# Patient Record
Sex: MALE | Race: WHITE | Employment: OTHER
[De-identification: names, ages, dates, MRNs, and addresses within clinical notes are randomized per-mention and may not be internally consistent; named-entity substitution may affect disease eponyms.]

---

## 2017-01-02 ENCOUNTER — HOME CARE VISIT (OUTPATIENT)
Dept: SCHEDULING | Facility: HOME HEALTH | Age: 67
End: 2017-01-02
Payer: MEDICAID

## 2017-01-02 PROCEDURE — 400013 HH SOC

## 2017-01-02 PROCEDURE — G0299 HHS/HOSPICE OF RN EA 15 MIN: HCPCS

## 2017-01-03 ENCOUNTER — HOME CARE VISIT (OUTPATIENT)
Dept: SCHEDULING | Facility: HOME HEALTH | Age: 67
End: 2017-01-03
Payer: MEDICAID

## 2017-01-03 VITALS
TEMPERATURE: 97.6 F | RESPIRATION RATE: 20 BRPM | SYSTOLIC BLOOD PRESSURE: 130 MMHG | OXYGEN SATURATION: 96 % | DIASTOLIC BLOOD PRESSURE: 70 MMHG | HEART RATE: 72 BPM

## 2017-01-03 VITALS
TEMPERATURE: 96.9 F | DIASTOLIC BLOOD PRESSURE: 90 MMHG | OXYGEN SATURATION: 96 % | SYSTOLIC BLOOD PRESSURE: 140 MMHG | HEART RATE: 68 BPM | RESPIRATION RATE: 18 BRPM

## 2017-01-03 PROCEDURE — G0155 HHCP-SVS OF CSW,EA 15 MIN: HCPCS

## 2017-01-03 PROCEDURE — G0299 HHS/HOSPICE OF RN EA 15 MIN: HCPCS

## 2017-01-04 ENCOUNTER — PATIENT OUTREACH (OUTPATIENT)
Dept: CASE MANAGEMENT | Age: 67
End: 2017-01-04

## 2017-01-04 ENCOUNTER — HOME CARE VISIT (OUTPATIENT)
Dept: SCHEDULING | Facility: HOME HEALTH | Age: 67
End: 2017-01-04
Payer: MEDICAID

## 2017-01-04 VITALS
RESPIRATION RATE: 18 BRPM | HEART RATE: 74 BPM | OXYGEN SATURATION: 98 % | TEMPERATURE: 97.2 F | DIASTOLIC BLOOD PRESSURE: 74 MMHG | SYSTOLIC BLOOD PRESSURE: 132 MMHG

## 2017-01-04 PROCEDURE — G0299 HHS/HOSPICE OF RN EA 15 MIN: HCPCS

## 2017-01-04 NOTE — PROGRESS NOTES
CCM outreach after discharge from SNF. Spoke with patient's son due to patient being at diaylsis . Contact information provided to son for patient to return call. Outreach to [place] , Delmy Bo. Unable to reach. Left message requesting a return call. CCM will folllow up. This note will not be viewable in [address]

## 2017-01-05 ENCOUNTER — HOME CARE VISIT (OUTPATIENT)
Dept: SCHEDULING | Facility: HOME HEALTH | Age: 67
End: 2017-01-05
Payer: MEDICAID

## 2017-01-05 ENCOUNTER — HOME CARE VISIT (OUTPATIENT)
Dept: HOME HEALTH SERVICES | Facility: HOME HEALTH | Age: 67
End: 2017-01-05
Payer: MEDICAID

## 2017-01-05 VITALS
DIASTOLIC BLOOD PRESSURE: 62 MMHG | SYSTOLIC BLOOD PRESSURE: 128 MMHG | RESPIRATION RATE: 18 BRPM | TEMPERATURE: 98.2 F | HEART RATE: 80 BPM

## 2017-01-05 PROCEDURE — G0299 HHS/HOSPICE OF RN EA 15 MIN: HCPCS

## 2017-01-06 ENCOUNTER — HOME CARE VISIT (OUTPATIENT)
Dept: SCHEDULING | Facility: HOME HEALTH | Age: 67
End: 2017-01-06
Payer: MEDICAID

## 2017-01-06 VITALS
RESPIRATION RATE: 16 BRPM | HEART RATE: 82 BPM | OXYGEN SATURATION: 95 % | DIASTOLIC BLOOD PRESSURE: 80 MMHG | SYSTOLIC BLOOD PRESSURE: 152 MMHG

## 2017-01-06 VITALS
DIASTOLIC BLOOD PRESSURE: 86 MMHG | SYSTOLIC BLOOD PRESSURE: 150 MMHG | HEART RATE: 101 BPM | OXYGEN SATURATION: 98 % | TEMPERATURE: 97 F | RESPIRATION RATE: 20 BRPM

## 2017-01-06 PROCEDURE — G0299 HHS/HOSPICE OF RN EA 15 MIN: HCPCS

## 2017-01-06 PROCEDURE — G0152 HHCP-SERV OF OT,EA 15 MIN: HCPCS

## 2017-01-09 ENCOUNTER — HOME CARE VISIT (OUTPATIENT)
Dept: SCHEDULING | Facility: HOME HEALTH | Age: 67
End: 2017-01-09
Payer: MEDICAID

## 2017-01-09 VITALS
OXYGEN SATURATION: 97 % | HEART RATE: 89 BPM | SYSTOLIC BLOOD PRESSURE: 150 MMHG | TEMPERATURE: 96.9 F | RESPIRATION RATE: 14 BRPM | DIASTOLIC BLOOD PRESSURE: 90 MMHG

## 2017-01-09 PROCEDURE — G0299 HHS/HOSPICE OF RN EA 15 MIN: HCPCS

## 2017-01-10 ENCOUNTER — HOME CARE VISIT (OUTPATIENT)
Dept: SCHEDULING | Facility: HOME HEALTH | Age: 67
End: 2017-01-10
Payer: MEDICAID

## 2017-01-10 VITALS
HEART RATE: 90 BPM | SYSTOLIC BLOOD PRESSURE: 150 MMHG | DIASTOLIC BLOOD PRESSURE: 82 MMHG | RESPIRATION RATE: 17 BRPM | OXYGEN SATURATION: 97 %

## 2017-01-10 PROCEDURE — G0151 HHCP-SERV OF PT,EA 15 MIN: HCPCS

## 2017-01-10 PROCEDURE — G0158 HHC OT ASSISTANT EA 15: HCPCS

## 2017-01-11 ENCOUNTER — HOME CARE VISIT (OUTPATIENT)
Dept: SCHEDULING | Facility: HOME HEALTH | Age: 67
End: 2017-01-11
Payer: MEDICAID

## 2017-01-11 VITALS
SYSTOLIC BLOOD PRESSURE: 158 MMHG | DIASTOLIC BLOOD PRESSURE: 82 MMHG | HEART RATE: 80 BPM | RESPIRATION RATE: 18 BRPM | TEMPERATURE: 98.8 F | OXYGEN SATURATION: 98 %

## 2017-01-11 VITALS
RESPIRATION RATE: 16 BRPM | SYSTOLIC BLOOD PRESSURE: 130 MMHG | OXYGEN SATURATION: 96 % | HEART RATE: 69 BPM | DIASTOLIC BLOOD PRESSURE: 62 MMHG | TEMPERATURE: 95.6 F

## 2017-01-11 PROCEDURE — G0299 HHS/HOSPICE OF RN EA 15 MIN: HCPCS

## 2017-01-12 ENCOUNTER — HOME CARE VISIT (OUTPATIENT)
Dept: SCHEDULING | Facility: HOME HEALTH | Age: 67
End: 2017-01-12
Payer: MEDICAID

## 2017-01-12 PROCEDURE — G0158 HHC OT ASSISTANT EA 15: HCPCS

## 2017-01-13 VITALS
DIASTOLIC BLOOD PRESSURE: 81 MMHG | SYSTOLIC BLOOD PRESSURE: 118 MMHG | RESPIRATION RATE: 18 BRPM | OXYGEN SATURATION: 98 % | HEART RATE: 78 BPM

## 2017-01-14 ENCOUNTER — HOME CARE VISIT (OUTPATIENT)
Dept: SCHEDULING | Facility: HOME HEALTH | Age: 67
End: 2017-01-14
Payer: MEDICAID

## 2017-01-14 VITALS
RESPIRATION RATE: 18 BRPM | HEART RATE: 72 BPM | SYSTOLIC BLOOD PRESSURE: 122 MMHG | DIASTOLIC BLOOD PRESSURE: 64 MMHG | OXYGEN SATURATION: 97 % | TEMPERATURE: 98 F

## 2017-01-14 PROCEDURE — G0299 HHS/HOSPICE OF RN EA 15 MIN: HCPCS

## 2017-01-17 ENCOUNTER — HOME CARE VISIT (OUTPATIENT)
Dept: HOME HEALTH SERVICES | Facility: HOME HEALTH | Age: 67
End: 2017-01-17
Payer: MEDICAID

## 2017-01-17 ENCOUNTER — HOME CARE VISIT (OUTPATIENT)
Dept: SCHEDULING | Facility: HOME HEALTH | Age: 67
End: 2017-01-17
Payer: MEDICAID

## 2017-01-17 VITALS
HEART RATE: 62 BPM | SYSTOLIC BLOOD PRESSURE: 112 MMHG | DIASTOLIC BLOOD PRESSURE: 60 MMHG | OXYGEN SATURATION: 98 % | TEMPERATURE: 97.1 F | RESPIRATION RATE: 18 BRPM

## 2017-01-17 VITALS
DIASTOLIC BLOOD PRESSURE: 70 MMHG | HEART RATE: 66 BPM | OXYGEN SATURATION: 96 % | RESPIRATION RATE: 17 BRPM | SYSTOLIC BLOOD PRESSURE: 112 MMHG

## 2017-01-17 PROCEDURE — G0157 HHC PT ASSISTANT EA 15: HCPCS

## 2017-01-17 PROCEDURE — G0152 HHCP-SERV OF OT,EA 15 MIN: HCPCS

## 2017-01-17 PROCEDURE — G0299 HHS/HOSPICE OF RN EA 15 MIN: HCPCS

## 2017-01-18 ENCOUNTER — PATIENT OUTREACH (OUTPATIENT)
Dept: CASE MANAGEMENT | Age: 67
End: 2017-01-18

## 2017-01-18 VITALS
RESPIRATION RATE: 20 BRPM | TEMPERATURE: 97.3 F | DIASTOLIC BLOOD PRESSURE: 68 MMHG | SYSTOLIC BLOOD PRESSURE: 122 MMHG | HEART RATE: 64 BPM

## 2017-01-19 ENCOUNTER — HOME CARE VISIT (OUTPATIENT)
Dept: SCHEDULING | Facility: HOME HEALTH | Age: 67
End: 2017-01-19
Payer: MEDICAID

## 2017-01-19 VITALS
DIASTOLIC BLOOD PRESSURE: 70 MMHG | TEMPERATURE: 97.8 F | HEART RATE: 70 BPM | SYSTOLIC BLOOD PRESSURE: 130 MMHG | OXYGEN SATURATION: 95 % | RESPIRATION RATE: 20 BRPM

## 2017-01-19 PROCEDURE — G0299 HHS/HOSPICE OF RN EA 15 MIN: HCPCS

## 2017-01-19 PROCEDURE — G0157 HHC PT ASSISTANT EA 15: HCPCS

## 2017-01-20 VITALS
HEART RATE: 61 BPM | SYSTOLIC BLOOD PRESSURE: 119 MMHG | TEMPERATURE: 97.8 F | DIASTOLIC BLOOD PRESSURE: 70 MMHG | RESPIRATION RATE: 18 BRPM

## 2017-01-24 ENCOUNTER — HOME CARE VISIT (OUTPATIENT)
Dept: SCHEDULING | Facility: HOME HEALTH | Age: 67
End: 2017-01-24
Payer: MEDICAID

## 2017-01-24 VITALS
HEART RATE: 55 BPM | RESPIRATION RATE: 18 BRPM | SYSTOLIC BLOOD PRESSURE: 122 MMHG | TEMPERATURE: 96.2 F | DIASTOLIC BLOOD PRESSURE: 80 MMHG | OXYGEN SATURATION: 98 %

## 2017-01-24 PROCEDURE — G0157 HHC PT ASSISTANT EA 15: HCPCS

## 2017-01-26 ENCOUNTER — HOME CARE VISIT (OUTPATIENT)
Dept: SCHEDULING | Facility: HOME HEALTH | Age: 67
End: 2017-01-26
Payer: MEDICAID

## 2017-01-26 VITALS
DIASTOLIC BLOOD PRESSURE: 78 MMHG | OXYGEN SATURATION: 98 % | TEMPERATURE: 97.2 F | HEART RATE: 72 BPM | SYSTOLIC BLOOD PRESSURE: 130 MMHG | RESPIRATION RATE: 20 BRPM

## 2017-01-26 PROCEDURE — G0157 HHC PT ASSISTANT EA 15: HCPCS

## 2017-01-31 ENCOUNTER — HOME CARE VISIT (OUTPATIENT)
Dept: SCHEDULING | Facility: HOME HEALTH | Age: 67
End: 2017-01-31
Payer: MEDICAID

## 2017-01-31 PROCEDURE — G0151 HHCP-SERV OF PT,EA 15 MIN: HCPCS

## 2017-02-01 VITALS
SYSTOLIC BLOOD PRESSURE: 126 MMHG | DIASTOLIC BLOOD PRESSURE: 66 MMHG | TEMPERATURE: 97.7 F | RESPIRATION RATE: 18 BRPM | HEART RATE: 58 BPM | OXYGEN SATURATION: 94 %

## 2017-03-29 ENCOUNTER — HOSPITAL ENCOUNTER (INPATIENT)
Age: 67
LOS: 2 days | Discharge: HOME HEALTH | DRG: 660 | End: 2017-04-02
Attending: EMERGENCY MEDICINE | Admitting: INTERNAL MEDICINE
Payer: MEDICAID

## 2017-03-29 DIAGNOSIS — R53.81 DEBILITY: Primary | ICD-10-CM

## 2017-03-29 DIAGNOSIS — D64.9 ANEMIA, UNSPECIFIED TYPE: ICD-10-CM

## 2017-03-29 DIAGNOSIS — D72.819 LEUKOPENIA, UNSPECIFIED TYPE: ICD-10-CM

## 2017-03-29 PROBLEM — N18.6 ESRD (END STAGE RENAL DISEASE) (HCC): Status: ACTIVE | Noted: 2017-03-29

## 2017-03-29 PROBLEM — E80.6 HYPERBILIRUBINEMIA: Status: ACTIVE | Noted: 2017-03-29

## 2017-03-29 PROBLEM — R42 DIZZINESS: Status: ACTIVE | Noted: 2017-03-29

## 2017-03-29 PROBLEM — R77.8 ELEVATED TOTAL PROTEIN: Status: ACTIVE | Noted: 2017-03-29

## 2017-03-29 LAB
ALBUMIN SERPL BCP-MCNC: 2.9 G/DL (ref 3.2–4.6)
ALBUMIN/GLOB SERPL: 0.5 {RATIO} (ref 1.2–3.5)
ALP SERPL-CCNC: 71 U/L (ref 50–136)
ALT SERPL-CCNC: 8 U/L (ref 12–65)
ANION GAP BLD CALC-SCNC: 12 MMOL/L (ref 7–16)
AST SERPL W P-5'-P-CCNC: 15 U/L (ref 15–37)
ATRIAL RATE: 90 BPM
BASOPHILS # BLD AUTO: 0 K/UL (ref 0–0.2)
BASOPHILS # BLD: 0 % (ref 0–2)
BILIRUB DIRECT SERPL-MCNC: 0.4 MG/DL
BILIRUB SERPL-MCNC: 1.8 MG/DL (ref 0.2–1.1)
BUN SERPL-MCNC: 6 MG/DL (ref 8–23)
CALCIUM SERPL-MCNC: 8.2 MG/DL (ref 8.3–10.4)
CALCULATED R AXIS, ECG10: -76 DEGREES
CALCULATED T AXIS, ECG11: 84 DEGREES
CHLORIDE SERPL-SCNC: 97 MMOL/L (ref 98–107)
CO2 SERPL-SCNC: 28 MMOL/L (ref 21–32)
CREAT SERPL-MCNC: 2.15 MG/DL (ref 0.8–1.5)
DIAGNOSIS, 93000: NORMAL
DIFFERENTIAL METHOD BLD: ABNORMAL
EOSINOPHIL # BLD: 0 K/UL (ref 0–0.8)
EOSINOPHIL NFR BLD: 1 % (ref 0.5–7.8)
ERYTHROCYTE [DISTWIDTH] IN BLOOD BY AUTOMATED COUNT: 19.5 % (ref 11.9–14.6)
FERRITIN SERPL-MCNC: 742 NG/ML (ref 8–388)
FLUAV AG NPH QL IA: NEGATIVE
FLUBV AG NPH QL IA: NEGATIVE
FOLATE SERPL-MCNC: 5.6 NG/ML (ref 3.1–17.5)
GLOBULIN SER CALC-MCNC: 5.8 G/DL (ref 2.3–3.5)
GLUCOSE SERPL-MCNC: 86 MG/DL (ref 65–100)
HCT VFR BLD AUTO: 20.3 % (ref 41.1–50.3)
HEMOCCULT STL QL: NEGATIVE
HGB BLD-MCNC: 6.9 G/DL (ref 13.6–17.2)
HGB RETIC QN AUTO: 34 PG (ref 29–35)
IMM GRANULOCYTES # BLD: 0 K/UL (ref 0–0.5)
IMM GRANULOCYTES NFR BLD AUTO: 1.1 % (ref 0–5)
IMM RETICS NFR: 9.4 % (ref 2.3–13.4)
INR PPP: 1 (ref 0.9–1.2)
LACTATE BLD-SCNC: 1.9 MMOL/L (ref 0.5–1.9)
LYMPHOCYTES # BLD AUTO: 81 % (ref 13–44)
LYMPHOCYTES # BLD: 0.7 K/UL (ref 0.5–4.6)
MAGNESIUM SERPL-MCNC: 1.5 MG/DL (ref 1.8–2.4)
MCH RBC QN AUTO: 30.9 PG (ref 26.1–32.9)
MCHC RBC AUTO-ENTMCNC: 34 G/DL (ref 31.4–35)
MCV RBC AUTO: 91 FL (ref 79.6–97.8)
MONOCYTES # BLD: 0.1 K/UL (ref 0.1–1.3)
MONOCYTES NFR BLD AUTO: 10 % (ref 4–12)
NEUTS SEG # BLD: 0.1 K/UL (ref 1.7–8.2)
NEUTS SEG NFR BLD AUTO: 7 % (ref 43–78)
PLATELET # BLD AUTO: 136 K/UL (ref 150–450)
PMV BLD AUTO: 9.2 FL (ref 10.8–14.1)
POTASSIUM SERPL-SCNC: 3.1 MMOL/L (ref 3.5–5.1)
PROT SERPL-MCNC: 8.7 G/DL (ref 6.3–8.2)
PROTHROMBIN TIME: 11.4 SEC (ref 9.6–12)
Q-T INTERVAL, ECG07: 412 MS
QRS DURATION, ECG06: 136 MS
QTC CALCULATION (BEZET), ECG08: 504 MS
RBC # BLD AUTO: 2.23 M/UL (ref 4.23–5.67)
RETICS # AUTO: 0.07 M/UL (ref 0.03–0.1)
RETICS/RBC NFR AUTO: 3 % (ref 0.3–2)
SODIUM SERPL-SCNC: 137 MMOL/L (ref 136–145)
VENTRICULAR RATE, ECG03: 90 BPM
VIT B12 SERPL-MCNC: 1135 PG/ML (ref 193–986)
WBC # BLD AUTO: 0.9 K/UL (ref 4.3–11.1)

## 2017-03-29 PROCEDURE — 80053 COMPREHEN METABOLIC PANEL: CPT | Performed by: EMERGENCY MEDICINE

## 2017-03-29 PROCEDURE — 82270 OCCULT BLOOD FECES: CPT

## 2017-03-29 PROCEDURE — 86334 IMMUNOFIX E-PHORESIS SERUM: CPT

## 2017-03-29 PROCEDURE — P9016 RBC LEUKOCYTES REDUCED: HCPCS | Performed by: EMERGENCY MEDICINE

## 2017-03-29 PROCEDURE — 74011250636 HC RX REV CODE- 250/636: Performed by: INTERNAL MEDICINE

## 2017-03-29 PROCEDURE — 77030032490 HC SLV COMPR SCD KNE COVD -B

## 2017-03-29 PROCEDURE — 82728 ASSAY OF FERRITIN: CPT | Performed by: INTERNAL MEDICINE

## 2017-03-29 PROCEDURE — 81003 URINALYSIS AUTO W/O SCOPE: CPT | Performed by: EMERGENCY MEDICINE

## 2017-03-29 PROCEDURE — 99218 HC RM OBSERVATION: CPT

## 2017-03-29 PROCEDURE — 86901 BLOOD TYPING SEROLOGIC RH(D): CPT | Performed by: EMERGENCY MEDICINE

## 2017-03-29 PROCEDURE — 87804 INFLUENZA ASSAY W/OPTIC: CPT | Performed by: EMERGENCY MEDICINE

## 2017-03-29 PROCEDURE — 93005 ELECTROCARDIOGRAM TRACING: CPT | Performed by: EMERGENCY MEDICINE

## 2017-03-29 PROCEDURE — 99285 EMERGENCY DEPT VISIT HI MDM: CPT | Performed by: EMERGENCY MEDICINE

## 2017-03-29 PROCEDURE — 77030013131 HC IV BLD ST ICUM -A

## 2017-03-29 PROCEDURE — 77030010541

## 2017-03-29 PROCEDURE — 82248 BILIRUBIN DIRECT: CPT | Performed by: INTERNAL MEDICINE

## 2017-03-29 PROCEDURE — 83605 ASSAY OF LACTIC ACID: CPT

## 2017-03-29 PROCEDURE — 86923 COMPATIBILITY TEST ELECTRIC: CPT | Performed by: EMERGENCY MEDICINE

## 2017-03-29 PROCEDURE — 85025 COMPLETE CBC W/AUTO DIFF WBC: CPT | Performed by: EMERGENCY MEDICINE

## 2017-03-29 PROCEDURE — 83735 ASSAY OF MAGNESIUM: CPT | Performed by: INTERNAL MEDICINE

## 2017-03-29 PROCEDURE — 82746 ASSAY OF FOLIC ACID SERUM: CPT | Performed by: EMERGENCY MEDICINE

## 2017-03-29 PROCEDURE — 86580 TB INTRADERMAL TEST: CPT

## 2017-03-29 PROCEDURE — 36430 TRANSFUSION BLD/BLD COMPNT: CPT

## 2017-03-29 PROCEDURE — 97161 PT EVAL LOW COMPLEX 20 MIN: CPT

## 2017-03-29 PROCEDURE — 74011000302 HC RX REV CODE- 302

## 2017-03-29 PROCEDURE — 77030010522

## 2017-03-29 PROCEDURE — 74011250637 HC RX REV CODE- 250/637

## 2017-03-29 PROCEDURE — 85610 PROTHROMBIN TIME: CPT | Performed by: EMERGENCY MEDICINE

## 2017-03-29 PROCEDURE — 84165 PROTEIN E-PHORESIS SERUM: CPT

## 2017-03-29 PROCEDURE — 85046 RETICYTE/HGB CONCENTRATE: CPT | Performed by: INTERNAL MEDICINE

## 2017-03-29 PROCEDURE — 82607 VITAMIN B-12: CPT | Performed by: EMERGENCY MEDICINE

## 2017-03-29 RX ORDER — POTASSIUM CHLORIDE 20MEQ/15ML
40 LIQUID (ML) ORAL
Status: COMPLETED | OUTPATIENT
Start: 2017-03-29 | End: 2017-03-29

## 2017-03-29 RX ORDER — FOLIC ACID 1 MG/1
1 TABLET ORAL DAILY
Status: DISCONTINUED | OUTPATIENT
Start: 2017-03-29 | End: 2017-04-02 | Stop reason: HOSPADM

## 2017-03-29 RX ORDER — SODIUM CHLORIDE 9 MG/ML
250 INJECTION, SOLUTION INTRAVENOUS AS NEEDED
Status: DISCONTINUED | OUTPATIENT
Start: 2017-03-29 | End: 2017-03-30 | Stop reason: SDUPTHER

## 2017-03-29 RX ORDER — SODIUM CHLORIDE 0.9 % (FLUSH) 0.9 %
5-10 SYRINGE (ML) INJECTION EVERY 8 HOURS
Status: DISCONTINUED | OUTPATIENT
Start: 2017-03-29 | End: 2017-03-30 | Stop reason: SDUPTHER

## 2017-03-29 RX ORDER — SODIUM CHLORIDE 0.9 % (FLUSH) 0.9 %
5-10 SYRINGE (ML) INJECTION EVERY 8 HOURS
Status: DISCONTINUED | OUTPATIENT
Start: 2017-03-29 | End: 2017-04-02 | Stop reason: HOSPADM

## 2017-03-29 RX ORDER — ALBUTEROL SULFATE 0.83 MG/ML
2.5 SOLUTION RESPIRATORY (INHALATION)
Status: DISCONTINUED | OUTPATIENT
Start: 2017-03-29 | End: 2017-04-02 | Stop reason: HOSPADM

## 2017-03-29 RX ORDER — MAGNESIUM SULFATE HEPTAHYDRATE 40 MG/ML
2 INJECTION, SOLUTION INTRAVENOUS ONCE
Status: COMPLETED | OUTPATIENT
Start: 2017-03-29 | End: 2017-03-29

## 2017-03-29 RX ORDER — SODIUM CHLORIDE 0.9 % (FLUSH) 0.9 %
5-10 SYRINGE (ML) INJECTION AS NEEDED
Status: DISCONTINUED | OUTPATIENT
Start: 2017-03-29 | End: 2017-04-02 | Stop reason: HOSPADM

## 2017-03-29 RX ORDER — METOPROLOL TARTRATE 25 MG/1
25 TABLET, FILM COATED ORAL EVERY 12 HOURS
Status: DISCONTINUED | OUTPATIENT
Start: 2017-03-29 | End: 2017-04-02 | Stop reason: HOSPADM

## 2017-03-29 RX ORDER — LANOLIN ALCOHOL/MO/W.PET/CERES
325 CREAM (GRAM) TOPICAL 2 TIMES DAILY WITH MEALS
Status: DISCONTINUED | OUTPATIENT
Start: 2017-03-29 | End: 2017-04-02 | Stop reason: HOSPADM

## 2017-03-29 RX ORDER — LANOLIN ALCOHOL/MO/W.PET/CERES
100 CREAM (GRAM) TOPICAL DAILY
Status: DISCONTINUED | OUTPATIENT
Start: 2017-03-29 | End: 2017-04-02 | Stop reason: HOSPADM

## 2017-03-29 RX ORDER — LORAZEPAM 2 MG/ML
1 INJECTION INTRAMUSCULAR
Status: DISCONTINUED | OUTPATIENT
Start: 2017-03-29 | End: 2017-04-02 | Stop reason: HOSPADM

## 2017-03-29 RX ORDER — SODIUM CHLORIDE 0.9 % (FLUSH) 0.9 %
5-10 SYRINGE (ML) INJECTION AS NEEDED
Status: DISCONTINUED | OUTPATIENT
Start: 2017-03-29 | End: 2017-03-30 | Stop reason: SDUPTHER

## 2017-03-29 RX ORDER — PANTOPRAZOLE SODIUM 40 MG/1
40 TABLET, DELAYED RELEASE ORAL
Status: DISCONTINUED | OUTPATIENT
Start: 2017-03-29 | End: 2017-04-02 | Stop reason: HOSPADM

## 2017-03-29 RX ADMIN — Medication 100 MG: at 15:51

## 2017-03-29 RX ADMIN — FOLIC ACID 1 MG: 1 TABLET ORAL at 15:51

## 2017-03-29 RX ADMIN — Medication 10 ML: at 15:52

## 2017-03-29 RX ADMIN — Medication 10 ML: at 21:24

## 2017-03-29 RX ADMIN — FERROUS SULFATE TAB 325 MG (65 MG ELEMENTAL FE) 325 MG: 325 (65 FE) TAB at 18:09

## 2017-03-29 RX ADMIN — TUBERCULIN PURIFIED PROTEIN DERIVATIVE 5 UNITS: 5 INJECTION INTRADERMAL at 18:10

## 2017-03-29 RX ADMIN — METOPROLOL TARTRATE 25 MG: 25 TABLET ORAL at 21:00

## 2017-03-29 RX ADMIN — PANTOPRAZOLE SODIUM 40 MG: 40 TABLET, DELAYED RELEASE ORAL at 18:09

## 2017-03-29 RX ADMIN — MAGNESIUM SULFATE HEPTAHYDRATE 2 G: 40 INJECTION, SOLUTION INTRAVENOUS at 19:35

## 2017-03-29 RX ADMIN — POTASSIUM CHLORIDE 40 MEQ: 20 SOLUTION ORAL at 15:51

## 2017-03-29 NOTE — IP AVS SNAPSHOT
[address] 
[phone] Patient: Hi Bean MRN: [id number] OOY:[birth year] You are allergic to the following Allergen Reactions Heparin Analogues Other (comments) H. I.T positive Immunizations Administered for This Admission Name Date  
 TB Skin Test (PPD) Intradermal 3/29/2017 Recent Documentation Height Weight BMI Smoking Status 1.727 m 75.4 kg 25.27 kg/m2 Former Smoker Unresulted Labs Order Current Status PLEASE READ & DOCUMENT PPD TEST IN 24 HRS Preliminary result PLEASE READ & DOCUMENT PPD TEST IN 48 HRS Preliminary result PROTEIN ELEC WITH LYLE, SERUM Preliminary result Emergency Contacts Name Discharge Info Relation Home Work Mobile Camila Pedro [24] [phone] About your hospitalization You were admitted on:  March 29, 2017 You last received care in the:  [facility] 6 MED SURG You were discharged on:  April 2, 2017 Unit phone number:  [phone] Why you were hospitalized Your primary diagnosis was:  Symptomatic Anemia Your diagnoses also included:  Pancytopenia (Hcc), Paroxysmal Atrial Fibrillation (Hcc), Htn (Hypertension), Gerd (Gastroesophageal Reflux Disease), Elevated Total Protein, Hyperbilirubinemia, Dizziness, Esrd (End Stage Renal Disease) (Hcc) Providers Seen During Your Hospitalizations Provider Role Specialty Primary office phone Janet Martinez MD Attending Provider Emergency Medicine [phone] Colletta Pelton, DO Attending Provider Internal Medicine [phone] Your Primary Care Physician (PCP) Primary Care Physician Office Phone Office Fax Moni Redman [phone] Follow-up Information Follow up With Details Comments Contact Info  IMPORTANT  NEW PCP     4/13/17 @ 7:50am with Dr. Braulio Aggarwal at [facility] [address] [facility] [address]  35 [facility]  Will Call within 48 hours of discharge to set up first in home appointment [address] [facility] [address] [facility] [address] 
[phone] Ludin Thompson MD  follow up in one week [address] [facility] 220 [address] [facility] [address] 
[phone] Gudelia Chaney MD  follow up in 3-5 days [address] [facility] [address] [facility] [address] 
[phone] Your Appointments Thursday April 13, 2017  8:20 AM EDT New Patient with Ludin Thompson MD  
[address] [place] ([address]) [address] [facility] [address] [phone] Current Discharge Medication List  
  
CONTINUE these medications which have NOT CHANGED Dose & Instructions Dispensing Information Comments Morning Noon Evening Bedtime  
 acetaminophen 325 mg tablet Commonly known as:  TYLENOL Your last dose was: Your next dose is:    
   
   
 Dose:  650 mg Take 2 Tabs by mouth two (2) times daily as needed. Use only if necessary. Patient has history of alcoholic liver disease Quantity:  30 Tab Refills:  0  
     
   
   
   
  
 albuterol 2.5 mg /3 mL (0.083 %) nebulizer solution Commonly known as:  PROVENTIL VENTOLIN Your last dose was: Your next dose is:    
   
   
 Dose:  2.5 mg  
3 mL by Nebulization route every six (6) hours as needed for Wheezing. Quantity:  24 Each Refills:  0  
     
   
   
   
  
 alum-mag hydroxide-simeth 200-200-20 mg/5 mL Susp Commonly known as:  MYLANTA Your last dose was: Your next dose is:    
   
   
 Dose:  30 mL Take 30 mL by mouth three (3) times daily as needed. Quantity:  1 Bottle Refills:  0  
     
   
   
   
  
 epoetin miky 10,000 unit/mL injection Commonly known as:  EPOGEN;PROCRIT Your last dose was: Your next dose is: Dose:  35730 Units 1 mL by SubCUTAneous route every seven (7) days. Indications: RENAL DIALYSIS Quantity:  1 Vial  
Refills:  0  
     
   
   
   
  
 ferrous sulfate 325 mg (65 mg iron) tablet Your last dose was: Your next dose is:    
   
   
 Dose:  325 mg Take 1 Tab by mouth two (2) times daily (with meals). Quantity:  60 Tab Refills:  0  
     
   
   
   
  
 magnesium oxide 400 mg tablet Commonly known as:  MAG-OX Your last dose was: Your next dose is:    
   
   
 Dose:  400 mg Take 400 mg by mouth daily. Refills:  0  
     
   
   
   
  
 metoprolol tartrate 25 mg tablet Commonly known as:  LOPRESSOR Your last dose was: Your next dose is:    
   
   
 Dose:  25 mg Take 1 Tab by mouth every twelve (12) hours. Quantity:  60 Tab Refills:  1 OXYGEN-AIR DELIVERY SYSTEMS Your last dose was: Your next dose is:    
   
   
 Dose:  2 L/min Take 2 L/min by inhalation nightly. via NC Refills:  0  
     
   
   
   
  
 pantoprazole 40 mg tablet Commonly known as:  PROTONIX Your last dose was: Your next dose is:    
   
   
 Dose:  40 mg Take 1 Tab by mouth Before breakfast and dinner. Quantity:  60 Tab Refills:  0  
     
   
   
   
  
 VITAMIN D3 2,000 unit Cap capsule Generic drug:  Cholecalciferol (Vitamin D3) Your last dose was: Your next dose is:    
   
   
 Dose:  2000 Units Take 2,000 Units by mouth once. Refills:  0 Where to Get Your Medications Information on where to get these meds will be given to you by the nurse or doctor. ! Ask your nurse or doctor about these medications  
  metoprolol tartrate 25 mg tablet Discharge Instructions Anemia: Care Instructions Your Care Instructions Anemia is a low level of red blood cells, which carry oxygen throughout your body. Many things can cause anemia. Lack of iron is one of the most common causes. Your body needs iron to make hemoglobin, a substance in red blood cells that carries oxygen from the lungs to your body's cells. Without enough iron, the body produces fewer and smaller red blood cells. As a result, your body's cells do not get enough oxygen, and you feel tired and weak. And you may have trouble concentrating. Bleeding is the most common cause of a lack of iron. You may have heavy menstrual bleeding or bleeding caused by conditions such as ulcers, hemorrhoids, or cancer. Regular use of aspirin or other anti-inflammatory medicines (such as ibuprofen) also can cause bleeding in some people. A lack of iron in your diet also can cause anemia, especially at times when the body needs more iron, such as during pregnancy, infancy, and the teen years. Your doctor may have prescribed iron pills. It may take several months of treatment for your iron levels to return to normal. Your doctor also may suggest that you eat foods that are rich in iron, such as meat and beans. There are many other causes of anemia. It is not always due to a lack of iron. Finding the specific cause of your anemia will help your doctor find the right treatment for you. Follow-up care is a key part of your treatment and safety. Be sure to make and go to all appointments, and call your doctor if you are having problems. It's also a good idea to know your test results and keep a list of the medicines you take. How can you care for yourself at home? · Take your medicines exactly as prescribed. Call your doctor if you think you are having a problem with your medicine. · If your doctor recommends iron pills, take them as directed: ¨ Try to take the pills on an empty stomach about 1 hour before or 2 hours after meals. But you may need to take iron with food to avoid an upset stomach. ¨ Do not take antacids or drink milk or caffeine drinks (such as coffee, tea, or cola) at the same time or within 2 hours of the time that you take your iron. They can make it hard for your body to absorb the iron. ¨ Vitamin C (from food or supplements) helps your body absorb iron. Try taking iron pills with a glass of orange juice or some other food that is high in vitamin C, such as citrus fruits. ¨ Iron pills may cause stomach problems, such as heartburn, nausea, diarrhea, constipation, and cramps. Be sure to drink plenty of fluids, and include fruits, vegetables, and fiber in your diet each day. Iron pills often make your bowel movements dark or green. ¨ If you forget to take an iron pill, do not take a double dose of iron the next time you take a pill. ¨ Keep iron pills out of the reach of small children. An overdose of iron can be very dangerous. · Follow your doctor's advice about eating iron-rich foods. These include red meat, shellfish, poultry, eggs, beans, raisins, whole-grain bread, and leafy green vegetables. · Steam vegetables to help them keep their iron content. When should you call for help? Call 911 anytime you think you may need emergency care. For example, call if: 
· You have symptoms of a heart attack. These may include: ¨ Chest pain or pressure, or a strange feeling in the chest. 
¨ Sweating. ¨ Shortness of breath. ¨ Nausea or vomiting. ¨ Pain, pressure, or a strange feeling in the back, neck, jaw, or upper belly or in one or both shoulders or arms. ¨ Lightheadedness or sudden weakness. ¨ A fast or irregular heartbeat. After you call 911, the  may tell you to chew 1 adult-strength or 2 to 4 low-dose aspirin. Wait for an ambulance. Do not try to drive yourself. · You passed out (lost consciousness). Call your doctor now or seek immediate medical care if: 
· You have new or increased shortness of breath. · You are dizzy or lightheaded, or you feel like you may faint. · Your fatigue and weakness continue or get worse. · You have any abnormal bleeding, such as: 
¨ Nosebleeds. ¨ Vaginal bleeding that is different (heavier, more frequent, at a different time of the month) than what you are used to. ¨ Bloody or black stools, or rectal bleeding. ¨ Bloody or pink urine. Watch closely for changes in your health, and be sure to contact your doctor if: 
· You do not get better as expected. Where can you learn more? Go to http://duc-nickolas.info/. Enter R301 in the search box to learn more about \"Anemia: Care Instructions. \" Current as of: October 13, 2016 Content Version: 11.2 © 5105-6930 [employer]. Care instructions adapted under license by [employer] (which disclaims liability or warranty for this information). If you have questions about a medical condition or this instruction, always ask your healthcare professional. [place] [address] any warranty or liability for your use of this information. DISCHARGE SUMMARY from Nurse The following personal items are in your possession at time of discharge: 
 
Dental Appliances: None Visual Aid: Glasses Home Medications: None Jewelry: None Clothing: Footwear, Pants, Shirt, Other (comment) (coat) Other Valuables: Wheelchair PATIENT INSTRUCTIONS: 
 
 
F-face looks uneven A-arms unable to move or move unevenly S-speech slurred or non-existent T-time-call 911 as soon as signs and symptoms begin-DO NOT go Back to bed or wait to see if you get better-TIME IS BRAIN. Warning Signs of HEART ATTACK Call 911 if you have these symptoms: ? Chest discomfort. Most heart attacks involve discomfort in the center of the chest that lasts more than a few minutes, or that goes away and comes back. It can feel like uncomfortable pressure, squeezing, fullness, or pain. ? Discomfort in other areas of the upper body. Symptoms can include pain or discomfort in one or both arms, the back, neck, jaw, or stomach. ? Shortness of breath with or without chest discomfort. ? Other signs may include breaking out in a cold sweat, nausea, or lightheadedness. Don't wait more than five minutes to call [address]! Fast action can save your life. Calling 911 is almost always the fastest way to get lifesaving treatment. Emergency Medical Services staff can begin treatment when they arrive  up to an hour sooner than if someone gets to the hospital by car. The discharge information has been reviewed with the patient. The patient verbalized understanding. Discharge medications reviewed with the patient and appropriate educational materials and side effects teaching were provided. Discharge Orders None [employer] Announcement We are excited to announce that we are making your provider's discharge notes available to you in [employer]. You will see these notes when they are completed and signed by the physician that discharged you from your recent hospital stay. If you have any questions or concerns about any information you see in [employer], please call the Health Information Department where you were seen or reach out to your Primary Care Provider for more information about your plan of care. Introducing [facility]! [employer] introduces [employer] patient portal. Now you can access parts of your medical record, email your doctor's office, and request medication refills online. 1. In your internet browser, go to [email or web address] 2. Click on the First Time User? Click Here link in the Sign In box.  You will see the New Member Sign Up page. 3. Enter your naaya Access Code exactly as it appears below. You will not need to use this code after youve completed the sign-up process. If you do not sign up before the expiration date, you must request a new code. · [employer]aya Access Code: [email or web address] Expires: 6/21/2017 11:36 AM 
 
4. Enter the last four digits of your Social Security Number (xxxx) and Date of Birth (mm/dd/yyyy) as indicated and click Submit. You will be taken to the next sign-up page. 5. Create a naaya ID. This will be your naaya login ID and cannot be changed, so think of one that is secure and easy to remember. 6. Create a naaya password. You can change your password at any time. 7. Enter your Password Reset Question and Answer. This can be used at a later time if you forget your password. 8. Enter your e-mail address. You will receive e-mail notification when new information is available in [address] 9. Click Sign Up. You can now view and download portions of your medical record. 10. Click the Download Summary menu link to download a portable copy of your medical information. If you have questions, please visit the Frequently Asked Questions section of the naaya website. Remember, naaya is NOT to be used for urgent needs. For medical emergencies, dial 911. Now available from your iPhone and Android! General Information Please provide this summary of care documentation to your next provider. Patient Signature:  ____________________________________________________________ Date:  ____________________________________________________________  
  
Percy Gilles Provider Signature:  ____________________________________________________________ Date:  ____________________________________________________________

## 2017-03-29 NOTE — PROGRESS NOTES
Primary Nurse Noah Armendariz, DEVAN and Abner Mahoney, DEVAN performed a dual skin assessment on this patient Impairment noted- see wound doc flow sheet scars noted and opening and sacral area.  Pt states the wound is from a previous surgery and md is aware  Landon score is 16

## 2017-03-29 NOTE — CONSULTS
[address] Nephrology Consultation    Admission Date:  3/29/2017    Admission Diagnosis:  SYMPTOMATIC ANEMIA    We are asked to manage ESRD    History of Present Illness:  Pt is a 62 yo man who started dialysis latter part of 2016 and declared ESRD after no recovery from VITO. He has been experiencing weakness, decreased appetite for at least two weeks. He went to dialysis today and then came to ER and found to be anemia and more leukopenic than usual.  He will be getting transfusion with PRBC. Past Medical History:   Diagnosis Date    Alcohol abuse, daily use 09/03/2016    NONE X 7 DAYS, USUALLY DRINKS 10-15 BEERS DAILY. TAKES A WEEK OFF AT A TIME ONCE A MONTH    Anemia 9/3/2016    HGB 7.8 ON ADMISSION    Chronic kidney disease     end stage renal disease- dialysis on Tues, Thurs. , Saturday mornings.  HIT (heparin-induced thrombocytopenia) ([facility] [address]     Hypertension     Hypokalemia 9/3/2016    2.7 ON ADMISSION    Tobacco abuse 9/3/2016    QUIT 7 DAYS AGO      Past Surgical History:   Procedure Laterality Date    ABDOMEN SURGERY PROC UNLISTED      Sept. 2016.  DEBRIDE NECROTIC SKIN/ TISSUE, ABD WALL  09/04/2016    I&D rectal abscess with drain placement - Dr. Ludmila Recinos      rectal abcess surgery that also got infected-  Subsequent I & D of the rectal abcess.       Current Facility-Administered Medications   Medication Dose Route Frequency    sodium chloride (NS) flush 5-10 mL  5-10 mL IntraVENous Q8H    sodium chloride (NS) flush 5-10 mL  5-10 mL IntraVENous PRN    0.9% sodium chloride infusion 250 mL  250 mL IntraVENous PRN    pantoprazole (PROTONIX) tablet 40 mg  40 mg Oral ACB&D    metoprolol tartrate (LOPRESSOR) tablet 25 mg  25 mg Oral Q12H    ferrous sulfate tablet 325 mg  325 mg Oral BID WITH MEALS    albuterol (PROVENTIL VENTOLIN) nebulizer solution 2.5 mg  2.5 mg Nebulization Q6H PRN    thiamine (B-1) tablet 100 mg  100 mg Oral DAILY    folic acid (FOLVITE) tablet 1 mg  1 mg Oral DAILY    tuberculin injection 5 Units  5 Units IntraDERMal ONCE    sodium chloride (NS) flush 5-10 mL  5-10 mL IntraVENous Q8H    sodium chloride (NS) flush 5-10 mL  5-10 mL IntraVENous PRN    LORazepam (ATIVAN) injection 1 mg  1 mg IntraVENous Q4H PRN    potassium chloride (KAON 10%) 20 mEq/15 mL oral liquid 40 mEq  40 mEq Oral NOW     Allergies   Allergen Reactions    Heparin Analogues Other (comments)     H. I.T positive      Social History   Substance Use Topics    Smoking status: Former Smoker     Packs/day: 2.00     Years: 40.00     Types: Cigarettes    Smokeless tobacco: Not on file      Comment: STATES HE QUIT 7 DAYS AGO    Alcohol use 42.0 - 63.0 oz/week     70 - 105 Cans of beer per week      Comment: 10-15 CANS DAILY      Family History   Problem Relation Age of Onset    Stroke Mother         Review of Systems:  Gen - no fever, appetite decreased  CV - no chest pain, no palpitation  Lung - no shortness of breath, no cough  Abd - no tenderness, no nausea/vomiting, no diarrhea  Ext - no edema  Musculoskeletal - no joint pain  Neurologic - weakness      Objective:  Vitals:    03/29/17 1403 03/29/17 1447 03/29/17 1511 03/29/17 1530   BP: 126/58 121/70 120/60 121/70   Pulse: 91 92 80 93   Resp: 16 17     Temp: 98.5 °F (36.9 °C) 97.4 °F (36.3 °C)  97.4 °F (36.3 °C)   SpO2: 100% 100%  100%   Weight:       Height:         No intake or output data in the 24 hours ending 03/29/17 1538  Wt Readings from Last 3 Encounters:   03/29/17 77 kg (169 lb 12.1 oz)   03/22/17 90.7 kg (200 lb)   01/18/17 92.1 kg (203 lb)       GEN - in no distress, alert and oriented  Neck - no JVD  CV - regular, no murmur, no rub  Lung - clear bilaterally, lungs expand symmetrically  Chest wall - normal appearance  Abd - soft, nontender, bowel sounds present, + colostomy  Ext - no edema      Data Review:     Recent Labs      03/29/17   1040   WBC  0.9*   HGB  6.9*   HCT  20.3*   PLT  136*   INR  1.0        Recent Labs 03/29/17   1040   NA  137   K  3.1*   CL  97*   CO2  28   BUN  6*   CREA  2.15*   CA  8.2*   GLU  86   MG  1.5*         Assessment:     Principal Problem:    Symptomatic anemia (3/29/2017)    Active Problems:    Pancytopenia ([facility] [address] (9/3/2016)      Overview: HGB 7.8 ON ADMISSION      HTN (hypertension) (9/4/2016)      GERD (gastroesophageal reflux disease) (9/4/2016)      Paroxysmal atrial fibrillation (HCC) (9/17/2016)      Elevated total protein (3/29/2017)      Hyperbilirubinemia (3/29/2017)      Dizziness (3/29/2017)      ESRD (end stage renal disease) ([facility] [address] (3/29/2017)        Plan:     1. ESRD - HD MWF  - Had dialysis today, next HD likely Friday  - Using Permacath  - Has WESTON AVF placed by Vascular surgery at [facility] the [facility]  - Has been unsuccessful with cannulation at his clinic and he [facility]  2. Weakness  3. Anemia - getting PRBC  4. Leukopenia  5. H/o HIT positive  6.  Diverting colostomy - because of perirectal abscess

## 2017-03-29 NOTE — H&P
HOSPITALIST H&P  NAME:  Fercho Camacho   Age:  61 y.o.  :   1950   MRN:   [id number]  PCP: PROVIDER UNKNOWN  Treatment Team: Attending Provider: Samia Escalante MD; Primary Nurse: Keshawn Huynh RN  HPI:   Fercho Camacho is a 61 y.o. male that presented to the ED with 3 week history of weakness and increasing difficulty ambulating, He reports decreased oral intake and some dysphagia with both solids and liquids. He reports chronic belching and abdominal distention since his surgery last fall. He reports occasional chills but denies fever, N/V. He denies overt blood loss. ED evaluation shows pancytopenia. The hospitalist have been asked to admit. Complete ROS done and is as stated in HPI or otherwise negative  Past Medical History:   Diagnosis Date    Alcohol abuse, daily use 2016    NONE X 7 DAYS, USUALLY DRINKS 10-15 BEERS DAILY. TAKES A WEEK OFF AT A TIME ONCE A MONTH    Anemia 9/3/2016    HGB 7.8 ON ADMISSION    Chronic kidney disease     end stage renal disease- dialysis on Tues, Thurs. , Saturday mornings.  HIT (heparin-induced thrombocytopenia) ([facility] [address]     Hypertension     Hypokalemia 9/3/2016    2.7 ON ADMISSION    Tobacco abuse 9/3/2016    QUIT 7 DAYS AGO      Past Surgical History:   Procedure Laterality Date    ABDOMEN SURGERY PROC UNLISTED      2016.  DEBRIDE NECROTIC SKIN/ TISSUE, ABD WALL  2016    I&D rectal abscess with drain placement - Dr. Ludmila Recinos      rectal abcess surgery that also got infected-  Subsequent I & D of the rectal abcess. Prior to Admission Medications   Prescriptions Last Dose Informant Patient Reported? Taking? Cholecalciferol, Vitamin D3, (VITAMIN D3) 2,000 unit cap capsule   Yes No   Sig: Take 2,000 Units by mouth once. OXYGEN-AIR DELIVERY SYSTEMS   Yes No   Sig: Take 2 L/min by inhalation nightly. via NC   POLLEN EXTRACTS (PROSTAT PO)   Yes No   Sig: Take  by mouth.    acetaminophen (TYLENOL) 325 mg tablet No No   Sig: Take 2 Tabs by mouth two (2) times daily as needed. Use only if necessary. Patient has history of alcoholic liver disease   albuterol (PROVENTIL VENTOLIN) 2.5 mg /3 mL (0.083 %) nebulizer solution   No No   Sig: 3 mL by Nebulization route every six (6) hours as needed for Wheezing. alum-mag hydroxide-simeth (MYLANTA) 200-200-20 mg/5 mL susp   No No   Sig: Take 30 mL by mouth three (3) times daily as needed. epoetin miky (EPOGEN;PROCRIT) 10,000 unit/mL injection   No No   Si mL by SubCUTAneous route every seven (7) days. Indications: RENAL DIALYSIS   ferrous sulfate 325 mg (65 mg iron) tablet   No No   Sig: Take 1 Tab by mouth two (2) times daily (with meals). magnesium oxide (MAG-OX) 400 mg tablet   Yes No   Sig: Take 400 mg by mouth daily. metoprolol tartrate (LOPRESSOR) 25 mg tablet   No No   Sig: Take 1 Tab by mouth every twelve (12) hours. oxyCODONE IR (ROXICODONE) 5 mg immediate release tablet   No No   Sig: Take 1 Tab by mouth every eight (8) hours as needed for Pain. Max Daily Amount: 15 mg.   pantoprazole (PROTONIX) 40 mg tablet   No No   Sig: Take 1 Tab by mouth Before breakfast and dinner. Facility-Administered Medications: None     Allergies   Allergen Reactions    Heparin Analogues Other (comments)     H. I.T positive      Social History   Substance Use Topics    Smoking status: Former Smoker     Packs/day: 2.00     Years: 40.00     Types: Cigarettes    Smokeless tobacco: Not on file      Comment: STATES HE QUIT 7 DAYS AGO    Alcohol use 42.0 - 63.0 oz/week     70 - 105 Cans of beer per week      Comment: 10-15 CANS DAILY      Family History   Problem Relation Age of Onset    Stroke Mother           Objective:     Visit Vitals    /78    Pulse 87    Temp 98.4 °F (36.9 °C)    Resp 18    Ht 5' 8\" (1.727 m)    Wt 77 kg (169 lb 12.1 oz)    SpO2 98%    BMI 25.81 kg/m2      Temp (24hrs), Av.4 °F (36.9 °C), Min:98.4 °F (36.9 °C), Max:98.4 °F (36.9 °C)    Oxygen Therapy  O2 Sat (%): 98 % (03/29/17 1204)  O2 Device: Room air (03/29/17 1204)  Physical Exam:  General:    Alert, cooperative, no distress, appears stated age. Head:   Normocephalic, without obvious abnormality, atraumatic. Nose:  Nares normal. No drainage or sinus tenderness. Lungs:   CTA  Heart:  irreg irreg   Abdomen:   Soft, non-tender. Not distended. Bowel sounds normal. No masses, Ostomy in place   Extremities: No cyanosis. No edema. No clubbing  Skin:     Texture, turgor normal. No rashes or lesions. Not Jaundiced  Neurologic: Alert and oriented times 4, generalized weakness   Data Review:   Recent Results (from the past 24 hour(s))   EKG, 12 LEAD, INITIAL    Collection Time: 03/29/17 10:29 AM   Result Value Ref Range    Ventricular Rate 90 BPM    Atrial Rate 375 BPM    QRS Duration 136 ms    Q-T Interval 412 ms    QTC Calculation (Bezet) 504 ms    Calculated R Axis -76 degrees    Calculated T Axis 84 degrees    Diagnosis       !! AGE AND GENDER SPECIFIC ECG ANALYSIS !! Atrial fibrillation with a competing junctional pacemaker  Right bundle branch block  Left anterior fascicular block  !!! Bifascicular block !!! Abnormal ECG  When compared with ECG of 16-SEP-2016 18:26,  Atrial fibrillation has replaced Sinus rhythm  T wave inversion now evident in Lateral leads     CBC WITH AUTOMATED DIFF    Collection Time: 03/29/17 10:40 AM   Result Value Ref Range    WBC 0.9 (LL) 4.3 - 11.1 K/uL    RBC 2.23 (L) 4.23 - 5.67 M/uL    HGB 6.9 (LL) 13.6 - 17.2 g/dL    HCT 20.3 (LL) 41.1 - 50.3 %    MCV 91.0 79.6 - 97.8 FL    MCH 30.9 26.1 - 32.9 PG    MCHC 34.0 31.4 - 35.0 g/dL    RDW 19.5 (H) 11.9 - 14.6 %    PLATELET 373 (L) 548 - 450 K/uL    MPV 9.2 (L) 10.8 - 14.1 FL    DF AUTOMATED      NEUTROPHILS 7 (L) 43 - 78 %    LYMPHOCYTES 81 (H) 13 - 44 %    MONOCYTES 10 4.0 - 12.0 %    EOSINOPHILS 1 0.5 - 7.8 %    BASOPHILS 0 0.0 - 2.0 %    IMMATURE GRANULOCYTES 1.1 0.0 - 5.0 %    ABS.  NEUTROPHILS 0.1 (L) 1.7 - 8.2 K/UL    ABS. LYMPHOCYTES 0.7 0.5 - 4.6 K/UL    ABS. MONOCYTES 0.1 0.1 - 1.3 K/UL    ABS. EOSINOPHILS 0.0 0.0 - 0.8 K/UL    ABS. BASOPHILS 0.0 0.0 - 0.2 K/UL    ABS. IMM. GRANS. 0.0 0.0 - 0.5 K/UL   METABOLIC PANEL, COMPREHENSIVE    Collection Time: 03/29/17 10:40 AM   Result Value Ref Range    Sodium 137 136 - 145 mmol/L    Potassium 3.1 (L) 3.5 - 5.1 mmol/L    Chloride 97 (L) 98 - 107 mmol/L    CO2 28 21 - 32 mmol/L    Anion gap 12 7 - 16 mmol/L    Glucose 86 65 - 100 mg/dL    BUN 6 (L) 8 - 23 MG/DL    Creatinine 2.15 (H) 0.8 - 1.5 MG/DL    GFR est AA 40 (L) >60 ml/min/1.73m2    GFR est non-AA 33 (L) >60 ml/min/1.73m2    Calcium 8.2 (L) 8.3 - 10.4 MG/DL    Bilirubin, total 1.8 (H) 0.2 - 1.1 MG/DL    ALT (SGPT) 8 (L) 12 - 65 U/L    AST (SGOT) 15 15 - 37 U/L    Alk. phosphatase 71 50 - 136 U/L    Protein, total 8.7 (H) 6.3 - 8.2 g/dL    Albumin 2.9 (L) 3.2 - 4.6 g/dL    Globulin 5.8 (H) 2.3 - 3.5 g/dL    A-G Ratio 0.5 (L) 1.2 - 3.5     TYPE & SCREEN    Collection Time: 03/29/17 10:40 AM   Result Value Ref Range    Crossmatch Expiration 04/01/2017     ABO/Rh(D) O NEGATIVE     Antibody screen NEG    INFLUENZA A & B AG (RAPID TEST)    Collection Time: 03/29/17 10:42 AM   Result Value Ref Range    Influenza A Ag NEGATIVE  NEG      Influenza B Ag NEGATIVE  NEG     POC FECAL OCCULT BLOOD    Collection Time: 03/29/17 10:43 AM   Result Value Ref Range    Occult blood, stool (POC) NEGATIVE  NEG     POC LACTIC ACID    Collection Time: 03/29/17 10:53 AM   Result Value Ref Range    Lactic Acid (POC) 1.9 0.5 - 1.9 mmol/L       Assessment and Plan:      Active Hospital Problems    Diagnosis Date Noted    Symptomatic anemia 03/29/2017    Paroxysmal atrial fibrillation (HCC) 09/17/2016    HTN (hypertension) 09/04/2016    GERD (gastroesophageal reflux disease) 09/04/2016    Pancytopenia ([facility] [address] 09/03/2016     HGB 7.8 ON ADMISSION     Admit to medical bed   Transfuse 1 PRBC  Check orthostatic VS  Check INR, ferritin, retic count, SPEP, direct bili   Consult Nephrology and Hematology   Follow up CT abdomen/pelvis scheduled for tomorrow   Continue home medications as ordered   No Heparin due to history of HIT  Surrogate decision maker: brother, Aaliyah August   Estimated length of stay:1-2 days   Sara Shukla., PA

## 2017-03-29 NOTE — ED PROVIDER NOTES
HPI Comments: 29-year-old male presents to the emergency department from dialysis. Apparently the patient was feeling weak today. EMS was called. Patient states he has not been feeling well for about a week. Increasing weakness. Chills. No alleviating. No nausea or vomiting. Decreased by mouth intake. Decreased appetite. Patient is afraid to eat because he doesn't want To change his ostomy bag. Patient had a perirectal abscess last year. Has a colostomy secondary to that. He has been followed by Dr. Williams Rivas.  That is healing nicely        Patient is a 61 y.o. male presenting with general illness. Generalized Body Aches   Associated symptoms include abdominal pain. Pertinent negatives include no shortness of breath. Past Medical History:   Diagnosis Date    Alcohol abuse, daily use 09/03/2016    NONE X 7 DAYS, USUALLY DRINKS 10-15 BEERS DAILY. TAKES A WEEK OFF AT A TIME ONCE A MONTH    Anemia 9/3/2016    HGB 7.8 ON ADMISSION    Chronic kidney disease     end stage renal disease- dialysis on Tues, Thurs. , Saturday mornings.  Hypertension     Hypokalemia 9/3/2016    2.7 ON ADMISSION    Tobacco abuse 9/3/2016    QUIT 7 DAYS AGO       Past Surgical History:   Procedure Laterality Date    ABDOMEN SURGERY PROC UNLISTED      Sept. 2016.  DEBRIDE NECROTIC SKIN/ TISSUE, ABD WALL  09/04/2016    I&D rectal abscess with drain placement - Dr. Yamilet Oneil      rectal abcess surgery that also got infected-  Subsequent I & D of the rectal abcess. Family History:   Problem Relation Age of Onset    Stroke Mother        Social History     Social History    Marital status: SINGLE     Spouse name: N/A    Number of children: N/A    Years of education: N/A     Occupational History    Not on file.      Social History Main Topics    Smoking status: Former Smoker     Packs/day: 2.00     Years: 40.00     Types: Cigarettes    Smokeless tobacco: Not on file      Comment: STATES HE QUIT 7 DAYS AGO    Alcohol use 42.0 - 63.0 oz/week     70 - 105 Cans of beer per week      Comment: 10-15 CANS DAILY    Drug use: No    Sexual activity: Not on file     Other Topics Concern    Not on file     Social History Narrative    9/3/16:  PATIENT IS SINGLE, LIVES WITH BROTHERDEVENDRA         ALLERGIES: Heparin analogues    Review of Systems   Constitutional: Positive for activity change and appetite change. Negative for chills and fever. HENT: Negative. Respiratory: Negative for shortness of breath. Gastrointestinal: Positive for abdominal pain and nausea. Negative for abdominal distention and vomiting. Genitourinary: Negative. Musculoskeletal: Negative. Skin: Negative. Neurological: Negative. Psychiatric/Behavioral: Negative. Vitals:    03/29/17 1030   BP: 114/78   Pulse: 90   Resp: 24   Temp: 98.4 °F (36.9 °C)   SpO2: 98%   Weight: 77 kg (169 lb 12.1 oz)   Height: 5' 8\" (1.727 m)            Physical Exam   Constitutional: He is oriented to person, place, and time. No distress. Disheveled, pale, chronically ill-appearing male. HENT:   Head: Normocephalic and atraumatic. Eyes: EOM are normal. Pupils are equal, round, and reactive to light. Cardiovascular: Normal rate and regular rhythm. Pulmonary/Chest: Breath sounds normal. No respiratory distress. He has no wheezes. Abdominal: Soft. He exhibits no distension. There is tenderness. There is no rebound and no guarding. Musculoskeletal: Normal range of motion. He exhibits no edema or tenderness. Neurological: He is alert and oriented to person, place, and time. No cranial nerve deficit. Coordination normal.   Skin: Skin is warm and dry. Psychiatric:   Affect is flat. Speech is slow and deliberate. Nursing note and vitals reviewed. MDM  Number of Diagnoses or Management Options  Diagnosis management comments: 65-year-old male on dialysis for several months. Increasing weakness.   Increased fatigue. Decreased exercise tolerance. Increasing shortness of breath. No alleviating. He feels worse with activity    Was on dialysis today had increased weakness and EMS was contacted    Patient states he gets really dizzy when he stands up. No fever, no vomiting, he does have liquid stool in his ostomy.   Joaquin Buchanan MD; 3/29/2017 @10:56 AM===========================================    ED Course       Procedures

## 2017-03-29 NOTE — PROGRESS NOTES
Care Management Interventions  Transition of Care Consult (CM Consult): Home Health, Discharge [address] [id number]: Yes  Current Support Network: Lives with Caregiver  Confirm Follow Up Transport: Family  Plan discussed with Pt/Family/Caregiver: Yes  Freedom of Choice Offered: Yes  Discharge Location  Discharge Placement: Home with home health    SW met with patient to initiate D/C planning. Pt lives with his brother. Pt stated that currently he has Medicaid and is on hospital sponsorship. Pt has Medicare card but Medicare will not take effect until May 2, 2017 when pt turns 67. Pt is interested in home care/ specifically home health nursing to assist him with his colostomy bag and an aide to assist with bathing/ dressing. We will plan to order [facility] at discharge and also do a referral to [facility] for aide service in the community- SW explained that there is wait list for services for [facility]. Pt does not have a PCP- we will request assignment through [facility] and update record. SW will continue to follow.

## 2017-03-29 NOTE — PROGRESS NOTES
TRANSFER - IN REPORT:    Verbal report received from [place], RN(name) on [address]  being received from ED(unit) for routine progression of care      Report consisted of patients Situation, Background, Assessment and   Recommendations(SBAR). Information from the following report(s) SBAR was reviewed with the receiving nurse. Opportunity for questions and clarification was provided. Assessment completed upon patients arrival to unit and care assumed.

## 2017-03-29 NOTE — IP AVS SNAPSHOT
Current Discharge Medication List  
  
CONTINUE these medications which have NOT CHANGED Dose & Instructions Dispensing Information Comments Morning Noon Evening Bedtime  
 acetaminophen 325 mg tablet Commonly known as:  TYLENOL Your last dose was: Your next dose is:    
   
   
 Dose:  650 mg Take 2 Tabs by mouth two (2) times daily as needed. Use only if necessary. Patient has history of alcoholic liver disease Quantity:  30 Tab Refills:  0  
     
   
   
   
  
 albuterol 2.5 mg /3 mL (0.083 %) nebulizer solution Commonly known as:  PROVENTIL VENTOLIN Your last dose was: Your next dose is:    
   
   
 Dose:  2.5 mg  
3 mL by Nebulization route every six (6) hours as needed for Wheezing. Quantity:  24 Each Refills:  0  
     
   
   
   
  
 alum-mag hydroxide-simeth 200-200-20 mg/5 mL Susp Commonly known as:  MYLANTA Your last dose was: Your next dose is:    
   
   
 Dose:  30 mL Take 30 mL by mouth three (3) times daily as needed. Quantity:  1 Bottle Refills:  0  
     
   
   
   
  
 epoetin miky 10,000 unit/mL injection Commonly known as:  EPOGEN;PROCRIT Your last dose was: Your next dose is:    
   
   
 Dose:  48934 Units 1 mL by SubCUTAneous route every seven (7) days. Indications: RENAL DIALYSIS Quantity:  1 Vial  
Refills:  0  
     
   
   
   
  
 ferrous sulfate 325 mg (65 mg iron) tablet Your last dose was: Your next dose is:    
   
   
 Dose:  325 mg Take 1 Tab by mouth two (2) times daily (with meals). Quantity:  60 Tab Refills:  0  
     
   
   
   
  
 magnesium oxide 400 mg tablet Commonly known as:  MAG-OX Your last dose was: Your next dose is:    
   
   
 Dose:  400 mg Take 400 mg by mouth daily. Refills:  0  
     
   
   
   
  
 metoprolol tartrate 25 mg tablet Commonly known as:  LOPRESSOR Your last dose was: Your next dose is:    
   
   
 Dose:  25 mg Take 1 Tab by mouth every twelve (12) hours. Quantity:  60 Tab Refills:  1 OXYGEN-AIR DELIVERY SYSTEMS Your last dose was: Your next dose is:    
   
   
 Dose:  2 L/min Take 2 L/min by inhalation nightly. via NC Refills:  0  
     
   
   
   
  
 pantoprazole 40 mg tablet Commonly known as:  PROTONIX Your last dose was: Your next dose is:    
   
   
 Dose:  40 mg Take 1 Tab by mouth Before breakfast and dinner. Quantity:  60 Tab Refills:  0  
     
   
   
   
  
 VITAMIN D3 2,000 unit Cap capsule Generic drug:  Cholecalciferol (Vitamin D3) Your last dose was: Your next dose is:    
   
   
 Dose:  2000 Units Take 2,000 Units by mouth once. Refills:  0 Where to Get Your Medications Information on where to get these meds will be given to you by the nurse or doctor. ! Ask your nurse or doctor about these medications  
  metoprolol tartrate 25 mg tablet

## 2017-03-29 NOTE — IP AVS SNAPSHOT
Xiomara Perera 
 
 
 [address] 
[phone] Patient: John Cerna MRN: [id number] EQO:5/2/2844 You are allergic to the following Allergen Reactions Heparin Analogues Other (comments) H. I.T positive Immunizations Administered for This Admission Name Date  
 TB Skin Test (PPD) Intradermal 3/29/2017 Recent Documentation Height Weight BMI Smoking Status 1.727 m 75.4 kg 25.27 kg/m2 Former Smoker Unresulted Labs Order Current Status PLEASE READ & DOCUMENT PPD TEST IN 24 HRS Preliminary result PLEASE READ & DOCUMENT PPD TEST IN 48 HRS Preliminary result PROTEIN ELEC WITH LYLE, SERUM Preliminary result Emergency Contacts Name Discharge Info Relation Home Work Mobile Tommy Pedro [24] [phone] About your hospitalization You were admitted on:  March 29, 2017 You last received care in the:  [facility] 6 MED SURG You were discharged on:  April 2, 2017 Why you were hospitalized Your primary diagnosis was:  Symptomatic Anemia Your diagnoses also included:  Pancytopenia (Hcc), Paroxysmal Atrial Fibrillation (Hcc), Htn (Hypertension), Gerd (Gastroesophageal Reflux Disease), Elevated Total Protein, Hyperbilirubinemia, Dizziness, Esrd (End Stage Renal Disease) (Hcc) Providers Seen During Your Hospitalizations Provider Role Specialty Primary office phone Segun Ho MD Attending Provider Emergency Medicine [phone] Mya Colindres DO Attending Provider Internal Medicine [phone] Your Primary Care Physician (PCP) Primary Care Physician Office Phone Office Fax Dale Saul [phone] Follow-up Information Follow up With Details Comments Contact Info IMPORTANT  NEW PCP     4/13/17 @ 7:50am with Dr. Edison Pereira at [facility] [address]  Will Call within 48 hours of discharge to set up first in home appointment [address] [facility] [address] [facility] [address] 
[phone] Christina Gomez MD  follow up in one week [address] [facility] [address] [facility] [address] 
[phone] Citlaly Joyner MD  follow up in 3-5 days [address] [facility] [address] [facility] [address] 
[phone] Your Appointments Thursday April 13, 2017  8:20 AM EDT New Patient with Christina Gomez MD  
[address] [place] ([address]) [address] [facility] [address]  
[phone] Current Discharge Medication List  
  
CONTINUE these medications which have NOT CHANGED Dose & Instructions Dispensing Information Comments Morning Noon Evening Bedtime  
 acetaminophen 325 mg tablet Commonly known as:  TYLENOL Your last dose was: Your next dose is:    
   
   
 Dose:  650 mg Take 2 Tabs by mouth two (2) times daily as needed. Use only if necessary. Patient has history of alcoholic liver disease Quantity:  30 Tab Refills:  0  
     
   
   
   
  
 albuterol 2.5 mg /3 mL (0.083 %) nebulizer solution Commonly known as:  PROVENTIL VENTOLIN Your last dose was: Your next dose is:    
   
   
 Dose:  2.5 mg  
3 mL by Nebulization route every six (6) hours as needed for Wheezing. Quantity:  24 Each Refills:  0  
     
   
   
   
  
 alum-mag hydroxide-simeth 200-200-20 mg/5 mL Susp Commonly known as:  MYLANTA Your last dose was: Your next dose is:    
   
   
 Dose:  30 mL Take 30 mL by mouth three (3) times daily as needed. Quantity:  1 Bottle Refills:  0  
     
   
   
   
  
 epoetin miky 10,000 unit/mL injection Commonly known as:  EPOGEN;PROCRIT Your last dose was: Your next dose is:    
   
   
 Dose:  65656 Units 1 mL by SubCUTAneous route every seven (7) days. Indications: RENAL DIALYSIS Quantity:  1 Vial  
Refills:  0  
     
   
   
   
  
 ferrous sulfate 325 mg (65 mg iron) tablet Your last dose was: Your next dose is:    
   
   
 Dose:  325 mg Take 1 Tab by mouth two (2) times daily (with meals). Quantity:  60 Tab Refills:  0  
     
   
   
   
  
 magnesium oxide 400 mg tablet Commonly known as:  MAG-OX Your last dose was: Your next dose is:    
   
   
 Dose:  400 mg Take 400 mg by mouth daily. Refills:  0  
     
   
   
   
  
 metoprolol tartrate 25 mg tablet Commonly known as:  LOPRESSOR Your last dose was: Your next dose is:    
   
   
 Dose:  25 mg Take 1 Tab by mouth every twelve (12) hours. Quantity:  60 Tab Refills:  1 OXYGEN-AIR DELIVERY SYSTEMS Your last dose was: Your next dose is:    
   
   
 Dose:  2 L/min Take 2 L/min by inhalation nightly. via NC Refills:  0  
     
   
   
   
  
 pantoprazole 40 mg tablet Commonly known as:  PROTONIX Your last dose was: Your next dose is:    
   
   
 Dose:  40 mg Take 1 Tab by mouth Before breakfast and dinner. Quantity:  60 Tab Refills:  0  
     
   
   
   
  
 VITAMIN D3 2,000 unit Cap capsule Generic drug:  Cholecalciferol (Vitamin D3) Your last dose was: Your next dose is:    
   
   
 Dose:  2000 Units Take 2,000 Units by mouth once. Refills:  0 Where to Get Your Medications Information on where to get these meds will be given to you by the nurse or doctor. ! Ask your nurse or doctor about these medications  
  metoprolol tartrate 25 mg tablet Discharge Instructions Anemia: Care Instructions Your Care Instructions Anemia is a low level of red blood cells, which carry oxygen throughout your body. Many things can cause anemia. Lack of iron is one of the most common causes. Your body needs iron to make hemoglobin, a substance in red blood cells that carries oxygen from the lungs to your body's cells. Without enough iron, the body produces fewer and smaller red blood cells. As a result, your body's cells do not get enough oxygen, and you feel tired and weak. And you may have trouble concentrating. Bleeding is the most common cause of a lack of iron. You may have heavy menstrual bleeding or bleeding caused by conditions such as ulcers, hemorrhoids, or cancer. Regular use of aspirin or other anti-inflammatory medicines (such as ibuprofen) also can cause bleeding in some people. A lack of iron in your diet also can cause anemia, especially at times when the body needs more iron, such as during pregnancy, infancy, and the teen years. Your doctor may have prescribed iron pills. It may take several months of treatment for your iron levels to return to normal. Your doctor also may suggest that you eat foods that are rich in iron, such as meat and beans. There are many other causes of anemia. It is not always due to a lack of iron. Finding the specific cause of your anemia will help your doctor find the right treatment for you. Follow-up care is a key part of your treatment and safety. Be sure to make and go to all appointments, and call your doctor if you are having problems. It's also a good idea to know your test results and keep a list of the medicines you take. How can you care for yourself at home? · Take your medicines exactly as prescribed. Call your doctor if you think you are having a problem with your medicine. · If your doctor recommends iron pills, take them as directed: ¨ Try to take the pills on an empty stomach about 1 hour before or 2 hours after meals. But you may need to take iron with food to avoid an upset stomach. ¨ Do not take antacids or drink milk or caffeine drinks (such as coffee, tea, or cola) at the same time or within 2 hours of the time that you take your iron. They can make it hard for your body to absorb the iron. ¨ Vitamin C (from food or supplements) helps your body absorb iron. Try taking iron pills with a glass of orange juice or some other food that is high in vitamin C, such as citrus fruits. ¨ Iron pills may cause stomach problems, such as heartburn, nausea, diarrhea, constipation, and cramps. Be sure to drink plenty of fluids, and include fruits, vegetables, and fiber in your diet each day. Iron pills often make your bowel movements dark or green. ¨ If you forget to take an iron pill, do not take a double dose of iron the next time you take a pill. ¨ Keep iron pills out of the reach of small children. An overdose of iron can be very dangerous. · Follow your doctor's advice about eating iron-rich foods. These include red meat, shellfish, poultry, eggs, beans, raisins, whole-grain bread, and leafy green vegetables. · Steam vegetables to help them keep their iron content. When should you call for help? Call 911 anytime you think you may need emergency care. For example, call if: 
· You have symptoms of a heart attack. These may include: ¨ Chest pain or pressure, or a strange feeling in the chest. 
¨ Sweating. ¨ Shortness of breath. ¨ Nausea or vomiting. ¨ Pain, pressure, or a strange feeling in the back, neck, jaw, or upper belly or in one or both shoulders or arms. ¨ Lightheadedness or sudden weakness. ¨ A fast or irregular heartbeat. After you call 911, the  may tell you to chew 1 adult-strength or 2 to 4 low-dose aspirin. Wait for an ambulance. Do not try to drive yourself. · You passed out (lost consciousness). Call your doctor now or seek immediate medical care if: 
· You have new or increased shortness of breath. · You are dizzy or lightheaded, or you feel like you may faint. · Your fatigue and weakness continue or get worse. · You have any abnormal bleeding, such as: 
¨ Nosebleeds. ¨ Vaginal bleeding that is different (heavier, more frequent, at a different time of the month) than what you are used to. ¨ Bloody or black stools, or rectal bleeding. ¨ Bloody or pink urine. Watch closely for changes in your health, and be sure to contact your doctor if: 
· You do not get better as expected. Where can you learn more? Go to http://duc-nickolas.info/. Enter R301 in the search box to learn more about \"Anemia: Care Instructions. \" Current as of: October 13, 2016 Content Version: 11.2 © 8846-8742 [employer]. Care instructions adapted under license by [employer] (which disclaims liability or warranty for this information). If you have questions about a medical condition or this instruction, always ask your healthcare professional. [place] [address] any warranty or liability for your use of this information. DISCHARGE SUMMARY from Nurse The following personal items are in your possession at time of discharge: 
 
Dental Appliances: None Visual Aid: Glasses Home Medications: None Jewelry: None Clothing: Footwear, Pants, Shirt, Other (comment) (coat) Other Valuables: Wheelchair PATIENT INSTRUCTIONS: 
 
 
F-face looks uneven A-arms unable to move or move unevenly S-speech slurred or non-existent T-time-call 911 as soon as signs and symptoms begin-DO NOT go Back to bed or wait to see if you get better-TIME IS BRAIN. Warning Signs of HEART ATTACK Call 911 if you have these symptoms: ? Chest discomfort. Most heart attacks involve discomfort in the center of the chest that lasts more than a few minutes, or that goes away and comes back. It can feel like uncomfortable pressure, squeezing, fullness, or pain. ? Discomfort in other areas of the upper body. Symptoms can include pain or discomfort in one or both arms, the back, neck, jaw, or stomach. ? Shortness of breath with or without chest discomfort. ? Other signs may include breaking out in a cold sweat, nausea, or lightheadedness. Don't wait more than five minutes to call [address]! Fast action can save your life. Calling 911 is almost always the fastest way to get lifesaving treatment. Emergency Medical Services staff can begin treatment when they arrive  up to an hour sooner than if someone gets to the hospital by car. The discharge information has been reviewed with the patient. The patient verbalized understanding. Discharge medications reviewed with the patient and appropriate educational materials and side effects teaching were provided. Discharge Orders None General Information Please provide this summary of care documentation to your next provider. Introducing [facility]! Enio Yan introduces [employer] patient portal. Now you can access parts of your medical record, email your doctor's office, and request medication refills online. 1. In your internet browser, go to [email or web address] 2. Click on the First Time User? Click Here link in the Sign In box. You will see the New Member Sign Up page. 3. Enter your [employer] Access Code exactly as it appears below. You will not need to use this code after youve completed the sign-up process. If you do not sign up before the expiration date, you must request a new code. · [employer] Access Code: [email or web address] Expires: 6/21/2017 11:36 AM 
 
 4. Enter the last four digits of your Social Security Number (xxxx) and Date of Birth (mm/dd/yyyy) as indicated and click Submit. You will be taken to the next sign-up page. 5. Create a [employer] ID. This will be your [employer] login ID and cannot be changed, so think of one that is secure and easy to remember. 6. Create a [employer] password. You can change your password at any time. 7. Enter your Password Reset Question and Answer. This can be used at a later time if you forget your password. 8. Enter your e-mail address. You will receive e-mail notification when new information is available in [address] 9. Click Sign Up. You can now view and download portions of your medical record. 10. Click the Download Summary menu link to download a portable copy of your medical information. If you have questions, please visit the Frequently Asked Questions section of the [employer] website. Remember, [employer] is NOT to be used for urgent needs. For medical emergencies, dial 911. Now available from your iPhone and Android! Patient Signature:  ____________________________________________________________ Date:  ____________________________________________________________  
  
[facility] Provider Signature:  ____________________________________________________________ Date:  ____________________________________________________________

## 2017-03-29 NOTE — ED NOTES
TRANSFER - OUT REPORT:    Verbal report given to Thao Edwards on [address]  being transferred to [phone] for routine progression of care       Report consisted of patients Situation, Background, Assessment and   Recommendations(SBAR). Information from the following report(s) SBAR, ED Summary, Procedure Summary, Intake/Output, MAR and Recent Results was reviewed with the receiving nurse. Lines:   Peripheral IV 03/29/17 Left Antecubital (Active)        Opportunity for questions and clarification was provided.       Patient transported with:   [employer]

## 2017-03-29 NOTE — PROGRESS NOTES
Problem: Mobility Impaired (Adult and Pediatric)  Goal: *Acute Goals and Plan of Care (Insert Text)  STG:  (1.)Mr. Pozo will improve standing tolerance without decrease in SBP >20 mmHg and/or DBP >10 mmHg within 3 days. (2.)Mr. Pozo will transfer from bed to chair and chair to bed with CONTACT GUARD ASSIST using the least restrictive device within 3 day(s). (3.)Mr. Pozo will ambulate with CONTACT GUARD ASSIST for 25 feet with the least restrictive device within 3 day(s). LTG:  (1.)Mr. Pozo will move from supine to sit and sit to supine , scoot up and down and roll side to side in bed with INDEPENDENT within 7 day(s). (2.)Mr. Pozo will transfer from bed to chair and chair to bed with INDEPENDENT using the least restrictive device within 7 day(s). (3.)Mr. Pozo will ambulate with MODIFIED INDEPENDENCE for 100+ feet with the least restrictive device within 7 day(s). ________________________________________________________________________________________________      PHYSICAL THERAPY: INITIAL ASSESSMENT, PM 3/29/2017  OBSERVATION: Hospital Day: 1  Payor: [id number] / Plan: [place] / Product Type: Medicaid /      NAME/AGE/GENDER: Carri Kiser is a 61 y.o. male         PRIMARY DIAGNOSIS: SYMPTOMATIC ANEMIA Symptomatic anemia Symptomatic anemia        ICD-10: Treatment Diagnosis:       · Generalized Muscle Weakness (M62.81)  · Difficulty in walking, Not elsewhere classified (R26.2)   Precaution/Allergies:  Heparin analogues       ASSESSMENT:      Mr. Adolfo Howe presents to physical therapy after admission for anemia with increasing weakness over last 3 weeks. Hx of perirectal abscess with colostomy bag. Receives HD. Supine upon arrival and agreeable to PT assessment. At baseline, he primarily uses WC for mobility, transfers to/from chair independently, and walks short distances. Lives with his brother who works.  He reports he was using crutches to walk but his colostomy bag limits him. Says he would like to improve walking ability. Sits to EOB with supervision. Reports dizziness upon sitting. BLE strength 4-/5 and sensation intact. Stands with SBA. Orthostatic vitals obtained: Supine /60, HR 80. Sitting /61, . Standing BP 99/56, . Returned to supine with needs met as RN arriving to start blood transfusion. Oscar Garza will benefit from skilled PT intervention during acute care stay to address LE weakness and decreased balance, upright tolerance, and gait abilities. Will continue to assess D/C needs as pt progresses. This section established at most recent assessment   PROBLEM LIST (Impairments causing functional limitations):  1. Decreased Strength  2. Decreased ADL/Functional Activities  3. Decreased Transfer Abilities  4. Decreased Ambulation Ability/Technique  5. Decreased Balance  6. Increased Pain  7. Decreased Activity Tolerance  8. Increased Fatigue    INTERVENTIONS PLANNED: (Benefits and precautions of physical therapy have been discussed with the patient.)  1. Balance Exercise  2. Bed Mobility  3. Family Education  4. Gait Training  5. Therapeutic Activites  6. Therapeutic Exercise/Strengthening  7. Transfer Training      TREATMENT PLAN: Frequency/Duration: 3 times a week for duration of hospital stay  Rehabilitation Potential For Stated Goals: [facility] REHABILITATION/EQUIPMENT: (at time of discharge pending progress): TBD. HISTORY:   History of Present Injury/Illness (Reason for Referral):  Per H&P, Nunu Palma is a 61 y.o. male that presented to the ED with 3 week history of weakness and increasing difficulty ambulating, He reports decreased oral intake and some dysphagia with both solids and liquids. He reports chronic belching and abdominal distention since his surgery last fall. He reports occasional chills but denies fever, N/V. He denies overt blood loss. ED evaluation shows pancytopenia. The hospitalist have been asked to admit. \"  Past Medical History/Comorbidities:   Mr. Brooklynn Lorenzo  has a past medical history of Alcohol abuse, daily use (09/03/2016); Anemia (9/3/2016); Chronic kidney disease; HIT (heparin-induced thrombocytopenia) ([facility] [address] Hypertension; Hypokalemia (9/3/2016); and Tobacco abuse (9/3/2016). Mr. Brooklynn Lorenzo  has a past surgical history that includes debride necrotic skin/ tissue, abd wall (09/04/2016); abdomen surgery proc unlisted; and other surgical.  Social History/Living Environment:   Home Environment: Private residence  Wheelchair Ramp: Yes  One/Two Story Residence: One story  Living Alone: No  Support Systems: Family member(s)  Patient Expects to be Discharged to[de-identified] Private residence  Current DME Used/Available at Home: Walker, rolling, Wheelchair, [address]  Prior Level of Function/Work/Activity:  WC primary mode of mobility. Decrease in function over last 3 weeks. Number of Personal Factors/Comorbidities that affect the Plan of Care:  Home alone during day  Decreased mobility  1-2: MODERATE COMPLEXITY   EXAMINATION:   Most Recent Physical Functioning:   Gross Assessment:  AROM: Within functional limits  Strength: Generally decreased, functional  Sensation: Intact               Posture:  Posture (WDL): Exceptions to WDL  Posture Assessment: Forward head, Rounded shoulders  Balance:  Sitting: Intact  Standing: Intact Bed Mobility:  Rolling: Supervision  Supine to Sit: Supervision  Scooting: Supervision  Wheelchair Mobility:     Transfers:  Sit to Stand: Stand-by asssistance  Stand to Sit: Stand-by asssistance  Gait:             Body Structures Involved:  1. Heart  2. Digestive Structures  3. Muscles Body Functions Affected:  1. Sensory/Pain  2. Cardio  3. Hematological  4. Neuromusculoskeletal  5. Movement Related Activities and Participation Affected:  1. General Tasks and Demands  2. Mobility  3. Self Care  4.  Domestic Life   Number of elements that affect the Plan of Care: 4+: HIGH COMPLEXITY   CLINICAL PRESENTATION:   Presentation: Stable and uncomplicated: LOW COMPLEXITY   CLINICAL DECISION MAKING:   [facility] AM-PAC 6 Clicks   Basic Mobility Inpatient Short Form  How much difficulty does the patient currently have. .. Unable A Lot A Little None   1. Turning over in bed (including adjusting bedclothes, sheets and blankets)? [ ] 1   [ ] 2   [ ] 3   [X] 4   2. Sitting down on and standing up from a chair with arms ( e.g., wheelchair, bedside commode, etc.)   [ ] 1   [ ] 2   [ ] 3   [X] 4   3. Moving from lying on back to sitting on the side of the bed? [ ] 1   [ ] 2   [ ] 3   [X] 4   How much help from another person does the patient currently need. .. Total A Lot A Little None   4. Moving to and from a bed to a chair (including a wheelchair)? [ ] 1   [ ] 2   [X] 3   [ ] 4   5. Need to walk in hospital room? [ ] 1   [X] 2   [ ] 3   [ ] 4   6. Climbing 3-5 steps with a railing? [ ] 1   [X] 2   [ ] 3   [ ] 4   © 2007, Trustees of [facility], under license to [employer]. All rights reserved    Score:  Initial: 19 Most Recent: X (Date: 3/29/17 )     Interpretation of Tool:  Represents activities that are increasingly more difficult (i.e. Bed mobility, Transfers, Gait).        Score 24 23 22-20 19-15 14-10 9-7 6       Modifier CH CI CJ CK CL CM CN         · Mobility - Walking and Moving Around:               - CURRENT STATUS:    CK - 40%-59% impaired, limited or restricted               - GOAL STATUS:           CJ - 20%-39% impaired, limited or restricted               - D/C STATUS:                       ---------------To be determined---------------  Payor[place] / [place] / Product Type: Medicaid /       Medical Necessity:     · Patient is expected to demonstrate progress in strength, balance, coordination and functional technique to increase independence with ambulation and improve safety during functional mobility. Reason for Services/Other Comments:  · Patient continues to require present interventions due to patient's inability to perform activity safely and independently. Use of outcome tool(s) and clinical judgement create a POC that gives a: Clear prediction of patient's progress: LOW COMPLEXITY                 TREATMENT:   (In addition to Assessment/Re-Assessment sessions the following treatments were rendered)   Pre-treatment Symptoms/Complaints: \"This IV is uncomfortable\"  Pain: Initial:   Pain Intensity 1: 0  Post Session:  None reported       Assessment/Reassessment only, no treatment provided today     Braces/Orthotics/Lines/Etc:   · IV  · colostomy bag  Treatment/Session Assessment:    · Response to Treatment:  Decreased BP upon standing  · Interdisciplinary Collaboration:  · Physical Therapist  · Registered Nurse  · Certified Nursing Assistant/Patient Care Technician  · After treatment position/precautions:  · Supine in bed  · Bed/Chair-wheels locked  · Bed in low position  · Call light within reach  · Nurse at bedside  · Compliance with Program/Exercises: Will assess as treatment progresses. · Recommendations/Intent for next treatment session: \"Next visit will focus on advancements to more challenging activities and reduction in assistance provided\".   Total Treatment Duration:  PT Patient Time In/Time Out  Time In: 1511  Time Out: [address]

## 2017-03-29 NOTE — ED NOTES
Upon reading information sent from dialysis the patient's hemoglobin was 7. Patient denies stool change in ostomy.

## 2017-03-29 NOTE — ED TRIAGE NOTES
Patient arrives to the ER via EMS. EMS was called out dialysis [facility]) for weakness. Patient was running his normal dialysis when he informed staff he was worried his colostomy bag was going to bust. Patient states he has has generalized weakness for two days. Patient states he feels to weak to change or empty his bag. Patient has only been going to dialysis for three months.

## 2017-03-30 ENCOUNTER — APPOINTMENT (OUTPATIENT)
Dept: CT IMAGING | Age: 67
DRG: 660 | End: 2017-03-30
Attending: INTERNAL MEDICINE
Payer: MEDICAID

## 2017-03-30 LAB
ALBUMIN SERPL BCP-MCNC: 2.4 G/DL (ref 3.2–4.6)
ALBUMIN/GLOB SERPL: 0.5 {RATIO} (ref 1.2–3.5)
ALP SERPL-CCNC: 61 U/L (ref 50–136)
ALT SERPL-CCNC: 8 U/L (ref 12–65)
ANION GAP BLD CALC-SCNC: 10 MMOL/L (ref 7–16)
AST SERPL W P-5'-P-CCNC: 13 U/L (ref 15–37)
BACTERIA SPEC CULT: ABNORMAL
BACTERIA SPEC CULT: ABNORMAL
BASOPHILS # BLD AUTO: 0 K/UL (ref 0–0.2)
BASOPHILS # BLD: 0 % (ref 0–2)
BILIRUB SERPL-MCNC: 1.7 MG/DL (ref 0.2–1.1)
BUN SERPL-MCNC: 13 MG/DL (ref 8–23)
CALCIUM SERPL-MCNC: 7.9 MG/DL (ref 8.3–10.4)
CHLORIDE SERPL-SCNC: 101 MMOL/L (ref 98–107)
CO2 SERPL-SCNC: 28 MMOL/L (ref 21–32)
CREAT SERPL-MCNC: 3.65 MG/DL (ref 0.8–1.5)
DIFFERENTIAL METHOD BLD: ABNORMAL
EOSINOPHIL # BLD: 0 K/UL (ref 0–0.8)
EOSINOPHIL NFR BLD: 2 % (ref 0.5–7.8)
ERYTHROCYTE [DISTWIDTH] IN BLOOD BY AUTOMATED COUNT: 20.6 % (ref 11.9–14.6)
FOLATE SERPL-MCNC: 10 NG/ML (ref 3.1–17.5)
GLOBULIN SER CALC-MCNC: 4.7 G/DL (ref 2.3–3.5)
GLUCOSE SERPL-MCNC: 104 MG/DL (ref 65–100)
HCG SERPL-ACNC: <1 MIU/ML (ref 0–2)
HCT VFR BLD AUTO: 24.5 % (ref 41.1–50.3)
HGB BLD-MCNC: 7.1 G/DL (ref 13.6–17.2)
HGB BLD-MCNC: 8.2 G/DL (ref 13.6–17.2)
IMM GRANULOCYTES # BLD: 0 K/UL (ref 0–0.5)
IMM GRANULOCYTES NFR BLD AUTO: 1.1 % (ref 0–5)
LYMPHOCYTES # BLD AUTO: 70 % (ref 13–44)
LYMPHOCYTES # BLD: 0.7 K/UL (ref 0.5–4.6)
MCH RBC QN AUTO: 29.9 PG (ref 26.1–32.9)
MCHC RBC AUTO-ENTMCNC: 33.5 G/DL (ref 31.4–35)
MCV RBC AUTO: 89.4 FL (ref 79.6–97.8)
MM INDURATION POC: NORMAL MM (ref 0–5)
MONOCYTES # BLD: 0.2 K/UL (ref 0.1–1.3)
MONOCYTES NFR BLD AUTO: 16 % (ref 4–12)
NEUTS SEG # BLD: 0.1 K/UL (ref 1.7–8.2)
NEUTS SEG NFR BLD AUTO: 11 % (ref 43–78)
PLATELET # BLD AUTO: 105 K/UL (ref 150–450)
PMV BLD AUTO: 9.3 FL (ref 10.8–14.1)
POTASSIUM SERPL-SCNC: 3.3 MMOL/L (ref 3.5–5.1)
PPD POC: NORMAL NEGATIVE
PROT SERPL-MCNC: 7.1 G/DL (ref 6.3–8.2)
RBC # BLD AUTO: 2.74 M/UL (ref 4.23–5.67)
SERVICE CMNT-IMP: ABNORMAL
SODIUM SERPL-SCNC: 139 MMOL/L (ref 136–145)
TSH SERPL DL<=0.005 MIU/L-ACNC: 2.76 UIU/ML (ref 0.36–3.74)
VIT B12 SERPL-MCNC: 911 PG/ML (ref 193–986)
WBC # BLD AUTO: 0.9 K/UL (ref 4.3–11.1)

## 2017-03-30 PROCEDURE — 30233N1 TRANSFUSION OF NONAUTOLOGOUS RED BLOOD CELLS INTO PERIPHERAL VEIN, PERCUTANEOUS APPROACH: ICD-10-PCS | Performed by: INTERNAL MEDICINE

## 2017-03-30 PROCEDURE — 80053 COMPREHEN METABOLIC PANEL: CPT

## 2017-03-30 PROCEDURE — 82607 VITAMIN B-12: CPT | Performed by: NURSE PRACTITIONER

## 2017-03-30 PROCEDURE — 84443 ASSAY THYROID STIM HORMONE: CPT | Performed by: INTERNAL MEDICINE

## 2017-03-30 PROCEDURE — 74011000258 HC RX REV CODE- 258: Performed by: INTERNAL MEDICINE

## 2017-03-30 PROCEDURE — P9016 RBC LEUKOCYTES REDUCED: HCPCS | Performed by: EMERGENCY MEDICINE

## 2017-03-30 PROCEDURE — 87500 VANOMYCIN DNA AMP PROBE: CPT | Performed by: INTERNAL MEDICINE

## 2017-03-30 PROCEDURE — 84702 CHORIONIC GONADOTROPIN TEST: CPT | Performed by: INTERNAL MEDICINE

## 2017-03-30 PROCEDURE — 97530 THERAPEUTIC ACTIVITIES: CPT

## 2017-03-30 PROCEDURE — 85025 COMPLETE CBC W/AUTO DIFF WBC: CPT

## 2017-03-30 PROCEDURE — 74011250637 HC RX REV CODE- 250/637: Performed by: INTERNAL MEDICINE

## 2017-03-30 PROCEDURE — 74011636320 HC RX REV CODE- 636/320: Performed by: INTERNAL MEDICINE

## 2017-03-30 PROCEDURE — 36415 COLL VENOUS BLD VENIPUNCTURE: CPT

## 2017-03-30 PROCEDURE — 36430 TRANSFUSION BLD/BLD COMPNT: CPT

## 2017-03-30 PROCEDURE — 74011250637 HC RX REV CODE- 250/637

## 2017-03-30 PROCEDURE — 77030013131 HC IV BLD ST ICUM -A

## 2017-03-30 PROCEDURE — 82746 ASSAY OF FOLIC ACID SERUM: CPT | Performed by: NURSE PRACTITIONER

## 2017-03-30 PROCEDURE — 85018 HEMOGLOBIN: CPT | Performed by: INTERNAL MEDICINE

## 2017-03-30 PROCEDURE — 99218 HC RM OBSERVATION: CPT

## 2017-03-30 PROCEDURE — 87641 MR-STAPH DNA AMP PROBE: CPT | Performed by: INTERNAL MEDICINE

## 2017-03-30 PROCEDURE — 80074 ACUTE HEPATITIS PANEL: CPT | Performed by: NURSE PRACTITIONER

## 2017-03-30 PROCEDURE — 74177 CT ABD & PELVIS W/CONTRAST: CPT

## 2017-03-30 RX ORDER — SODIUM CHLORIDE 9 MG/ML
250 INJECTION, SOLUTION INTRAVENOUS AS NEEDED
Status: DISCONTINUED | OUTPATIENT
Start: 2017-03-30 | End: 2017-04-02 | Stop reason: HOSPADM

## 2017-03-30 RX ORDER — MUPIROCIN 20 MG/G
OINTMENT TOPICAL 2 TIMES DAILY
Status: DISCONTINUED | OUTPATIENT
Start: 2017-03-30 | End: 2017-03-30

## 2017-03-30 RX ORDER — MUPIROCIN 20 MG/G
OINTMENT TOPICAL 2 TIMES DAILY
Status: DISCONTINUED | OUTPATIENT
Start: 2017-03-30 | End: 2017-04-02 | Stop reason: HOSPADM

## 2017-03-30 RX ORDER — SODIUM CHLORIDE 0.9 % (FLUSH) 0.9 %
10 SYRINGE (ML) INJECTION
Status: COMPLETED | OUTPATIENT
Start: 2017-03-30 | End: 2017-03-30

## 2017-03-30 RX ADMIN — Medication 10 ML: at 05:33

## 2017-03-30 RX ADMIN — MUPIROCIN: 20 OINTMENT TOPICAL at 18:00

## 2017-03-30 RX ADMIN — Medication 10 ML: at 21:01

## 2017-03-30 RX ADMIN — PANTOPRAZOLE SODIUM 40 MG: 40 TABLET, DELAYED RELEASE ORAL at 17:49

## 2017-03-30 RX ADMIN — SODIUM CHLORIDE 100 ML: 900 INJECTION, SOLUTION INTRAVENOUS at 14:41

## 2017-03-30 RX ADMIN — PANTOPRAZOLE SODIUM 40 MG: 40 TABLET, DELAYED RELEASE ORAL at 05:33

## 2017-03-30 RX ADMIN — DIATRIZOATE MEGLUMINE AND DIATRIZOATE SODIUM 15 ML: 660; 100 LIQUID ORAL; RECTAL at 12:39

## 2017-03-30 RX ADMIN — MUPIROCIN: 20 OINTMENT TOPICAL at 12:40

## 2017-03-30 RX ADMIN — Medication 10 ML: at 17:49

## 2017-03-30 RX ADMIN — Medication 10 ML: at 14:41

## 2017-03-30 RX ADMIN — FOLIC ACID 1 MG: 1 TABLET ORAL at 08:21

## 2017-03-30 RX ADMIN — IOPAMIDOL 100 ML: 755 INJECTION, SOLUTION INTRAVENOUS at 14:41

## 2017-03-30 RX ADMIN — FERROUS SULFATE TAB 325 MG (65 MG ELEMENTAL FE) 325 MG: 325 (65 FE) TAB at 17:48

## 2017-03-30 RX ADMIN — FERROUS SULFATE TAB 325 MG (65 MG ELEMENTAL FE) 325 MG: 325 (65 FE) TAB at 08:21

## 2017-03-30 RX ADMIN — Medication 100 MG: at 08:21

## 2017-03-30 NOTE — MED STUDENT NOTES
*ATTENTION:  This note has been created by a medical student for educational purposes only. Please do not refer to the content of this note for clinical decision-making, billing, or other purposes. Please see attending physicians note to obtain clinical information on this patient. *       Hospitalist Service Note (Medical Student Note*)   3/30/2017  Admit Date: 3/29/2017 10:26 AM   NAME: Fercho Camacho   :  1950   MRN:  [id number]   Attending: Chasidy Grimm DO  PCP:  PROVIDER UNKNOWN  Treatment Team: Attending Provider: Chasidy Grimm DO; Consulting Provider: Johnson Dougherty MD; Consulting Provider: Greg Torres MD; Care Manager: Ailyn Foss  SUBJECTIVE:     Reason for Admission:     Per HPI Marcelo Cano is a 61 y.o. male that presented to the ED with 3 week history of weakness and increasing difficulty ambulating, He reports decreased oral intake and some dysphagia with both solids and liquids. He reports chronic belching and abdominal distention since his surgery last fall. He reports occasional chills but denies fever, N/V. He denies overt blood loss. ED evaluation shows pancytopenia. \"   3/30 Pt was transfused with 1 unit of PRBC. States he feels a lot better after the transfusion. Pt states that his stool in his colostomy bag has changed from solid to liquid. Pt denies CP, SOB, dizziness, vision changes, abdominal pain. Social History   Substance Use Topics    Smoking status: Former Smoker     Packs/day: 2.00     Years: 40.00     Types: Cigarettes    Smokeless tobacco: Not on file      Comment: STATES HE QUIT 7 DAYS AGO    Alcohol use 42.0 - 63.0 oz/week     70 - 105 Cans of beer per week      Comment: 10-15 CANS DAILY       Past Medical History:   Diagnosis Date    Alcohol abuse, daily use 2016    NONE X 7 DAYS, USUALLY DRINKS 10-15 BEERS DAILY.   TAKES A WEEK OFF AT A TIME ONCE A MONTH    Anemia 9/3/2016    HGB 7.8 ON ADMISSION    Chronic kidney disease end stage renal disease- dialysis on Tues, Thurs. , Saturday mornings.  HIT (heparin-induced thrombocytopenia) ([facility] [address]     Hypertension     Hypokalemia 9/3/2016    2.7 ON ADMISSION    Tobacco abuse 9/3/2016    QUIT 7 DAYS AGO       Past Surgical History:   Procedure Laterality Date    ABDOMEN SURGERY PROC UNLISTED      Sept. 2016.  DEBRIDE NECROTIC SKIN/ TISSUE, ABD WALL  09/04/2016    I&D rectal abscess with drain placement - Dr. [facility]      rectal abcess surgery that also got infected-  Subsequent I & D of the rectal abcess. Allergies   Allergen Reactions    Heparin Analogues Other (comments)     H. I.T positive       Current Facility-Administered Medications   Medication Dose Route Frequency Provider Last Rate Last Dose    0.9% sodium chloride infusion 250 mL  250 mL IntraVENous PRN Butch Cade, DO        mupirocin (BACTROBAN) 2 % ointment   Both Nostrils BID Carlos Cavazos, DO        saline peripheral flush soln 10 mL  10 mL InterCATHeter RAD ONCE Carlos Cavazos, DO        sodium chloride 0.9 % bolus infusion 100 mL  100 mL IntraVENous RAD ONCE Carlos Cavazos, DO        iopamidol (ISOVUE-370) 76 % injection 100 mL  100 mL IntraVENous RAD ONCE Carlos Cavazos, DO        sodium chloride (NS) flush 5-10 mL  5-10 mL IntraVENous Q8H Anne-Marie Gamble MD   10 mL at 03/29/17 2124    sodium chloride (NS) flush 5-10 mL  5-10 mL IntraVENous PRN Anne-Marie Gamble MD        0.9% sodium chloride infusion 250 mL  250 mL IntraVENous PRN MALIHA Bennett        pantoprazole (PROTONIX) tablet 40 mg  40 mg Oral ACB&D Ova MALIHA Ang   40 mg at 03/30/17 0533    metoprolol tartrate (LOPRESSOR) tablet 25 mg  25 mg Oral Q12H MALIHA Bennett   25 mg at 03/29/17 2100    ferrous sulfate tablet 325 mg  325 mg Oral BID WITH MEALS Ova MALIHA Ang   325 mg at 03/30/17 6421    albuterol (PROVENTIL VENTOLIN) nebulizer solution 2.5 mg 2.5 mg Nebulization Q6H PRN Kennedy Lyon, PA        thiamine (B-1) tablet 100 mg  100 mg Oral DAILY Kennedy Lyon, PA   100 mg at 09/59/06 1690    folic acid (FOLVITE) tablet 1 mg  1 mg Oral DAILY Kennedy Lyon, PA   1 mg at 03/30/17 2728    sodium chloride (NS) flush 5-10 mL  5-10 mL IntraVENous Q8H Kennedy Al., PA   10 mL at 03/30/17 0533    sodium chloride (NS) flush 5-10 mL  5-10 mL IntraVENous PRN MALIHA Lloyd        LORazepam (ATIVAN) injection 1 mg  1 mg IntraVENous Q4H PRN Kennedy Lyon, PA           Recent Results (from the past 24 hour(s))   HEMOGLOBIN    Collection Time: 03/30/17 12:58 AM   Result Value Ref Range    HGB 7.1 (L) 13.6 - 17.2 g/dL   VAN A PCR    Collection Time: 03/30/17  8:20 AM   Result Value Ref Range    Special Requests: NO SPECIAL REQUESTS      Culture result: (A)       Carl target DNA is not detected. DNA from Carl resistant gene is not detected. MRSA SCREEN - PCR (NASAL)    Collection Time: 03/30/17  8:25 AM   Result Value Ref Range    Special Requests: NO SPECIAL REQUESTS      Culture result: (A)       MRSA target DNA is detected (presumptive positive for MRSA colonization). Culture result:        RESULTS VERIFIED, PHONED TO AND READ BACK BY  ARTURO SOLER RN AT 5722 ON 3/30/17 SG     CBC WITH AUTOMATED DIFF    Collection Time: 03/30/17  9:10 AM   Result Value Ref Range    WBC 0.9 (LL) 4.3 - 11.1 K/uL    RBC 2.74 (L) 4.23 - 5.67 M/uL    HGB 8.2 (L) 13.6 - 17.2 g/dL    HCT 24.5 (L) 41.1 - 50.3 %    MCV 89.4 79.6 - 97.8 FL    MCH 29.9 26.1 - 32.9 PG    MCHC 33.5 31.4 - 35.0 g/dL    RDW 20.6 (H) 11.9 - 14.6 %    PLATELET 145 (L) 165 - 450 K/uL    MPV 9.3 (L) 10.8 - 14.1 FL    DF AUTOMATED      NEUTROPHILS 11 (L) 43 - 78 %    LYMPHOCYTES 70 (H) 13 - 44 %    MONOCYTES 16 (H) 4.0 - 12.0 %    EOSINOPHILS 2 0.5 - 7.8 %    BASOPHILS 0 0.0 - 2.0 %    IMMATURE GRANULOCYTES 1.1 0.0 - 5.0 %    ABS.  NEUTROPHILS 0.1 (L) 1.7 - 8.2 K/UL ABS. LYMPHOCYTES 0.7 0.5 - 4.6 K/UL    ABS. MONOCYTES 0.2 0.1 - 1.3 K/UL    ABS. EOSINOPHILS 0.0 0.0 - 0.8 K/UL    ABS. BASOPHILS 0.0 0.0 - 0.2 K/UL    ABS. IMM. GRANS. 0.0 0.0 - 0.5 K/UL   METABOLIC PANEL, COMPREHENSIVE    Collection Time: 17  9:10 AM   Result Value Ref Range    Sodium 139 136 - 145 mmol/L    Potassium 3.3 (L) 3.5 - 5.1 mmol/L    Chloride 101 98 - 107 mmol/L    CO2 28 21 - 32 mmol/L    Anion gap 10 7 - 16 mmol/L    Glucose 104 (H) 65 - 100 mg/dL    BUN 13 8 - 23 MG/DL    Creatinine 3.65 (H) 0.8 - 1.5 MG/DL    GFR est AA 22 (L) >60 ml/min/1.73m2    GFR est non-AA 18 (L) >60 ml/min/1.73m2    Calcium 7.9 (L) 8.3 - 10.4 MG/DL    Bilirubin, total 1.7 (H) 0.2 - 1.1 MG/DL    ALT (SGPT) 8 (L) 12 - 65 U/L    AST (SGOT) 13 (L) 15 - 37 U/L    Alk. phosphatase 61 50 - 136 U/L    Protein, total 7.1 6.3 - 8.2 g/dL    Albumin 2.4 (L) 3.2 - 4.6 g/dL    Globulin 4.7 (H) 2.3 - 3.5 g/dL    A-G Ratio 0.5 (L) 1.2 - 3.5     TOTAL HCG, QT. Collection Time: 17  9:10 AM   Result Value Ref Range    Beta HCG, QT <1 0.0 - 2.0 MIU/ML   TSH 3RD GENERATION    Collection Time: 17  9:10 AM   Result Value Ref Range    TSH 2.760 0.358 - 3.740 uIU/mL       PHYSICAL EXAM     Visit Vitals    /63 (BP 1 Location: Right arm, BP Patient Position: At rest)    Pulse 67    Temp 97.3 °F (36.3 °C)    Resp 18    Ht 5' 8\" (1.727 m)    Wt 77 kg (169 lb 12.1 oz)    SpO2 100%    BMI 25.81 kg/m2      Temp (24hrs), Av.8 °F (36.6 °C), Min:97.3 °F (36.3 °C), Max:98.5 °F (36.9 °C)    Oxygen Therapy  O2 Sat (%): 100 % (17 1140)  O2 Device: Room air (17 1140)    Intake/Output Summary (Last 24 hours) at 17 1332  Last data filed at 17 1142   Gross per 24 hour   Intake              295 ml   Output              225 ml   Net               70 ml        General: WD and WN , No apparent distress.    Head: Normocephalic, without obvious abnormality, atraumatic.   Eyes: PERRL; EOMI  ENT: Hearing is normal. Oropharynx is clear with tacky mucous membranes    Resp: Clear to auscultation bilaterally. No Wheezing or Rhonchi. Resp are even and unlabored  Heart[de-identified] Regular rate and rhythm, no murmur, rub or gallop. Abdomen: Soft, non-tender. Not distended. Bowel sounds normal. Colectomy bag. Extremities: No cyanosis. No clubbing. No LE edema  Skin: Texture, turgor normal. No significant rashes or lesions. Neurologic: No focal CN deficits noted  Psych: Alert and oriented x 4; Judgement and insight are normal     DIAGNOSTIC STUDIES      Labs and Studies from previous 24 hours have been personally reviewed by myself    Pancytopenia WBC 0.9, HGB 8.2, Platelets 688  Bilirubin 1.7       ASSESSMENT / Alexey Dean 169 Problems    Diagnosis Date Noted    Symptomatic anemia 03/29/2017    Elevated total protein 03/29/2017    Hyperbilirubinemia 03/29/2017    Dizziness 03/29/2017    ESRD (end stage renal disease) ([facility] [address] 03/29/2017    Paroxysmal atrial fibrillation ([facility] [address] 09/17/2016    HTN (hypertension) 09/04/2016    GERD (gastroesophageal reflux disease) 09/04/2016    Pancytopenia ([facility] [address] 09/03/2016     HGB 7.8 ON ADMISSION        - Consult Hematology for Bone Marrow Biopsy  - CT abdomen/pelvis  - Continue following CBC, BMP  - Continue Ferrous sulfate for anemia.   -Dialysis tomorrow.         Full Code    Signed By: TUAN Jarvis-III    March 30, 2017

## 2017-03-30 NOTE — PROGRESS NOTES
Hospitalist Progress Note    Patient: Narcisa Rhoades MRN: [id number]  SSN: [id number]    YOB: 1950  Age: 61 y.o. Sex: male      Admit Date: 3/29/2017    LOS: 0 days     Subjective:     From H&P: \"60 y.o. male that presented to the ED with 3 week history of weakness and increasing difficulty ambulating, He reports decreased oral intake and some dysphagia with both solids and liquids. He reports chronic belching and abdominal distention since his surgery last fall. He reports occasional chills but denies fever, N/V. He denies overt blood loss. ED evaluation shows pancytopenia. \"    3/30 - The patient feels much improved and no dizziness after PRBCs. Denies F/C/N/V. Denies abdominal pain. Denies CP/SOB. Review of systems negative except stated above. Objective:     Vitals:    03/30/17 0713 03/30/17 0914 03/30/17 1140 03/30/17 1534   BP: 113/60 97/56 106/63 119/66   Pulse: 63 72 67 68   Resp: 18  18 18   Temp: 97.4 °F (36.3 °C)  97.3 °F (36.3 °C) 98.1 °F (36.7 °C)   SpO2: 100%  100% 100%   Weight:       Height:            Intake and Output:  Current Shift: 03/30 0701 - 03/30 1900  In: -   Out: 229 [Urine:225]    Physical Exam:   GENERAL: alert, cooperative, no distress, appears stated age  EYE: conjunctivae/corneas clear. PERRL. THROAT & NECK: normal and no erythema or exudates noted. LUNG: clear to auscultation bilaterally  HEART: regular rate and rhythm, S1S2, no murmur, no JVD  ABDOMEN: soft, non-tender, non-distended. Bowel sounds normal. Can feel spleen edges. Ostomy. EXTREMITIES:  No edema, 2+ pedal/radial pulses bilaterally  SKIN: no rash or abnormalities  NEUROLOGIC: A&Ox3. Cranial nerves 2-12 grossly intact.     Lab/Data Review:  BMP:   Lab Results   Component Value Date/Time     03/30/2017 09:10 AM    K 3.3 (L) 03/30/2017 09:10 AM     03/30/2017 09:10 AM    CO2 28 03/30/2017 09:10 AM    AGAP 10 03/30/2017 09:10 AM     (H) 03/30/2017 09:10 AM    BUN 13 03/30/2017 09:10 AM    CREA 3.65 (H) 03/30/2017 09:10 AM    GFRAA 22 (L) 03/30/2017 09:10 AM    GFRNA 18 (L) 03/30/2017 09:10 AM     CBC:   Lab Results   Component Value Date/Time    WBC 0.9 (LL) 03/30/2017 09:10 AM    HGB 8.2 (L) 03/30/2017 09:10 AM    HCT 24.5 (L) 03/30/2017 09:10 AM     (L) 03/30/2017 09:10 AM     Recent Glucose Results:   Lab Results   Component Value Date/Time     (H) 03/30/2017 09:10 AM     Liver Panel:   Lab Results   Component Value Date/Time    ALB 2.4 (L) 03/30/2017 09:10 AM    TP 7.1 03/30/2017 09:10 AM    GLOB 4.7 (H) 03/30/2017 09:10 AM    AGRAT 0.5 (L) 03/30/2017 09:10 AM    SGOT 13 (L) 03/30/2017 09:10 AM    ALT 8 (L) 03/30/2017 09:10 AM    AP 61 03/30/2017 09:10 AM        Imaging:    CT A/P  Splenomegaly, 16 x 8 cm. One small gallstone. Left lower quadrant  colostomy.     Assessment:     Principal Problem:    Symptomatic anemia (3/29/2017)    Active Problems:    Pancytopenia ([address] (9/3/2016)      Overview: HGB 7.8 ON ADMISSION      HTN (hypertension) (9/4/2016)      GERD (gastroesophageal reflux disease) (9/4/2016)      Paroxysmal atrial fibrillation ([address] (9/17/2016)      Elevated total protein (3/29/2017)      Hyperbilirubinemia (3/29/2017)      Dizziness (3/29/2017)      ESRD (end stage renal disease) ([address] (3/29/2017)        Plan:     - Trend CBC s/p PRBC  - To get bone marrow biopsy in AM  - Continue FeSO4  - Await blood studies  - Thiamine/Folic Acid  - May need to consult gen surg soon for splenomegaly    Signed By: Denae Robledo DO     March 30, 2017

## 2017-03-30 NOTE — PROGRESS NOTES
Problem: Mobility Impaired (Adult and Pediatric)  Goal: *Acute Goals and Plan of Care (Insert Text)  STG:  (1.)Mr. Pozo will improve standing tolerance without decrease in SBP >20 mmHg and/or DBP >10 mmHg within 3 days. (2.)Mr. Pozo will transfer from bed to chair and chair to bed with CONTACT GUARD ASSIST using the least restrictive device within 3 day(s). - MET 3/30/17  (3.)Mr. Pozo will ambulate with CONTACT GUARD ASSIST for 25 feet with the least restrictive device within 3 day(s). LTG:  (1.)Mr. Pozo will move from supine to sit and sit to supine , scoot up and down and roll side to side in bed with INDEPENDENT within 7 day(s). (2.)Mr. Pozo will transfer from bed to chair and chair to bed with INDEPENDENT using the least restrictive device within 7 day(s). (3.)Mr. Pozo will ambulate with MODIFIED INDEPENDENCE for 100+ feet with the least restrictive device within 7 day(s). ________________________________________________________________________________________________      PHYSICAL THERAPY: Daily Note, Treatment Day: 1st and AM 3/30/2017  OBSERVATION: Hospital Day: 2  Payor: [id number] [place], [place] [address] / Plan: [place] / Product Type: Medicaid /      NAME/AGE/GENDER: Linda Aceves is a 61 y.o. male         PRIMARY DIAGNOSIS: SYMPTOMATIC ANEMIA Symptomatic anemia Symptomatic anemia        ICD-10: Treatment Diagnosis:       · Generalized Muscle Weakness (M62.81)  · Difficulty in walking, Not elsewhere classified (R26.2)   Precaution/Allergies:  Heparin analogues       ASSESSMENT:      Mr. Kody Rivas presents to physical therapy after admission for anemia with increasing weakness over last 3 weeks. Hx of perirectal abscess with colostomy bag. Receives HD. Supine upon arrival and agreeable to PT treatment. He states he is feeling better after blood transfusion yesterday. At rest, BP 97/56. Sits to EOB with supervision and increased time required.  In sitting, /48; no dizziness. Performs seated BLE exercises at EOB. Post activity BP 97/53. Stands and transfers to chair with CGA. Verbal cues given for safety/technique. /54. Pt declined walking, saying, \"I'm just not up for that today. \" Encouraged pt to sit up in chair but declined d/t discomfort and transfers to bed with CGA and to supine with supervision. Improvement with BP and transfers but further progress limited by motivation. Agreeable to [place] PT at D/C; discuss with SW. Will continue POC. This section established at most recent assessment   PROBLEM LIST (Impairments causing functional limitations):  1. Decreased Strength  2. Decreased ADL/Functional Activities  3. Decreased Transfer Abilities  4. Decreased Ambulation Ability/Technique  5. Decreased Balance  6. Increased Pain  7. Decreased Activity Tolerance  8. Increased Fatigue    INTERVENTIONS PLANNED: (Benefits and precautions of physical therapy have been discussed with the patient.)  1. Balance Exercise  2. Bed Mobility  3. Family Education  4. Gait Training  5. Therapeutic Activites  6. Therapeutic Exercise/Strengthening  7. Transfer Training      TREATMENT PLAN: Frequency/Duration: 3 times a week for duration of hospital stay  Rehabilitation Potential For Stated Goals: Marcin Talavera REHABILITATION/EQUIPMENT: (at time of discharge pending progress): TBD. HISTORY:   History of Present Injury/Illness (Reason for Referral):  Per H&P, Opal Ferrari is a 61 y.o. male that presented to the ED with 3 week history of weakness and increasing difficulty ambulating, He reports decreased oral intake and some dysphagia with both solids and liquids. He reports chronic belching and abdominal distention since his surgery last fall. He reports occasional chills but denies fever, N/V. He denies overt blood loss. ED evaluation shows pancytopenia. The hospitalist have been asked to admit.  \"  Past Medical History/Comorbidities:   Mr. Alberto Rizzo  has a past medical history of Alcohol abuse, daily use (09/03/2016); Anemia (9/3/2016); Chronic kidney disease; HIT (heparin-induced thrombocytopenia) ([facility] [address] Hypertension; Hypokalemia (9/3/2016); and Tobacco abuse (9/3/2016). Mr. Chuck Culp  has a past surgical history that includes debride necrotic skin/ tissue, abd wall (09/04/2016); abdomen surgery proc unlisted; and other surgical.  Social History/Living Environment:   Home Environment: Private residence  Wheelchair Ramp: Yes  One/Two Story Residence: One story  Living Alone: No  Support Systems: Family member(s)  Patient Expects to be Discharged to[de-identified] Private residence  Current DME Used/Available at Home: Walker, rolling, Wheelchair, [address]  Prior Level of Function/Work/Activity:  WC primary mode of mobility. Decrease in function over last 3 weeks. Number of Personal Factors/Comorbidities that affect the Plan of Care:  Home alone during day  Decreased mobility  1-2: MODERATE COMPLEXITY   EXAMINATION:   Most Recent Physical Functioning:   Gross Assessment:  AROM: Within functional limits  Strength: Generally decreased, functional  Sensation: Intact               Posture:  Posture (WDL): Exceptions to WDL  Posture Assessment: Forward head, Rounded shoulders  Balance:  Sitting: Intact  Standing: Intact Bed Mobility:  Rolling: Supervision  Supine to Sit: Supervision  Sit to Supine: Supervision  Scooting: Supervision  Level of Assistance: Supervision  Interventions: Verbal cues  Wheelchair Mobility:     Transfers:  Sit to Stand: Stand-by asssistance  Stand to Sit: Stand-by asssistance  Stand Pivot Transfers: Contact guard assistance  Bed to Chair: Contact guard assistance  Level of Assistance: Contact guard assistance  Interventions: Safety awareness training;Verbal cues; Tactile cues  Duration: 16 Minutes  Gait:             Body Structures Involved:  1. Heart  2. Digestive Structures  3.  Muscles Body Functions Affected:  1. Sensory/Pain  2. Cardio  3. Hematological  4. Neuromusculoskeletal  5. Movement Related Activities and Participation Affected:  1. General Tasks and Demands  2. Mobility  3. Self Care  4. Domestic Life   Number of elements that affect the Plan of Care: 4+: HIGH COMPLEXITY   CLINICAL PRESENTATION:   Presentation: Stable and uncomplicated: LOW COMPLEXITY   CLINICAL DECISION MAKIN[address] [place] [address] AM-PAC 6 Clicks   Basic Mobility Inpatient Short Form  How much difficulty does the patient currently have. .. Unable A Lot A Little None   1. Turning over in bed (including adjusting bedclothes, sheets and blankets)? [ ] 1   [ ] 2   [ ] 3   [X] 4   2. Sitting down on and standing up from a chair with arms ( e.g., wheelchair, bedside commode, etc.)   [ ] 1   [ ] 2   [ ] 3   [X] 4   3. Moving from lying on back to sitting on the side of the bed? [ ] 1   [ ] 2   [ ] 3   [X] 4   How much help from another person does the patient currently need. .. Total A Lot A Little None   4. Moving to and from a bed to a chair (including a wheelchair)? [ ] 1   [ ] 2   [X] 3   [ ] 4   5. Need to walk in hospital room? [ ] 1   [X] 2   [ ] 3   [ ] 4   6. Climbing 3-5 steps with a railing? [ ] 1   [X] 2   [ ] 3   [ ] 4   © , Trustees of [address] [place] [address], under license to [employer]. All rights reserved    Score:  Initial: 19 Most Recent: X (Date: 3/29/17 )     Interpretation of Tool:  Represents activities that are increasingly more difficult (i.e. Bed mobility, Transfers, Gait).        Score 24 23 22-20 19-15 14-10 9-7 6       Modifier CH CI CJ CK CL CM CN         · Mobility - Walking and Moving Around:               - CURRENT STATUS:    CK - 40%-59% impaired, limited or restricted               - GOAL STATUS:           CJ - 20%-39% impaired, limited or restricted               - D/C STATUS:                       ---------------To be determined---------------  Payor: [address] [place] [address] [place] / Plan: [place] / Product Type: Medicaid /       Medical Necessity:     · Patient is expected to demonstrate progress in strength, balance, coordination and functional technique to increase independence with ambulation and improve safety during functional mobility. Reason for Services/Other Comments:  · Patient continues to require present interventions due to patient's inability to perform activity safely and independently. Use of outcome tool(s) and clinical judgement create a POC that gives a: Clear prediction of patient's progress: LOW COMPLEXITY                 TREATMENT:   (In addition to Assessment/Re-Assessment sessions the following treatments were rendered)   Pre-treatment Symptoms/Complaints:  \"I want to get back in bed and watch TV\"  Pain: Initial:   Pain Intensity 1: 0  Post Session:  None reported       Therapeutic Activity: (  16 Minutes ):  Therapeutic activities including Bed transfers, Chair transfers, Ambulation on level ground, sitting/standing balance, and seated exercises to improve mobility, strength, balance, coordination and endurance. Required minimal   verbal/tactile cues to promote safety with transfers. Date:  3/30/17 Date:   Date:     Activity/Exercise Parameters Parameters Parameters   Hip flexion  1x10     LAQ 1x10     Ankle pumps  1x10                                    Braces/Orthotics/Lines/Etc:   · colostomy bag  Treatment/Session Assessment:    · Response to Treatment:  Able to transfer to chair with CGA  · Interdisciplinary Collaboration:  · Physical Therapist and Registered Nurse  · After treatment position/precautions:  · Supine in bed, Bed/Chair-wheels locked, Bed in low position and Call light within reach  · Compliance with Program/Exercises: Will assess as treatment progresses. · Recommendations/Intent for next treatment session:   \"Next visit will focus on advancements to more challenging activities and reduction in assistance provided\".   Total Treatment Duration:  PT Patient Time In/Time Out  Time In: 0914  Time Out: 19 Maryellen Mckenna

## 2017-03-30 NOTE — PROGRESS NOTES
[facility]        Subjective: ESRD    Review of Systems -   General ROS: negative for - chills, fatigue or fever  Respiratory ROS: no cough, shortness of breath, or wheezing  Cardiovascular ROS: no chest pain or dyspnea on exertion  Gastrointestinal ROS: no abdominal pain, change in bowel habits, or black or bloody stools  Genito-Urinary ROS: no dysuria, trouble voiding, or hematuria  Neurological ROS: no TIA or stroke symptoms        Objective:    Vitals:    03/30/17 0417 03/30/17 0432 03/30/17 0713 03/30/17 0914   BP: 108/62 109/59 113/60 97/56   Pulse: 64 65 63 72   Resp: 18 18 18    Temp: 97.8 °F (36.6 °C) 97.7 °F (36.5 °C) 97.4 °F (36.3 °C)    SpO2: 100% 100% 100%    Weight:       Height:           PE  Gen: in no acute distress  CV: reg rate  Chest: clear  Abd: soft  Ext/Access: av fistula left arm ok       . LAB  Recent Labs      03/30/17   0910  03/30/17   0058  03/29/17   1040   WBC  0.9*   --   0.9*   HGB  8.2*  7.1*  6.9*   HCT  24.5*   --   20.3*   PLT  105*   --   136*   INR   --    --   1.0     Recent Labs      03/30/17   0910  03/29/17   1040   NA  139  137   K  3.3*  3.1*   CL  101  97*   CO2  28  28   GLU  104*  86   BUN  13  6*   CREA  3.65*  2.15*   MG   --   1.5*   CA  7.9*  8.2*   ALB  2.4*  2.9*   TBILI  1.7*  1.8*   ALT  8*  8*   SGOT  13*  15           Radiology    A/P:   Patient Active Problem List   Diagnosis Code    Alcohol abuse, daily use F10.10    VITO (acute kidney injury) ([facility] N17.9    Pancytopenia ([facility] D61.818    HTN (hypertension) I10    GERD (gastroesophageal reflux disease) K21.9    Perirectal abscess K61.1    Thrombocytopenia (HCC) D69.6    Acute hypoactive delirium due to multiple etiologies F05    Pleural effusion on left J90    Paroxysmal atrial fibrillation (HCC) I48.0    Acute blood loss anemia D62    Suspected sleep apnea G47.30    Nocturnal hypoxemia G47.34    HIT (heparin-induced thrombocytopenia) ([facility]) D75.82    CKD (chronic kidney disease) requiring chronic dialysis (HCC) N18.6, Z99.2    Symptomatic anemia D64.9    Elevated total protein R77.8    Hyperbilirubinemia E80.6    Dizziness R42    ESRD (end stage renal disease) [facility] N18.6        For dialysis again tomorrow.          Magdaline Moritz, MD

## 2017-03-30 NOTE — CONSULTS
Inpatient Hematology / Oncology Consult Note    Reason for Consult:  SYMPTOMATIC ANEMIA  Referring Physician:  Chasidy Grimm DO    History of Present Illness:  Mr. Franc Guevara is a 61 y.o. male admitted on 3/29/2017. He presented to the ER with a 3 week history of weakness, difficulty ambulating, decreased PO intake, 20# weight loss and dysphagia. He reports chronic belching and abdominal distention since his surgery last fall. He has some occasional chills but denies fever, N/V. He denies overt blood loss. PMH: ESRD, on dialysis, HTN, GERD, afib, hx HIT, ETOH abuse (drinks 10-15 beers daily). He was seen by Dr. Blaze Stanley in clinic back in November 2016 for pancytopenia after a lengthy hospitalization with sepsis and an ICU stay. His counts were improving and he was encouraged to follow up in 6 weeks with plans to bone marrow if counts worsened, but he never returned to clinic. His sister apparently has some neutropenia as well but no baseline labs prior his last hospitalization were available. On admission, he was noted again to be pancytopenic with /, hgb 6.9, platelets 615,129. We were consulted for further recommendations. Review of Systems:  Constitutional + chills, lethargy, decreased PO intake, 20lb weight loss    HEENT Denies trauma, blurry vision, hearing loss, ear pain, nosebleeds, sore throat, neck pain    Skin Denies lesions or rashes. Lungs Denies dyspnea, cough, sputum production or hemoptysis. Cardiovascular Denies chest pain, palpitations, or lower extremity edema. Gastrointestinal Denies nausea, vomiting, changes in bowel habits, bloody or black stools, abdominal pain.  Denies dysuria, frequency or hesitancy of urination. Neuro Denies headaches, visual changes or ataxia. Denies dizziness, tingling, tremors, sensory change, speech change, focal weakness      Hematology Denies easy bruising or bleeding, denies gingival bleeding or epistaxis.    Endo Denies heat/cold intolerance, denies diabetes or thyroid abnormalities. MSK Denies back pain, arthralgias, myalgias or frequent falls. Psychiatric/Behavioral Denies depression and substance abuse. The patient is not nervous/anxious. Allergies   Allergen Reactions    Heparin Analogues Other (comments)     H. I.T positive     Past Medical History:   Diagnosis Date    Alcohol abuse, daily use 09/03/2016    NONE X 7 DAYS, USUALLY DRINKS 10-15 BEERS DAILY. TAKES A WEEK OFF AT A TIME ONCE A MONTH    Anemia 9/3/2016    HGB 7.8 ON ADMISSION    Chronic kidney disease     end stage renal disease- dialysis on Tues, Thurs. , Saturday mornings.  HIT [facility]parin-induced thrombocytopenia) [facility]     Hypertension     Hypokalemia 9/3/2016    2.7 ON ADMISSION    Tobacco abuse 9/3/2016    QUIT 7 DAYS AGO     Past Surgical History:   Procedure Laterality Date    ABDOMEN SURGERY PROC UNLISTED      Sept. 2016.  DEBRIDE NECROTIC SKIN/ TISSUE, ABD WALL  09/04/2016    I&D rectal abscess with drain placement - Dr. Akhil King      rectal abcess surgery that also got infected-  Subsequent I & D of the rectal abcess. Family History   Problem Relation Age of Onset    Stroke Mother      Social History     Social History    Marital status: SINGLE     Spouse name: N/A    Number of children: N/A    Years of education: N/A     Occupational History    Not on file.      Social History Main Topics    Smoking status: Former Smoker     Packs/day: 2.00     Years: 40.00     Types: Cigarettes    Smokeless tobacco: Not on file      Comment: STATES HE QUIT 7 DAYS AGO    Alcohol use 42.0 - 63.0 oz/week     70 - 105 Cans of beer per week      Comment: 10-15 CANS DAILY    Drug use: No    Sexual activity: Not on file     Other Topics Concern    Not on file     Social History Narrative    9/3/16:  PATIENT IS SINGLE, LIVES WITH BROTHERDEVENDRA     Current Facility-Administered Medications   Medication Dose Route Frequency Provider Last Rate Last Dose    0.9% sodium chloride infusion 250 mL  250 mL IntraVENous PRN Scott Espinosa,         mupirocin (BACTROBAN) 2 % ointment   Both Nostrils BID Lonita Shell, DO        sodium chloride (NS) flush 5-10 mL  5-10 mL IntraVENous Q8H Kun Strauss MD   10 mL at 17 2124    sodium chloride (NS) flush 5-10 mL  5-10 mL IntraVENous PRN Kun Strauss MD        pantoprazole (PROTONIX) tablet 40 mg  40 mg Oral ACB&D [place]., PA   40 mg at 17 0533    metoprolol tartrate (LOPRESSOR) tablet 25 mg  25 mg Oral Q12H Adán Mckeon Rist., PA   25 mg at 17 2100    ferrous sulfate tablet 325 mg  325 mg Oral BID WITH MEALS Adán Mojicat., PA   325 mg at 17 1687    albuterol (PROVENTIL VENTOLIN) nebulizer solution 2.5 mg  2.5 mg Nebulization Q6H PRN [place]., PA        thiamine (B-1) tablet 100 mg  100 mg Oral DAILY [place]., PA   100 mg at  3164    folic acid (FOLVITE) tablet 1 mg  1 mg Oral DAILY [place]., PA   1 mg at 17 8845    LORazepam (ATIVAN) injection 1 mg  1 mg IntraVENous Q4H PRN [place]., PA           OBJECTIVE:  Patient Vitals for the past 8 hrs:   BP Temp Pulse Resp SpO2   17 1140 106/63 97.3 °F (36.3 °C) 67 18 100 %   17 0914 97/56 - 72 - -     Temp (24hrs), Av.7 °F (36.5 °C), Min:97.3 °F (36.3 °C), Max:98.2 °F (36.8 °C)     0701 -  1900  In: -   Out: 225 [Urine:225]    Physical Exam:  Constitutional: Well developed, well nourished male in no acute distress, sitting comfortably in the hospital bed. HEENT: Normocephalic and atraumatic. Oropharynx is clear, mucous membranes are moist.    Neck supple     Lymph node   Deferred. Skin Warm and dry. No bruising and no rash noted. No erythema. No pallor.     Respiratory Lungs are clear to auscultation bilaterally without wheezes, rales or rhonchi, normal air exchange without accessory muscle use. CVS Normal rate, regular rhythm and normal S1 and S2. No murmurs, gallops, or rubs. Abdomen Soft, nontender and nondistended, normoactive bowel sounds. No palpable mass. Splenomegaly. Neuro Grossly nonfocal with no obvious sensory or motor deficits. MSK Normal range of motion in general.  No edema and no tenderness. Psych Appropriate mood and affect. Labs:    Recent Results (from the past 24 hour(s))   HEMOGLOBIN    Collection Time: 03/30/17 12:58 AM   Result Value Ref Range    HGB 7.1 (L) 13.6 - 17.2 g/dL   VAN A PCR    Collection Time: 03/30/17  8:20 AM   Result Value Ref Range    Special Requests: NO SPECIAL REQUESTS      Culture result: (A)       Carl target DNA is not detected. DNA from Carl resistant gene is not detected. MRSA SCREEN - PCR (NASAL)    Collection Time: 03/30/17  8:25 AM   Result Value Ref Range    Special Requests: NO SPECIAL REQUESTS      Culture result: (A)       MRSA target DNA is detected (presumptive positive for MRSA colonization). Culture result:        RESULTS VERIFIED, PHONED TO AND READ BACK BY  ARTURO SOLER RN AT 0380 ON 3/30/17 SG     CBC WITH AUTOMATED DIFF    Collection Time: 03/30/17  9:10 AM   Result Value Ref Range    WBC 0.9 (LL) 4.3 - 11.1 K/uL    RBC 2.74 (L) 4.23 - 5.67 M/uL    HGB 8.2 (L) 13.6 - 17.2 g/dL    HCT 24.5 (L) 41.1 - 50.3 %    MCV 89.4 79.6 - 97.8 FL    MCH 29.9 26.1 - 32.9 PG    MCHC 33.5 31.4 - 35.0 g/dL    RDW 20.6 (H) 11.9 - 14.6 %    PLATELET 535 (L) 686 - 450 K/uL    MPV 9.3 (L) 10.8 - 14.1 FL    DF AUTOMATED      NEUTROPHILS 11 (L) 43 - 78 %    LYMPHOCYTES 70 (H) 13 - 44 %    MONOCYTES 16 (H) 4.0 - 12.0 %    EOSINOPHILS 2 0.5 - 7.8 %    BASOPHILS 0 0.0 - 2.0 %    IMMATURE GRANULOCYTES 1.1 0.0 - 5.0 %    ABS. NEUTROPHILS 0.1 (L) 1.7 - 8.2 K/UL    ABS. LYMPHOCYTES 0.7 0.5 - 4.6 K/UL    ABS. MONOCYTES 0.2 0.1 - 1.3 K/UL    ABS. EOSINOPHILS 0.0 0.0 - 0.8 K/UL    ABS. BASOPHILS 0.0 0.0 - 0.2 K/UL    ABS. IMM. GRANS. 0.0 0.0 - 0.5 K/UL   METABOLIC PANEL, COMPREHENSIVE    Collection Time: 03/30/17  9:10 AM   Result Value Ref Range    Sodium 139 136 - 145 mmol/L    Potassium 3.3 (L) 3.5 - 5.1 mmol/L    Chloride 101 98 - 107 mmol/L    CO2 28 21 - 32 mmol/L    Anion gap 10 7 - 16 mmol/L    Glucose 104 (H) 65 - 100 mg/dL    BUN 13 8 - 23 MG/DL    Creatinine 3.65 (H) 0.8 - 1.5 MG/DL    GFR est AA 22 (L) >60 ml/min/1.73m2    GFR est non-AA 18 (L) >60 ml/min/1.73m2    Calcium 7.9 (L) 8.3 - 10.4 MG/DL    Bilirubin, total 1.7 (H) 0.2 - 1.1 MG/DL    ALT (SGPT) 8 (L) 12 - 65 U/L    AST (SGOT) 13 (L) 15 - 37 U/L    Alk. phosphatase 61 50 - 136 U/L    Protein, total 7.1 6.3 - 8.2 g/dL    Albumin 2.4 (L) 3.2 - 4.6 g/dL    Globulin 4.7 (H) 2.3 - 3.5 g/dL    A-G Ratio 0.5 (L) 1.2 - 3.5     TOTAL HCG, QT.     Collection Time: 03/30/17  9:10 AM   Result Value Ref Range    Beta HCG, QT <1 0.0 - 2.0 MIU/ML   TSH 3RD GENERATION    Collection Time: 03/30/17  9:10 AM   Result Value Ref Range    TSH 2.760 0.358 - 3.740 uIU/mL       Imaging:  NA    ASSESSMENT:  Problem List  Date Reviewed: 1/18/2017          Codes Class Noted    * (Principal)Symptomatic anemia ICD-10-CM: D64.9  ICD-9-CM: 285.9  3/29/2017        Elevated total protein ICD-10-CM: R77.8  ICD-9-CM: 790.99  3/29/2017        Hyperbilirubinemia ICD-10-CM: E80.6  ICD-9-CM: 782.4  3/29/2017        Dizziness ICD-10-CM: R42  ICD-9-CM: 780.4  3/29/2017        ESRD (end stage renal disease) ([facility] ICD-10-CM: N18.6  ICD-9-CM: 585.6  3/29/2017        HIT (heparin-induced thrombocytopenia) (HCC) ICD-10-CM: S25.44  ICD-9-CM: 289.84  10/11/2016        CKD (chronic kidney disease) requiring chronic dialysis ([facility] ICD-10-CM: N18.6, Z99.2  ICD-9-CM: 585.6, V45.11  10/11/2016        Nocturnal hypoxemia (Chronic) ICD-10-CM: G47.34  ICD-9-CM: 327.24  10/4/2016        Suspected sleep apnea ICD-10-CM: G47.30  ICD-9-CM: 780.57  10/2/2016        Acute blood loss anemia ICD-10-CM: D62  ICD-9-CM: 285.1 9/29/2016        Paroxysmal atrial fibrillation (HCC) ICD-10-CM: I48.0  ICD-9-CM: 427.31  9/17/2016        Pleural effusion on left ICD-10-CM: J90  ICD-9-CM: 511.9  9/12/2016        Acute hypoactive delirium due to multiple etiologies ICD-10-CM: F05  ICD-9-CM: 293.0  9/11/2016        Thrombocytopenia ([facility] ICD-10-CM: D69.6  ICD-9-CM: 287.5  9/8/2016        HTN (hypertension) (Chronic) ICD-10-CM: I10  ICD-9-CM: 401.9  9/4/2016        GERD (gastroesophageal reflux disease) (Chronic) ICD-10-CM: K21.9  ICD-9-CM: 530.81  9/4/2016        Perirectal abscess ICD-10-CM: K61.1  ICD-9-CM: 391  9/4/2016    Overview Signed 9/8/2016  9:59 AM by Gregory Gutierrez NP     9/4/16:  Incision and drainage of perirectal abscess. Alcohol abuse, daily use (Chronic) ICD-10-CM: F10.10  ICD-9-CM: 305.01  9/3/2016        VITO (acute kidney injury) ([facility] ICD-10-CM: N17.9  ICD-9-CM: 584.9  9/3/2016        Pancytopenia ([facility] ICD-10-CM: V55.184  ICD-9-CM: 284.19  9/3/2016    Overview Signed 9/3/2016  8:22 PM by Charmayne Pilon, NP     HGB 7.8 ON ADMISSION                 62 yo male, PMH: ESRD, on dialysis, HTN, GERD, afib, hx HIT, ETOH abuse (drinks 10-15 beers daily), pancytopenia. Admitted with increased weakness, difficulty with ambulation, decreased PO intake, weight loss. Pancytopenic on admission    RECOMMENDATIONS:  Pancytopenia  - Check B12, folate. CT A/P to assess liver/spleen. Bone marrow biopsy as soon as possible - Dr. Alisha Fleming to do at bedside tomorrow, 3/31 at [address] [place] [address]  - Hgb improved post 2 units PRBCs  - On ferrous sulfate PO    ESRD  - Nephrology following. Dialysis continues    Weakness  - PT/OT following    Dysphagia  - Consult speech therapy    Lab studies were personally reviewed. Case discussed with Dr. Edwin Howard. Thank you for allowing us to participate in the care of Mr. Joy Issa.   We will follow along            Colten Armando NP  [facility]  [address] [address]  Office : [phone]  Fax : [phone]

## 2017-03-30 NOTE — PROGRESS NOTES
New PCP assigned and patient has a follow up appointment already. Info in the AVS.   Randi Avalosion.  Apurva Burk

## 2017-03-31 ENCOUNTER — HOME HEALTH ADMISSION (OUTPATIENT)
Dept: HOME HEALTH SERVICES | Facility: HOME HEALTH | Age: 67
End: 2017-03-31

## 2017-03-31 LAB
ABO + RH BLD: NORMAL
ALBUMIN SERPL BCP-MCNC: 2.4 G/DL (ref 3.2–4.6)
ANION GAP BLD CALC-SCNC: 5 MMOL/L (ref 7–16)
BACTERIA SPEC CULT: ABNORMAL
BASOPHILS # BLD AUTO: 0 K/UL (ref 0–0.2)
BASOPHILS # BLD: 0 % (ref 0–2)
BLD PROD TYP BPU: NORMAL
BLD PROD TYP BPU: NORMAL
BLOOD GROUP ANTIBODIES SERPL: NORMAL
BONE MARROW PREP & W,BMA: NORMAL
BPU ID: NORMAL
BPU ID: NORMAL
BUN SERPL-MCNC: 9 MG/DL (ref 8–23)
CALCIUM SERPL-MCNC: 7.5 MG/DL (ref 8.3–10.4)
CHLORIDE SERPL-SCNC: 104 MMOL/L (ref 98–107)
CO2 SERPL-SCNC: 31 MMOL/L (ref 21–32)
CREAT SERPL-MCNC: 2.65 MG/DL (ref 0.8–1.5)
CROSSMATCH RESULT,%XM: NORMAL
CROSSMATCH RESULT,%XM: NORMAL
DIFFERENTIAL METHOD BLD: ABNORMAL
EOSINOPHIL # BLD: 0 K/UL (ref 0–0.8)
EOSINOPHIL NFR BLD: 1 % (ref 0.5–7.8)
ERYTHROCYTE [DISTWIDTH] IN BLOOD BY AUTOMATED COUNT: 19.9 % (ref 11.9–14.6)
GLUCOSE SERPL-MCNC: 93 MG/DL (ref 65–100)
HCT VFR BLD AUTO: 25.3 % (ref 41.1–50.3)
HGB BLD-MCNC: 8.5 G/DL (ref 13.6–17.2)
IMM GRANULOCYTES # BLD: 0 K/UL (ref 0–0.5)
IMM GRANULOCYTES NFR BLD AUTO: 0 % (ref 0–5)
LYMPHOCYTES # BLD AUTO: 58 % (ref 13–44)
LYMPHOCYTES # BLD: 0.6 K/UL (ref 0.5–4.6)
MCH RBC QN AUTO: 29.9 PG (ref 26.1–32.9)
MCHC RBC AUTO-ENTMCNC: 33.6 G/DL (ref 31.4–35)
MCV RBC AUTO: 89.1 FL (ref 79.6–97.8)
MM INDURATION POC: NORMAL 0MM (ref 0–5)
MONOCYTES # BLD: 0.2 K/UL (ref 0.1–1.3)
MONOCYTES NFR BLD AUTO: 25 % (ref 4–12)
NEUTS SEG # BLD: 0.2 K/UL (ref 1.7–8.2)
NEUTS SEG NFR BLD AUTO: 16 % (ref 43–78)
PHOSPHATE SERPL-MCNC: 1.5 MG/DL (ref 2.3–3.7)
PLATELET # BLD AUTO: 105 K/UL (ref 150–450)
PMV BLD AUTO: 9.1 FL (ref 10.8–14.1)
POTASSIUM SERPL-SCNC: 3.7 MMOL/L (ref 3.5–5.1)
PPD POC: NORMAL NEGATIVE
RBC # BLD AUTO: 2.84 M/UL (ref 4.23–5.67)
SERVICE CMNT-IMP: ABNORMAL
SODIUM SERPL-SCNC: 140 MMOL/L (ref 136–145)
SPECIMEN EXP DATE BLD: NORMAL
STATUS OF UNIT,%ST: NORMAL
STATUS OF UNIT,%ST: NORMAL
UNIT DIVISION, %UDIV: 0
UNIT DIVISION, %UDIV: 0
WBC # BLD AUTO: 1 K/UL (ref 4.3–11.1)

## 2017-03-31 PROCEDURE — 36415 COLL VENOUS BLD VENIPUNCTURE: CPT | Performed by: INTERNAL MEDICINE

## 2017-03-31 PROCEDURE — 88185 FLOWCYTOMETRY/TC ADD-ON: CPT | Performed by: EMERGENCY MEDICINE

## 2017-03-31 PROCEDURE — 80069 RENAL FUNCTION PANEL: CPT | Performed by: INTERNAL MEDICINE

## 2017-03-31 PROCEDURE — G8997 SWALLOW GOAL STATUS: HCPCS

## 2017-03-31 PROCEDURE — 90935 HEMODIALYSIS ONE EVALUATION: CPT

## 2017-03-31 PROCEDURE — 07DR3ZX EXTRACTION OF ILIAC BONE MARROW, PERCUTANEOUS APPROACH, DIAGNOSTIC: ICD-10-PCS | Performed by: PATHOLOGY

## 2017-03-31 PROCEDURE — 65270000029 HC RM PRIVATE

## 2017-03-31 PROCEDURE — 88311 DECALCIFY TISSUE: CPT | Performed by: EMERGENCY MEDICINE

## 2017-03-31 PROCEDURE — 88341 IMHCHEM/IMCYTCHM EA ADD ANTB: CPT | Performed by: EMERGENCY MEDICINE

## 2017-03-31 PROCEDURE — G8998 SWALLOW D/C STATUS: HCPCS

## 2017-03-31 PROCEDURE — 85025 COMPLETE CBC W/AUTO DIFF WBC: CPT | Performed by: INTERNAL MEDICINE

## 2017-03-31 PROCEDURE — 88184 FLOWCYTOMETRY/ TC 1 MARKER: CPT | Performed by: EMERGENCY MEDICINE

## 2017-03-31 PROCEDURE — 88312 SPECIAL STAINS GROUP 1: CPT | Performed by: EMERGENCY MEDICINE

## 2017-03-31 PROCEDURE — 88305 TISSUE EXAM BY PATHOLOGIST: CPT | Performed by: EMERGENCY MEDICINE

## 2017-03-31 PROCEDURE — 88342 IMHCHEM/IMCYTCHM 1ST ANTB: CPT | Performed by: EMERGENCY MEDICINE

## 2017-03-31 PROCEDURE — 99218 HC RM OBSERVATION: CPT

## 2017-03-31 PROCEDURE — 92610 EVALUATE SWALLOWING FUNCTION: CPT

## 2017-03-31 PROCEDURE — 88313 SPECIAL STAINS GROUP 2: CPT | Performed by: EMERGENCY MEDICINE

## 2017-03-31 PROCEDURE — G8996 SWALLOW CURRENT STATUS: HCPCS

## 2017-03-31 PROCEDURE — 74011250637 HC RX REV CODE- 250/637

## 2017-03-31 PROCEDURE — 5A1D00Z PERFORMANCE OF URINARY FILTRATION, SINGLE: ICD-10-PCS | Performed by: INTERNAL MEDICINE

## 2017-03-31 PROCEDURE — 74011250636 HC RX REV CODE- 250/636: Performed by: NURSE PRACTITIONER

## 2017-03-31 PROCEDURE — 079T3ZX DRAINAGE OF BONE MARROW, PERCUTANEOUS APPROACH, DIAGNOSTIC: ICD-10-PCS | Performed by: PATHOLOGY

## 2017-03-31 PROCEDURE — 74640000003 HC CRRT SET UP OR EXCHANGE

## 2017-03-31 RX ORDER — LORAZEPAM 2 MG/ML
1 INJECTION INTRAMUSCULAR ONCE
Status: COMPLETED | OUTPATIENT
Start: 2017-03-31 | End: 2017-03-31

## 2017-03-31 RX ORDER — MORPHINE SULFATE 2 MG/ML
2 INJECTION, SOLUTION INTRAMUSCULAR; INTRAVENOUS ONCE
Status: COMPLETED | OUTPATIENT
Start: 2017-03-31 | End: 2017-03-31

## 2017-03-31 RX ADMIN — FERROUS SULFATE TAB 325 MG (65 MG ELEMENTAL FE) 325 MG: 325 (65 FE) TAB at 15:08

## 2017-03-31 RX ADMIN — Medication 10 ML: at 22:54

## 2017-03-31 RX ADMIN — Medication 2 MG: at 13:18

## 2017-03-31 RX ADMIN — LORAZEPAM 1 MG: 2 INJECTION INTRAMUSCULAR at 13:18

## 2017-03-31 RX ADMIN — MUPIROCIN: 20 OINTMENT TOPICAL at 22:45

## 2017-03-31 RX ADMIN — PANTOPRAZOLE SODIUM 40 MG: 40 TABLET, DELAYED RELEASE ORAL at 15:08

## 2017-03-31 RX ADMIN — Medication 100 MG: at 01:00

## 2017-03-31 RX ADMIN — Medication 10 ML: at 15:09

## 2017-03-31 RX ADMIN — MUPIROCIN: 20 OINTMENT TOPICAL at 15:10

## 2017-03-31 NOTE — PROGRESS NOTES
HD treatment completed without complication. Total of 2 kgs removed. CVC dressing clean, dry, ad intact, tego caps intact, bilateral lumen flushed with 9 cc NS. Transport contacted for return to room.

## 2017-03-31 NOTE — PROGRESS NOTES
Bone Marrow Biopsy done, Patient resting with eyes closed on bed rest until 1430. Needs to leave bandaid on left hip until tomorrow, for 24 hours.

## 2017-03-31 NOTE — PROGRESS NOTES
Speech language pathology: OBSERVATION bedside swallow note: Initial Assessment and Discharge    NAME/AGE/GENDER: Kandi Foreman is a 61 y.o. male  DATE: 3/31/2017  PRIMARY DIAGNOSIS: SYMPTOMATIC ANEMIA  Symptomatic anemia  Dizziness       ICD-10: Treatment Diagnosis: DYSPHAGIA; UNSPECIFIED R13.10  INTERDISCIPLINARY COLLABORATION: Registered Nurse  PRECAUTIONS/ALLERGIES: Heparin analogues ASSESSMENT:Based on the objective data described below, Mr. Luis Garza presents with a swallow within functional limits. RN reports patient tolerating current diet. Patient reports occasional discomfort at site of catheter for dialysis but otherwise denies any swallowing with the exception of reports of belching and gas. Reports he is hungry. Fed himself a container of mixed consistencies and regular textures with normal mastication time and initiation of the swallow. No signs/sx of aspiration with 8oz of thin liquids via serial swallows by straw. Recommend continue cardiac diet/thin liquids. ST not indicated at this time. ?????? ? ? This section established at most recent assessment??????????  PROBLEM LIST (Impairments causing functional limitations):  n/a  REHABILITATION POTENTIAL FOR STATED GOALS: n/a  PLAN OF CARE:   Patient will benefit from skilled intervention to address the following impairments. RECOMMENDATIONS AND PLANNED INTERVENTIONS (Benefits and precautions of therapy have been discussed with the patient.):  continue prescribed diet  MEDICATIONS:  With liquid  COMPENSATORY STRATEGIES/MODIFICATIONS INCLUDING:  None  OTHER RECOMMENDATIONS (including follow up treatment recommendations):   None  RECOMMENDED DIET MODIFICATIONS DISCUSSED WITH:  Nursing  Patient  FREQUENCY/DURATION: Will not continue to follow patient to address above goals. RECOMMENDED REHABILITATION/EQUIPMENT: (at time of discharge pending progress):   None. SUBJECTIVE:   Talkative.   History of Present Injury/Illness: Mr. Luis Garza  has a past medical history of Alcohol abuse, daily use (09/03/2016); Anemia (9/3/2016); Chronic kidney disease; HIT (heparin-induced thrombocytopenia) ([facility] Hypertension; Hypokalemia (9/3/2016); and Tobacco abuse (9/3/2016). Bonny tamraks He also  has a past surgical history that includes debride necrotic skin/ tissue, abd wall (09/04/2016); abdomen surgery proc unlisted; and other surgical.   Present Symptoms: n/a  Pain Intensity 1: 0  Pain Location 1: Generalized  Current Medications:   No current facility-administered medications on file prior to encounter. Current Outpatient Prescriptions on File Prior to Encounter   Medication Sig Dispense Refill    Cholecalciferol, Vitamin D3, (VITAMIN D3) 2,000 unit cap capsule Take 2,000 Units by mouth once.  OXYGEN-AIR DELIVERY SYSTEMS Take 2 L/min by inhalation nightly. via NC      magnesium oxide (MAG-OX) 400 mg tablet Take 400 mg by mouth daily.  ferrous sulfate 325 mg (65 mg iron) tablet Take 1 Tab by mouth two (2) times daily (with meals). 60 Tab 0    acetaminophen (TYLENOL) 325 mg tablet Take 2 Tabs by mouth two (2) times daily as needed. Use only if necessary. Patient has history of alcoholic liver disease 30 Tab 0    albuterol (PROVENTIL VENTOLIN) 2.5 mg /3 mL (0.083 %) nebulizer solution 3 mL by Nebulization route every six (6) hours as needed for Wheezing. 24 Each 0    alum-mag hydroxide-simeth (MYLANTA) 200-200-20 mg/5 mL susp Take 30 mL by mouth three (3) times daily as needed. 1 Bottle 0    epoetin miky (EPOGEN;PROCRIT) 10,000 unit/mL injection 1 mL by SubCUTAneous route every seven (7) days. Indications: RENAL DIALYSIS 1 Vial 0    pantoprazole (PROTONIX) 40 mg tablet Take 1 Tab by mouth Before breakfast and dinner.  60 Tab 0     Current Dietary Status:  Renal      History of reflux:  yes  Reflux medication: protonix  Social History/Home Situation: home with family  Home Environment: Private residence  Wheelchair Ramp: Yes  One/Two Story Residence: One story  Living Alone: No  Support Systems: Family member(s)  Patient Expects to be Discharged to[de-identified] Private residence  Current DME Used/Available at Home: [place] Severe, rolling, Wheelchair, Crutches  OBJECTIVE:   Respiratory Status:        CXR Results: lung bases are clear  MRI/CT Results:n/a  Oral Motor Structure/Speech:  Oral-Motor Structure/Motor Speech  Labial: No impairment  Dentition: Natural  Lingual: No impairment    Cognitive and Communication Status:  Neurologic State: Alert  Orientation Level: Oriented X4  Cognition: Appropriate for age attention/concentration  Perception: Appears intact  Perseveration: No perseveration noted  Safety/Judgement: Awareness of environment    BEDSIDE SWALLOW EVALUATION  Oral Assessment:  Oral Assessment  Labial: No impairment  Dentition: Natural  Lingual: No impairment  P.O. Trials: The patient was given tsp to straw amounts of the following:   Consistency Presented: Thin liquid; Solid;Mixed consistency  How Presented: Self-fed/presented    ORAL PHASE:  Bolus Acceptance: No impairment  Bolus Formation/Control: No impairment  Propulsion: No impairment     Oral Residue: None    PHARYNGEAL PHASE:     Laryngeal Elevation: Functional  Aspiration Signs/Symptoms: None  Vocal Quality: No impairment           Pharyngeal Phase Characteristics: No impairment, issues, or problems     OTHER OBSERVATIONS:  Rate/bite size: WNL   Endurance:   WNL     Tool Used: Dysphagia Outcome and Severity Scale (GONZALES)    Score Comments   Normal Diet  [x] 7 With no strategies or extra time needed   Functional Swallow  [] 6 May have mild oral or pharyngeal delay       Mild Dysphagia    [] 5 Which may require one diet consistency restricted (those who demonstrate penetration which is entirely cleared on MBS would be included)   Mild-Moderate Dysphagia  [] 4 With 1-2 diet consistencies restricted       Moderate Dysphagia  [] 3 With 2 or more diet consistencies restricted       Moderately Severe Dysphagia  [] 2 With partial PO strategies (trials with ST only)       Severe Dysphagia  [] 1 With inability to tolerate any PO safely          Score:  Initial: 7 Most Recent: X (Date: -- )   Interpretation of Tool: The Dysphagia Outcome and Severity Scale (GONZALES) is a simple, easy-to-use, 7-point scale developed to systematically rate the functional severity of dysphagia based on objective assessment and make recommendations for diet level, independence level, and type of nutrition.      Score 7 6 5 4 3 2 1   Modifier CH CI CJ CK CL CM CN   Swallowing:     - CURRENT STATUS: CH - 0% impaired, limited or restricted    - GOAL STATUS:  CH - 0% impaired, limited or restricted    - D/C STATUS:  [address] [place] [address] - 0% impaired, limited or restricted  Payor: [employer] [address] / Plan: [place] [id number] [address] / Product Type: Medicaid /     TREATMENT:    (In addition to Assessment/Re-Assessment sessions the following treatments were rendered)  Assessment/Reassessment only, no treatment provided today  __________________________________________________________________________________________________  Safety:   After treatment position/precautions:  RN notified  Upright in Bed    Total Treatment Duration:  Time In: 1429  Time Out: [address] [place], SLP

## 2017-03-31 NOTE — DIALYSIS
HD treatment initiated via right perm cath without difficulty, Consent signed. CVC dressing changed per protocol, clean, dry, and intact, tego caps intact, bilateral lumen aspirated and flushed with 9cc NS. VS stable, will continue to monitor during tx. No Heparin this tx. Machine tests passed and alarms intact. NAD.

## 2017-03-31 NOTE — PROGRESS NOTES
Admit Date: 3/29/2017      Subjective:          Review of Systems  Cardio-vascular: no chest pain, no SOB  GI: no N/V/D  : no dysuria, no hematuria    Objective:     Patient Vitals for the past 8 hrs:   BP Temp Pulse Resp SpO2   03/31/17 0930 104/62 - 82 - -   03/31/17 0902 105/67 - 91 - -   03/31/17 0833 98/70 - 87 - -   03/31/17 0754 120/77 - 74 - -   03/31/17 0653 121/73 97.8 °F (36.6 °C) 81 18 100 %   03/31/17 0520 121/71 98.1 °F (36.7 °C) 74 18 98 %            Physical Exam:   Lungs: clear  CV: RR,   Abdomen: soft, not tender, no rebound. Ext: trace edema          Data Review No results found for this or any previous visit (from the past 8 hour(s)).         Assessment:     Principal Problem:    Symptomatic anemia (3/29/2017)    Active Problems:    Pancytopenia ([address] (9/3/2016)      Overview: HGB 7.8 ON ADMISSION      HTN (hypertension) (9/4/2016)      GERD (gastroesophageal reflux disease) (9/4/2016)      Paroxysmal atrial fibrillation ([address] (9/17/2016)      Elevated total protein (3/29/2017)      Hyperbilirubinemia (3/29/2017)      Dizziness (3/29/2017)      ESRD (end stage renal disease) ([facility] (3/29/2017)        Plan:     Seen on HD no pb    Emir Ortiz MD

## 2017-03-31 NOTE — MED STUDENT NOTES
*ATTENTION:  This note has been created by a medical student for educational purposes only. Please do not refer to the content of this note for clinical decision-making, billing, or other purposes. Please see attending physicians note to obtain clinical information on this patient. *       Hospitalist Service Note (Medical Student Note*)   3/31/2017  Admit Date: 3/29/2017 10:26 AM   NAME: Jose Alfredo Guerra   :  1950   MRN:  [id number]   Attending: Nadya Galindo DO  PCP:  PROVIDER UNKNOWN  Treatment Team: Attending Provider: Nadya Galindo DO; Consulting Provider: Osman Porter MD; Consulting Provider: Joya Wilkins MD; Care Manager: Marisol Foss  SUBJECTIVE:     Reason for Admission:    Javy Diaz is a 61 y.o. male that presented to the ED with 3 week history of weakness and increasing difficulty ambulating, He reports decreased oral intake and some dysphagia with both solids and liquids. He reports chronic belching and abdominal distention since his surgery last fall. He reports occasional chills but denies fever, N/V. He denies overt blood loss. ED evaluation shows pancytopenia. \"     3/30 Pt was transfused with 1 unit of PRBC. States he feels a lot better after the transfusion. Pt states that his stool in his colostomy bag has changed from solid to liquid. Pt denies CP, SOB, dizziness, vision changes, abdominal pain. 3/31 Pt was seen in dialysis. Pt states of no complaints. CT scan of abdomen showed Spenlomegaly, with 1 small gallstone. Will be doing bone marrow biopsy per Dr Leann Crowe. Pt denies CP, SOB, dizziness, N/V.            Social History   Substance Use Topics    Smoking status: Former Smoker     Packs/day: 2.00     Years: 40.00     Types: Cigarettes    Smokeless tobacco: Not on file      Comment: STATES HE QUIT 7 DAYS AGO    Alcohol use 42.0 - 63.0 oz/week     70 - 105 Cans of beer per week      Comment: 10-15 CANS DAILY       Past Medical History: Diagnosis Date    Alcohol abuse, daily use 09/03/2016    NONE X 7 DAYS, USUALLY DRINKS 10-15 BEERS DAILY. TAKES A WEEK OFF AT A TIME ONCE A MONTH    Anemia 9/3/2016    HGB 7.8 ON ADMISSION    Chronic kidney disease     end stage renal disease- dialysis on Tues, Thurs. , Saturday mornings.  HIT (heparin-induced thrombocytopenia) ([facility] [address]     Hypertension     Hypokalemia 9/3/2016    2.7 ON ADMISSION    Tobacco abuse 9/3/2016    QUIT 7 DAYS AGO       Past Surgical History:   Procedure Laterality Date    ABDOMEN SURGERY PROC UNLISTED      Sept. 2016.  DEBRIDE NECROTIC SKIN/ TISSUE, ABD WALL  09/04/2016    I&D rectal abscess with drain placement - Dr. Darvin Orozco      rectal abcess surgery that also got infected-  Subsequent I & D of the rectal abcess. Allergies   Allergen Reactions    Heparin Analogues Other (comments)     H. I.T positive       Current Facility-Administered Medications   Medication Dose Route Frequency Provider Last Rate Last Dose    0.9% sodium chloride infusion 250 mL  250 mL IntraVENous PRN Anabel Hyatt, DO        mupirocin (BACTROBAN) 2 % ointment   Both Nostrils BID Carlos Schofield,         sodium chloride (NS) flush 5-10 mL  5-10 mL IntraVENous Q8H Martha Anderson MD   10 mL at 03/30/17 2101    sodium chloride (NS) flush 5-10 mL  5-10 mL IntraVENous PRN Martha Anderson MD        pantoprazole (PROTONIX) tablet 40 mg  40 mg Oral ACB&D Brenton Lawrence, PA   40 mg at 03/30/17 1749    metoprolol tartrate (LOPRESSOR) tablet 25 mg  25 mg Oral Q12H Brenton Lawrence PA   Stopped at 03/30/17 2100    ferrous sulfate tablet 325 mg  325 mg Oral BID WITH MEALS MALIHA Gonsalez   325 mg at 03/30/17 1748    albuterol (PROVENTIL VENTOLIN) nebulizer solution 2.5 mg  2.5 mg Nebulization Q6H PRN Brenton Lawrence PA        thiamine (B-1) tablet 100 mg  100 mg Oral DAILY Brenton Lawrence PA   100 mg at 30/13/59 3301    folic acid (FOLVITE) tablet 1 mg  1 mg Oral DAILY MALIHA Rosario   1 mg at 03/30/17 5982    LORazepam (ATIVAN) injection 1 mg  1 mg IntraVENous Q4H PRN MALIHA Rosario           Recent Results (from the past 24 hour(s))   CBC WITH AUTOMATED DIFF    Collection Time: 03/30/17  9:10 AM   Result Value Ref Range    WBC 0.9 (LL) 4.3 - 11.1 K/uL    RBC 2.74 (L) 4.23 - 5.67 M/uL    HGB 8.2 (L) 13.6 - 17.2 g/dL    HCT 24.5 (L) 41.1 - 50.3 %    MCV 89.4 79.6 - 97.8 FL    MCH 29.9 26.1 - 32.9 PG    MCHC 33.5 31.4 - 35.0 g/dL    RDW 20.6 (H) 11.9 - 14.6 %    PLATELET 487 (L) 451 - 450 K/uL    MPV 9.3 (L) 10.8 - 14.1 FL    DF AUTOMATED      NEUTROPHILS 11 (L) 43 - 78 %    LYMPHOCYTES 70 (H) 13 - 44 %    MONOCYTES 16 (H) 4.0 - 12.0 %    EOSINOPHILS 2 0.5 - 7.8 %    BASOPHILS 0 0.0 - 2.0 %    IMMATURE GRANULOCYTES 1.1 0.0 - 5.0 %    ABS. NEUTROPHILS 0.1 (L) 1.7 - 8.2 K/UL    ABS. LYMPHOCYTES 0.7 0.5 - 4.6 K/UL    ABS. MONOCYTES 0.2 0.1 - 1.3 K/UL    ABS. EOSINOPHILS 0.0 0.0 - 0.8 K/UL    ABS. BASOPHILS 0.0 0.0 - 0.2 K/UL    ABS. IMM. GRANS. 0.0 0.0 - 0.5 K/UL   METABOLIC PANEL, COMPREHENSIVE    Collection Time: 03/30/17  9:10 AM   Result Value Ref Range    Sodium 139 136 - 145 mmol/L    Potassium 3.3 (L) 3.5 - 5.1 mmol/L    Chloride 101 98 - 107 mmol/L    CO2 28 21 - 32 mmol/L    Anion gap 10 7 - 16 mmol/L    Glucose 104 (H) 65 - 100 mg/dL    BUN 13 8 - 23 MG/DL    Creatinine 3.65 (H) 0.8 - 1.5 MG/DL    GFR est AA 22 (L) >60 ml/min/1.73m2    GFR est non-AA 18 (L) >60 ml/min/1.73m2    Calcium 7.9 (L) 8.3 - 10.4 MG/DL    Bilirubin, total 1.7 (H) 0.2 - 1.1 MG/DL    ALT (SGPT) 8 (L) 12 - 65 U/L    AST (SGOT) 13 (L) 15 - 37 U/L    Alk. phosphatase 61 50 - 136 U/L    Protein, total 7.1 6.3 - 8.2 g/dL    Albumin 2.4 (L) 3.2 - 4.6 g/dL    Globulin 4.7 (H) 2.3 - 3.5 g/dL    A-G Ratio 0.5 (L) 1.2 - 3.5     TOTAL HCG, QT.     Collection Time: 03/30/17  9:10 AM   Result Value Ref Range    Beta HCG, QT <1 0.0 - 2.0 MIU/ML   TSH 3RD GENERATION    Collection Time: 17  9:10 AM   Result Value Ref Range    TSH 2.760 0.358 - 3.740 uIU/mL   VITAMIN B12    Collection Time: 17  9:23 PM   Result Value Ref Range    Vitamin B12 911 193 - 986 pg/mL   FOLATE    Collection Time: 17  9:23 PM   Result Value Ref Range    Folate 10.0 3.1 - 17.5 ng/mL       PHYSICAL EXAM     Visit Vitals    BP 98/70    Pulse 87    Temp 97.8 °F (36.6 °C)    Resp 18    Ht 5' 8\" (1.727 m)    Wt 77 kg (169 lb 12.1 oz)    SpO2 100%    BMI 25.81 kg/m2      Temp (24hrs), Av.9 °F (36.6 °C), Min:97.3 °F (36.3 °C), Max:98.3 °F (36.8 °C)    Oxygen Therapy  O2 Sat (%): 100 % (17 06)  O2 Device: Room air (17 1534)    Intake/Output Summary (Last 24 hours) at 17 0858  Last data filed at 17 0521   Gross per 24 hour   Intake                0 ml   Output              679 ml   Net             -679 ml        General: WD and WN , No apparent distress. Getting Dialysis  Head: Normocephalic, without obvious abnormality, atraumatic. Eyes: PERRL; EOMI  ENT: Hearing is normal. Oropharynx is clear with tacky mucous membranes    Resp: Clear to auscultation bilaterally. No Wheezing or Rhonchi. Resp are even and unlabored  Heart[de-identified] Regular rate and rhythm, no murmur, rub or gallop. Abdomen: Soft, non-tender. Not distended. Bowel sounds normal. Spenlomegaly noted 2 finger widths below the Left CVA  Extremities: No cyanosis. No clubbing. No LE edema  Skin: Texture, turgor normal. No significant rashes or lesions. Neurologic: No focal CN deficits noted  Psych: Alert and oriented x 4; Judgement and insight are normal     DIAGNOSTIC STUDIES      Labs and Studies from previous 24 hours have been personally reviewed by myself      B12 911  Folate 10.0    CT impression: Splenomegaly, 16 x 8 cm. One small gallstone. Left lower quadrant  colostomy.         ASSESSMENT / PLAN     Active Hospital Problems    Diagnosis Date Noted    Symptomatic anemia 2017  Elevated total protein 03/29/2017    Hyperbilirubinemia 03/29/2017    Dizziness 03/29/2017    ESRD (end stage renal disease) ([facility] 03/29/2017    Paroxysmal atrial fibrillation ([facility] 09/17/2016    HTN (hypertension) 09/04/2016    GERD (gastroesophageal reflux disease) 09/04/2016    Pancytopenia ([facility] 09/03/2016     HGB 7.8 ON ADMISSION       -Continue to trend CBC  -Bone marrow biopsy today.   -Continue Iron Sulfate  -May consult gen surgery for splenomegaly.    - Continue Dialysis    Full Code    Signed By: Parul Riley S-III    March 31, 2017

## 2017-03-31 NOTE — PROCEDURES
Bone Marrow Biopsy and Aspiration Procedure Note    Ordering MD: Dr. Chito Goncalves  Patient: Kanwal Townsend  Reason for procedure: pancytopenia    Bone marrow biopsy and aspiration procedure and potential risks including bleeding, infection, trauma and pain were reviewed with the patient. Informed consent obtained. Patients's name and birth were double checked. He did wish for systemic premedication. Patient assisted to right lateral recumbent position. Left posterior superior iliac spine prepped and draped in the usual sterile fashion, and 10 ml of 1% lidocaine was used for local anesthesia of all layer from skin to periosteum of the  posterior superior iliac spine area. Adequate anesthesia was achieved. The biopsy needle was inserted into left posterior superior iliac spine and a core biopsy was obtained without difficulty. The needle was reinserted and 8 ml of bone marrow aspirated for appropriate testing. The patient tolerated the procedure well. No excessive bleeding or hematoma formation was present. No immediate complications were observed. The patient was instructed to leave a pressure bandage on for 24 hours, then can be removed. After removal of the bandage, the patient can bathe and clean the site with soap and water. He was instructed to call with any excess bleeding or signs or symptoms of infection. Procedure time: 39 Minutes    Specimens sent for: Bone marrow morphology, flow cytometry and cytogenetics studies.      Alvaro Jasmine MD PhD  [facility]

## 2017-03-31 NOTE — PROGRESS NOTES
SPEECH PATHOLOGY NOTE:    Orders received for bedside swallowing assessment. Patient out of the room. Will re-attempt later today.     Maricarmen Heaton MS, SLP

## 2017-03-31 NOTE — PROGRESS NOTES
[address]  Face to Face Encounter    Patients Name: Hi Bean    YOB: 1950    Primary Diagnosis: Symptomatic anemia     Ordering Physician: Dr. Latricia Villela     Date of Face to Face:   3/31/2017                                  Face to Face Encounter findings are related to primary reason for home care:   yes. 1. I certify that the patient needs intermittent care as follows: skilled nursing care:  skilled observation/assessment, patient education    2. I certify that this patient is homebound, that is: 1) patient requires the use of a cane device, special transportation, or assistance of another to leave the home; or 2) patient's condition makes leaving the home medically contraindicated; and 3) patient has a normal inability to leave the home and leaving the home requires considerable and taxing effort. Patient may leave the home for infrequent and short duration for medical reasons, and occasional absences for non-medical reasons. Homebound status is due to the following functional limitations: Patient with strength deficits limiting the performance of all ADL's without caregiver assistance or the use of an assistive device. 3. I certify that this patient is under my care and that I, or a nurse practitioner or [facility], or clinical nurse specialist, or certified nurse midwife, working with me, had a Face-to-Face Encounter that meets the physician Face-to-Face Encounter requirements. The following are the clinical findings from the [address] [place] [address] encounter that support the need for skilled services and is a summary of the encounter:     [address]  3/31/2017      THE FOLLOWING TO BE COMPLETED BY THE COMMUNITY PHYSICIAN:    I concur with the findings described above from the [facility] encounter that this patient is homebound and in need of a skilled service.     Certifying Physician: _____________________________________      Printed Certifying Physician Name: _____________________________________    Date: _________________

## 2017-03-31 NOTE — PROGRESS NOTES
Occupational Therapy Note:    OT orders received, chart reviewed, and evaluation attempted. Patient unable to be seen due to being at dialysis. Will plan to check back as schedule permits and patient is available to initiate occupational therapy evaluation.      Thank you for this consult,     [facility], OTR/L

## 2017-03-31 NOTE — PROGRESS NOTES
Hospitalist Progress Note    Patient: Leslee Yuan MRN: [id number]  SSN: [id number]    YOB: 1950  Age: 61 y.o. Sex: male      Admit Date: 3/29/2017    LOS: 0 days     Subjective:     From H&P: \"60 y.o. male that presented to the ED with 3 week history of weakness and increasing difficulty ambulating, He reports decreased oral intake and some dysphagia with both solids and liquids. He reports chronic belching and abdominal distention since his surgery last fall. He reports occasional chills but denies fever, N/V. He denies overt blood loss. ED evaluation shows pancytopenia. \"    3/30 - The patient feels much improved and no dizziness after PRBCs. Denies F/C/N/V. Denies abdominal pain. Denies CP/SOB. 3/31 - The patient feels good today. He is awaiting his BMB. Did well on dialysis. Review of systems negative except stated above. Objective:     Vitals:    03/31/17 1033 03/31/17 1058 03/31/17 1128 03/31/17 1144   BP: 91/62 105/63 93/60 100/63   Pulse: 79 82 81 82   Resp:       Temp:       SpO2:       Weight:       Height:            Intake and Output:  Current Shift: 03/31 0701 - 03/31 1900  In: 240 [P.O.:240]  Out: 2000     Physical Exam:   GENERAL: alert, cooperative, no distress, appears stated age  EYE: conjunctivae/corneas clear. PERRL. THROAT & NECK: normal and no erythema or exudates noted. LUNG: clear to auscultation bilaterally  HEART: regular rate and rhythm, S1S2, no murmur, no JVD  ABDOMEN: soft, non-tender, non-distended. Bowel sounds normal. Can feel spleen edges. Ostomy. EXTREMITIES:  No edema, 2+ pedal/radial pulses bilaterally  SKIN: no rash or abnormalities  NEUROLOGIC: A&Ox3. Cranial nerves 2-12 grossly intact.     Lab/Data Review:  BMP:   No results found for: NA, K, CL, CO2, AGAP, GLU, BUN, CREA, GFRAA, GFRNA  CBC:   No results found for: WBC, HGB, HGBEXT, HCT, HCTEXT, PLT, PLTEXT, HGBEXT, HCTEXT, PLTEXT  Recent Glucose Results:   No results found for: GLU  Liver Panel:   No results found for: ALB, CBIL, TBIL, TP, GLOB, AGRAT, SGOT, ASTPOC, ALTPOC, ALT, GPT, AP     Imaging:    CT A/P  Splenomegaly, 16 x 8 cm. One small gallstone. Left lower quadrant  colostomy.     Assessment:     Principal Problem:    Symptomatic anemia (3/29/2017)    Active Problems:    Pancytopenia ([facility] [address] (9/3/2016)      Overview: HGB 7.8 ON ADMISSION      HTN (hypertension) (9/4/2016)      GERD (gastroesophageal reflux disease) (9/4/2016)      Paroxysmal atrial fibrillation ([facility] [address] (9/17/2016)      Elevated total protein (3/29/2017)      Hyperbilirubinemia (3/29/2017)      Dizziness (3/29/2017)      ESRD (end stage renal disease) ([facility] [address] (3/29/2017)        Plan:     - Check CBC in AM  - Bone marrow biopsy done  - Continue FeSO4  - Await blood studies  - Thiamine/Folic Acid  - Appreciate hematology's input and assistance    Signed By: Chasidy Grimm DO     March 31, 2017

## 2017-04-01 LAB
BASOPHILS # BLD AUTO: 0 K/UL (ref 0–0.2)
BASOPHILS # BLD: 0 % (ref 0–2)
DIFFERENTIAL METHOD BLD: ABNORMAL
EOSINOPHIL # BLD: 0 K/UL (ref 0–0.8)
EOSINOPHIL NFR BLD: 3 % (ref 0.5–7.8)
ERYTHROCYTE [DISTWIDTH] IN BLOOD BY AUTOMATED COUNT: 19.5 % (ref 11.9–14.6)
HAV IGM SERPL QL IA: NEGATIVE
HBV CORE IGM SERPL QL IA: NEGATIVE
HBV SURFACE AG SERPL QL IA: NEGATIVE
HCT VFR BLD AUTO: 24.2 % (ref 41.1–50.3)
HCV AB S/CO SERPL IA: 0.1 S/CO RATIO (ref 0–0.9)
HGB BLD-MCNC: 8.2 G/DL (ref 13.6–17.2)
IMM GRANULOCYTES # BLD: 0 K/UL (ref 0–0.5)
IMM GRANULOCYTES NFR BLD AUTO: 0 % (ref 0–5)
LYMPHOCYTES # BLD AUTO: 69 % (ref 13–44)
LYMPHOCYTES # BLD: 0.7 K/UL (ref 0.5–4.6)
MCH RBC QN AUTO: 30.4 PG (ref 26.1–32.9)
MCHC RBC AUTO-ENTMCNC: 33.9 G/DL (ref 31.4–35)
MCV RBC AUTO: 89.6 FL (ref 79.6–97.8)
MONOCYTES # BLD: 0.1 K/UL (ref 0.1–1.3)
MONOCYTES NFR BLD AUTO: 14 % (ref 4–12)
NEUTS SEG # BLD: 0.1 K/UL (ref 1.7–8.2)
NEUTS SEG NFR BLD AUTO: 14 % (ref 43–78)
PLATELET # BLD AUTO: 104 K/UL (ref 150–450)
PMV BLD AUTO: 9.2 FL (ref 10.8–14.1)
RBC # BLD AUTO: 2.7 M/UL (ref 4.23–5.67)
WBC # BLD AUTO: 1 K/UL (ref 4.3–11.1)

## 2017-04-01 PROCEDURE — 97530 THERAPEUTIC ACTIVITIES: CPT

## 2017-04-01 PROCEDURE — 97165 OT EVAL LOW COMPLEX 30 MIN: CPT

## 2017-04-01 PROCEDURE — 85025 COMPLETE CBC W/AUTO DIFF WBC: CPT | Performed by: INTERNAL MEDICINE

## 2017-04-01 PROCEDURE — 74011250637 HC RX REV CODE- 250/637

## 2017-04-01 PROCEDURE — 65270000029 HC RM PRIVATE

## 2017-04-01 PROCEDURE — 36415 COLL VENOUS BLD VENIPUNCTURE: CPT | Performed by: INTERNAL MEDICINE

## 2017-04-01 RX ADMIN — MUPIROCIN: 20 OINTMENT TOPICAL at 18:00

## 2017-04-01 RX ADMIN — Medication 10 ML: at 21:40

## 2017-04-01 RX ADMIN — PANTOPRAZOLE SODIUM 40 MG: 40 TABLET, DELAYED RELEASE ORAL at 05:57

## 2017-04-01 RX ADMIN — Medication 100 MG: at 10:35

## 2017-04-01 RX ADMIN — FOLIC ACID 1 MG: 1 TABLET ORAL at 10:35

## 2017-04-01 RX ADMIN — MUPIROCIN: 20 OINTMENT TOPICAL at 09:00

## 2017-04-01 RX ADMIN — Medication 10 ML: at 05:56

## 2017-04-01 RX ADMIN — PANTOPRAZOLE SODIUM 40 MG: 40 TABLET, DELAYED RELEASE ORAL at 17:28

## 2017-04-01 RX ADMIN — Medication 10 ML: at 14:00

## 2017-04-01 RX ADMIN — FERROUS SULFATE TAB 325 MG (65 MG ELEMENTAL FE) 325 MG: 325 (65 FE) TAB at 17:28

## 2017-04-01 RX ADMIN — FERROUS SULFATE TAB 325 MG (65 MG ELEMENTAL FE) 325 MG: 325 (65 FE) TAB at 10:35

## 2017-04-01 RX ADMIN — METOPROLOL TARTRATE 25 MG: 25 TABLET ORAL at 10:35

## 2017-04-01 NOTE — PROGRESS NOTES
Problem: Self Care Deficits Care Plan (Adult)  Goal: *Acute Goals and Plan of Care (Insert Text)  1. Patient will perform full body bathing and dressing with modified independence. 2. Patient will perform functional transfers with modified independence and least restrictive adaptive equipment. 3. Patient will tolerate 23 minutes of BUE strengthening exercises for improved safety with ADLs and functional transfers. 4. Patient will perform grooming/hygiene tasks, including gathering supplies, with modified independence. Timeframe: 7 visits         Comments:       OCCUPATIONAL THERAPY: Initial Assessment and PM 4/1/2017  INPATIENT: Hospital Day: 4  Payor: [place] / Plan: [address] [place] [address] / Product Type: Medicaid /      NAME/AGE/GENDER: Narcisa Rhoades is a 61 y.o. male         PRIMARY DIAGNOSIS:  SYMPTOMATIC ANEMIA  Symptomatic anemia  Dizziness Symptomatic anemia Symptomatic anemia        ICD-10: Treatment Diagnosis:        · Generalized Muscle Weakness (M62.81)   Precautions/Allergies:         Heparin analogues       ASSESSMENT:      Mr. Maria De Jesus Azul presents to hospital for above. Upon entry of OT, patient was alert and supine in bed, agreeable to OT services. He reports that at baseline he is independent-modified independent with ADLs and requires some assistance with ADLs. He reports that he uses a wheelchair at all times, although he appears as though he could complete functional transfers/household distance functional mobility using a less restrictive adaptive equipment option, based on today's performance. Today, he performed bed mobility with supervision and STS with supervision and rolling walker. He presents with generally decreased, functional UE strength, relative to hospitalization. Mr. Maria De Jesus Azul was left in supine with all needs met.  He would benefit from 1-2 OT visits per week during hospital stay to address functional deficits related to decreased ADL/functional mobility performance in order to increase independence and safety within the home upon discharge. Would benefit from education on energy conservation techniques/home modifications. Will follow. This section established at most recent assessment   PROBLEM LIST (Impairments causing functional limitations):  1. Decreased Strength  2. Decreased ADL/Functional Activities  3. Decreased Transfer Abilities  4. Decreased Ambulation Ability/Technique  5. Decreased Activity Tolerance  6. Decreased Work Simplification/Energy Conservation Techniques  7. Decreased [place] with Home Exercise Program    INTERVENTIONS PLANNED: (Benefits and precautions of occupational therapy have been discussed with the patient.)  1. Activities of daily living training  2. Adaptive equipment training  3. Group therapy  4. Theraputic activity  5. Theraputic exercise  6. Energy conservation education/home modification education      TREATMENT PLAN: Frequency/Duration: Follow patient 1-2x per week to address above goals. Rehabilitation Potential For Stated Goals: GOOD      RECOMMENDED REHABILITATION/EQUIPMENT: (at time of discharge pending progress): Continue Skilled Therapy. HHOT? OCCUPATIONAL PROFILE AND HISTORY:   History of Present Injury/Illness (Reason for Referral):  See H&P  Past Medical History/Comorbidities:   Mr. Brooklynn Lorenzo  has a past medical history of Alcohol abuse, daily use (09/03/2016); Anemia (9/3/2016); Chronic kidney disease; HIT (heparin-induced thrombocytopenia) ([facility] [address] Hypertension; Hypokalemia (9/3/2016); and Tobacco abuse (9/3/2016).   Mr. Brooklynn Lorenzo  has a past surgical history that includes debride necrotic skin/ tissue, abd wall (09/04/2016); abdomen surgery proc unlisted; and other surgical.  Social History/Living Environment:   Home Environment: Private residence  # Steps to Enter: 1  Wheelchair Ramp: Yes  One/Two Story Residence: One story  Living Alone: No  Support Systems: Family member(s)  Patient Expects to be Discharged to[de-identified] Private residence  Current DME Used/Available at Home: Walker, rolling, Wheelchair  Tub or Shower Type:  (bed baths?)  Prior Level of Function/Work/Activity:  Lives in [facility] with brother. Uses wheelchair \"at all times\". Independent to mod I with ADLs at baseline. Personal Factors:          Sex:  male        Age:  61 y.o. Overall Behavior:  Self limiting? Number of Personal Factors/Comorbidities that affect the Plan of Care: Expanded review of therapy/medical records (1-2):  MODERATE COMPLEXITY   ASSESSMENT OF OCCUPATIONAL PERFORMANCE[de-identified]   Activities of Daily Living:           Basic ADLs (From Assessment) Complex ADLs (From Assessment)   Basic ADL  Feeding: Independent  Oral Facial Hygiene/Grooming: Independent  Bathing: Contact guard assistance  Upper Body Dressing: Setup  Lower Body Dressing: Contact guard assistance  Toileting: Contact guard assistance Instrumental ADL  Meal Preparation: Moderate assistance  Homemaking: Maximum assistance  Medication Management: Independent  Financial Management: Independent   Grooming/Bathing/Dressing Activities of Daily Living     Cognitive Retraining  Safety/Judgement: Awareness of environment                       Bed/Mat Mobility  Rolling: Supervision  Supine to Sit: Supervision  Sit to Supine: Supervision  Sit to Stand: Supervision  Scooting: Supervision          Most Recent Physical Functioning:   Gross Assessment:  AROM: Within functional limits  Strength: Generally decreased, functional (BUE (4/5))  Sensation: Intact               Posture:  Posture (WDL): Exceptions to WDL  Posture Assessment:  Forward head, Rounded shoulders  Balance:  Sitting: Intact  Standing: Intact Bed Mobility:  Rolling: Supervision  Supine to Sit: Supervision  Sit to Supine: Supervision  Scooting: Supervision  Wheelchair Mobility:     Transfers:  Sit to Stand: Supervision  Stand to Sit: Modified independent                    Patient Vitals for the past 6 hrs: BP BP Patient Position SpO2 Pulse   17 1118 107/64 At rest 99 % 75        Mental Status  Neurologic State: Alert  Orientation Level: Oriented X4  Cognition: Appropriate decision making, Appropriate for age attention/concentration, Appropriate safety awareness, Follows commands  Perception: Appears intact  Perseveration: No perseveration noted  Safety/Judgement: Awareness of environment                               Physical Skills Involved:  1. Mobility  2. Strength  3. Endurance Cognitive Skills Affected (resulting in the inability to perform in a timely and safe manner): 1. none Psychosocial Skills Affected:  1. Environmental Adaptations   Number of elements that affect the Plan of Care: 3-5:  MODERATE COMPLEXITY   CLINICAL DECISION MAKIN[address] [place] [address] AM-PAC 6 Clicks   Basic Mobility Inpatient Short Form  How much help from another person does the patient currently need. .. Total A Lot A Little None   1. Putting on and taking off regular lower body clothing?   [ ] 1   [ ] 2   [X] 3   [ ] 4   2. Bathing (including washing, rinsing, drying)? [ ] 1   [ ] 2   [X] 3   [ ] 4   3. Toileting, which includes using toilet, bedpan or urinal?   [ ] 1   [ ] 2   [ ] 3   [X] 4   4. Putting on and taking off regular upper body clothing?   [ ] 1   [ ] 2   [ ] 3   [X] 4   5. Taking care of personal grooming such as brushing teeth? [ ] 1   [ ] 2   [ ] 3   [X] 4   6. Eating meals? [ ] 1   [ ] 2   [ ] 3   [X] 4   © , Trustees of [address] [place] [address], under license to [employer]. All rights reserved    Score:  Initial: 22 Most Recent: X (Date: -- )     Interpretation of Tool:  Represents activities that are increasingly more difficult (i.e. Bed mobility, Transfers, Gait).        Score 24 23 22-20 19-15 14-10 9-7 6       Modifier CH CI CJ CK CL CM CN         · Self Care:               - CURRENT STATUS:    CJ - 20%-39% impaired, limited or restricted               - GOAL STATUS:           CI - 1%-19% impaired, limited or restricted               - D/C STATUS:                       ---------------To be determined---------------  Payor: [address] [place] [address] [address] / Plan: [address] / Product Type: Medicaid /       Medical Necessity:     · Patient demonstrates good rehab potential due to higher previous functional level. Reason for Services/Other Comments:  · Patient continues to require modification of therapeutic interventions to increase complexity of exercises. Use of outcome tool(s) and clinical judgement create a POC that gives a: LOW COMPLEXITY             TREATMENT:   (In addition to Assessment/Re-Assessment sessions the following treatments were rendered)      Pre-treatment Symptoms/Complaints:    Pain: Initial:   Pain Intensity 1: 0  Post Session:  same      Assessment/Reassessment only, no treatment provided today     Braces/Orthotics/Lines/Etc:   · O2 Device: Room air  Treatment/Session Assessment:    · Response to Treatment:  Tolerated well without complications  · Interdisciplinary Collaboration:  · Occupational Therapist  · Registered Nurse  · After treatment position/precautions:  · Supine in bed  · Bed alarm/tab alert on  · Bed/Chair-wheels locked  · Bed in low position  · Call light within reach  · Side rails x 2  · Compliance with Program/Exercises: Will assess as treatment progresses. · Recommendations/Intent for next treatment session: \"Next visit will focus on advancements to more challenging activities and reduction in assistance provided\".   Total Treatment Duration:  OT Patient Time In/Time Out  Time In: 1405  Time Out: 1000 [facility]

## 2017-04-01 NOTE — PROGRESS NOTES
[facility] Progress Note    Follow-Up on:   ESRD    ROS:  Gen - no fever, no chills, appetite unchanged  CV - no chest pain, no palpitation  Lung - no shortness of breath, no cough  Abd - no tenderness, no nausea/vomiting, no diarrhea  Ext - no edema    Exam:  Vitals:    04/01/17 0152 04/01/17 0235 04/01/17 0659 04/01/17 1118   BP: 111/63 116/68 105/58 107/64   Pulse: 78 81 78 75   Resp: 18 18 18 18   Temp: 98 °F (36.7 °C) 98.4 °F (36.9 °C) 97.7 °F (36.5 °C) 98.5 °F (36.9 °C)   SpO2: 98% 98% 100% 99%   Weight:       Height:             Intake/Output Summary (Last 24 hours) at 04/01/17 1145  Last data filed at 04/01/17 1013   Gross per 24 hour   Intake              240 ml   Output              150 ml   Net               90 ml       Wt Readings from Last 3 Encounters:   03/31/17 75.4 kg (166 lb 3.2 oz)   03/22/17 90.7 kg (200 lb)   01/18/17 92.1 kg (203 lb)       GEN - in no distress  CV - regular, no murmur, no rub  Lung - clear bilaterally  Abd - soft, nontender  Ext - no edema    Recent Labs      03/31/17   1420  03/30/17   0910  03/30/17   0058   WBC  1.0*  0.9*   --    HGB  8.5*  8.2*  7.1*   HCT  25.3*  24.5*   --    PLT  105*  105*   --         Recent Labs      03/31/17   1420  03/30/17   0910   NA  140  139   K  3.7  3.3*   CL  104  101   CO2  31  28   BUN  9  13   CREA  2.65*  3.65*   CA  7.5*  7.9*   GLU  93  104*   PHOS  1.5*   --        Assessment / Plan:  Principal Problem:    Symptomatic anemia (3/29/2017)    Active Problems:    Pancytopenia ([address] (9/3/2016)      Overview: HGB 7.8 ON ADMISSION      HTN (hypertension) (9/4/2016)      GERD (gastroesophageal reflux disease) (9/4/2016)      Paroxysmal atrial fibrillation (HCC) (9/17/2016)      Elevated total protein (3/29/2017)      Hyperbilirubinemia (3/29/2017)      Dizziness (3/29/2017)      ESRD (end stage renal disease) ([facility] [address] (3/29/2017)    1.  ESRD - HD MWF  - Using Permacath  - Has WESTON AVF placed by [facility] surgery [facility]  - Has been unsuccessful with cannulation at his clinic and [facility]  2. Weakness  3. Anemia   4. Leukopenia  5. H/o HIT positive  6.  Diverting colostomy - because of perirectal abscess  Next HD Monday, s/p bone marrow biopsy

## 2017-04-01 NOTE — PROGRESS NOTES
Hospitalist Progress Note    Patient: Zaid Hunt MRN: [id number]  SSN: [id number]    YOB: 1950  Age: 61 y.o. Sex: male      Admit Date: 3/29/2017    LOS: 1 day     Subjective:     From H&P: \"60 y.o. male that presented to the ED with 3 week history of weakness and increasing difficulty ambulating, He reports decreased oral intake and some dysphagia with both solids and liquids. He reports chronic belching and abdominal distention since his surgery last fall. He reports occasional chills but denies fever, N/V. He denies overt blood loss. ED evaluation shows pancytopenia. \"    3/30 - The patient feels much improved and no dizziness after PRBCs. Denies F/C/N/V. Denies abdominal pain. Denies CP/SOB. 3/31 - The patient feels good today. He is awaiting his BMB. Did well on dialysis. 4/1 - No new acute complaints. Denies dizziness. Review of systems negative except stated above. Objective:     Vitals:    04/01/17 0235 04/01/17 0659 04/01/17 1118 04/01/17 1526   BP: 116/68 105/58 107/64 114/70   Pulse: 81 78 75 88   Resp: 18 18 18 18   Temp: 98.4 °F (36.9 °C) 97.7 °F (36.5 °C) 98.5 °F (36.9 °C) 98.6 °F (37 °C)   SpO2: 98% 100% 99% 99%   Weight:       Height:            Intake and Output:  Current Shift: 04/01 0701 - 04/01 1900  In: -   Out: 170 [Urine:150]    Physical Exam:   GENERAL: alert, cooperative, no distress, appears stated age  EYE: conjunctivae/corneas clear. PERRL. THROAT & NECK: normal and no erythema or exudates noted. LUNG: clear to auscultation bilaterally  HEART: regular rate and rhythm, S1S2, no murmur, no JVD  ABDOMEN: soft, non-tender, non-distended. Bowel sounds normal. Can feel spleen edges. Ostomy. EXTREMITIES:  No edema, 2+ pedal/radial pulses bilaterally  SKIN: no rash or abnormalities  NEUROLOGIC: A&Ox3. Cranial nerves 2-12 grossly intact.     Lab/Data Review:  BMP:   No results found for: NA, K, CL, CO2, AGAP, GLU, BUN, CREA, GFRAA, GFRNA  CBC:   No results found for: WBC, HGB, HGBEXT, HCT, HCTEXT, PLT, PLTEXT, HGBEXT, HCTEXT, PLTEXT  Recent Glucose Results:   No results found for: GLU  Liver Panel:   No results found for: ALB, CBIL, TBIL, TP, GLOB, AGRAT, SGOT, ASTPOC, ALTPOC, ALT, GPT, AP     Imaging:    CT A/P  Splenomegaly, 16 x 8 cm. One small gallstone. Left lower quadrant  colostomy.     Assessment:     Principal Problem:    Symptomatic anemia (3/29/2017)    Active Problems:    Pancytopenia ([facility] [address] (9/3/2016)      Overview: HGB 7.8 ON ADMISSION      HTN (hypertension) (9/4/2016)      GERD (gastroesophageal reflux disease) (9/4/2016)      Paroxysmal atrial fibrillation ([facility] [address] (9/17/2016)      Elevated total protein (3/29/2017)      Hyperbilirubinemia (3/29/2017)      Dizziness (3/29/2017)      ESRD (end stage renal disease) ([facility] [address] (3/29/2017)        Plan:     - CBC Pending  - Bone marrow biopsy done - can follow up outpatient for results  - Continue FeSO4  - Thiamine/Folic Acid  - Appreciate nephrology & hematology's input and assistance  - Plan discharge in AM    Signed By: Vishnu Dominguez DO     April 1, 2017

## 2017-04-01 NOTE — PROGRESS NOTES
Daily Progress Note -- Hematology/ Medical Oncology        Patient Name: Shiva Kamara    Date of Visit: 2017  : 1950  Age:66 y.o.       Mr. Johanne Lanza is a 61 y.o. male admitted on 3/29/2017. He presented to the ER with a 3 week history of weakness, difficulty ambulating, decreased PO intake, 20# weight loss and dysphagia. He reports chronic belching and abdominal distention since his surgery last fall. He has some occasional chills but denies fever, N/V. He denies overt blood loss. PMH: ESRD, on dialysis, HTN, GERD, afib, hx HIT, ETOH abuse (drinks 10-15 beers daily). He was seen by Dr. Felicita Munson in clinic back in 2016 for pancytopenia after a lengthy hospitalization with sepsis and an ICU stay. His counts were improving and he was encouraged to follow up in 6 weeks with plans to bone marrow if counts worsened, but he never returned to clinic. His sister apparently has some neutropenia as well but no baseline labs prior his last hospitalization were available. On admission, he was noted again to be pancytopenic with /, hgb 6.9, platelets 429,851. We were consulted for further recommendations. Interval history:  Had bone marrow aspirate and biopsy yesterday. No results yet.    B12/folate normal.  CT with splenomegaly (16 x8 cm)    Medications:   Current Facility-Administered Medications   Medication Dose Route Frequency Provider Last Rate Last Dose    0.9% sodium chloride infusion 250 mL  250 mL IntraVENous PRN Magy Jaimes DO        mupirocin (BACTROBAN) 2 % ointment   Both Nostrils BID Carlos Hassan,         sodium chloride (NS) flush 5-10 mL  5-10 mL IntraVENous Q8H Lynda Lafleur MD   10 mL at 17 0556    sodium chloride (NS) flush 5-10 mL  5-10 mL IntraVENous PRN Lynda Lafleur MD        pantoprazole (PROTONIX) tablet 40 mg  40 mg Oral ACB&D Liu Cavanaugh, PA   40 mg at 17 0557    metoprolol tartrate (LOPRESSOR) tablet 25 mg 25 mg Oral Q12H Mg Abo., PA   25 mg at 04/01/17 1035    ferrous sulfate tablet 325 mg  325 mg Oral BID WITH MEALS Neno Ry MALIHA Walters   325 mg at 04/01/17 1035    albuterol (PROVENTIL VENTOLIN) nebulizer solution 2.5 mg  2.5 mg Nebulization Q6H PRN Mg Abo., PA        thiamine (B-1) tablet 100 mg  100 mg Oral DAILY Mg Abo., PA   100 mg at 32/34/26 7476    folic acid (FOLVITE) tablet 1 mg  1 mg Oral DAILY Gm Abo., PA   1 mg at 04/01/17 1035    LORazepam (ATIVAN) injection 1 mg  1 mg IntraVENous Q4H PRN Mg Abo., PA           Allergies: Allergies   Allergen Reactions    Heparin Analogues Other (comments)     H. I.T positive       Review of Systems: The Review of Systems is documented in full in the internal medical record. All systems are negative other than for those noted above. Physical Examination:  General Appearance: Healthy appearing patient in no acute distress. Vital signs:   Visit Vitals    /64 (BP 1 Location: Right arm, BP Patient Position: At rest)    Pulse 75    Temp 98.5 °F (36.9 °C)    Resp 18    Ht 5' 8\" (1.727 m)    Wt 166 lb 3.2 oz (75.4 kg)    SpO2 99%    BMI 25.27 kg/m2     HEENT: No oral or pharyngeal masses. There is no ulceration or thrush. Lymph nodes: There is no cervical, supraclavicular, axillary or inguinal adenopathy. Lungs: There lungs are clear to auscultation and percussion. There is no egophony. Heart: There is no jugular venous distention. The rate is normal and rhythm regular. The S1 and S2 are normal and there are no murmurs or rubs. Abdomen: Soft, non-tender, bowel sounds present and normal, liver normal, spleen 2 fb below LCM  Skin: No rash, petechiae or ecchymoses. No evidence of malignancy.        Labs/Imaging:  Lab Results   Component Value Date/Time    WBC 1.0 03/31/2017 02:20 PM    HGB 8.5 03/31/2017 02:20 PM    HCT 25.3 03/31/2017 02:20 PM    PLATELET 769 15/24/9357 02:20 PM    MCV 89.1 03/31/2017 02:20 PM       Lab Results   Component Value Date/Time    Sodium 140 03/31/2017 02:20 PM    Potassium 3.7 03/31/2017 02:20 PM    Chloride 104 03/31/2017 02:20 PM    CO2 31 03/31/2017 02:20 PM    Anion gap 5 03/31/2017 02:20 PM    Glucose 93 03/31/2017 02:20 PM    BUN 9 03/31/2017 02:20 PM    Creatinine 2.65 03/31/2017 02:20 PM    GFR est AA 31 03/31/2017 02:20 PM    GFR est non-AA 26 03/31/2017 02:20 PM    Calcium 7.5 03/31/2017 02:20 PM    AST (SGOT) 13 03/30/2017 09:10 AM    Alk. phosphatase 61 03/30/2017 09:10 AM    Protein, total 7.1 03/30/2017 09:10 AM    Albumin 2.4 03/31/2017 02:20 PM    Globulin 4.7 03/30/2017 09:10 AM    A-G Ratio 0.5 03/30/2017 09:10 AM    ALT (SGPT) 8 03/30/2017 09:10 AM           ASSESSMENT:  Pancytopenia-etiology unclear. Presence of splenomegaly could signal the presence of infiltrative disease or portal hypertension with hypersplenism. PLAN:  Await results of biopsy. Discharge at your discretion. He can followup with Dr. Megan Bueno regarding results of marrow. Corinne Aguirre MD FACP    Oncology and Hematology   [address] [phone]  F [phone]  [email or web address]    Elements of this note have been dictated using speech recognition software. As a result, errors of speech recognition may have occurred.

## 2017-04-01 NOTE — PROGRESS NOTES
Problem: Mobility Impaired (Adult and Pediatric)  Goal: *Acute Goals and Plan of Care (Insert Text)  STG:  (1.)Mr. Pozo will improve standing tolerance without decrease in SBP >20 mmHg and/or DBP >10 mmHg within 3 days. (2.)Mr. Pozo will transfer from bed to chair and chair to bed with CONTACT GUARD ASSIST using the least restrictive device within 3 day(s). - MET 3/30/17  (3.)Mr. Pozo will ambulate with CONTACT GUARD ASSIST for 25 feet with the least restrictive device within 3 day(s). LTG:  (1.)Mr. Pozo will move from supine to sit and sit to supine , scoot up and down and roll side to side in bed with INDEPENDENT within 7 day(s). (2.)Mr. Pozo will transfer from bed to chair and chair to bed with INDEPENDENT using the least restrictive device within 7 day(s). (3.)Mr. Pozo will ambulate with MODIFIED INDEPENDENCE for 100+ feet with the least restrictive device within 7 day(s). ________________________________________________________________________________________________      PHYSICAL THERAPY: Daily Note, Treatment Day: 2nd and PM 4/1/2017  INPATIENT: Hospital Day: 4  Payor: [id number] [place], [place] [address] / Plan: [address] [place] [address] Avenue / Product Type: Medicaid /      NAME/AGE/GENDER: Tanner Corona is a 61 y.o. male         PRIMARY DIAGNOSIS: SYMPTOMATIC ANEMIA  Symptomatic anemia  Dizziness Symptomatic anemia Symptomatic anemia        ICD-10: Treatment Diagnosis:       · Generalized Muscle Weakness (M62.81)  · Difficulty in walking, Not elsewhere classified (R26.2)   Precaution/Allergies:  Heparin analogues       ASSESSMENT:      Mr. Neto Schaefer presents to physical therapy after admission for anemia with increasing weakness over last 3 weeks. Hx of perirectal abscess with colostomy bag. Receives HD. He is ready to perform therapeutic exercise but is not interested in sitting in chair secondary to desire to have colostomy bag emptied, although it is only half full.   He stands and ambulates in the room for a total of 25 feet. Verbal cues given for safety/technique. He sits briefly in the chair but insists on return to bed so that the colostomy bag might be changed. He is safely transferred back to the bed. He is agreeable to [facility] PT at D/C; discuss with SW. Will continue POC. This section established at most recent assessment   PROBLEM LIST (Impairments causing functional limitations):  1. Decreased Strength  2. Decreased ADL/Functional Activities  3. Decreased Transfer Abilities  4. Decreased Ambulation Ability/Technique  5. Decreased Balance  6. Increased Pain  7. Decreased Activity Tolerance  8. Increased Fatigue    INTERVENTIONS PLANNED: (Benefits and precautions of physical therapy have been discussed with the patient.)  1. Balance Exercise  2. Bed Mobility  3. Family Education  4. Gait Training  5. Therapeutic Activites  6. Therapeutic Exercise/Strengthening  7. Transfer Training      TREATMENT PLAN: Frequency/Duration: 3 times a week for duration of hospital stay  Rehabilitation Potential For Stated Goals: Marcin Talavera REHABILITATION/EQUIPMENT: (at time of discharge pending progress): TBD. HISTORY:   History of Present Injury/Illness (Reason for Referral):  Per H&P, Opal Ferrari is a 61 y.o. male that presented to the ED with 3 week history of weakness and increasing difficulty ambulating, He reports decreased oral intake and some dysphagia with both solids and liquids. He reports chronic belching and abdominal distention since his surgery last fall. He reports occasional chills but denies fever, N/V. He denies overt blood loss. ED evaluation shows pancytopenia. The hospitalist have been asked to admit. \"  Past Medical History/Comorbidities:   Mr. Alberto Rizzo  has a past medical history of Alcohol abuse, daily use (09/03/2016); Anemia (9/3/2016); Chronic kidney disease; HIT (heparin-induced thrombocytopenia) ([facility] [address] Hypertension;  Hypokalemia (9/3/2016); and Tobacco abuse (9/3/2016). Mr. Charlie Gil  has a past surgical history that includes debride necrotic skin/ tissue, abd wall (09/04/2016); abdomen surgery proc unlisted; and other surgical.  Social History/Living Environment:   Home Environment: Private residence  # Steps to Enter: 1  Wheelchair Ramp: Yes  One/Two Story Residence: One story  Living Alone: No  Support Systems: Family member(s)  Patient Expects to be Discharged to[de-identified] Private residence  Current DME Used/Available at Home: Walker, rolling, Wheelchair  Tub or Shower Type:  (bed baths?)  Prior Level of Function/Work/Activity:  WC primary mode of mobility. Decrease in function over last 3 weeks. Number of Personal Factors/Comorbidities that affect the Plan of Care:  Home alone during day  Decreased mobility  1-2: MODERATE COMPLEXITY   EXAMINATION:   Most Recent Physical Functioning:   Gross Assessment:                  Posture:     Balance:  Sitting: Intact  Standing: Impaired  Standing - Static: Good  Standing - Dynamic : Fair Bed Mobility:  Rolling: Supervision  Supine to Sit: Supervision  Sit to Supine: Supervision  Scooting: Supervision  Wheelchair Mobility:     Transfers:  Sit to Stand: Supervision  Stand to Sit: Supervision  Bed to Chair: Supervision  Sliding Board : Supervision  Gait:     Base of Support: Center of gravity altered  Speed/Kell: Fluctuations; Pace decreased (<100 feet/min); Shuffled; Slow  Step Length: Left shortened;Right shortened  Swing Pattern: Left asymmetrical;Right asymmetrical  Stance: Left decreased;Right decreased;Time;Weight shift  Gait Abnormalities: Decreased step clearance;Shuffling gait; Steppage gait; Step to gait;Trunk sway increased  Distance (ft): 25 Feet (ft)  Assistive Device:  (no specific assistive device today)  Ambulation - Level of Assistance: Contact guard assistance  Interventions: Manual cues; Safety awareness training; Tactile cues; Verbal cues; Visual/Demos       Body Structures Involved:  1. Heart  2.  Digestive Structures  3. Muscles Body Functions Affected:  1. Sensory/Pain  2. Cardio  3. Hematological  4. Neuromusculoskeletal  5. Movement Related Activities and Participation Affected:  1. General Tasks and Demands  2. Mobility  3. Self Care  4. Domestic Life   Number of elements that affect the Plan of Care: 4+: HIGH COMPLEXITY   CLINICAL PRESENTATION:   Presentation: Stable and uncomplicated: LOW COMPLEXITY   CLINICAL DECISION MAKIN[address] [place] [address] AM-PAC 6 Clicks   Basic Mobility Inpatient Short Form  How much difficulty does the patient currently have. .. Unable A Lot A Little None   1. Turning over in bed (including adjusting bedclothes, sheets and blankets)? [ ] 1   [ ] 2   [ ] 3   [X] 4   2. Sitting down on and standing up from a chair with arms ( e.g., wheelchair, bedside commode, etc.)   [ ] 1   [ ] 2   [ ] 3   [X] 4   3. Moving from lying on back to sitting on the side of the bed? [ ] 1   [ ] 2   [ ] 3   [X] 4   How much help from another person does the patient currently need. .. Total A Lot A Little None   4. Moving to and from a bed to a chair (including a wheelchair)? [ ] 1   [ ] 2   [X] 3   [ ] 4   5. Need to walk in hospital room? [ ] 1   [X] 2   [ ] 3   [ ] 4   6. Climbing 3-5 steps with a railing? [ ] 1   [X] 2   [ ] 3   [ ] 4   © , Trustees of [address] [place] [address], under license to [employer]. All rights reserved    Score:  Initial: 19 Most Recent: X (Date: 3/29/17 )     Interpretation of Tool:  Represents activities that are increasingly more difficult (i.e. Bed mobility, Transfers, Gait).        Score 24 23 22-20 19-15 14-10 9-7 6       Modifier CH CI CJ CK CL CM CN         · Mobility - Walking and Moving Around:               - CURRENT STATUS:    CK - 40%-59% impaired, limited or restricted               - GOAL STATUS:           CJ - 20%-39% impaired, limited or restricted               - D/C STATUS:                       ---------------To be determined---------------  Payor: [id number] / Plan: [place] MEDICAID [facility] / Product Type: Medicaid /       Medical Necessity:     · Patient is expected to demonstrate progress in strength, balance, coordination and functional technique to increase independence with ambulation and improve safety during functional mobility. Reason for Services/Other Comments:  · Patient continues to require present interventions due to patient's inability to perform activity safely and independently. Use of outcome tool(s) and clinical judgement create a POC that gives a: Clear prediction of patient's progress: LOW COMPLEXITY                 TREATMENT:   (In addition to Assessment/Re-Assessment sessions the following treatments were rendered)   Pre-treatment Symptoms/Complaints:  \"I want to get back in bed so they can change the colostomy bag.\"    Pain: Initial:   Pain Intensity 1: 0 (no specific pain today. Patient is concerned with colostomy)  Post Session:  None reported       Therapeutic Activity: (    24 mins): Therapeutic activities including Bed transfers, Chair transfers, Ambulation on level ground, sitting/standing balance, and seated exercises to improve mobility, strength, balance, coordination and endurance. Required minimal Manual cues; Safety awareness training; Tactile cues; Verbal cues; Visual/Demos verbal/tactile cues to promote safety with transfers.       Date:  3/30/17 Date:   Date:     Activity/Exercise Parameters Parameters Parameters   Hip flexion  1x10     LAQ 1x10     Ankle pumps  1x10                                    Braces/Orthotics/Lines/Etc:   · colostomy bag  Treatment/Session Assessment:    · Response to Treatment:  Able to transfer to chair with CGA  · Interdisciplinary Collaboration:  · Physical Therapist and Registered Nurse  · After treatment position/precautions:  · Supine in bed, Bed/Chair-wheels locked, Bed in low position and Call light within reach  · Compliance with Program/Exercises: Will assess as treatment progresses. · Recommendations/Intent for next treatment session: \"Next visit will focus on advancements to more challenging activities and reduction in assistance provided\".   Total Treatment Duration:  PT Patient Time In/Time Out  Time In: 1211  Time Out: [facility], [place]

## 2017-04-02 VITALS
HEART RATE: 69 BPM | OXYGEN SATURATION: 97 % | RESPIRATION RATE: 18 BRPM | WEIGHT: 166.2 LBS | TEMPERATURE: 98.2 F | HEIGHT: 68 IN | DIASTOLIC BLOOD PRESSURE: 60 MMHG | BODY MASS INDEX: 25.19 KG/M2 | SYSTOLIC BLOOD PRESSURE: 111 MMHG

## 2017-04-02 PROCEDURE — 74011250637 HC RX REV CODE- 250/637: Performed by: INTERNAL MEDICINE

## 2017-04-02 PROCEDURE — 74011250637 HC RX REV CODE- 250/637

## 2017-04-02 RX ORDER — METOPROLOL TARTRATE 25 MG/1
25 TABLET, FILM COATED ORAL EVERY 12 HOURS
Qty: 60 TAB | Refills: 1 | Status: SHIPPED
Start: 2017-04-02 | End: 2017-06-01

## 2017-04-02 RX ADMIN — FOLIC ACID 1 MG: 1 TABLET ORAL at 07:23

## 2017-04-02 RX ADMIN — PANTOPRAZOLE SODIUM 40 MG: 40 TABLET, DELAYED RELEASE ORAL at 05:24

## 2017-04-02 RX ADMIN — FERROUS SULFATE TAB 325 MG (65 MG ELEMENTAL FE) 325 MG: 325 (65 FE) TAB at 07:23

## 2017-04-02 RX ADMIN — Medication 10 ML: at 05:28

## 2017-04-02 RX ADMIN — Medication 100 MG: at 07:23

## 2017-04-02 RX ADMIN — METOPROLOL TARTRATE 25 MG: 25 TABLET ORAL at 07:23

## 2017-04-02 RX ADMIN — MUPIROCIN: 20 OINTMENT TOPICAL at 07:23

## 2017-04-02 NOTE — DISCHARGE INSTRUCTIONS
Anemia: Care Instructions  Your Care Instructions    Anemia is a low level of red blood cells, which carry oxygen throughout your body. Many things can cause anemia. Lack of iron is one of the most common causes. Your body needs iron to make hemoglobin, a substance in red blood cells that carries oxygen from the lungs to your body's cells. Without enough iron, the body produces fewer and smaller red blood cells. As a result, your body's cells do not get enough oxygen, and you feel tired and weak. And you may have trouble concentrating. Bleeding is the most common cause of a lack of iron. You may have heavy menstrual bleeding or bleeding caused by conditions such as ulcers, hemorrhoids, or cancer. Regular use of aspirin or other anti-inflammatory medicines (such as ibuprofen) also can cause bleeding in some people. A lack of iron in your diet also can cause anemia, especially at times when the body needs more iron, such as during pregnancy, infancy, and the teen years. Your doctor may have prescribed iron pills. It may take several months of treatment for your iron levels to return to normal. Your doctor also may suggest that you eat foods that are rich in iron, such as meat and beans. There are many other causes of anemia. It is not always due to a lack of iron. Finding the specific cause of your anemia will help your doctor find the right treatment for you. Follow-up care is a key part of your treatment and safety. Be sure to make and go to all appointments, and call your doctor if you are having problems. It's also a good idea to know your test results and keep a list of the medicines you take. How can you care for yourself at home? · Take your medicines exactly as prescribed. Call your doctor if you think you are having a problem with your medicine. · If your doctor recommends iron pills, take them as directed:  ¨ Try to take the pills on an empty stomach about 1 hour before or 2 hours after meals. But you may need to take iron with food to avoid an upset stomach. ¨ Do not take antacids or drink milk or caffeine drinks (such as coffee, tea, or cola) at the same time or within 2 hours of the time that you take your iron. They can make it hard for your body to absorb the iron. ¨ Vitamin C (from food or supplements) helps your body absorb iron. Try taking iron pills with a glass of orange juice or some other food that is high in vitamin C, such as citrus fruits. ¨ Iron pills may cause stomach problems, such as heartburn, nausea, diarrhea, constipation, and cramps. Be sure to drink plenty of fluids, and include fruits, vegetables, and fiber in your diet each day. Iron pills often make your bowel movements dark or green. ¨ If you forget to take an iron pill, do not take a double dose of iron the next time you take a pill. ¨ Keep iron pills out of the reach of small children. An overdose of iron can be very dangerous. · Follow your doctor's advice about eating iron-rich foods. These include red meat, shellfish, poultry, eggs, beans, raisins, whole-grain bread, and leafy green vegetables. · Steam vegetables to help them keep their iron content. When should you call for help? Call 911 anytime you think you may need emergency care. For example, call if:  · You have symptoms of a heart attack. These may include:  ¨ Chest pain or pressure, or a strange feeling in the chest.  ¨ Sweating. ¨ Shortness of breath. ¨ Nausea or vomiting. ¨ Pain, pressure, or a strange feeling in the back, neck, jaw, or upper belly or in one or both shoulders or arms. ¨ Lightheadedness or sudden weakness. ¨ A fast or irregular heartbeat. After you call 911, the  may tell you to chew 1 adult-strength or 2 to 4 low-dose aspirin. Wait for an ambulance. Do not try to drive yourself. · You passed out (lost consciousness).   Call your doctor now or seek immediate medical care if:  · You have new or increased shortness of breath. · You are dizzy or lightheaded, or you feel like you may faint. · Your fatigue and weakness continue or get worse. · You have any abnormal bleeding, such as:  ¨ Nosebleeds. ¨ Vaginal bleeding that is different (heavier, more frequent, at a different time of the month) than what you are used to. ¨ Bloody or black stools, or rectal bleeding. ¨ Bloody or pink urine. Watch closely for changes in your health, and be sure to contact your doctor if:  · You do not get better as expected. Where can you learn more? Go to http://duc-nickolas.info/. Enter R301 in the search box to learn more about \"Anemia: Care Instructions. \"  Current as of: October 13, 2016  Content Version: 11.2  © 6649-3482 [employer]. Care instructions adapted under license by [employer] (which disclaims liability or warranty for this information). If you have questions about a medical condition or this instruction, always ask your healthcare professional. [place] [address] any warranty or liability for your use of this information. DISCHARGE SUMMARY from Nurse    The following personal items are in your possession at time of discharge:    Dental Appliances: None  Visual Aid: Glasses     Home Medications: None  Jewelry: None  Clothing: Footwear, Pants, Shirt, Other (comment) (coat)  Other Valuables: Wheelchair             PATIENT INSTRUCTIONS:    After general anesthesia or intravenous sedation, for 24 hours or while taking prescription Narcotics:  · Limit your activities  · Do not drive and operate hazardous machinery  · Do not make important personal or business decisions  · Do  not drink alcoholic beverages  · If you have not urinated within 8 hours after discharge, please contact your surgeon on call.     Report the following to your surgeon:  · Excessive pain, swelling, redness or odor of or around the surgical area  · Temperature over 100.5  · Nausea and vomiting lasting longer than 4 hours or if unable to take medications  · Any signs of decreased circulation or nerve impairment to extremity: change in color, persistent  numbness, tingling, coldness or increase pain  · Any questions        What to do at Home:  Recommended activity: Activity as tolerated,     If you experience any of the following symptoms bright red blood in stool or urine, weakness,dizzines,or fainting, please follow up with facility provider. *  Please give a list of your current medications to your Primary Care Provider. *  Please update this list whenever your medications are discontinued, doses are      changed, or new medications (including over-the-counter products) are added. *  Please carry medication information at all times in case of emergency situations. These are general instructions for a healthy lifestyle:    No smoking/ No tobacco products/ Avoid exposure to second hand smoke    Surgeon General's Warning:  Quitting smoking now greatly reduces serious risk to your health. Obesity, smoking, and sedentary lifestyle greatly increases your risk for illness    A healthy diet, regular physical exercise & weight monitoring are important for maintaining a healthy lifestyle    You may be retaining fluid if you have a history of heart failure or if you experience any of the following symptoms:  Weight gain of 3 pounds or more overnight or 5 pounds in a week, increased swelling in our hands or feet or shortness of breath while lying flat in bed. Please call your doctor as soon as you notice any of these symptoms; do not wait until your next office visit. Recognize signs and symptoms of STROKE:    F-face looks uneven    A-arms unable to move or move unevenly    S-speech slurred or non-existent    T-time-call 911 as soon as signs and symptoms begin-DO NOT go       Back to bed or wait to see if you get better-TIME IS BRAIN.     Warning Signs of HEART ATTACK     Call 911 if you have these symptoms:   Chest discomfort. Most heart attacks involve discomfort in the center of the chest that lasts more than a few minutes, or that goes away and comes back. It can feel like uncomfortable pressure, squeezing, fullness, or pain.  Discomfort in other areas of the upper body. Symptoms can include pain or discomfort in one or both arms, the back, neck, jaw, or stomach.  Shortness of breath with or without chest discomfort.  Other signs may include breaking out in a cold sweat, nausea, or lightheadedness. Don't wait more than five minutes to call 911 - MINUTES MATTER! Fast action can save your life. Calling 911 is almost always the fastest way to get lifesaving treatment. Emergency Medical Services staff can begin treatment when they arrive -- up to an hour sooner than if someone gets to the hospital by car. The discharge information has been reviewed with the patient. The patient verbalized understanding. Discharge medications reviewed with the patient and appropriate educational materials and side effects teaching were provided.

## 2017-04-02 NOTE — DISCHARGE SUMMARY
Hospitalist Discharge Summary     Patient ID:  Kandi Foreman  [id number]  61 y.o.  1950  Admit date: 3/29/2017 10:26 AM  Discharge date and time: 4/2/2017  Attending: Beverly Ceballos DO  PCP:  Teagan Murguia MD  Treatment Team: Attending Provider: Beverly Ceballos DO; Consulting Provider: Dante Mantilla MD; Consulting Provider: Lola Elizabeth MD; Care Manager: Gin Foss    Principal Diagnosis Symptomatic anemia   Principal Problem:    Symptomatic anemia (3/29/2017)    Active Problems:    Pancytopenia ([facility] (9/3/2016)      Overview: HGB 7.8 ON ADMISSION      HTN (hypertension) (9/4/2016)      GERD (gastroesophageal reflux disease) (9/4/2016)      [facility]oxysmal [facility] fibrillation ([facility] (9/17/2016)      Elevated total protein (3/29/2017)      Hyperbilirubinemia (3/29/2017)      Dizziness (3/29/2017)      ESRD (end stage renal disease) ([facility] (3/29/2017)         FROM H&P:  61 y.o. male that presented to the ED with 3 week history of weakness and increasing difficulty ambulating, He reports decreased oral intake and some dysphagia with both solids and liquids. He reports chronic belching and abdominal distention since his surgery last fall. He reports occasional chills but denies fever, N/V. He denies overt blood loss. ED evaluation shows pancytopenia    Hospital Course:  He was admitted to the hospital and given PRBCs with good response and the dizziness and weakness resolved. He was evaluated by hematology, who did a bone marrow biopsy. He also got a CT scan that showed splenomegaly. The patient continued to feel better and was discharged home in stable condition to follow up with Dr. Chelsea Thompson for bone marrow biopsy results.      Significant Diagnostic Studies:       Labs: Results:       Chemistry Recent Labs      03/31/17   1420   GLU  93   NA  140   K  3.7   CL  104   CO2  31   BUN  9   CREA  2.65*   CA  7.5*   AGAP  5*   ALB  2.4*      CBC w/Diff Recent Labs      04/01/17   1600  03/31/17 1420   WBC  1.0*  1.0*   RBC  2.70*  2.84*   HGB  8.2*  8.5*   HCT  24.2*  25.3*   PLT  104*  105*   GRANS  14*  16*   LYMPH  69*  58*   EOS  3  1      Cardiac Enzymes No results for input(s): CPK, CKND1, MERVAT in the last 72 hours. No lab exists for component: CKRMB, TROIP   Coagulation No results for input(s): PTP, INR, APTT in the last 72 hours. No lab exists for component: INREXT    Lipid Panel Lab Results   Component Value Date/Time    Cholesterol, total 80 09/29/2016 02:43 AM    HDL Cholesterol 16 09/29/2016 02:43 AM    LDL, calculated 30 09/29/2016 02:43 AM    VLDL, calculated 34 09/29/2016 02:43 AM    Triglyceride 170 09/29/2016 02:43 AM    CHOL/HDL Ratio 5.0 09/29/2016 02:43 AM      BNP No results for input(s): BNPP in the last 72 hours. Liver Enzymes Recent Labs      03/31/17   1420   ALB  2.4*      Thyroid Studies Lab Results   Component Value Date/Time    T4, Total 5.7 10/02/2016 03:00 PM    T3 Uptake 34 10/02/2016 03:00 PM    TSH 2.760 03/30/2017 09:10 AM          Imaging:    CT A/P  Splenomegaly, 16 x 8 cm. One small gallstone. Left lower quadrant  Colostomy. Discharge Exam:  Visit Vitals    /60 (BP 1 Location: Right arm, BP Patient Position: At rest)    Pulse 69    Temp 98.2 °F (36.8 °C)    Resp 18    Ht 5' 8\" (1.727 m)    Wt 75.4 kg (166 lb 3.2 oz)    SpO2 97%    BMI 25.27 kg/m2     General appearance: alert, cooperative, no distress, appears stated age  Lungs: clear to auscultation bilaterally  Heart: regular rate and rhythm, S1, S2 normal, no murmur, click, rub or gallop  Abdomen: soft, non-tender. Bowel sounds normal. Splenomegaly. Ostomy in place. Extremities: no cyanosis or edema  Neurologic: Grossly normal    Disposition: home  Discharge Condition: stable  Patient Instructions:   Current Discharge Medication List      CONTINUE these medications which have CHANGED    Details   metoprolol tartrate (LOPRESSOR) 25 mg tablet Take 1 Tab by mouth every twelve (12) hours.   Qty: 60 Tab, Refills: 1         CONTINUE these medications which have NOT CHANGED    Details   Cholecalciferol, Vitamin D3, (VITAMIN D3) 2,000 unit cap capsule Take 2,000 Units by mouth once. OXYGEN-AIR DELIVERY SYSTEMS Take 2 L/min by inhalation nightly. via NC      magnesium oxide (MAG-OX) 400 mg tablet Take 400 mg by mouth daily. ferrous sulfate 325 mg (65 mg iron) tablet Take 1 Tab by mouth two (2) times daily (with meals). Qty: 60 Tab, Refills: 0      acetaminophen (TYLENOL) 325 mg tablet Take 2 Tabs by mouth two (2) times daily as needed. Use only if necessary. Patient has history of alcoholic liver disease  Qty: 30 Tab, Refills: 0      albuterol (PROVENTIL VENTOLIN) 2.5 mg /3 mL (0.083 %) nebulizer solution 3 mL by Nebulization route every six (6) hours as needed for Wheezing. Qty: 24 Each, Refills: 0      alum-mag hydroxide-simeth (MYLANTA) 200-200-20 mg/5 mL susp Take 30 mL by mouth three (3) times daily as needed. Qty: 1 Bottle, Refills: 0      epoetin miky (EPOGEN;PROCRIT) 10,000 unit/mL injection 1 mL by SubCUTAneous route every seven (7) days. Indications: RENAL DIALYSIS  Qty: 1 Vial, Refills: 0      pantoprazole (PROTONIX) 40 mg tablet Take 1 Tab by mouth Before breakfast and dinner.   Qty: 60 Tab, Refills: 0             Activity: Activity as tolerated  Diet: Regular Diet  Wound Care: As directed    Follow-up  · Dr. Adam Mejia in one week  · Dr. Shant Connell later this week    Time spent to discharge patient 35 minutes  Signed:  Denae Robledo DO  4/2/2017  9:21 AM

## 2017-04-02 NOTE — PROGRESS NOTES
Pt for D/C home today with [facility] for follow up care as ordered/referred.

## 2017-04-03 LAB
ALBUMIN SERPL ELPH-MCNC: 3.63 G/DL (ref 3.2–5.6)
ALBUMIN/GLOB SERPL: 0.7 {RATIO}
ALPHA1 GLOB SERPL ELPH-MCNC: 0.34 G/DL (ref 0.1–0.4)
ALPHA2 GLOB SERPL ELPH-MCNC: 0.63 G/DL (ref 0.4–1.2)
B-GLOBULIN SERPL QL ELPH: 1.31 G/DL (ref 0.6–1.3)
GAMMA GLOB MFR SERPL ELPH: 2.69 G/DL (ref 0.5–1.6)
IGA SERPL-MCNC: 781 MG/DL (ref 85–499)
IGG SERPL-MCNC: 2540 MG/DL (ref 610–1616)
IGM SERPL-MCNC: 67 MG/DL (ref 35–242)
M PROTEIN SERPL ELPH-MCNC: ABNORMAL G/DL
PROT PATTERN SERPL ELPH-IMP: ABNORMAL
PROT PATTERN SPEC IFE-IMP: ABNORMAL
PROT SERPL-MCNC: 8.6 G/DL (ref 6.3–8.2)

## 2017-04-06 ENCOUNTER — HOME CARE VISIT (OUTPATIENT)
Dept: SCHEDULING | Facility: HOME HEALTH | Age: 67
End: 2017-04-06

## 2017-04-09 ENCOUNTER — APPOINTMENT (OUTPATIENT)
Dept: GENERAL RADIOLOGY | Age: 67
DRG: 720 | End: 2017-04-09
Attending: EMERGENCY MEDICINE
Payer: MEDICAID

## 2017-04-09 ENCOUNTER — HOSPITAL ENCOUNTER (INPATIENT)
Age: 67
LOS: 9 days | Discharge: NURSING, REHAB OR LONG-TERM CARE | DRG: 720 | End: 2017-04-18
Attending: EMERGENCY MEDICINE | Admitting: SURGERY
Payer: MEDICAID

## 2017-04-09 ENCOUNTER — APPOINTMENT (OUTPATIENT)
Dept: CT IMAGING | Age: 67
DRG: 720 | End: 2017-04-09
Attending: EMERGENCY MEDICINE
Payer: MEDICAID

## 2017-04-09 DIAGNOSIS — Z99.2 ESRD (END STAGE RENAL DISEASE) ON DIALYSIS (HCC): ICD-10-CM

## 2017-04-09 DIAGNOSIS — K61.1 PERI-RECTAL ABSCESS: ICD-10-CM

## 2017-04-09 DIAGNOSIS — R65.21 SEPTIC SHOCK(785.52): ICD-10-CM

## 2017-04-09 DIAGNOSIS — N18.6 ESRD (END STAGE RENAL DISEASE) ON DIALYSIS (HCC): ICD-10-CM

## 2017-04-09 DIAGNOSIS — K61.1 PERIRECTAL ABSCESS: Primary | ICD-10-CM

## 2017-04-09 DIAGNOSIS — D61.818 PANCYTOPENIA (HCC): ICD-10-CM

## 2017-04-09 DIAGNOSIS — A41.9 SEPTIC SHOCK(785.52): ICD-10-CM

## 2017-04-09 LAB
ALBUMIN SERPL BCP-MCNC: 1.7 G/DL (ref 3.2–4.6)
ALBUMIN/GLOB SERPL: 0.3 {RATIO} (ref 1.2–3.5)
ALP SERPL-CCNC: 70 U/L (ref 50–136)
ALT SERPL-CCNC: 14 U/L (ref 12–65)
ANION GAP BLD CALC-SCNC: 10 MMOL/L (ref 7–16)
APPEARANCE UR: CLEAR
AST SERPL W P-5'-P-CCNC: 41 U/L (ref 15–37)
BACTERIA URNS QL MICRO: 0 /HPF
BASOPHILS # BLD AUTO: 0 K/UL (ref 0–0.2)
BASOPHILS # BLD: 0 % (ref 0–2)
BILIRUB SERPL-MCNC: 0.5 MG/DL (ref 0.2–1.1)
BILIRUB UR QL: ABNORMAL
BUN SERPL-MCNC: 39 MG/DL (ref 8–23)
CALCIUM SERPL-MCNC: 7.3 MG/DL (ref 8.3–10.4)
CASTS URNS QL MICRO: ABNORMAL /LPF
CHLORIDE SERPL-SCNC: 107 MMOL/L (ref 98–107)
CO2 SERPL-SCNC: 23 MMOL/L (ref 21–32)
COLOR UR: YELLOW
CREAT SERPL-MCNC: 5.07 MG/DL (ref 0.8–1.5)
DIFFERENTIAL METHOD BLD: ABNORMAL
EOSINOPHIL # BLD: 0 K/UL (ref 0–0.8)
EOSINOPHIL NFR BLD: 0 % (ref 0.5–7.8)
EPI CELLS #/AREA URNS HPF: ABNORMAL /HPF
ERYTHROCYTE [DISTWIDTH] IN BLOOD BY AUTOMATED COUNT: 18.3 % (ref 11.9–14.6)
GLOBULIN SER CALC-MCNC: 5 G/DL (ref 2.3–3.5)
GLUCOSE SERPL-MCNC: 90 MG/DL (ref 65–100)
GLUCOSE UR STRIP.AUTO-MCNC: NEGATIVE MG/DL
HCT VFR BLD AUTO: 16.3 % (ref 41.1–50.3)
HGB BLD-MCNC: 5.5 G/DL (ref 13.6–17.2)
HGB UR QL STRIP: NEGATIVE
IMM GRANULOCYTES # BLD: 0 K/UL (ref 0–0.5)
IMM GRANULOCYTES NFR BLD AUTO: 0.4 % (ref 0–5)
KETONES UR QL STRIP.AUTO: NEGATIVE MG/DL
LACTATE BLD-SCNC: 1.1 MMOL/L (ref 0.5–1.9)
LEUKOCYTE ESTERASE UR QL STRIP.AUTO: NEGATIVE
LYMPHOCYTES # BLD AUTO: 24 % (ref 13–44)
LYMPHOCYTES # BLD: 0.6 K/UL (ref 0.5–4.6)
MAGNESIUM SERPL-MCNC: 1.5 MG/DL (ref 1.8–2.4)
MCH RBC QN AUTO: 29.7 PG (ref 26.1–32.9)
MCHC RBC AUTO-ENTMCNC: 33.7 G/DL (ref 31.4–35)
MCV RBC AUTO: 88.1 FL (ref 79.6–97.8)
MONOCYTES # BLD: 0.1 K/UL (ref 0.1–1.3)
MONOCYTES NFR BLD AUTO: 5 % (ref 4–12)
NEUTS SEG # BLD: 1.6 K/UL (ref 1.7–8.2)
NEUTS SEG NFR BLD AUTO: 71 % (ref 43–78)
NITRITE UR QL STRIP.AUTO: NEGATIVE
PH UR STRIP: 7 [PH] (ref 5–9)
PLATELET # BLD AUTO: 109 K/UL (ref 150–450)
PMV BLD AUTO: 9.5 FL (ref 10.8–14.1)
POTASSIUM SERPL-SCNC: 2.9 MMOL/L (ref 3.5–5.1)
PROCALCITONIN SERPL-MCNC: 4.5 NG/ML
PROT SERPL-MCNC: 6.7 G/DL (ref 6.3–8.2)
PROT UR STRIP-MCNC: 100 MG/DL
RBC # BLD AUTO: 1.85 M/UL (ref 4.23–5.67)
RBC #/AREA URNS HPF: ABNORMAL /HPF
SODIUM SERPL-SCNC: 140 MMOL/L (ref 136–145)
SP GR UR REFRACTOMETRY: 1.02 (ref 1–1.02)
TROPONIN I BLD-MCNC: 0.03 NG/ML (ref 0–0.08)
UROBILINOGEN UR QL STRIP.AUTO: 1 EU/DL (ref 0.2–1)
WBC # BLD AUTO: 2.3 K/UL (ref 4.3–11.1)
WBC URNS QL MICRO: ABNORMAL /HPF

## 2017-04-09 PROCEDURE — 81001 URINALYSIS AUTO W/SCOPE: CPT | Performed by: EMERGENCY MEDICINE

## 2017-04-09 PROCEDURE — 74011250637 HC RX REV CODE- 250/637: Performed by: EMERGENCY MEDICINE

## 2017-04-09 PROCEDURE — 74011000250 HC RX REV CODE- 250: Performed by: EMERGENCY MEDICINE

## 2017-04-09 PROCEDURE — 86923 COMPATIBILITY TEST ELECTRIC: CPT | Performed by: EMERGENCY MEDICINE

## 2017-04-09 PROCEDURE — 96365 THER/PROPH/DIAG IV INF INIT: CPT | Performed by: EMERGENCY MEDICINE

## 2017-04-09 PROCEDURE — 87641 MR-STAPH DNA AMP PROBE: CPT | Performed by: EMERGENCY MEDICINE

## 2017-04-09 PROCEDURE — 74011636320 HC RX REV CODE- 636/320: Performed by: EMERGENCY MEDICINE

## 2017-04-09 PROCEDURE — 74011250637 HC RX REV CODE- 250/637: Performed by: SURGERY

## 2017-04-09 PROCEDURE — 87077 CULTURE AEROBIC IDENTIFY: CPT | Performed by: EMERGENCY MEDICINE

## 2017-04-09 PROCEDURE — 74177 CT ABD & PELVIS W/CONTRAST: CPT

## 2017-04-09 PROCEDURE — 36430 TRANSFUSION BLD/BLD COMPNT: CPT

## 2017-04-09 PROCEDURE — 87205 SMEAR GRAM STAIN: CPT | Performed by: EMERGENCY MEDICINE

## 2017-04-09 PROCEDURE — 93005 ELECTROCARDIOGRAM TRACING: CPT | Performed by: EMERGENCY MEDICINE

## 2017-04-09 PROCEDURE — 96367 TX/PROPH/DG ADDL SEQ IV INF: CPT | Performed by: EMERGENCY MEDICINE

## 2017-04-09 PROCEDURE — 74011250636 HC RX REV CODE- 250/636: Performed by: EMERGENCY MEDICINE

## 2017-04-09 PROCEDURE — 84145 PROCALCITONIN (PCT): CPT | Performed by: EMERGENCY MEDICINE

## 2017-04-09 PROCEDURE — 84484 ASSAY OF TROPONIN QUANT: CPT

## 2017-04-09 PROCEDURE — P9016 RBC LEUKOCYTES REDUCED: HCPCS | Performed by: EMERGENCY MEDICINE

## 2017-04-09 PROCEDURE — 80053 COMPREHEN METABOLIC PANEL: CPT | Performed by: EMERGENCY MEDICINE

## 2017-04-09 PROCEDURE — 99285 EMERGENCY DEPT VISIT HI MDM: CPT | Performed by: EMERGENCY MEDICINE

## 2017-04-09 PROCEDURE — 30233N1 TRANSFUSION OF NONAUTOLOGOUS RED BLOOD CELLS INTO PERIPHERAL VEIN, PERCUTANEOUS APPROACH: ICD-10-PCS | Performed by: EMERGENCY MEDICINE

## 2017-04-09 PROCEDURE — 86900 BLOOD TYPING SEROLOGIC ABO: CPT | Performed by: EMERGENCY MEDICINE

## 2017-04-09 PROCEDURE — 71010 XR CHEST PORT: CPT

## 2017-04-09 PROCEDURE — 85025 COMPLETE CBC W/AUTO DIFF WBC: CPT | Performed by: EMERGENCY MEDICINE

## 2017-04-09 PROCEDURE — 74011250636 HC RX REV CODE- 250/636: Performed by: INTERNAL MEDICINE

## 2017-04-09 PROCEDURE — 83605 ASSAY OF LACTIC ACID: CPT

## 2017-04-09 PROCEDURE — 65270000029 HC RM PRIVATE

## 2017-04-09 PROCEDURE — 74011000258 HC RX REV CODE- 258: Performed by: EMERGENCY MEDICINE

## 2017-04-09 PROCEDURE — 87186 SC STD MICRODIL/AGAR DIL: CPT | Performed by: EMERGENCY MEDICINE

## 2017-04-09 PROCEDURE — 87040 BLOOD CULTURE FOR BACTERIA: CPT | Performed by: EMERGENCY MEDICINE

## 2017-04-09 PROCEDURE — 83735 ASSAY OF MAGNESIUM: CPT | Performed by: EMERGENCY MEDICINE

## 2017-04-09 RX ORDER — ACETAMINOPHEN 500 MG
1000 TABLET ORAL
Status: COMPLETED | OUTPATIENT
Start: 2017-04-09 | End: 2017-04-09

## 2017-04-09 RX ORDER — OXYCODONE AND ACETAMINOPHEN 5; 325 MG/1; MG/1
1 TABLET ORAL
Status: DISCONTINUED | OUTPATIENT
Start: 2017-04-09 | End: 2017-04-18 | Stop reason: HOSPADM

## 2017-04-09 RX ORDER — METOPROLOL TARTRATE 5 MG/5ML
1.25 INJECTION INTRAVENOUS
Status: DISCONTINUED | OUTPATIENT
Start: 2017-04-09 | End: 2017-04-10

## 2017-04-09 RX ORDER — METOPROLOL TARTRATE 25 MG/1
25 TABLET, FILM COATED ORAL EVERY 12 HOURS
Status: DISCONTINUED | OUTPATIENT
Start: 2017-04-09 | End: 2017-04-10

## 2017-04-09 RX ORDER — SODIUM CHLORIDE 9 MG/ML
250 INJECTION, SOLUTION INTRAVENOUS AS NEEDED
Status: DISCONTINUED | OUTPATIENT
Start: 2017-04-09 | End: 2017-04-12

## 2017-04-09 RX ORDER — NALOXONE HYDROCHLORIDE 0.4 MG/ML
0.4 INJECTION, SOLUTION INTRAMUSCULAR; INTRAVENOUS; SUBCUTANEOUS AS NEEDED
Status: DISCONTINUED | OUTPATIENT
Start: 2017-04-09 | End: 2017-04-18 | Stop reason: HOSPADM

## 2017-04-09 RX ORDER — SODIUM CHLORIDE 0.9 % (FLUSH) 0.9 %
10 SYRINGE (ML) INJECTION
Status: COMPLETED | OUTPATIENT
Start: 2017-04-09 | End: 2017-04-09

## 2017-04-09 RX ORDER — POTASSIUM CHLORIDE 14.9 MG/ML
20 INJECTION INTRAVENOUS
Status: DISPENSED | OUTPATIENT
Start: 2017-04-09 | End: 2017-04-10

## 2017-04-09 RX ORDER — ALBUTEROL SULFATE 0.83 MG/ML
2.5 SOLUTION RESPIRATORY (INHALATION)
Status: DISCONTINUED | OUTPATIENT
Start: 2017-04-09 | End: 2017-04-18 | Stop reason: HOSPADM

## 2017-04-09 RX ORDER — PANTOPRAZOLE SODIUM 40 MG/1
40 TABLET, DELAYED RELEASE ORAL
Status: DISCONTINUED | OUTPATIENT
Start: 2017-04-10 | End: 2017-04-10

## 2017-04-09 RX ORDER — HYDROMORPHONE HYDROCHLORIDE 1 MG/ML
1 INJECTION, SOLUTION INTRAMUSCULAR; INTRAVENOUS; SUBCUTANEOUS
Status: DISCONTINUED | OUTPATIENT
Start: 2017-04-09 | End: 2017-04-18 | Stop reason: HOSPADM

## 2017-04-09 RX ORDER — ENALAPRILAT 1.25 MG/ML
1.25 INJECTION INTRAVENOUS
Status: DISCONTINUED | OUTPATIENT
Start: 2017-04-09 | End: 2017-04-10

## 2017-04-09 RX ORDER — OXYCODONE AND ACETAMINOPHEN 5; 325 MG/1; MG/1
2 TABLET ORAL
Status: DISCONTINUED | OUTPATIENT
Start: 2017-04-09 | End: 2017-04-18 | Stop reason: HOSPADM

## 2017-04-09 RX ORDER — LORAZEPAM 2 MG/ML
1 INJECTION INTRAMUSCULAR
Status: DISCONTINUED | OUTPATIENT
Start: 2017-04-09 | End: 2017-04-18 | Stop reason: HOSPADM

## 2017-04-09 RX ORDER — SODIUM CHLORIDE 0.9 % (FLUSH) 0.9 %
5-10 SYRINGE (ML) INJECTION AS NEEDED
Status: DISCONTINUED | OUTPATIENT
Start: 2017-04-09 | End: 2017-04-12

## 2017-04-09 RX ORDER — LANOLIN ALCOHOL/MO/W.PET/CERES
400 CREAM (GRAM) TOPICAL DAILY
Status: DISCONTINUED | OUTPATIENT
Start: 2017-04-10 | End: 2017-04-18 | Stop reason: HOSPADM

## 2017-04-09 RX ORDER — ENOXAPARIN SODIUM 100 MG/ML
40 INJECTION SUBCUTANEOUS EVERY 24 HOURS
Status: DISCONTINUED | OUTPATIENT
Start: 2017-04-09 | End: 2017-04-09

## 2017-04-09 RX ORDER — SODIUM CHLORIDE 0.9 % (FLUSH) 0.9 %
5-10 SYRINGE (ML) INJECTION AS NEEDED
Status: DISCONTINUED | OUTPATIENT
Start: 2017-04-09 | End: 2017-04-18 | Stop reason: HOSPADM

## 2017-04-09 RX ORDER — SODIUM CHLORIDE 9 MG/ML
250 INJECTION, SOLUTION INTRAVENOUS AS NEEDED
Status: DISCONTINUED | OUTPATIENT
Start: 2017-04-09 | End: 2017-04-18 | Stop reason: HOSPADM

## 2017-04-09 RX ORDER — SODIUM CHLORIDE 0.9 % (FLUSH) 0.9 %
5-10 SYRINGE (ML) INJECTION EVERY 8 HOURS
Status: DISCONTINUED | OUTPATIENT
Start: 2017-04-09 | End: 2017-04-18 | Stop reason: HOSPADM

## 2017-04-09 RX ORDER — MELATONIN
2000 DAILY
Status: DISCONTINUED | OUTPATIENT
Start: 2017-04-10 | End: 2017-04-18 | Stop reason: HOSPADM

## 2017-04-09 RX ORDER — HYDROMORPHONE HYDROCHLORIDE 1 MG/ML
0.5 INJECTION, SOLUTION INTRAMUSCULAR; INTRAVENOUS; SUBCUTANEOUS
Status: DISCONTINUED | OUTPATIENT
Start: 2017-04-09 | End: 2017-04-18 | Stop reason: HOSPADM

## 2017-04-09 RX ORDER — DIPHENHYDRAMINE HYDROCHLORIDE 50 MG/ML
12.5 INJECTION, SOLUTION INTRAMUSCULAR; INTRAVENOUS
Status: DISCONTINUED | OUTPATIENT
Start: 2017-04-09 | End: 2017-04-18 | Stop reason: HOSPADM

## 2017-04-09 RX ORDER — LANOLIN ALCOHOL/MO/W.PET/CERES
325 CREAM (GRAM) TOPICAL 2 TIMES DAILY WITH MEALS
Status: DISCONTINUED | OUTPATIENT
Start: 2017-04-10 | End: 2017-04-18 | Stop reason: HOSPADM

## 2017-04-09 RX ORDER — VANCOMYCIN HYDROCHLORIDE
1250 ONCE
Status: COMPLETED | OUTPATIENT
Start: 2017-04-09 | End: 2017-04-09

## 2017-04-09 RX ADMIN — SODIUM CHLORIDE 2040 ML: 900 INJECTION, SOLUTION INTRAVENOUS at 18:57

## 2017-04-09 RX ADMIN — DIATRIZOATE MEGLUMINE AND DIATRIZOATE SODIUM 15 ML: 660; 100 LIQUID ORAL; RECTAL at 19:26

## 2017-04-09 RX ADMIN — METOPROLOL TARTRATE 25 MG: 25 TABLET ORAL at 23:46

## 2017-04-09 RX ADMIN — PIPERACILLIN SODIUM,TAZOBACTAM SODIUM 4.5 G: 4; .5 INJECTION, POWDER, FOR SOLUTION INTRAVENOUS at 19:00

## 2017-04-09 RX ADMIN — CLINDAMYCIN PHOSPHATE 600 MG: 150 INJECTION, SOLUTION, CONCENTRATE INTRAVENOUS at 19:35

## 2017-04-09 RX ADMIN — ERYTHROPOIETIN 20000 UNITS: 20000 INJECTION, SOLUTION INTRAVENOUS; SUBCUTANEOUS at 23:46

## 2017-04-09 RX ADMIN — ACETAMINOPHEN 1000 MG: 500 TABLET ORAL at 18:56

## 2017-04-09 RX ADMIN — IOPAMIDOL 100 ML: 755 INJECTION, SOLUTION INTRAVENOUS at 20:43

## 2017-04-09 RX ADMIN — SODIUM CHLORIDE 100 ML: 900 INJECTION, SOLUTION INTRAVENOUS at 20:43

## 2017-04-09 RX ADMIN — Medication 10 ML: at 20:43

## 2017-04-09 RX ADMIN — VANCOMYCIN HYDROCHLORIDE 1250 MG: 10 INJECTION, POWDER, LYOPHILIZED, FOR SOLUTION INTRAVENOUS at 20:11

## 2017-04-09 NOTE — ED PROVIDER NOTES
HPI Comments: Patient is relatively immobile and has a wound on his buttock extensor draining about 3 days ago. It is very foul smelling. He has a history of perirectal abscess and a colostomy bag. He reports abdominal pain for 3 or 4 days. He's been going to dialysis Monday Wednesday and Friday. Patient is a 61 y.o. male presenting with fever. The history is provided by the patient. The history is limited by the condition of the patient. Fever    This is a new problem. The current episode started more than 2 days ago. The problem occurs constantly. The problem has been gradually worsening. The maximum temperature noted was 101 - 101.9 F. The temperature was taken using a rectal thermometer. Pertinent negatives include no cough, no shortness of breath, no mental status change and no urinary symptoms. He has tried nothing for the symptoms. Past Medical History:   Diagnosis Date    Alcohol abuse, daily use 09/03/2016    NONE X 7 DAYS, USUALLY DRINKS 10-15 BEERS DAILY. TAKES A WEEK OFF AT A TIME ONCE A MONTH    Anemia 9/3/2016    HGB 7.8 ON ADMISSION    Chronic kidney disease     end stage renal disease- dialysis on Tues, Thurs. , Saturday mornings.  HIT (heparin-induced thrombocytopenia) ([facility]     Hypertension     Hypokalemia 9/3/2016    2.7 ON ADMISSION    Tobacco abuse 9/3/2016    QUIT 7 DAYS AGO       Past Surgical History:   Procedure Laterality Date    ABDOMEN SURGERY PROC UNLISTED      Sept. 2016.  DEBRIDE NECROTIC SKIN/ TISSUE, ABD WALL  09/04/2016    I&D rectal abscess with drain placement - Dr. Rose Jones      rectal abcess surgery that also got infected-  Subsequent I & D of the rectal abcess. Family History:   Problem Relation Age of Onset    Stroke Mother        Social History     Social History    Marital status: SINGLE     Spouse name: N/A    Number of children: N/A    Years of education: N/A     Occupational History    Not on file.      Social History Main Topics    Smoking status: Former Smoker     Packs/day: 2.00     Years: 40.00     Types: Cigarettes    Smokeless tobacco: Not on file      Comment: STATES HE QUIT 7 DAYS AGO    Alcohol use 42.0 - 63.0 oz/week     70 - 105 Cans of beer per week      Comment: 10-15 CANS DAILY    Drug use: No    Sexual activity: Not on file     Other Topics Concern    Not on file     Social History Narrative    9/3/16:  PATIENT IS SINGLE, LIVES WITH BROTHERDEVENDRA         ALLERGIES: Heparin analogues    Review of Systems   Constitutional: Positive for fever. Negative for chills. Respiratory: Negative for cough and shortness of breath. Skin: Positive for wound. All other systems reviewed and are negative. Vitals:    04/09/17 1824 04/09/17 1848 04/09/17 1905   BP: 99/50 99/50    Pulse: 92     Resp: 18     Temp: 99.1 °F (37.3 °C)  (!) 101.3 °F (38.5 °C)   SpO2: 100%     Weight: 68 kg (150 lb)     Height: 5' 6\" (1.676 m)              Physical Exam   Constitutional: He is oriented to person, place, and time. He appears well-developed and well-nourished. He has a sickly appearance. He appears ill. HENT:   Head: Normocephalic and atraumatic. Neck: Normal range of motion. Neck supple. Cardiovascular: Normal rate and regular rhythm. Pulmonary/Chest: Effort normal and breath sounds normal. No respiratory distress. He has no wheezes. Abdominal: Soft. He exhibits no distension. There is tenderness (right-sided). There is no rebound. Genitourinary:   Genitourinary Comments: Open and draining wound over coccyx, very foul-smelling   Musculoskeletal: Normal range of motion. Neurological: He is alert and oriented to person, place, and time. No cranial nerve deficit. Skin: Skin is warm and dry. He is not diaphoretic. There is pallor. Nursing note and vitals reviewed. MDM  Number of Diagnoses or Management Options  Diagnosis management comments: Patient treated like sepsis with fluids and abx.   Given blood.  Seen by nephro. His CT scan of his abdomen and pelvis showed a large perirectal abscess with air/fluid level vs abscess perforated bowel with contained free air. He had an abscess on previous CT but not read as abscess and no intervention between 3/30 and now that I can find. D/w Dr. Jamar Boucher for admission and that this patient is very very sick. Will have IR tomorrow. Amount and/or Complexity of Data Reviewed  Clinical lab tests: ordered and reviewed  Review and summarize past medical records: yes (Perirectal abscess in sept. Seen 3/22 by Natividad Capone and wounds intact.   Admission end of march for pancytopenia.  )  Discuss the patient with other providers: yes  Independent visualization of images, tracings, or specimens: yes (Bifascicular block)    Risk of Complications, Morbidity, and/or Mortality  Presenting problems: high  Diagnostic procedures: moderate  Management options: high    Patient Progress  Patient progress: improved    ED Course       Procedures

## 2017-04-10 LAB
ANION GAP BLD CALC-SCNC: 12 MMOL/L (ref 7–16)
ATRIAL RATE: 88 BPM
BACTERIA SPEC CULT: ABNORMAL
BACTERIA SPEC CULT: ABNORMAL
BUN SERPL-MCNC: 41 MG/DL (ref 8–23)
CALCIUM SERPL-MCNC: 7.4 MG/DL (ref 8.3–10.4)
CALCULATED P AXIS, ECG09: 41 DEGREES
CALCULATED R AXIS, ECG10: -70 DEGREES
CALCULATED T AXIS, ECG11: 59 DEGREES
CHLORIDE SERPL-SCNC: 110 MMOL/L (ref 98–107)
CO2 SERPL-SCNC: 20 MMOL/L (ref 21–32)
CREAT SERPL-MCNC: 5 MG/DL (ref 0.8–1.5)
DIAGNOSIS, 93000: NORMAL
ERYTHROCYTE [DISTWIDTH] IN BLOOD BY AUTOMATED COUNT: 17.8 % (ref 11.9–14.6)
FERRITIN SERPL-MCNC: 1087 NG/ML (ref 8–388)
GLUCOSE SERPL-MCNC: 81 MG/DL (ref 65–100)
HCT VFR BLD AUTO: 21.2 % (ref 41.1–50.3)
HCT VFR BLD AUTO: 21.5 % (ref 41.1–50.3)
HCT VFR BLD AUTO: 22.4 % (ref 41.1–50.3)
HEMOCCULT STL QL: NEGATIVE
HGB BLD-MCNC: 7.3 G/DL (ref 13.6–17.2)
HGB BLD-MCNC: 7.4 G/DL (ref 13.6–17.2)
HGB BLD-MCNC: 7.6 G/DL (ref 13.6–17.2)
IRON SERPL-MCNC: 75 UG/DL (ref 35–150)
MCH RBC QN AUTO: 30.5 PG (ref 26.1–32.9)
MCHC RBC AUTO-ENTMCNC: 34.9 G/DL (ref 31.4–35)
MCV RBC AUTO: 87.2 FL (ref 79.6–97.8)
P-R INTERVAL, ECG05: 184 MS
PLATELET # BLD AUTO: 85 K/UL (ref 150–450)
PMV BLD AUTO: 9.3 FL (ref 10.8–14.1)
POTASSIUM SERPL-SCNC: 3.3 MMOL/L (ref 3.5–5.1)
Q-T INTERVAL, ECG07: 410 MS
QRS DURATION, ECG06: 150 MS
QTC CALCULATION (BEZET), ECG08: 496 MS
RBC # BLD AUTO: 2.43 M/UL (ref 4.23–5.67)
SERVICE CMNT-IMP: ABNORMAL
SODIUM SERPL-SCNC: 142 MMOL/L (ref 136–145)
TRANSFERRIN SERPL-MCNC: 58 MG/DL (ref 202–364)
VENTRICULAR RATE, ECG03: 88 BPM
WBC # BLD AUTO: 2.3 K/UL (ref 4.3–11.1)

## 2017-04-10 PROCEDURE — 77030013579 HC DRSG WND CA ALG BMS -A

## 2017-04-10 PROCEDURE — 74011250636 HC RX REV CODE- 250/636: Performed by: INTERNAL MEDICINE

## 2017-04-10 PROCEDURE — 36430 TRANSFUSION BLD/BLD COMPNT: CPT

## 2017-04-10 PROCEDURE — 84466 ASSAY OF TRANSFERRIN: CPT | Performed by: INTERNAL MEDICINE

## 2017-04-10 PROCEDURE — 65270000029 HC RM PRIVATE

## 2017-04-10 PROCEDURE — 80048 BASIC METABOLIC PNL TOTAL CA: CPT | Performed by: SURGERY

## 2017-04-10 PROCEDURE — 85027 COMPLETE CBC AUTOMATED: CPT | Performed by: SURGERY

## 2017-04-10 PROCEDURE — 77030012939 HC DRSG HYDRCOIL BMS -A

## 2017-04-10 PROCEDURE — 74011000258 HC RX REV CODE- 258: Performed by: SURGERY

## 2017-04-10 PROCEDURE — 74011250637 HC RX REV CODE- 250/637: Performed by: SURGERY

## 2017-04-10 PROCEDURE — 82272 OCCULT BLD FECES 1-3 TESTS: CPT | Performed by: INTERNAL MEDICINE

## 2017-04-10 PROCEDURE — 36415 COLL VENOUS BLD VENIPUNCTURE: CPT | Performed by: SURGERY

## 2017-04-10 PROCEDURE — 87641 MR-STAPH DNA AMP PROBE: CPT | Performed by: SURGERY

## 2017-04-10 PROCEDURE — 83540 ASSAY OF IRON: CPT | Performed by: INTERNAL MEDICINE

## 2017-04-10 PROCEDURE — 74011250636 HC RX REV CODE- 250/636: Performed by: SURGERY

## 2017-04-10 PROCEDURE — 74011000250 HC RX REV CODE- 250: Performed by: INTERNAL MEDICINE

## 2017-04-10 PROCEDURE — C9113 INJ PANTOPRAZOLE SODIUM, VIA: HCPCS | Performed by: INTERNAL MEDICINE

## 2017-04-10 PROCEDURE — P9047 ALBUMIN (HUMAN), 25%, 50ML: HCPCS | Performed by: INTERNAL MEDICINE

## 2017-04-10 PROCEDURE — 77030013131 HC IV BLD ST ICUM -A

## 2017-04-10 PROCEDURE — 77030010541

## 2017-04-10 PROCEDURE — 82728 ASSAY OF FERRITIN: CPT | Performed by: INTERNAL MEDICINE

## 2017-04-10 PROCEDURE — 77030010522

## 2017-04-10 PROCEDURE — P9016 RBC LEUKOCYTES REDUCED: HCPCS | Performed by: EMERGENCY MEDICINE

## 2017-04-10 PROCEDURE — 85018 HEMOGLOBIN: CPT | Performed by: INTERNAL MEDICINE

## 2017-04-10 RX ORDER — POLYVINYL ALCOHOL 14 MG/ML
1 SOLUTION/ DROPS OPHTHALMIC AS NEEDED
Status: DISCONTINUED | OUTPATIENT
Start: 2017-04-10 | End: 2017-04-18 | Stop reason: HOSPADM

## 2017-04-10 RX ORDER — ALBUMIN HUMAN 250 G/1000ML
25 SOLUTION INTRAVENOUS ONCE
Status: COMPLETED | OUTPATIENT
Start: 2017-04-10 | End: 2017-04-10

## 2017-04-10 RX ORDER — VANCOMYCIN HYDROCHLORIDE
1250 SEE ADMIN INSTRUCTIONS
Status: DISCONTINUED | OUTPATIENT
Start: 2017-04-10 | End: 2017-04-18 | Stop reason: HOSPADM

## 2017-04-10 RX ORDER — MUPIROCIN 20 MG/G
OINTMENT TOPICAL 2 TIMES DAILY
Status: DISPENSED | OUTPATIENT
Start: 2017-04-10 | End: 2017-04-15

## 2017-04-10 RX ADMIN — Medication 400 MG: at 09:59

## 2017-04-10 RX ADMIN — MUPIROCIN: 20 OINTMENT TOPICAL at 10:03

## 2017-04-10 RX ADMIN — FERROUS SULFATE TAB 325 MG (65 MG ELEMENTAL FE) 325 MG: 325 (65 FE) TAB at 18:49

## 2017-04-10 RX ADMIN — Medication 5 ML: at 23:07

## 2017-04-10 RX ADMIN — PIPERACILLIN SODIUM,TAZOBACTAM SODIUM 3.38 G: 3; .375 INJECTION, POWDER, FOR SOLUTION INTRAVENOUS at 18:43

## 2017-04-10 RX ADMIN — SODIUM CHLORIDE 40 MG: 9 INJECTION INTRAMUSCULAR; INTRAVENOUS; SUBCUTANEOUS at 22:40

## 2017-04-10 RX ADMIN — POTASSIUM CHLORIDE 20 MEQ: 14.9 INJECTION, SOLUTION INTRAVENOUS at 00:21

## 2017-04-10 RX ADMIN — PANTOPRAZOLE SODIUM 40 MG: 40 TABLET, DELAYED RELEASE ORAL at 06:36

## 2017-04-10 RX ADMIN — Medication 10 ML: at 14:00

## 2017-04-10 RX ADMIN — MUPIROCIN: 20 OINTMENT TOPICAL at 18:00

## 2017-04-10 RX ADMIN — SODIUM CHLORIDE 40 MG: 9 INJECTION INTRAMUSCULAR; INTRAVENOUS; SUBCUTANEOUS at 14:45

## 2017-04-10 RX ADMIN — Medication 10 ML: at 06:48

## 2017-04-10 RX ADMIN — FERROUS SULFATE TAB 325 MG (65 MG ELEMENTAL FE) 325 MG: 325 (65 FE) TAB at 09:59

## 2017-04-10 RX ADMIN — VITAMIN D, TAB 1000IU (100/BT) 2000 UNITS: 25 TAB at 09:58

## 2017-04-10 RX ADMIN — ALBUMIN (HUMAN) 25 G: 0.25 INJECTION, SOLUTION INTRAVENOUS at 14:36

## 2017-04-10 RX ADMIN — SODIUM CHLORIDE 500 ML: 900 INJECTION, SOLUTION INTRAVENOUS at 12:49

## 2017-04-10 RX ADMIN — PIPERACILLIN SODIUM,TAZOBACTAM SODIUM 3.38 G: 3; .375 INJECTION, POWDER, FOR SOLUTION INTRAVENOUS at 06:37

## 2017-04-10 NOTE — WOUND CARE
Patient seen for wound to left buttock low in gluteal fold. 2.1x0.6x2.5cm with some yellow thick and serosanguinous drainage. Packed with alginate and placed Duo Derm. Small area remains open around anus but no dressing placed. Noted small suture in place. Surgeon following, wound team will assist as needed. Colostomy supplies given.

## 2017-04-10 NOTE — PROGRESS NOTES
[address]  [address] [facility], [address] [place] [address], [address]      PLAN:Consult IR for drainage of abscess  Cont Blanca/Zosyn  Appreciate Hospitalist and Nephrology        ASSESSMENT:  Admit Date: 4/9/2017   * No surgery found *  * No surgery found *    Active Problems:    Aundrea-rectal abscess (4/9/2017)         SUBJECTIVE:Patient well known to me from previous admit. Patient with history of perirectal abscess with sepsis leading to ESRD and multiple surgeries including a diverting colostomy. Patient has been follow as an OP doing well on HD. Perineal and rectal wound has completely healed but he preents to the ED last night with increasing pain and new abscess. Over the past few weeks he admits to decreasing appetite and 30 lb wt loss. CT showed new left sided perirectal abscess. There is drainage spontaneously in his bed. Foul odor noted. No cellulitis or skin redness. There is tenderness to palpation and a fullness consistent with abscess, but no typical fluctuance. IR has been consulted for drainage. Blood cultures pending. His Hgb was 5 on admit and he has been transfused 2 Units PRBC. Nephrology has held HD due to dehydration. OBJECTIVE:  Constitutional: Alert oriented cooperative patient in no acute distress; appears stated age   Visit Vitals    /51 (BP 1 Location: Right arm, BP Patient Position: At rest)    Pulse (!) 54    Temp 97.4 °F (36.3 °C)    Resp 20    Ht 5' 6\" (1.676 m)    Wt 150 lb (68 kg)    SpO2 97%    BMI 24.21 kg/m2     Eyes:Sclera are clear. ENMT: no external lesions gross hearing normal; no obvious neck masses, no ear or lip lesions  CV: RRR. Normal perfusion  Resp: No JVD. Breathing is  non-labored; no audible wheezing. GI: soft normal bs no TTP    Musculoskeletal: unremarkable with normal function. No embolic signs or cyanosis.    Neuro:  Oriented; moves all 4; no focal deficits  Psychiatric: normal affect and mood, no memory impairment  Perirectal scar tissue noted. Mostly well healed. Left fullness palpated with drainage. Tender to palpation. Small 2cm sacral ulcer noted. Foul oder and purulent drainage noted. Dressing changed.     Patient Vitals for the past 24 hrs:   BP Temp Pulse Resp SpO2 Height Weight   04/10/17 1216 102/51 97.4 °F (36.3 °C) (!) 54 20 97 % - -   04/10/17 0813 95/54 97.7 °F (36.5 °C) (!) 54 20 99 % - -   04/10/17 0331 94/51 96.8 °F (36 °C) 61 20 100 % - -   04/10/17 0309 91/51 97.6 °F (36.4 °C) (!) 59 18 100 % - -   04/10/17 0231 (!) 87/50 96.1 °F (35.6 °C) 60 17 100 % - -   04/09/17 2230 (!) 89/48 98 °F (36.7 °C) 68 18 99 % - -   04/09/17 2200 91/52 - 71 19 99 % - -   04/09/17 2152 (!) 88/52 98.2 °F (36.8 °C) 80 18 100 % - -   04/09/17 2147 90/53 98.2 °F (36.8 °C) 71 16 99 % - -   04/09/17 2142 (!) 88/51 98 °F (36.7 °C) 68 18 99 % - -   04/09/17 2137 93/51 98 °F (36.7 °C) 70 20 99 % - -   04/09/17 2126 93/51 98 °F (36.7 °C) 73 20 99 % - -   04/09/17 2015 92/51 - 81 24 98 % - -   04/09/17 1930 97/53 - 90 28 100 % - -   04/09/17 1905 - (!) 101.3 °F (38.5 °C) - - - - -   04/09/17 1900 102/51 - - - - - -   04/09/17 1848 99/50 - - - - - -   04/09/17 1824 99/50 99.1 °F (37.3 °C) 92 18 100 % 5' 6\" (1.676 m) 150 lb (68 kg)     Labs:  Recent Labs      04/10/17   0753  04/09/17   1840   WBC  2.3*  2.3*   HGB  7.4*  5.5*   PLT  85*  109*   NA  142  140   K  3.3*  2.9*   CL  110*  107   CO2  20*  23   BUN  41*  39*   CREA  5.00*  5.07*   GLU  81  90   TBILI   --   0.5   SGOT   --   41*   ALT   --   14   AP   --   70         [place] Saver Ambika, DO

## 2017-04-10 NOTE — PROGRESS NOTES
Skin assessment completed by Romeo Gamez and Corrinne Maine, RN. Pt skin is CDI, exceptions include an open area near rectum with small amount of yellow drainage, below this there is what appears to be an old wound with a small black line, Possibly an old suture in appearance. Pt is AOX4. Pt has a colostomy bag old adhesive noted around site. Pt given call light and essentials at bedside. Will continue to monitor.

## 2017-04-10 NOTE — PROGRESS NOTES
[facility]    Follow-Up on:4/10/17    HPI: not doing well overall plethora of problems; no resolution    ROS:  Denies CP, SOB.     Current Facility-Administered Medications   Medication Dose Route Frequency    polyvinyl alcohol (LIQUIFILM TEARS) 1.4 % ophthalmic solution 1 Drop  1 Drop Both Eyes PRN    vancomycin (VANCOCIN) 1250 mg in  ml infusion  1,250 mg IntraVENous See Admin Instructions    mupirocin (BACTROBAN) 2 % ointment   Topical BID    sodium chloride 0.9 % bolus infusion 500 mL  500 mL IntraVENous ONCE    albumin human 25% (BUMINATE) solution 25 g  25 g IntraVENous ONCE    pantoprazole (PROTONIX) 40 mg in sodium chloride 0.9 % 10 mL injection  40 mg IntraVENous Q12H    sodium chloride (NS) flush 5-10 mL  5-10 mL IntraVENous PRN    0.9% sodium chloride infusion 250 mL  250 mL IntraVENous PRN    epoetin miky (EPOGEN;PROCRIT) injection 20,000 Units  20,000 Units SubCUTAneous Q7D    0.9% sodium chloride infusion 250 mL  250 mL IntraVENous PRN    albuterol (PROVENTIL VENTOLIN) nebulizer solution 2.5 mg  2.5 mg Nebulization Q6H PRN    ferrous sulfate tablet 325 mg  325 mg Oral BID WITH MEALS    cholecalciferol (VITAMIN D3) tablet 2,000 Units  2,000 Units Oral DAILY    magnesium oxide (MAG-OX) tablet 400 mg  400 mg Oral DAILY    sodium chloride (NS) flush 5-10 mL  5-10 mL IntraVENous Q8H    sodium chloride (NS) flush 5-10 mL  5-10 mL IntraVENous PRN    naloxone (NARCAN) injection 0.4 mg  0.4 mg IntraVENous PRN    diphenhydrAMINE (BENADRYL) injection 12.5 mg  12.5 mg IntraVENous Q4H PRN    LORazepam (ATIVAN) injection 1 mg  1 mg IntraVENous Q6H PRN    oxyCODONE-acetaminophen (PERCOCET) 5-325 mg per tablet 2 Tab  2 Tab Oral Q4H PRN    oxyCODONE-acetaminophen (PERCOCET) 5-325 mg per tablet 1 Tab  1 Tab Oral Q4H PRN    HYDROmorphone (PF) (DILAUDID) injection 0.5 mg  0.5 mg IntraVENous Q1H PRN    HYDROmorphone (PF) (DILAUDID) injection 1 mg  1 mg IntraVENous Q1H PRN    piperacillin-tazobactam (ZOSYN) 3.375 g in 0.9% sodium chloride (MBP/ADV) 100 mL  3.375 g IntraVENous Q12H       Exam:  Vitals:    04/10/17 0309 04/10/17 0331 04/10/17 0813 04/10/17 1216   BP: 91/51 94/51 95/54 102/51   Pulse: (!) 59 61 (!) 54 (!) 54   Resp: 18 20 20 20   Temp: 97.6 °F (36.4 °C) 96.8 °F (36 °C) 97.7 °F (36.5 °C) 97.4 °F (36.3 °C)   SpO2: 100% 100% 99% 97%   Weight:       Height:             Intake/Output Summary (Last 24 hours) at 04/10/17 1413  Last data filed at 04/10/17 0814   Gross per 24 hour   Intake            965.4 ml   Output              550 ml   Net            415.4 ml     PE:  GEN - in no distress  CV - regular, no murmur, no rub  Lung - clear bilaterally  Abd - soft, nontender  Ext - no edema    Labs  Recent Labs      04/10/17   0753  04/09/17   1840   WBC  2.3*  2.3*   HGB  7.4*  5.5*   HCT  21.2*  16.3*   PLT  85*  109*     Recent Labs      04/10/17   0753  04/09/17   1840   NA  142  140   K  3.3*  2.9*   CL  110*  107   CO2  20*  23   BUN  41*  39*   CREA  5.00*  5.07*   GLU  81  90   CA  7.4*  7.3*   MG   --   1.5*     No results for input(s): PH, PCO2, PO2, PCO2 in the last 72 hours.     Problem List:  Patient Active Problem List    Diagnosis Date Noted    Aundrea-rectal abscess 04/09/2017    Symptomatic anemia 03/29/2017    Elevated total protein 03/29/2017    Hyperbilirubinemia 03/29/2017    Dizziness 03/29/2017    ESRD (end stage renal disease) ([facility] 03/29/2017    HIT (heparin-induced thrombocytopenia) ([facility] 10/11/2016    CKD (chronic kidney disease) requiring chronic dialysis ([facility] 10/11/2016    Nocturnal hypoxemia 10/04/2016    Suspected sleep apnea 10/02/2016    Acute blood loss anemia 09/29/2016    Paroxysmal atrial fibrillation (HCC) 09/17/2016    Pleural effusion on left 09/12/2016    Acute hypoactive delirium due to multiple etiologies 09/11/2016    Thrombocytopenia ([facility] 09/08/2016    HTN (hypertension) 09/04/2016    GERD (gastroesophageal reflux disease) 09/04/2016    Perirectal abscess 09/04/2016    Alcohol abuse, daily use 09/03/2016    VITO (acute kidney injury) ([facility] 09/03/2016    Pancytopenia ([facility] 09/03/2016       Issues Addressed By Nephrology:    Plan:  1. Stage IV decubitus  2. Colostomy  3. Malnutrition  4. High transit GI time  5.  ESRD HD TTS

## 2017-04-10 NOTE — ED NOTES
TRANSFER - OUT REPORT:    Verbal report given to Kelton(name) on Chuy Pozo  being transferred to 711(unit) for routine progression of care       Report consisted of patients Situation, Background, Assessment and   Recommendations(SBAR). Information from the following report(s) ED Summary was reviewed with the receiving nurse. Lines:       Opportunity for questions and clarification was provided.       Patient transported with:   Registered Nurse

## 2017-04-10 NOTE — CONSULTS
[place] [facility] Consult    Subjective:     Ora Corona is a 61 y.o.  male who is being seen for ESRD. This is an unfortunate gentleman with ESRD on dialysis on a MWF schedule at [facility]. He comes in with an infected decubitus \"my hind end is leaking\". He has had intermittent fevers as well. He dialyzes via a catheter. He has had a LUE fistula attempted but never matured. He recently has undergone a pancytopenia work up. Bone marrow biopsy was nondiagnostic. He has a history of HIT. Past Medical History:   Diagnosis Date    Alcohol abuse, daily use 09/03/2016    NONE X 7 DAYS, USUALLY DRINKS 10-15 BEERS DAILY. TAKES A WEEK OFF AT A TIME ONCE A MONTH    Anemia 9/3/2016    HGB 7.8 ON ADMISSION    Chronic kidney disease     end stage renal disease- dialysis on Tues, Thurs. , Saturday mornings. 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 2016.  DEBRIDE NECROTIC SKIN/ TISSUE, ABD WALL  09/04/2016    I&D rectal abscess with drain placement - Dr. Linda Yee      rectal abcess surgery that also got infected-  Subsequent I & D of the rectal abcess.      Family History   Problem Relation Age of Onset    Stroke Mother       Social History   Substance Use Topics    Smoking status: Former Smoker     Packs/day: 2.00     Years: 40.00     Types: Cigarettes    Smokeless tobacco: Not on file      Comment: STATES HE QUIT 7 DAYS AGO    Alcohol use 42.0 - 63.0 oz/week     70 - 105 Cans of beer per week      Comment: 10-15 CANS DAILY       Current Facility-Administered Medications   Medication Dose Route Frequency Provider Last Rate Last Dose    sodium chloride (NS) flush 5-10 mL  5-10 mL IntraVENous PRN Matthew Kraft MD        vancomycin (VANCOCIN) 1250 mg in  ml infusion  1,250 mg IntraVENous ONCE Melissa Herring MD        0.9% sodium chloride infusion 250 mL  250 mL IntraVENous PRN Melissa Herring MD        potassium chloride 20 mEq in 100 ml IVPB  20 mEq IntraVENous Q2H Scar More MD        epoetin miky (EPOGEN;PROCRIT) injection 20,000 Units  20,000 Units SubCUTAneous Q7D Scar More MD        0.9% sodium chloride infusion 250 mL  250 mL IntraVENous PRN Scar More MD         Current Outpatient Prescriptions   Medication Sig Dispense Refill    metoprolol tartrate (LOPRESSOR) 25 mg tablet Take 1 Tab by mouth every twelve (12) hours. 60 Tab 1    Cholecalciferol, Vitamin D3, (VITAMIN D3) 2,000 unit cap capsule Take 2,000 Units by mouth once.  OXYGEN-AIR DELIVERY SYSTEMS Take 2 L/min by inhalation nightly. via NC      magnesium oxide (MAG-OX) 400 mg tablet Take 400 mg by mouth daily.  ferrous sulfate 325 mg (65 mg iron) tablet Take 1 Tab by mouth two (2) times daily (with meals). 60 Tab 0    acetaminophen (TYLENOL) 325 mg tablet Take 2 Tabs by mouth two (2) times daily as needed. Use only if necessary. Patient has history of alcoholic liver disease 30 Tab 0    albuterol (PROVENTIL VENTOLIN) 2.5 mg /3 mL (0.083 %) nebulizer solution 3 mL by Nebulization route every six (6) hours as needed for Wheezing. 24 Each 0    alum-mag hydroxide-simeth (MYLANTA) 200-200-20 mg/5 mL susp Take 30 mL by mouth three (3) times daily as needed. 1 Bottle 0    epoetin miky (EPOGEN;PROCRIT) 10,000 unit/mL injection 1 mL by SubCUTAneous route every seven (7) days. Indications: RENAL DIALYSIS 1 Vial 0    pantoprazole (PROTONIX) 40 mg tablet Take 1 Tab by mouth Before breakfast and dinner. 60 Tab 0        Allergies   Allergen Reactions    Heparin Analogues Other (comments)     H. I.T positive        Review of Systems:  Pertinent items are noted in the History of Present Illness. Objective:      Intake and Output:            Physical Exam:   General appearance: alert, cooperative, no distress, appears older than stated age, chronically ill appearing  Neck: supple, symmetrical, trachea midline, no adenopathy, thyroid: not enlarged, symmetric, no tenderness/mass/nodules, no carotid bruit and no JVD  Lungs: clear to auscultation bilaterally  Heart: regular rate and rhythm, S1, S2 normal, no murmur, click, rub or gallop  Abdomen: soft, non-tender. Bowel sounds normal. No masses,  no organomegaly, colostomy LLQ  Extremities: extremities normal, atraumatic, no cyanosis or edema          Data Review:   Recent Results (from the past 24 hour(s))   CULTURE, BLOOD    Collection Time: 04/09/17  5:52 PM   Result Value Ref Range    Special Requests: RIGHT HAND      Culture result: PENDING    METABOLIC PANEL, COMPREHENSIVE    Collection Time: 04/09/17  6:40 PM   Result Value Ref Range    Sodium 140 136 - 145 mmol/L    Potassium 2.9 (LL) 3.5 - 5.1 mmol/L    Chloride 107 98 - 107 mmol/L    CO2 23 21 - 32 mmol/L    Anion gap 10 7 - 16 mmol/L    Glucose 90 65 - 100 mg/dL    BUN 39 (H) 8 - 23 MG/DL    Creatinine 5.07 (H) 0.8 - 1.5 MG/DL    GFR est AA 15 (L) >60 ml/min/1.73m2    GFR est non-AA 12 (L) >60 ml/min/1.73m2    Calcium 7.3 (L) 8.3 - 10.4 MG/DL    Bilirubin, total 0.5 0.2 - 1.1 MG/DL    ALT (SGPT) 14 12 - 65 U/L    AST (SGOT) 41 (H) 15 - 37 U/L    Alk.  phosphatase 70 50 - 136 U/L    Protein, total 6.7 6.3 - 8.2 g/dL    Albumin 1.7 (L) 3.2 - 4.6 g/dL    Globulin 5.0 (H) 2.3 - 3.5 g/dL    A-G Ratio 0.3 (L) 1.2 - 3.5     CBC WITH AUTOMATED DIFF    Collection Time: 04/09/17  6:40 PM   Result Value Ref Range    WBC 2.3 (L) 4.3 - 11.1 K/uL    RBC 1.85 (L) 4.23 - 5.67 M/uL    HGB 5.5 (LL) 13.6 - 17.2 g/dL    HCT 16.3 (LL) 41.1 - 50.3 %    MCV 88.1 79.6 - 97.8 FL    MCH 29.7 26.1 - 32.9 PG    MCHC 33.7 31.4 - 35.0 g/dL    RDW 18.3 (H) 11.9 - 14.6 %    PLATELET 836 (L) 663 - 450 K/uL    MPV 9.5 (L) 10.8 - 14.1 FL    DF AUTOMATED      NEUTROPHILS 71 43 - 78 %    LYMPHOCYTES 24 13 - 44 %    MONOCYTES 5 4.0 - 12.0 %    EOSINOPHILS 0 (L) 0.5 - 7.8 %    BASOPHILS 0 0.0 - 2.0 %    IMMATURE GRANULOCYTES 0.4 0.0 - 5.0 %    ABS. NEUTROPHILS 1.6 (L) 1.7 - 8.2 K/UL    ABS. LYMPHOCYTES 0.6 0.5 - 4.6 K/UL    ABS. MONOCYTES 0.1 0.1 - 1.3 K/UL    ABS. EOSINOPHILS 0.0 0.0 - 0.8 K/UL    ABS. BASOPHILS 0.0 0.0 - 0.2 K/UL    ABS. IMM. GRANS. 0.0 0.0 - 0.5 K/UL   MAGNESIUM    Collection Time: 04/09/17  6:40 PM   Result Value Ref Range    Magnesium 1.5 (L) 1.8 - 2.4 mg/dL   POC TROPONIN-I    Collection Time: 04/09/17  6:55 PM   Result Value Ref Range    Troponin-I (POC) 0.03 0.0 - 0.08 ng/ml   POC LACTIC ACID    Collection Time: 04/09/17  6:59 PM   Result Value Ref Range    Lactic Acid (POC) 1.1 0.5 - 1.9 mmol/L   TYPE & SCREEN    Collection Time: 04/09/17  7:38 PM   Result Value Ref Range    Crossmatch Expiration 04/12/2017     ABO/Rh(D) Gabriella Heal NEGATIVE     Antibody screen PENDING            Assessment:       ESRD  Pancytopenia  Hypokalemia      Plan: This is an unfortunate gentleman with numerous health issues not the least of which is ESRD being admitted for an infected decubitus. He dialyzes on a MWF schedule. Will hold dialysis for the time being as he is volume and electrolyte depleted. Will transfuse, replace potassium. Given his obvious chronic debility and overwhelming health issues as well as questionable home situation, would ask Palliative Care to see as well. Thank you for the kind courtesy of this consultation. Will make further adjustments to the medical regimen as the clinical course dictates and follow very closely with you.       Signed By: Willie Root MD     April 9, 2017

## 2017-04-10 NOTE — PROGRESS NOTES
TRANSFER - IN REPORT:    Verbal report received from [address] [place] [id number] on [address]  being received from ED for routine progression of care      Report consisted of patients Situation, Background, Assessment and   Recommendations(SBAR). Information from the following report(s) SBAR, ED Summary, [place] and Recent Results was reviewed with the receiving nurse. Opportunity for questions and clarification was provided. Assessment completed upon patients arrival to unit and care assumed.

## 2017-04-10 NOTE — PROGRESS NOTES
Pharmacokinetic Consult to Pharmacist    Leo Stein is a 61 y.o. male being treated for perirectal abscess with vanc/zosyn. Height: 5' 6\" (167.6 cm)  Weight: 68 kg (150 lb)  Lab Results   Component Value Date/Time    BUN 39 04/09/2017 06:40 PM    Creatinine 5.07 04/09/2017 06:40 PM    WBC 2.3 04/09/2017 06:40 PM    Procalcitonin 4.5 04/09/2017 06:40 PM      Estimated Creatinine Clearance: 13.3 mL/min (based on Cr of 5.07). Day 1 of vancomycin. Goal trough is 12-18. Vancomycin 1.25 g x 1 dose; will schedule future doses based on post-HD levels. Will continue to follow patient.       Thank you,  Nadya Billy, PharmD

## 2017-04-10 NOTE — H&P
Surgery  Patient known to Dr. Albina Prabhakar now with a new perirectal abscess. CT scan showed: FINDINGS:  Abdomen CT: There are tiny bilateral pleural effusions. There is splenomegaly. There are no lesions in the liver or spleen. There are small gallstones. There  is no bile duct dilatation. The adrenal glands and pancreas appear normal.   There is normal enhancement of the kidneys. There is no hydronephrosis. There  is no adenopathy. There are no inflammatory changes or fluid collections in the  abdomen.     Pelvis CT: There is now an air-fluid collection to the left of the mid rectum,  consistent with abscess. It measures 4.4 cm in size, communicates with the area  of inflammation in the left perianal/gluteal region. There is increased  presacral edema. There is no significant adenopathy. Left lower quadrant  ostomy is again noted. There are surgical changes in the right hip.     IMPRESSION  IMPRESSION:   1. The area of inflammation in the perianal region now extends into the left  perirectal region where there is a discrete abscess. 2. Stable splenomegaly. Plan: 1. Admit  2. Antibiotics. 3. Nephrology consult  4. Will notify Dr. Albina Prabhakar in AM  5. Might be amenable to IR drainage.   Catalino Ramesh MD

## 2017-04-10 NOTE — CONSULTS
HOSPITALIST H&P/CONSULT  NAME:  Kanwal Townsend   Age:  61 y.o.  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 He underwent extensive debridement of the perineal area and eventually underwent a diverting colostomy at that time, unfortunately, he went into renal failure and never recovered and is on scheduled HD now ( ), he also has a history of pancytopenia + splenomegaly, he is regularly followed by hematology and was in the hospital 1 week ago , a BM biopsy was done at that time and reported no major findings. He was admitted yesterday to the surgical service  with sepsis due to a perirectal abscess draining into the gluteal area, he was also anemic with a Hb of 5.5. He has received 2 U of pRBCs and his Hb is up to 7.4 after that. His BP has been borderline low. He was seen by Renal, holding HD today due to dehydration and low BP. The hospitalist group was consulted to help with the medical management of Mr. Joy Issa.        10 point ROS done and is negative except as noted in HPI. Past Medical History:   Diagnosis Date    Alcohol abuse, daily use 2016    NONE X 7 DAYS, USUALLY DRINKS 10-15 BEERS DAILY. TAKES A WEEK OFF AT A TIME ONCE A MONTH    Anemia 9/3/2016    HGB 7.8 ON ADMISSION    Chronic kidney disease     end stage renal disease- dialysis on Tues, Thurs. , Saturday mornings.  HIT (heparin-induced thrombocytopenia) ([facility] [address]     Hypertension     Hypokalemia 9/3/2016    2.7 ON ADMISSION    Tobacco abuse 9/3/2016    QUIT 7 DAYS AGO      Past Surgical History:   Procedure Laterality Date    ABDOMEN SURGERY PROC UNLISTED      2016.     DEBRIDE NECROTIC SKIN/ TISSUE, ABD WALL  2016    I&D rectal abscess with drain placement - Dr. Rose Jones      rectal abcess surgery that also got infected-  Subsequent I & D of the rectal abcess. Prior to Admission Medications   Prescriptions Last Dose Informant Patient Reported? Taking? Cholecalciferol, Vitamin D3, (VITAMIN D3) 2,000 unit cap capsule   Yes No   Sig: Take 2,000 Units by mouth once. OXYGEN-AIR DELIVERY SYSTEMS   Yes No   Sig: Take 2 L/min by inhalation nightly. via NC   acetaminophen (TYLENOL) 325 mg tablet   No No   Sig: Take 2 Tabs by mouth two (2) times daily as needed. Use only if necessary. Patient has history of alcoholic liver disease   albuterol (PROVENTIL VENTOLIN) 2.5 mg /3 mL (0.083 %) nebulizer solution   No No   Sig: 3 mL by Nebulization route every six (6) hours as needed for Wheezing. alum-mag hydroxide-simeth (MYLANTA) 200-200-20 mg/5 mL susp   No No   Sig: Take 30 mL by mouth three (3) times daily as needed. epoetin miky (EPOGEN;PROCRIT) 10,000 unit/mL injection   No No   Si mL by SubCUTAneous route every seven (7) days. Indications: RENAL DIALYSIS   ferrous sulfate 325 mg (65 mg iron) tablet   No No   Sig: Take 1 Tab by mouth two (2) times daily (with meals). magnesium oxide (MAG-OX) 400 mg tablet   Yes No   Sig: Take 400 mg by mouth daily. metoprolol tartrate (LOPRESSOR) 25 mg tablet   No No   Sig: Take 1 Tab by mouth every twelve (12) hours. pantoprazole (PROTONIX) 40 mg tablet   No No   Sig: Take 1 Tab by mouth Before breakfast and dinner. Facility-Administered Medications: None     Home meds reconciled. Allergies   Allergen Reactions    Heparin Analogues Other (comments)     H. I.T positive      Social History   Substance Use Topics    Smoking status: Former Smoker     Packs/day: 2.00     Years: 40.00     Types: Cigarettes    Smokeless tobacco: Not on file      Comment: STATES HE QUIT 7 DAYS AGO    Alcohol use 42.0 - 63.0 oz/week     70 - 105 Cans of beer per week      Comment: 10-15 CANS DAILY      Family History   Problem Relation Age of Onset    Stroke Mother       Immunization History   Administered Date(s) Administered    TB Skin Test (PPD) Intradermal 2016, 10/10/2016, 2017     Objective:     Visit Vitals    /51 (BP 1 Location: Right arm, BP Patient Position: At rest)    Pulse (!) 54    Temp 97.4 °F (36.3 °C)    Resp 20    Ht 5' 6\" (1.676 m)    Wt 68 kg (150 lb)    SpO2 97%    BMI 24.21 kg/m2      Temp (24hrs), Av °F (36.7 °C), Min:96.1 °F (35.6 °C), Max:101.3 °F (38.5 °C)    Oxygen Therapy  O2 Sat (%): 97 % (04/10/17 1216)  Pulse via Oximetry: 71 beats per minute (17 2200)  O2 Device: Room air (17 2152)  Physical Exam:  General:    Alert, cooperative  Head:   NCAT. No obvious deformity  Nose:  Nares normal. No drainage  Lungs:   CTABL. No wheezing/rhonchi/rales  Heart:   RRR. No m/r/g. Abdomen:   Left sided colostomy bag with stool . Tender splenomegaly  Extremities: No cyanosis. Skin:     No rashes or lesions. Not Jaundiced  Neurologic: Moves all extremities. no gross focal deficits      Data Review:   Recent Results (from the past 24 hour(s))   CULTURE, BLOOD    Collection Time: 17  5:52 PM   Result Value Ref Range    Special Requests: RIGHT HAND      Culture result: NO GROWTH AFTER 14 HOURS     EKG, 12 LEAD, INITIAL    Collection Time: 17  6:33 PM   Result Value Ref Range    Ventricular Rate 88 BPM    Atrial Rate 88 BPM    P-R Interval 184 ms    QRS Duration 150 ms    Q-T Interval 410 ms    QTC Calculation (Bezet) 496 ms    Calculated P Axis 41 degrees    Calculated R Axis -70 degrees    Calculated T Axis 59 degrees    Diagnosis       !! AGE AND GENDER SPECIFIC ECG ANALYSIS !! Normal sinus rhythm  Right bundle branch block  Left anterior fascicular block  !!! Bifascicular block !!!   Possible Lateral infarct , age undetermined  Abnormal ECG  When compared with ECG of 29-MAR-2017 10:29,  Borderline criteria for Lateral infarct are now Present  Confirmed by Shira Miguel ([id number]) on 4/10/2017 7:31:14 AM     PROCALCITONIN    Collection Time: 04/09/17  6:40 PM   Result Value Ref Range    Procalcitonin 4.5 ng/mL   CULTURE, BLOOD    Collection Time: 04/09/17  6:40 PM   Result Value Ref Range    Special Requests: RIGHT ANTECUBITAL      Culture result: NO GROWTH AFTER 14 HOURS     METABOLIC PANEL, COMPREHENSIVE    Collection Time: 04/09/17  6:40 PM   Result Value Ref Range    Sodium 140 136 - 145 mmol/L    Potassium 2.9 (LL) 3.5 - 5.1 mmol/L    Chloride 107 98 - 107 mmol/L    CO2 23 21 - 32 mmol/L    Anion gap 10 7 - 16 mmol/L    Glucose 90 65 - 100 mg/dL    BUN 39 (H) 8 - 23 MG/DL    Creatinine 5.07 (H) 0.8 - 1.5 MG/DL    GFR est AA 15 (L) >60 ml/min/1.73m2    GFR est non-AA 12 (L) >60 ml/min/1.73m2    Calcium 7.3 (L) 8.3 - 10.4 MG/DL    Bilirubin, total 0.5 0.2 - 1.1 MG/DL    ALT (SGPT) 14 12 - 65 U/L    AST (SGOT) 41 (H) 15 - 37 U/L    Alk. phosphatase 70 50 - 136 U/L    Protein, total 6.7 6.3 - 8.2 g/dL    Albumin 1.7 (L) 3.2 - 4.6 g/dL    Globulin 5.0 (H) 2.3 - 3.5 g/dL    A-G Ratio 0.3 (L) 1.2 - 3.5     CBC WITH AUTOMATED DIFF    Collection Time: 04/09/17  6:40 PM   Result Value Ref Range    WBC 2.3 (L) 4.3 - 11.1 K/uL    RBC 1.85 (L) 4.23 - 5.67 M/uL    HGB 5.5 (LL) 13.6 - 17.2 g/dL    HCT 16.3 (LL) 41.1 - 50.3 %    MCV 88.1 79.6 - 97.8 FL    MCH 29.7 26.1 - 32.9 PG    MCHC 33.7 31.4 - 35.0 g/dL    RDW 18.3 (H) 11.9 - 14.6 %    PLATELET 354 (L) 115 - 450 K/uL    MPV 9.5 (L) 10.8 - 14.1 FL    DF AUTOMATED      NEUTROPHILS 71 43 - 78 %    LYMPHOCYTES 24 13 - 44 %    MONOCYTES 5 4.0 - 12.0 %    EOSINOPHILS 0 (L) 0.5 - 7.8 %    BASOPHILS 0 0.0 - 2.0 %    IMMATURE GRANULOCYTES 0.4 0.0 - 5.0 %    ABS. NEUTROPHILS 1.6 (L) 1.7 - 8.2 K/UL    ABS. LYMPHOCYTES 0.6 0.5 - 4.6 K/UL    ABS. MONOCYTES 0.1 0.1 - 1.3 K/UL    ABS. EOSINOPHILS 0.0 0.0 - 0.8 K/UL    ABS. BASOPHILS 0.0 0.0 - 0.2 K/UL    ABS. IMM. GRANS. 0.0 0.0 - 0.5 K/UL   MAGNESIUM    Collection Time: 04/09/17  6:40 PM   Result Value Ref Range    Magnesium 1.5 (L) 1.8 - 2.4 mg/dL   POC TROPONIN-I    Collection Time: 04/09/17  6:55 PM   Result Value Ref Range    Troponin-I (POC) 0.03 0.0 - 0.08 ng/ml   POC LACTIC ACID    Collection Time: 04/09/17  6:59 PM   Result Value Ref Range    Lactic Acid (POC) 1.1 0.5 - 1.9 mmol/L   TYPE & SCREEN    Collection Time: 04/09/17  7:38 PM   Result Value Ref Range    Crossmatch Expiration 04/12/2017     ABO/Rh(D) O NEGATIVE     Antibody screen NEG     Unit number [id number]     Blood component type  LR AS5     Unit division 00     Status of unit TRANSFUSED     Crossmatch result Compatible     Unit number [id number]     Blood component type [facility] AS5     Unit division 00     Status of unit ISSUED     Crossmatch result Compatible    URINALYSIS W/ RFLX MICROSCOPIC    Collection Time: 04/09/17  8:24 PM   Result Value Ref Range    Color YELLOW      Appearance CLEAR      Specific gravity 1.021 1.001 - 1.023      pH (UA) 7.0 5.0 - 9.0      Protein 100 (A) NEG mg/dL    Glucose NEGATIVE  mg/dL    Ketone NEGATIVE  NEG mg/dL    Bilirubin SMALL (A) NEG      Blood NEGATIVE  NEG      Urobilinogen 1.0 0.2 - 1.0 EU/dL    Nitrites NEGATIVE  NEG      Leukocyte Esterase NEGATIVE  NEG      WBC 0-3 0 /hpf    RBC 0-3 0 /hpf    Epithelial cells 0-3 0 /hpf    Bacteria 0 0 /hpf    Casts 3-5 0 /lpf   MSSA/MRSA SC BY PCR, NASAL SWAB    Collection Time: 04/10/17  3:30 AM   Result Value Ref Range    Special Requests: NO SPECIAL REQUESTS      Culture result: (A)       MRSA target DNA detected, SA target DNA detected. A positive test result does not necessarily indicate the presence of viable organisms. It is however, presumptive for the presence of MRSA or SA.     Culture result:        RESULTS VERIFIED, PHONED TO AND READ BACK BY  KIMBERLY PALAFOX 04/10/17 2915 [id number] [facility]     METABOLIC PANEL, BASIC Collection Time: 04/10/17  7:53 AM   Result Value Ref Range    Sodium 142 136 - 145 mmol/L    Potassium 3.3 (L) 3.5 - 5.1 mmol/L    Chloride 110 (H) 98 - 107 mmol/L    CO2 20 (L) 21 - 32 mmol/L    Anion gap 12 7 - 16 mmol/L    Glucose 81 65 - 100 mg/dL    BUN 41 (H) 8 - 23 MG/DL    Creatinine 5.00 (H) 0.8 - 1.5 MG/DL    GFR est AA 15 (L) >60 ml/min/1.73m2    GFR est non-AA 12 (L) >60 ml/min/1.73m2    Calcium 7.4 (L) 8.3 - 10.4 MG/DL   CBC W/O DIFF    Collection Time: 04/10/17  7:53 AM   Result Value Ref Range    WBC 2.3 (L) 4.3 - 11.1 K/uL    RBC 2.43 (L) 4.23 - 5.67 M/uL    HGB 7.4 (L) 13.6 - 17.2 g/dL    HCT 21.2 (L) 41.1 - 50.3 %    MCV 87.2 79.6 - 97.8 FL    MCH 30.5 26.1 - 32.9 PG    MCHC 34.9 31.4 - 35.0 g/dL    RDW 17.8 (H) 11.9 - 14.6 %    PLATELET 85 (L) 476 - 450 K/uL    MPV 9.3 (L) 10.8 - 14.1 FL     Imaging /Procedures /Studies:  I personally reviewed all labs, imaging, and other studies this admission:  CXR Results  (Last 48 hours)               04/09/17 1921  XR CHEST PORT Final result    Impression:  IMPRESSION: No acute findings in the chest           Narrative:  Chest X-ray       INDICATION:   Sepsis, fever       A portable AP view of the chest was obtained. FINDINGS: The lungs are clear. There are no infiltrates or effusions. There is   mild cardia megaly. Central line remains in place. CT Results  (Last 48 hours)               04/09/17 2048  CT ABD PELV W CONT Final result    Impression:  IMPRESSION:    1. The area of inflammation in the perianal region now extends into the left   perirectal region where there is a discrete abscess. 2.  Stable splenomegaly. Narrative:  CT of the Abdomen and Pelvis       INDICATION:  Perirectal abscess       Multiple axial images were obtained through the abdomen and pelvis after   intravenous injection of 100mL of Isovue 370. Radiation dose reduction   techniques were used for this study:   All CT scans performed at this facility   use one or all of the following: Automated exposure control, adjustment of the   mA and/or kVp according to patient's size, iterative reconstruction. Compared   with 03/30/2017. FINDINGS:   Abdomen CT: There are tiny bilateral pleural effusions. There is splenomegaly. There are no lesions in the liver or spleen. There are small gallstones. There   is no bile duct dilatation. The adrenal glands and pancreas appear normal.    There is normal enhancement of the kidneys. There is no hydronephrosis. There   is no adenopathy. There are no inflammatory changes or fluid collections in the   abdomen. Pelvis CT: There is now an air-fluid collection to the left of the mid rectum,   consistent with abscess. It measures 4.4 cm in size, communicates with the area   of inflammation in the left perianal/gluteal region. There is increased   presacral edema. There is no significant adenopathy. Left lower quadrant   ostomy is again noted. There are surgical changes in the right hip. Assessment and Plan: Active Hospital Problems    Diagnosis Date Noted    Aundrea-rectal abscess 04/09/2017       PLAN    # sepsis due to perirectal abscess   -continue IV vancomycin and zosyn   -blood cultures in progress   -to be seen by general surgery. -BP is borderline, will stop lopressor.   -He has ESRD but he seems to be very dehydrated, will order 500 mL of NS + 25 g of albumin     # anemia   -possibly due to blood loss   -Occult blood stool ordered   -received 2 U of pRBCs yesterday   -Hgb q6h  -IV PPI   -if occult blood in stool is +, will consider GI consultation   -If occult blood in stool is -, will consider hematology consult     # History of paroxysmal afib   -on sinus rhythm for now   -will resume lopressor once BP is better     #history of HIT   -SHOULD AVOID ANY HEPARIN PRODUCT     #Pancytopenia   -chronic.  Hematology has been following case        DVT ppx:  BCD   Code status: Full   Risk assessment:  High   Plan of care discussed with: patient     Thanks for allowing us to participate in the care of this patient.  Will follow case with you    Signed By: Sanjiv Bates MD     April 10, 2017

## 2017-04-11 ENCOUNTER — APPOINTMENT (OUTPATIENT)
Dept: CT IMAGING | Age: 67
DRG: 720 | End: 2017-04-11
Attending: SURGERY
Payer: MEDICAID

## 2017-04-11 LAB
ABO + RH BLD: NORMAL
BLD PROD TYP BPU: NORMAL
BLD PROD TYP BPU: NORMAL
BLOOD GROUP ANTIBODIES SERPL: NORMAL
BPU ID: NORMAL
BPU ID: NORMAL
CROSSMATCH RESULT,%XM: NORMAL
CROSSMATCH RESULT,%XM: NORMAL
ERYTHROCYTE [DISTWIDTH] IN BLOOD BY AUTOMATED COUNT: 18.8 % (ref 11.9–14.6)
HCT VFR BLD AUTO: 21.6 % (ref 41.1–50.3)
HGB BLD-MCNC: 7.3 G/DL (ref 13.6–17.2)
MCH RBC QN AUTO: 29.8 PG (ref 26.1–32.9)
MCHC RBC AUTO-ENTMCNC: 33.8 G/DL (ref 31.4–35)
MCV RBC AUTO: 88.2 FL (ref 79.6–97.8)
PLATELET # BLD AUTO: 108 K/UL (ref 150–450)
PMV BLD AUTO: 9.6 FL (ref 10.8–14.1)
RBC # BLD AUTO: 2.45 M/UL (ref 4.23–5.67)
SPECIMEN EXP DATE BLD: NORMAL
STATUS OF UNIT,%ST: NORMAL
STATUS OF UNIT,%ST: NORMAL
UNIT DIVISION, %UDIV: 0
UNIT DIVISION, %UDIV: 0
VANCOMYCIN SERPL-MCNC: 9.9 UG/ML
WBC # BLD AUTO: 2 K/UL (ref 4.3–11.1)

## 2017-04-11 PROCEDURE — 74011250637 HC RX REV CODE- 250/637: Performed by: SURGERY

## 2017-04-11 PROCEDURE — 74011000302 HC RX REV CODE- 302: Performed by: INTERNAL MEDICINE

## 2017-04-11 PROCEDURE — 5A1D60Z PERFORMANCE OF URINARY FILTRATION, MULTIPLE: ICD-10-PCS | Performed by: INTERNAL MEDICINE

## 2017-04-11 PROCEDURE — 87077 CULTURE AEROBIC IDENTIFY: CPT | Performed by: SURGERY

## 2017-04-11 PROCEDURE — 74011000250 HC RX REV CODE- 250: Performed by: INTERNAL MEDICINE

## 2017-04-11 PROCEDURE — C1769 GUIDE WIRE: HCPCS

## 2017-04-11 PROCEDURE — C9113 INJ PANTOPRAZOLE SODIUM, VIA: HCPCS | Performed by: INTERNAL MEDICINE

## 2017-04-11 PROCEDURE — 74011000258 HC RX REV CODE- 258: Performed by: SURGERY

## 2017-04-11 PROCEDURE — 80202 ASSAY OF VANCOMYCIN: CPT | Performed by: INTERNAL MEDICINE

## 2017-04-11 PROCEDURE — 74011250636 HC RX REV CODE- 250/636: Performed by: SURGERY

## 2017-04-11 PROCEDURE — 74011250636 HC RX REV CODE- 250/636: Performed by: RADIOLOGY

## 2017-04-11 PROCEDURE — 36415 COLL VENOUS BLD VENIPUNCTURE: CPT | Performed by: SURGERY

## 2017-04-11 PROCEDURE — 74011250636 HC RX REV CODE- 250/636: Performed by: INTERNAL MEDICINE

## 2017-04-11 PROCEDURE — 85027 COMPLETE CBC AUTOMATED: CPT | Performed by: SURGERY

## 2017-04-11 PROCEDURE — 74011000250 HC RX REV CODE- 250: Performed by: RADIOLOGY

## 2017-04-11 PROCEDURE — 87205 SMEAR GRAM STAIN: CPT | Performed by: SURGERY

## 2017-04-11 PROCEDURE — 99153 MOD SED SAME PHYS/QHP EA: CPT

## 2017-04-11 PROCEDURE — 76450000000

## 2017-04-11 PROCEDURE — 86580 TB INTRADERMAL TEST: CPT | Performed by: INTERNAL MEDICINE

## 2017-04-11 PROCEDURE — 90935 HEMODIALYSIS ONE EVALUATION: CPT

## 2017-04-11 PROCEDURE — 99152 MOD SED SAME PHYS/QHP 5/>YRS: CPT

## 2017-04-11 PROCEDURE — 74011250636 HC RX REV CODE- 250/636

## 2017-04-11 PROCEDURE — 65270000029 HC RM PRIVATE

## 2017-04-11 PROCEDURE — 87186 SC STD MICRODIL/AGAR DIL: CPT | Performed by: SURGERY

## 2017-04-11 RX ORDER — MIDAZOLAM HYDROCHLORIDE 1 MG/ML
.5-2 INJECTION, SOLUTION INTRAMUSCULAR; INTRAVENOUS
Status: DISCONTINUED | OUTPATIENT
Start: 2017-04-11 | End: 2017-04-14

## 2017-04-11 RX ORDER — DIPHENHYDRAMINE HYDROCHLORIDE 50 MG/ML
12.5-5 INJECTION, SOLUTION INTRAMUSCULAR; INTRAVENOUS ONCE
Status: ACTIVE | OUTPATIENT
Start: 2017-04-11 | End: 2017-04-12

## 2017-04-11 RX ORDER — FENTANYL CITRATE 50 UG/ML
25-100 INJECTION, SOLUTION INTRAMUSCULAR; INTRAVENOUS
Status: DISCONTINUED | OUTPATIENT
Start: 2017-04-11 | End: 2017-04-14

## 2017-04-11 RX ORDER — LIDOCAINE HYDROCHLORIDE 20 MG/ML
50-200 INJECTION, SOLUTION INFILTRATION; PERINEURAL ONCE
Status: COMPLETED | OUTPATIENT
Start: 2017-04-11 | End: 2017-04-11

## 2017-04-11 RX ORDER — FENTANYL CITRATE 50 UG/ML
12.5-5 INJECTION, SOLUTION INTRAMUSCULAR; INTRAVENOUS
Status: DISCONTINUED | OUTPATIENT
Start: 2017-04-11 | End: 2017-04-14

## 2017-04-11 RX ORDER — LIDOCAINE HYDROCHLORIDE 20 MG/ML
100-200 INJECTION, SOLUTION INFILTRATION; PERINEURAL ONCE
Status: ACTIVE | OUTPATIENT
Start: 2017-04-11 | End: 2017-04-12

## 2017-04-11 RX ORDER — SODIUM CHLORIDE 9 MG/ML
25 INJECTION, SOLUTION INTRAVENOUS ONCE
Status: COMPLETED | OUTPATIENT
Start: 2017-04-11 | End: 2017-04-13

## 2017-04-11 RX ORDER — SODIUM CHLORIDE 9 MG/ML
25 INJECTION, SOLUTION INTRAVENOUS ONCE
Status: ACTIVE | OUTPATIENT
Start: 2017-04-11 | End: 2017-04-12

## 2017-04-11 RX ORDER — DIPHENHYDRAMINE HYDROCHLORIDE 50 MG/ML
50 INJECTION, SOLUTION INTRAMUSCULAR; INTRAVENOUS ONCE
Status: ACTIVE | OUTPATIENT
Start: 2017-04-11 | End: 2017-04-12

## 2017-04-11 RX ADMIN — SODIUM CHLORIDE 25 ML/HR: 900 INJECTION, SOLUTION INTRAVENOUS at 14:48

## 2017-04-11 RX ADMIN — MIDAZOLAM HYDROCHLORIDE 1 MG: 1 INJECTION, SOLUTION INTRAMUSCULAR; INTRAVENOUS at 14:54

## 2017-04-11 RX ADMIN — SODIUM CHLORIDE 40 MG: 9 INJECTION INTRAMUSCULAR; INTRAVENOUS; SUBCUTANEOUS at 22:01

## 2017-04-11 RX ADMIN — VANCOMYCIN HYDROCHLORIDE 1000 MG: 1 INJECTION, POWDER, LYOPHILIZED, FOR SOLUTION INTRAVENOUS at 18:22

## 2017-04-11 RX ADMIN — LIDOCAINE HYDROCHLORIDE 100 MG: 20 INJECTION, SOLUTION INFILTRATION; PERINEURAL at 15:08

## 2017-04-11 RX ADMIN — PIPERACILLIN SODIUM,TAZOBACTAM SODIUM 3.38 G: 3; .375 INJECTION, POWDER, FOR SOLUTION INTRAVENOUS at 18:23

## 2017-04-11 RX ADMIN — MUPIROCIN: 20 OINTMENT TOPICAL at 09:00

## 2017-04-11 RX ADMIN — FENTANYL CITRATE 50 MCG: 50 INJECTION, SOLUTION INTRAMUSCULAR; INTRAVENOUS at 15:07

## 2017-04-11 RX ADMIN — MIDAZOLAM HYDROCHLORIDE 1 MG: 1 INJECTION, SOLUTION INTRAMUSCULAR; INTRAVENOUS at 15:11

## 2017-04-11 RX ADMIN — TUBERCULIN PURIFIED PROTEIN DERIVATIVE 5 UNITS: 5 INJECTION INTRADERMAL at 18:26

## 2017-04-11 RX ADMIN — FERROUS SULFATE TAB 325 MG (65 MG ELEMENTAL FE) 325 MG: 325 (65 FE) TAB at 18:19

## 2017-04-11 RX ADMIN — MUPIROCIN: 20 OINTMENT TOPICAL at 18:00

## 2017-04-11 RX ADMIN — OXYCODONE HYDROCHLORIDE AND ACETAMINOPHEN 1 TABLET: 5; 325 TABLET ORAL at 18:33

## 2017-04-11 RX ADMIN — MIDAZOLAM HYDROCHLORIDE 1 MG: 1 INJECTION, SOLUTION INTRAMUSCULAR; INTRAVENOUS at 15:16

## 2017-04-11 RX ADMIN — MIDAZOLAM HYDROCHLORIDE 1 MG: 1 INJECTION, SOLUTION INTRAMUSCULAR; INTRAVENOUS at 15:07

## 2017-04-11 RX ADMIN — Medication 5 ML: at 22:01

## 2017-04-11 RX ADMIN — PIPERACILLIN SODIUM,TAZOBACTAM SODIUM 3.38 G: 3; .375 INJECTION, POWDER, FOR SOLUTION INTRAVENOUS at 06:20

## 2017-04-11 RX ADMIN — FENTANYL CITRATE 50 MCG: 50 INJECTION, SOLUTION INTRAMUSCULAR; INTRAVENOUS at 15:16

## 2017-04-11 RX ADMIN — OXYCODONE HYDROCHLORIDE AND ACETAMINOPHEN 1 TABLET: 5; 325 TABLET ORAL at 06:26

## 2017-04-11 RX ADMIN — FENTANYL CITRATE 50 MCG: 50 INJECTION, SOLUTION INTRAMUSCULAR; INTRAVENOUS at 14:55

## 2017-04-11 RX ADMIN — Medication 10 ML: at 18:21

## 2017-04-11 RX ADMIN — Medication 5 ML: at 06:20

## 2017-04-11 NOTE — PROGRESS NOTES
PLAN:  Drain management -- per IR direction  Cont Abx -- Zosyn/Vanc  Nephrology following for HD needs  Hospitalist following for medical management  Will order diet      ASSESSMENT:  Admit Date: 4/9/2017   * No surgery found *  * No surgery found *    Principal Problem:    Aundrea-rectal abscess (4/9/2017)         SUBJECTIVE:  Resting in bed. Just back from IR. Successful drain placement today. Denies fevers/chills/sweats. No pain at present. Hungry and wants to eat. Reports feeling weak and tired today after HD and IR procedure. AF, VSS. Hgb 7.3 today. OBJECTIVE:  Constitutional: Alert oriented cooperative patient in no acute distress; appears stated age   Visit Vitals    /59 (BP 1 Location: Right arm, BP Patient Position: At rest)    Pulse 69    Temp 97.5 °F (36.4 °C)    Resp 19    Ht 5' 6\" (1.676 m)    Wt 82.6 kg (182 lb 1.6 oz)    SpO2 98%    BMI 29.39 kg/m2     Eyes: Sclera are clear. EOMs intact  ENMT: No external lesions, gross hearing normal; no obvious neck masses, no ear or lip lesions  CV: RRR, S1S2. Normal perfusion, pulses palpable, no edema  Resp: No JVD. Breathing is non-labored; no audible wheezing. BBS clear, on RA    GI: Soft and non-distended, non-tender, +BS, stool in ostomy pouch    Musculoskeletal: Unremarkable with normal function - general weakness. No embolic signs or cyanosis.    Neuro: Alert, Oriented; moves all 4; no focal deficits  Psychiatric: Normal affect and mood, no memory impairment  Skin: Drain to L buttock -- c/d/i, scant drainage in bulb, no surrounding cellulitis or erythema      Patient Vitals for the past 24 hrs:   BP Temp Pulse Resp SpO2 Weight   04/11/17 1615 111/59 97.5 °F (36.4 °C) 69 19 98 % -   04/11/17 1605 130/65 - 64 18 100 % -   04/11/17 1600 125/62 - 65 18 100 % -   04/11/17 1556 131/60 - 65 16 100 % -   04/11/17 1551 122/66 - 70 18 100 % -   04/11/17 1546 118/65 - 71 15 100 % -   04/11/17 1542 121/62 - 69 15 100 % -   04/11/17 1520 120/61 - 65 11 100 % -   04/11/17 1515 123/63 - 67 16 100 % -   04/11/17 1510 122/61 - 66 15 100 % -   04/11/17 1505 112/56 - 64 16 100 % -   04/11/17 1500 134/63 - 63 18 100 % -   04/11/17 1455 114/58 - 61 11 100 % -   04/11/17 1450 114/58 - 64 30 100 % -   04/11/17 1442 126/60 - 66 24 100 % -   04/11/17 1127 127/64 97.3 °F (36.3 °C) 64 16 100 % -   04/11/17 1049 105/71 - 63 - - -   04/11/17 1028 124/69 - (!) 59 - - -   04/11/17 1002 117/66 - 61 - - -   04/11/17 0927 116/70 - (!) 57 - - -   04/11/17 0902 109/65 - (!) 56 - - -   04/11/17 0828 117/62 - (!) 54 - - -   04/11/17 0800 112/62 - (!) 57 - - -   04/11/17 0735 110/62 - 63 - - -   04/11/17 0426 128/61 97.7 °F (36.5 °C) 66 18 97 % -   04/11/17 0108 110/54 97.7 °F (36.5 °C) 65 18 99 % -   04/10/17 2239 - - - - - 82.6 kg (182 lb 1.6 oz)   04/10/17 1903 110/61 97.6 °F (36.4 °C) 65 18 100 % -   04/10/17 1737 117/75 97.9 °F (36.6 °C) 82 20 98 % -       Labs:  Recent Labs      04/11/17   0551   04/10/17   0753  04/09/17   1840   WBC  2.0*   --   2.3*  2.3*   HGB  7.3*   < >  7.4*  5.5*   PLT  108*   --   85*  109*   NA   --    --   142  140   K   --    --   3.3*  2.9*   CL   --    --   110*  107   CO2   --    --   20*  23   BUN   --    --   41*  39*   CREA   --    --   5.00*  5.07*   GLU   --    --   81  90   TBILI   --    --    --   0.5   SGOT   --    --    --   41*   ALT   --    --    --   14   AP   --    --    --   70    < > = values in this interval not displayed.          Sanjuanita Sarabia, NP-C

## 2017-04-11 NOTE — PROGRESS NOTES
TRANSFER - IN REPORT:    Verbal report received from EDDIE Rn (name) on Anselmo Kiss  being received from IR Procedure (unit) for routine progression of care      Report consisted of patients Situation, Background, Assessment and   Recommendations(SBAR). Information from the following report(s) Procedure Summary and MAR was reviewed with the receiving nurse. Opportunity for questions and clarification was provided. Assessment completed upon patients arrival to unit and care assumed.

## 2017-04-11 NOTE — PROGRESS NOTES
Care Management Interventions  Mode of Transport at Discharge: Other (see comment) (family or medicaid van)  Transition of Care Consult (CM Consult): Discharge Planning, [address]: No  Reason Outside [place]: Managed care specific requirement, Patient already serviced by other home care/hospice agency [facility] is also out of network with "[employer]  Discharge Durable Medical Equipment: No  Physical Therapy Consult: Yes  Occupational Therapy Consult: Yes  Speech Therapy Consult: No  Current Support Network: Own Home  Confirm Follow Up Transport: Other (see comment) (medicaid van)  Discharge Location  Discharge Placement: Home with home health ([facility])    SW was notified that the pt will require [place] services at discharge. HH was ordered upon his discharge from [facility] on 4/6/17. Referral sent to [facility] but they had to forward it to [facility] since [facility] is not in network with the pt's insurance. Home health order received to resume previous [place] services and order along with pertinent clinical information was forwarded to [facility]. SW will notify them when pt is discharged. SW continues to follow to assist as needed.

## 2017-04-11 NOTE — PROGRESS NOTES
Pharmacokinetic Consult to Pharmacist    Orviridiana Chloe is a 61 y.o. male being treated for perirectal abscess with vanc/zosyn. Height: 5' 6\" (167.6 cm)  Weight: 82.6 kg (182 lb 1.6 oz)  Lab Results   Component Value Date/Time    BUN 41 04/10/2017 07:53 AM    Creatinine 5.00 04/10/2017 07:53 AM    WBC 2.0 04/11/2017 05:51 AM    Procalcitonin 4.5 04/09/2017 06:40 PM      Estimated Creatinine Clearance: 15.1 mL/min (based on Cr of 5). Lab Results   Component Value Date/Time    Vancomycin,trough 42.4 09/05/2016 12:00 PM    VANCOMYCIN,RANDOM 9.9 04/11/2017 04:47 PM       Day 3 of vancomycin. Goal trough is 12-18. Post-HD level below goal. Re-dose with vancomycin 1000 mg IV x 1 today. Will continue to dose around HD and random levels. Pharmacy will continue to follow. Please call with any questions.     Thank you,  Jael Smith, PharmD  Clinical Pharmacist  [phone]

## 2017-04-11 NOTE — PROGRESS NOTES
Dr. Tristan Shipman, on call for general surgery was notified of Critical lab of WBC 2.0. Repeated to clarify and no orders received.

## 2017-04-11 NOTE — DIALYSIS
Treatment initiated using Right CVC by Jas Mohamud. Both ports aspirated and flushed arterial port sluggish. Lines reversed. Machine set per MD orders. Pt VS stable. Will continue to monitor.

## 2017-04-11 NOTE — DIALYSIS
HD treatment completed without complication. Total of 1.9 kgs removed. Flushed both ports with 0.9 mL of NS.  VS stable, NAD. CVC dressing clean, dry, and intact, tego caps intact, bilateral lumen flushed with 9 cc NS. Transport contacted for return to room.

## 2017-04-11 NOTE — PROGRESS NOTES
TRANSFER - OUT REPORT:    Verbal report given to Jane Padgett RN (name) on Cathy Zee  being transferred to 7th Floor(unit) for routine progression of care       Report consisted of patients Situation, Background, Assessment and   Recommendations(SBAR). Information from the following report(s) SBAR was reviewed with the receiving nurse. Lines:   Peripheral IV 04/11/17 Right Wrist (Active)   Site Assessment Clean, dry, & intact 4/11/2017  7:35 AM   Phlebitis Assessment 0 4/11/2017  7:35 AM   Infiltration Assessment 0 4/11/2017  7:35 AM   Dressing Status Clean, dry, & intact 4/11/2017  7:35 AM   Dressing Type Tape;Transparent 4/11/2017  7:35 AM   Hub Color/Line Status Flushed 4/11/2017  7:35 AM        Opportunity for questions and clarification was provided. Dr. Aman Boudreaux would like the LUISA drain irrigated 3 times a day until his follow up appointment.         Patient transported with:   Registered Nurse

## 2017-04-11 NOTE — PROGRESS NOTES
TRANSFER - IN REPORT:    Verbal report received from DEVAN Vallejo(name) on Chuy Pozo  being received from IR(unit) for routine post - op      Report consisted of patients Situation, Background, Assessment and   Recommendations(SBAR). Information from the following report(s) SBAR, OR Summary, MAR and Recent Results was reviewed with the receiving nurse. Opportunity for questions and clarification was provided. Assessment completed upon patients arrival to unit and care assumed.

## 2017-04-11 NOTE — PROGRESS NOTES
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 He underwent extensive debridement of the perineal area and eventually underwent a diverting colostomy at that time, unfortunately, he went into renal failure and never recovered and is on scheduled HD now ( ), he also has a history of pancytopenia + splenomegaly, he is regularly followed by hematology and was in the hospital 1 week ago , a BM biopsy was done at that time and reported no major findings. Admitted  to the surgical service with sepsis due to a perirectal abscess draining into the gluteal area, he was also anemic with a Hb of 5.5. He has received 2 U of pRBCs . Ana Line His BP has been borderline low. The hospitalist group was consulted to help with the medical management of Mr. Franc Guevara.      4-- pt seen s/p IR drainage of perirectal abscess w/ drain placement    Review of Systems negative with exception of pertinent positives noted above  PHYSICAL EXAM     Visit Vitals    /59 (BP 1 Location: Right arm, BP Patient Position: At rest)    Pulse 69    Temp 97.5 °F (36.4 °C)    Resp 19    Ht 5' 6\" (1.676 m)    Wt 82.6 kg (182 lb 1.6 oz)    SpO2 98%    BMI 29.39 kg/m2      Temp (24hrs), Av.6 °F (36.4 °C), Min:97.3 °F (36.3 °C), Max:97.9 °F (36.6 °C)    Oxygen Therapy  O2 Sat (%): 98 % (17 161)  Pulse via Oximetry: 66 beats per minute (17 1600)  O2 Device: Room air (17 1615)  O2 Flow Rate (L/min): 2 l/min (17 1520)  ETCO2 (mmHg): 35 mmHg (17 1520)    Intake/Output Summary (Last 24 hours) at 17 1645  Last data filed at 17 1049   Gross per 24 hour   Intake 0 ml   Output             2980 ml   Net            -2980 ml      General: No acute distress    Lungs:  CTA Bilaterally. Heart:  Regular rate and rhythm,  No murmur, rub, or gallop  Abdomen: Soft, Non distended, Non tender, Positive bowel sounds  Extremities: No cyanosis, clubbing or edema  Neurologic:  No focal deficits    ASSESSMENT      Active Hospital Problems    Diagnosis Date Noted    Aundrea-rectal abscess 04/09/2017     Plan:  · Sepsis - r/t perirectal abscess. iv vanc/zosyn. Blood cx 1/2 GPC. Mgmt per sx team.   · Pancytopenia - s/p 2 units prbc on admit. Fecal occult neg. On EPO. Will request heme consult  · Hx of pafib- BP marginally improved - may be safe to resume BB soon  · Hx of HIT - no heparin products  · Debility - pt/ot/ppd.  Likely to need STR    DVT Prophylaxis: scds    Signed By: John Sarkar DO     April 11, 2017

## 2017-04-11 NOTE — CONSULTS
Palliative Care    Patient: Anselmo Reddy MRN: [id number]  SSN: [id number]    YOB: 1950  Age: 61 y.o. Sex: male       Date of Request: 4/11/2017  Date of Consult:  4/11/2017  Reason for Consult:   symptom management and goals of care  Requesting Physician: Reji Chong     Assessment/Plan:     Principal Diagnosis:    Debility, Unspecified  R53.81    Additional Diagnoses:   · Malnutrition, Chronic  E46  · Nausea/Vomiting  R11.2  · Pain, abdomen  R10.9  · Counseling, Encounter for Medical Advice  Z71.9  · Encounter for Palliative Care  Z51.5    Palliative Performance Scale (PPS):  PPS: 60    Medical Decision Making:   Reviewed and summarized admission notes, previous hospitalization, Palliative Care consult  Discussed case with appropriate providers. Reviewed laboratory and x-ray data. CBC, CMP, CT CAP, bone marrow, B12, folate, haptoglobin    Discussed with patient. He has a variety of somatic symptoms including fatigue, pyrosis/heartburn, current issues with perirectal abscess. He hopes to get better management of these and an answer as to his enlarged spleen and low blood counts. He indicates to me today that he is not using alcohol excessively, although this is not consistent with other history in record. He indicates he is not ready to [place] up,\" he is agreeable to continue dialysis but hopes for symptom improvement as outlined. Will continue to follow as needed. Will discuss findings with members of the interdisciplinary team.      Thank you for this referral.   The Palliative Care team is available from 8 am to 4:30 pm Monday-Friday. Medical management during other hours is per the primary attending service.        .    Subjective:     History obtained from:  Patient and Chart    Chief Complaint: tired  History of Present Illness:  62 y/o with history over last few months on Carol's gangrene requiring diverting colostomy and resulting in prolonged hospitalization, renal failure which has continued to require maintenance dialysis, pancytopenia and splenomegaly of uncertain etiology (bone marrow nondiagnostic). He now presents with a perirectal abscess. Advance Directive: Not received      Code Status:  Full Code            Health Care Power of : Not received, brother has been spokesperson and listed as contact    Past Medical History:   Diagnosis Date    Alcohol abuse, daily use 09/03/2016    NONE X 7 DAYS, USUALLY DRINKS 10-15 BEERS DAILY. TAKES A WEEK OFF AT A TIME ONCE A MONTH    Anemia 9/3/2016    HGB 7.8 ON ADMISSION    Chronic kidney disease     end stage renal disease- dialysis on Tues, Thurs. , Saturday mornings.  HIT [facility]heparin[facility]induced thrombocytopenia) ([facility] [address]     Hypertension     Hypokalemia 9/3/2016    2.7 ON ADMISSION    Tobacco abuse 9/3/2016    QUIT 7 DAYS AGO      Past Surgical History:   Procedure Laterality Date    ABDOMEN SURGERY PROC UNLISTED      Sept. 2016.  DEBRIDE NECROTIC SKIN/ TISSUE, ABD WALL  09/04/2016    I&D rectal abscess with drain placement - Dr. Leonarda Daniels      rectal abcess surgery that also got infected-  Subsequent I & D of the rectal abcess. Family History   Problem Relation Age of Onset    Stroke Mother       Social History   Substance Use Topics    Smoking status: Former Smoker     Packs/day: 2.00     Years: 40.00     Types: Cigarettes    Smokeless tobacco: Not on file      Comment: STATES HE QUIT 7 DAYS AGO    Alcohol use 42.0 - 63.0 oz/week     70 - 105 Cans of beer per week      Comment: 10-15 CANS DAILY     Prior to Admission medications    Medication Sig Start Date End Date Taking? Authorizing Provider   acetaminophen (TYLENOL) 325 mg tablet Take 2 Tabs by mouth two (2) times daily as needed. Use only if necessary.  Patient has history of alcoholic liver disease 67/59/15  Yes Serena Delacruz MD   epoetin miky (EPOGEN;PROCRIT) 10,000 unit/mL injection 1 mL by SubCUTAneous route every seven (7) days. Indications: RENAL DIALYSIS 10/23/16  Yes Michael Moy MD   pantoprazole (PROTONIX) 40 mg tablet Take 1 Tab by mouth Before breakfast and dinner. 10/23/16  Yes Michael Moy MD   metoprolol tartrate (LOPRESSOR) 25 mg tablet Take 1 Tab by mouth every twelve (12) hours. 4/2/17   Pilar Baltazar,    Cholecalciferol, Vitamin D3, (VITAMIN D3) 2,000 unit cap capsule Take 2,000 Units by mouth once. Jessy Loaiza MD   OXYGEN-AIR DELIVERY SYSTEMS Take 2 L/min by inhalation nightly. via NC    Historical Provider   magnesium oxide (MAG-OX) 400 mg tablet Take 400 mg by mouth daily. Historical Provider   ferrous sulfate 325 mg (65 mg iron) tablet Take 1 Tab by mouth two (2) times daily (with meals). 10/23/16   Michael Moy MD       Allergies   Allergen Reactions    Heparin Analogues Other (comments)     H. I.T positive        Review of Systems:  A comprehensive review of systems was negative except for: fatigue, heartburn, leg weakness     Objective:     Visit Vitals    /71    Pulse 63    Temp 97.7 °F (36.5 °C)    Resp 18    Ht 5' 6\" (1.676 m)    Wt 82.6 kg (182 lb 1.6 oz)    SpO2 97%    BMI 29.39 kg/m2        Physical Exam:    General:  Cooperative. No acute distress. Eyes:  Conjunctivae/corneas clear    Nose: Nares normal. Septum midline. Neck: Supple, symmetrical, trachea midline, no JVD   Lungs:   Clear to auscultation bilaterally, unlabored   Heart:  Regular rate and rhythm, no murmur    Abdomen:   Colostomy, spleen enlarged, soft   Extremities: Normal, atraumatic, no cyanosis or edema   Skin: Pale, No rash or lesions.    Neurologic: Nonfocal   Psych: Alert and oriented      Assessment:     Hospital Problems  Date Reviewed: 1/18/2017          Codes Class Noted POA    Aundrea-rectal abscess ICD-10-CM: K61.1  ICD-9-CM: 680  4/9/2017 Unknown              Signed By: Ibeth Barry MD     April 11, 2017

## 2017-04-11 NOTE — PROGRESS NOTES
Subjective:   Daily Progress Note: 4/11/2017 9:25 AM    No complaints. Feeling better.     Current Facility-Administered Medications   Medication Dose Route Frequency    polyvinyl alcohol (LIQUIFILM TEARS) 1.4 % ophthalmic solution 1 Drop  1 Drop Both Eyes PRN    vancomycin (VANCOCIN) 1250 mg in  ml infusion  1,250 mg IntraVENous See Admin Instructions    mupirocin (BACTROBAN) 2 % ointment   Topical BID    pantoprazole (PROTONIX) 40 mg in sodium chloride 0.9 % 10 mL injection  40 mg IntraVENous Q12H    sodium chloride (NS) flush 5-10 mL  5-10 mL IntraVENous PRN    0.9% sodium chloride infusion 250 mL  250 mL IntraVENous PRN    epoetin miky (EPOGEN;PROCRIT) injection 20,000 Units  20,000 Units SubCUTAneous Q7D    0.9% sodium chloride infusion 250 mL  250 mL IntraVENous PRN    albuterol (PROVENTIL VENTOLIN) nebulizer solution 2.5 mg  2.5 mg Nebulization Q6H PRN    ferrous sulfate tablet 325 mg  325 mg Oral BID WITH MEALS    cholecalciferol (VITAMIN D3) tablet 2,000 Units  2,000 Units Oral DAILY    magnesium oxide (MAG-OX) tablet 400 mg  400 mg Oral DAILY    sodium chloride (NS) flush 5-10 mL  5-10 mL IntraVENous Q8H    sodium chloride (NS) flush 5-10 mL  5-10 mL IntraVENous PRN    naloxone (NARCAN) injection 0.4 mg  0.4 mg IntraVENous PRN    diphenhydrAMINE (BENADRYL) injection 12.5 mg  12.5 mg IntraVENous Q4H PRN    LORazepam (ATIVAN) injection 1 mg  1 mg IntraVENous Q6H PRN    oxyCODONE-acetaminophen (PERCOCET) 5-325 mg per tablet 2 Tab  2 Tab Oral Q4H PRN    oxyCODONE-acetaminophen (PERCOCET) 5-325 mg per tablet 1 Tab  1 Tab Oral Q4H PRN    HYDROmorphone (PF) (DILAUDID) injection 0.5 mg  0.5 mg IntraVENous Q1H PRN    HYDROmorphone (PF) (DILAUDID) injection 1 mg  1 mg IntraVENous Q1H PRN    piperacillin-tazobactam (ZOSYN) 3.375 g in 0.9% sodium chloride (MBP/ADV) 100 mL  3.375 g IntraVENous Q12H               Objective:     Visit Vitals    /65    Pulse (!) 56    Temp 97.7 °F (36.5 °C)    Resp 18    Ht 5' 6\" (1.676 m)    Wt 82.6 kg (182 lb 1.6 oz)    SpO2 97%    BMI 29.39 kg/m2      O2 Device: Room air    Temp (24hrs), Av.7 °F (36.5 °C), Min:97.4 °F (36.3 °C), Max:97.9 °F (36.6 °C)          1901 -  0700  In: 965.4   Out: 1630 [Urine:780]      Visit Vitals    /65    Pulse (!) 56    Temp 97.7 °F (36.5 °C)    Resp 18    Ht 5' 6\" (1.676 m)    Wt 82.6 kg (182 lb 1.6 oz)    SpO2 97%    BMI 29.39 kg/m2     Head: Normocephalic, without obvious abnormality  Neck: no JVD  Lungs: clear to auscultation bilaterally  Heart: regular rate and rhythm  Abdomen: soft, non-tender. Extremities: no edema          Data Review    Recent Results (from the past 48 hour(s))   CULTURE, BLOOD    Collection Time: 17  5:52 PM   Result Value Ref Range    Special Requests: RIGHT HAND      Culture result: NO GROWTH AFTER 14 HOURS     EKG, 12 LEAD, INITIAL    Collection Time: 17  6:33 PM   Result Value Ref Range    Ventricular Rate 88 BPM    Atrial Rate 88 BPM    P-R Interval 184 ms    QRS Duration 150 ms    Q-T Interval 410 ms    QTC Calculation (Bezet) 496 ms    Calculated P Axis 41 degrees    Calculated R Axis -70 degrees    Calculated T Axis 59 degrees    Diagnosis       !! AGE AND GENDER SPECIFIC ECG ANALYSIS !! Normal sinus rhythm  Right bundle branch block  Left anterior fascicular block  !!! Bifascicular block !!!   Possible Lateral infarct , age undetermined  Abnormal ECG  When compared with ECG of 29-MAR-2017 10:29,  Borderline criteria for Lateral infarct are now Present  Confirmed by Miko Huggins ([id number]) on 4/10/2017 7:31:14 AM     PROCALCITONIN    Collection Time: 17  6:40 PM   Result Value Ref Range    Procalcitonin 4.5 ng/mL   CULTURE, BLOOD    Collection Time: 17  6:40 PM   Result Value Ref Range    Special Requests: RIGHT ANTECUBITAL      GRAM STAIN GRAM POSITIVE COCCI  AEROBIC BOTTLE        GRAM STAIN        RESULTS VERIFIED, PHONED TO AND READ BACK BY  NAGY Valencia 04/10/17 2243 LEANDER      Culture result: CULTURE IN PROGRESS,FURTHER UPDATES TO FOLLOW      Culture result:        SA target DNA sequence not detected within the sample. Test performed by PCR   METABOLIC PANEL, COMPREHENSIVE    Collection Time: 04/09/17  6:40 PM   Result Value Ref Range    Sodium 140 136 - 145 mmol/L    Potassium 2.9 (LL) 3.5 - 5.1 mmol/L    Chloride 107 98 - 107 mmol/L    CO2 23 21 - 32 mmol/L    Anion gap 10 7 - 16 mmol/L    Glucose 90 65 - 100 mg/dL    BUN 39 (H) 8 - 23 MG/DL    Creatinine 5.07 (H) 0.8 - 1.5 MG/DL    GFR est AA 15 (L) >60 ml/min/1.73m2    GFR est non-AA 12 (L) >60 ml/min/1.73m2    Calcium 7.3 (L) 8.3 - 10.4 MG/DL    Bilirubin, total 0.5 0.2 - 1.1 MG/DL    ALT (SGPT) 14 12 - 65 U/L    AST (SGOT) 41 (H) 15 - 37 U/L    Alk. phosphatase 70 50 - 136 U/L    Protein, total 6.7 6.3 - 8.2 g/dL    Albumin 1.7 (L) 3.2 - 4.6 g/dL    Globulin 5.0 (H) 2.3 - 3.5 g/dL    A-G Ratio 0.3 (L) 1.2 - 3.5     CBC WITH AUTOMATED DIFF    Collection Time: 04/09/17  6:40 PM   Result Value Ref Range    WBC 2.3 (L) 4.3 - 11.1 K/uL    RBC 1.85 (L) 4.23 - 5.67 M/uL    HGB 5.5 (LL) 13.6 - 17.2 g/dL    HCT 16.3 (LL) 41.1 - 50.3 %    MCV 88.1 79.6 - 97.8 FL    MCH 29.7 26.1 - 32.9 PG    MCHC 33.7 31.4 - 35.0 g/dL    RDW 18.3 (H) 11.9 - 14.6 %    PLATELET 636 (L) 057 - 450 K/uL    MPV 9.5 (L) 10.8 - 14.1 FL    DF AUTOMATED      NEUTROPHILS 71 43 - 78 %    LYMPHOCYTES 24 13 - 44 %    MONOCYTES 5 4.0 - 12.0 %    EOSINOPHILS 0 (L) 0.5 - 7.8 %    BASOPHILS 0 0.0 - 2.0 %    IMMATURE GRANULOCYTES 0.4 0.0 - 5.0 %    ABS. NEUTROPHILS 1.6 (L) 1.7 - 8.2 K/UL    ABS. LYMPHOCYTES 0.6 0.5 - 4.6 K/UL    ABS. MONOCYTES 0.1 0.1 - 1.3 K/UL    ABS. EOSINOPHILS 0.0 0.0 - 0.8 K/UL    ABS. BASOPHILS 0.0 0.0 - 0.2 K/UL    ABS. IMM.  GRANS. 0.0 0.0 - 0.5 K/UL   MAGNESIUM    Collection Time: 04/09/17  6:40 PM   Result Value Ref Range    Magnesium 1.5 (L) 1.8 - 2.4 mg/dL   POC TROPONIN-I    Collection Time: 04/09/17  6:55 PM   Result Value Ref Range    Troponin-I (POC) 0.03 0.0 - 0.08 ng/ml   POC LACTIC ACID    Collection Time: 04/09/17  6:59 PM   Result Value Ref Range    Lactic Acid (POC) 1.1 0.5 - 1.9 mmol/L   TYPE & SCREEN    Collection Time: 04/09/17  7:38 PM   Result Value Ref Range    Crossmatch Expiration 04/12/2017     ABO/Rh(D) O NEGATIVE     Antibody screen NEG     Unit number [id number]     Blood component type RC LR AS5     Unit division 00     Status of unit TRANSFUSED     Crossmatch result Compatible     Unit number [id number]     Blood component type RC LR AS5     Unit division 00     Status of unit TRANSFUSED     Crossmatch result Compatible    URINALYSIS W/ RFLX MICROSCOPIC    Collection Time: 04/09/17  8:24 PM   Result Value Ref Range    Color YELLOW      Appearance CLEAR      Specific gravity 1.021 1.001 - 1.023      pH (UA) 7.0 5.0 - 9.0      Protein 100 (A) NEG mg/dL    Glucose NEGATIVE  mg/dL    Ketone NEGATIVE  NEG mg/dL    Bilirubin SMALL (A) NEG      Blood NEGATIVE  NEG      Urobilinogen 1.0 0.2 - 1.0 EU/dL    Nitrites NEGATIVE  NEG      Leukocyte Esterase NEGATIVE  NEG      WBC 0-3 0 /hpf    RBC 0-3 0 /hpf    Epithelial cells 0-3 0 /hpf    Bacteria 0 0 /hpf    Casts 3-5 0 /lpf   MSSA/MRSA SC BY PCR, NASAL SWAB    Collection Time: 04/10/17  3:30 AM   Result Value Ref Range    Special Requests: NO SPECIAL REQUESTS      Culture result: (A)       MRSA target DNA detected, SA target DNA detected. A positive test result does not necessarily indicate the presence of viable organisms. It is however, presumptive for the presence of MRSA or SA.     Culture result:        RESULTS VERIFIED, PHONED TO AND READ BACK BY  KIMBERLY PALAFOX 04/10/17 8863 TH     METABOLIC PANEL, BASIC    Collection Time: 04/10/17  7:53 AM   Result Value Ref Range    Sodium 142 136 - 145 mmol/L    Potassium 3.3 (L) 3.5 - 5.1 mmol/L    Chloride 110 (H) 98 - 107 mmol/L    CO2 20 (L) 21 - 32 mmol/L    Anion gap 12 7 - 16 mmol/L Glucose 81 65 - 100 mg/dL    BUN 41 (H) 8 - 23 MG/DL    Creatinine 5.00 (H) 0.8 - 1.5 MG/DL    GFR est AA 15 (L) >60 ml/min/1.73m2    GFR est non-AA 12 (L) >60 ml/min/1.73m2    Calcium 7.4 (L) 8.3 - 10.4 MG/DL   CBC W/O DIFF    Collection Time: 04/10/17  7:53 AM   Result Value Ref Range    WBC 2.3 (L) 4.3 - 11.1 K/uL    RBC 2.43 (L) 4.23 - 5.67 M/uL    HGB 7.4 (L) 13.6 - 17.2 g/dL    HCT 21.2 (L) 41.1 - 50.3 %    MCV 87.2 79.6 - 97.8 FL    MCH 30.5 26.1 - 32.9 PG    MCHC 34.9 31.4 - 35.0 g/dL    RDW 17.8 (H) 11.9 - 14.6 %    PLATELET 85 (L) 490 - 450 K/uL    MPV 9.3 (L) 10.8 - 14.1 FL   FERRITIN    Collection Time: 04/10/17  7:53 AM   Result Value Ref Range    Ferritin 1087 (H) 8 - 388 NG/ML   IRON    Collection Time: 04/10/17  7:53 AM   Result Value Ref Range    Iron 75 35 - 150 ug/dL   TRANSFERRIN    Collection Time: 04/10/17  7:53 AM   Result Value Ref Range    Transferrin 58 (L) 202 - 364 mg/dL   OCCULT BLOOD, STOOL    Collection Time: 04/10/17 12:50 PM   Result Value Ref Range    Occult blood, stool NEGATIVE  NEG     HGB & HCT    Collection Time: 04/10/17  2:52 PM   Result Value Ref Range    HGB 7.6 (L) 13.6 - 17.2 g/dL    HCT 22.4 (L) 41.1 - 50.3 %   HGB & HCT    Collection Time: 04/10/17  8:38 PM   Result Value Ref Range    HGB 7.3 (L) 13.6 - 17.2 g/dL    HCT 21.5 (L) 41.1 - 50.3 %   CBC W/O DIFF    Collection Time: 04/11/17  5:51 AM   Result Value Ref Range    WBC 2.0 (LL) 4.3 - 11.1 K/uL    RBC 2.45 (L) 4.23 - 5.67 M/uL    HGB 7.3 (L) 13.6 - 17.2 g/dL    HCT 21.6 (L) 41.1 - 50.3 %    MCV 88.2 79.6 - 97.8 FL    MCH 29.8 26.1 - 32.9 PG    MCHC 33.8 31.4 - 35.0 g/dL    RDW 18.8 (H) 11.9 - 14.6 %    PLATELET 001 (L) 259 - 450 K/uL    MPV 9.6 (L) 10.8 - 14.1 FL       Assessment     Patient Active Problem List    Diagnosis Date Noted    Aundrea-rectal abscess 04/09/2017    Symptomatic anemia 03/29/2017    Elevated total protein 03/29/2017    Hyperbilirubinemia 03/29/2017    Dizziness 03/29/2017    ESRD (end stage renal disease) ([facility] 03/29/2017    [facility]in[facility]ocytopenia[facility] 10/11/2016    CKD (chronic kidney disease) requiring chronic dialysis ([facility] 10/11/2016    Nocturnal hypoxemia 10/04/2016    Suspected sleep apnea 10/02/2016    Acute blood loss anemia 09/29/2016    Paroxysmal atrial fibrillation (HCC) 09/17/2016    Pleural effusion on left 09/12/2016    Acute hypoactive delirium due to multiple etiologies 09/11/2016    Thrombocytopenia ([facility] 09/08/2016    HTN (hypertension) 09/04/2016    GERD (gastroesophageal reflux disease) 09/04/2016    Perirectal abscess 09/04/2016    Alcohol abuse, daily use 09/03/2016    VITO (acute kidney injury) ([facility] 09/03/2016    Pancytopenia ([facility] 09/03/2016           Problems Addressed by Nephrology     ESRD  Anemia    Plan     Hgb stable. On Procrit. Dialysis for clearance. Will ask Palliative Care to see given his overall constellation of issues. And general prognosis.

## 2017-04-12 ENCOUNTER — APPOINTMENT (OUTPATIENT)
Dept: CT IMAGING | Age: 67
DRG: 720 | End: 2017-04-12
Attending: HOSPITALIST
Payer: MEDICAID

## 2017-04-12 LAB
ANION GAP BLD CALC-SCNC: 8 MMOL/L (ref 7–16)
BUN SERPL-MCNC: 21 MG/DL (ref 8–23)
CALCIUM SERPL-MCNC: 7.7 MG/DL (ref 8.3–10.4)
CHLORIDE SERPL-SCNC: 108 MMOL/L (ref 98–107)
CO2 SERPL-SCNC: 26 MMOL/L (ref 21–32)
CREAT SERPL-MCNC: 3.56 MG/DL (ref 0.8–1.5)
ERYTHROCYTE [DISTWIDTH] IN BLOOD BY AUTOMATED COUNT: 18 % (ref 11.9–14.6)
GLUCOSE SERPL-MCNC: 82 MG/DL (ref 65–100)
HCT VFR BLD AUTO: 22.6 % (ref 41.1–50.3)
HGB BLD-MCNC: 7.5 G/DL (ref 13.6–17.2)
MCH RBC QN AUTO: 30.1 PG (ref 26.1–32.9)
MCHC RBC AUTO-ENTMCNC: 33.2 G/DL (ref 31.4–35)
MCV RBC AUTO: 90.8 FL (ref 79.6–97.8)
MM INDURATION POC: 0 MM (ref 0–5)
PLATELET # BLD AUTO: 111 K/UL (ref 150–450)
PMV BLD AUTO: 9.1 FL (ref 10.8–14.1)
POTASSIUM SERPL-SCNC: 3.4 MMOL/L (ref 3.5–5.1)
PPD POC: NORMAL NEGATIVE
RBC # BLD AUTO: 2.49 M/UL (ref 4.23–5.67)
SODIUM SERPL-SCNC: 142 MMOL/L (ref 136–145)
WBC # BLD AUTO: 1.6 K/UL (ref 4.3–11.1)

## 2017-04-12 PROCEDURE — 74011000258 HC RX REV CODE- 258: Performed by: INTERNAL MEDICINE

## 2017-04-12 PROCEDURE — 97162 PT EVAL MOD COMPLEX 30 MIN: CPT

## 2017-04-12 PROCEDURE — 74011250636 HC RX REV CODE- 250/636: Performed by: INTERNAL MEDICINE

## 2017-04-12 PROCEDURE — 74011250636 HC RX REV CODE- 250/636: Performed by: HOSPITALIST

## 2017-04-12 PROCEDURE — 80048 BASIC METABOLIC PNL TOTAL CA: CPT | Performed by: INTERNAL MEDICINE

## 2017-04-12 PROCEDURE — 74011250636 HC RX REV CODE- 250/636: Performed by: SURGERY

## 2017-04-12 PROCEDURE — C9113 INJ PANTOPRAZOLE SODIUM, VIA: HCPCS | Performed by: INTERNAL MEDICINE

## 2017-04-12 PROCEDURE — 97166 OT EVAL MOD COMPLEX 45 MIN: CPT

## 2017-04-12 PROCEDURE — 74011000250 HC RX REV CODE- 250: Performed by: INTERNAL MEDICINE

## 2017-04-12 PROCEDURE — 36415 COLL VENOUS BLD VENIPUNCTURE: CPT | Performed by: INTERNAL MEDICINE

## 2017-04-12 PROCEDURE — 74011250636 HC RX REV CODE- 250/636: Performed by: NURSE PRACTITIONER

## 2017-04-12 PROCEDURE — 70450 CT HEAD/BRAIN W/O DYE: CPT

## 2017-04-12 PROCEDURE — 74011250637 HC RX REV CODE- 250/637: Performed by: SURGERY

## 2017-04-12 PROCEDURE — 65270000029 HC RM PRIVATE

## 2017-04-12 PROCEDURE — 74011000258 HC RX REV CODE- 258: Performed by: SURGERY

## 2017-04-12 PROCEDURE — 72125 CT NECK SPINE W/O DYE: CPT

## 2017-04-12 PROCEDURE — 85027 COMPLETE CBC AUTOMATED: CPT | Performed by: INTERNAL MEDICINE

## 2017-04-12 PROCEDURE — 74011250637 HC RX REV CODE- 250/637: Performed by: HOSPITALIST

## 2017-04-12 RX ORDER — METOPROLOL TARTRATE 25 MG/1
12.5 TABLET, FILM COATED ORAL EVERY 12 HOURS
Status: DISCONTINUED | OUTPATIENT
Start: 2017-04-12 | End: 2017-04-18 | Stop reason: HOSPADM

## 2017-04-12 RX ORDER — MAGNESIUM SULFATE HEPTAHYDRATE 40 MG/ML
2 INJECTION, SOLUTION INTRAVENOUS ONCE
Status: COMPLETED | OUTPATIENT
Start: 2017-04-12 | End: 2017-04-13

## 2017-04-12 RX ORDER — POTASSIUM CHLORIDE 14.9 MG/ML
20 INJECTION INTRAVENOUS
Status: COMPLETED | OUTPATIENT
Start: 2017-04-13 | End: 2017-04-13

## 2017-04-12 RX ORDER — POTASSIUM CHLORIDE 14.9 MG/ML
20 INJECTION INTRAVENOUS
Status: DISPENSED | OUTPATIENT
Start: 2017-04-12 | End: 2017-04-12

## 2017-04-12 RX ADMIN — VITAMIN D, TAB 1000IU (100/BT) 2000 UNITS: 25 TAB at 09:29

## 2017-04-12 RX ADMIN — HYDROMORPHONE HYDROCHLORIDE 1 MG: 1 INJECTION, SOLUTION INTRAMUSCULAR; INTRAVENOUS; SUBCUTANEOUS at 11:02

## 2017-04-12 RX ADMIN — MUPIROCIN: 20 OINTMENT TOPICAL at 17:43

## 2017-04-12 RX ADMIN — FERROUS SULFATE TAB 325 MG (65 MG ELEMENTAL FE) 325 MG: 325 (65 FE) TAB at 17:43

## 2017-04-12 RX ADMIN — MAGNESIUM SULFATE HEPTAHYDRATE 2 G: 40 INJECTION, SOLUTION INTRAVENOUS at 21:57

## 2017-04-12 RX ADMIN — OXYCODONE HYDROCHLORIDE AND ACETAMINOPHEN 2 TABLET: 5; 325 TABLET ORAL at 09:33

## 2017-04-12 RX ADMIN — POTASSIUM CHLORIDE 20 MEQ: 14.9 INJECTION, SOLUTION INTRAVENOUS at 23:47

## 2017-04-12 RX ADMIN — Medication 5 ML: at 05:31

## 2017-04-12 RX ADMIN — FERROUS SULFATE TAB 325 MG (65 MG ELEMENTAL FE) 325 MG: 325 (65 FE) TAB at 08:46

## 2017-04-12 RX ADMIN — SODIUM CHLORIDE 40 MG: 9 INJECTION INTRAMUSCULAR; INTRAVENOUS; SUBCUTANEOUS at 22:01

## 2017-04-12 RX ADMIN — PIPERACILLIN SODIUM,TAZOBACTAM SODIUM 3.38 G: 3; .375 INJECTION, POWDER, FOR SOLUTION INTRAVENOUS at 05:31

## 2017-04-12 RX ADMIN — TBO-FILGRASTIM 300 MCG: 300 INJECTION, SOLUTION SUBCUTANEOUS at 17:41

## 2017-04-12 RX ADMIN — OXYCODONE HYDROCHLORIDE AND ACETAMINOPHEN 2 TABLET: 5; 325 TABLET ORAL at 14:15

## 2017-04-12 RX ADMIN — MUPIROCIN: 20 OINTMENT TOPICAL at 11:02

## 2017-04-12 RX ADMIN — SODIUM CHLORIDE 40 MG: 9 INJECTION INTRAMUSCULAR; INTRAVENOUS; SUBCUTANEOUS at 09:30

## 2017-04-12 RX ADMIN — Medication 400 MG: at 09:26

## 2017-04-12 RX ADMIN — PIPERACILLIN SODIUM,TAZOBACTAM SODIUM 3.38 G: 3; .375 INJECTION, POWDER, FOR SOLUTION INTRAVENOUS at 17:42

## 2017-04-12 RX ADMIN — METOPROLOL TARTRATE 12.5 MG: 25 TABLET, FILM COATED ORAL at 09:26

## 2017-04-12 RX ADMIN — OXYCODONE HYDROCHLORIDE AND ACETAMINOPHEN 2 TABLET: 5; 325 TABLET ORAL at 18:16

## 2017-04-12 NOTE — PROGRESS NOTES
[facility]    Follow-Up on:4/12/17    HPI: not doing well overall plethora of problems; lacks understanding of severity of illness    ROS:  Denies CP, SOB.     Current Facility-Administered Medications   Medication Dose Route Frequency    piperacillin-tazobactam (ZOSYN) 3.375 g in 0.9% sodium chloride (MBP/ADV) 100 mL  3.375 g IntraVENous Q12H    metoprolol tartrate (LOPRESSOR) tablet 12.5 mg  12.5 mg Oral Q12H    tbo-filgrastim (GRANIX) injection 300 mcg  300 mcg SubCUTAneous DAILY    midazolam (VERSED) injection 0.5-2 mg  0.5-2 mg IntraVENous Multiple    fentaNYL citrate (PF) injection 12.5-50 mcg  12.5-50 mcg IntraVENous Multiple    midazolam (VERSED) injection 0.5-2 mg  0.5-2 mg IntraVENous Multiple    fentaNYL citrate (PF) injection  mcg   mcg IntraVENous Multiple    polyvinyl alcohol (LIQUIFILM TEARS) 1.4 % ophthalmic solution 1 Drop  1 Drop Both Eyes PRN    vancomycin (VANCOCIN) 1250 mg in  ml infusion  1,250 mg IntraVENous See Admin Instructions    mupirocin (BACTROBAN) 2 % ointment   Topical BID    pantoprazole (PROTONIX) 40 mg in sodium chloride 0.9 % 10 mL injection  40 mg IntraVENous Q12H    sodium chloride (NS) flush 5-10 mL  5-10 mL IntraVENous PRN    epoetin miky (EPOGEN;PROCRIT) injection 20,000 Units  20,000 Units SubCUTAneous Q7D    0.9% sodium chloride infusion 250 mL  250 mL IntraVENous PRN    albuterol (PROVENTIL VENTOLIN) nebulizer solution 2.5 mg  2.5 mg Nebulization Q6H PRN    ferrous sulfate tablet 325 mg  325 mg Oral BID WITH MEALS    cholecalciferol (VITAMIN D3) tablet 2,000 Units  2,000 Units Oral DAILY    magnesium oxide (MAG-OX) tablet 400 mg  400 mg Oral DAILY    sodium chloride (NS) flush 5-10 mL  5-10 mL IntraVENous Q8H    naloxone (NARCAN) injection 0.4 mg  0.4 mg IntraVENous PRN    diphenhydrAMINE (BENADRYL) injection 12.5 mg  12.5 mg IntraVENous Q4H PRN    LORazepam (ATIVAN) injection 1 mg  1 mg IntraVENous Q6H PRN    oxyCODONE-acetaminophen (PERCOCET) 5-325 mg per tablet 2 Tab  2 Tab Oral Q4H PRN    oxyCODONE-acetaminophen (PERCOCET) 5-325 mg per tablet 1 Tab  1 Tab Oral Q4H PRN    HYDROmorphone (PF) (DILAUDID) injection 0.5 mg  0.5 mg IntraVENous Q1H PRN    HYDROmorphone (PF) (DILAUDID) injection 1 mg  1 mg IntraVENous Q1H PRN       Exam:  Vitals:    04/12/17 0549 04/12/17 0808 04/12/17 0926 04/12/17 1416   BP: 118/69 118/61 115/60 100/54   Pulse: 71 79 73 (!) 59   Resp: 19   16   Temp: 98 °F (36.7 °C) 97.6 °F (36.4 °C)  97.9 °F (36.6 °C)   SpO2: 98% 98%  98%   Weight:       Height:             Intake/Output Summary (Last 24 hours) at 04/12/17 1419  Last data filed at 04/12/17 1415   Gross per 24 hour   Intake                0 ml   Output              725 ml   Net             -725 ml     PE:  GEN - in no distress  CV - regular, no murmur, no rub  Lung - clear bilaterally  Abd - soft, nontender  Ext - no edema    Labs  Recent Labs      04/12/17   0639  04/11/17   0551  04/10/17   2038   04/10/17   0753   WBC  1.6*  2.0*   --    --   2.3*   HGB  7.5*  7.3*  7.3*   < >  7.4*   HCT  22.6*  21.6*  21.5*   < >  21.2*   PLT  111*  108*   --    --   85*    < > = values in this interval not displayed. Recent Labs      04/12/17   0639  04/10/17   0753  04/09/17   1840   NA  142  142  140   K  3.4*  3.3*  2.9*   CL  108*  110*  107   CO2  26  20*  23   BUN  21  41*  39*   CREA  3.56*  5.00*  5.07*   GLU  82  81  90   CA  7.7*  7.4*  7.3*   MG   --    --   1.5*     No results for input(s): PH, PCO2, PO2, PCO2 in the last 72 hours.     Problem List:  Patient Active Problem List    Diagnosis Date Noted    Aundrea-rectal abscess 04/09/2017    Symptomatic anemia 03/29/2017    Elevated total protein 03/29/2017    Hyperbilirubinemia 03/29/2017    Dizziness 03/29/2017    ESRD (end stage renal disease) ([facility] 03/29/2017    HIT (heparin-induced thrombocytopenia) ([facility] 10/11/2016    CKD (chronic kidney disease) requiring chronic dialysis ([facility] 10/11/2016    Nocturnal hypoxemia 10/04/2016    Suspected sleep apnea 10/02/2016    Acute blood loss anemia 09/29/2016    Paroxysmal atrial fibrillation (HCC) 09/17/2016    Pleural effusion on left 09/12/2016    Acute hypoactive delirium due to multiple etiologies 09/11/2016    Thrombocytopenia ([facility] 09/08/2016    HTN (hypertension) 09/04/2016    GERD (gastroesophageal reflux disease) 09/04/2016    Perirectal abscess 09/04/2016    Alcohol abuse, daily use 09/03/2016    VITO (acute kidney injury) ([facility] 09/03/2016    Pancytopenia ([facility] 09/03/2016       Issues Addressed By Nephrology:    Plan:  1. Stage IV decubitus  2. Colostomy  3. Malnutrition  4. High transit GI time  5. ESRD HD TTS  6.  Perirectal abscess s/p drain

## 2017-04-12 NOTE — PROGRESS NOTES
Problem: Self Care Deficits Care Plan (Adult)  Goal: *Acute Goals and Plan of Care (Insert Text)  1. Patient will feed self entire meal with setup. 2. Patient will complete grooming with setup. 3. Patient will roll in supine with moderate assistance to aid with supine ADLs. 4. Patient will participate in BUE therapeutic exercises to increase strength in BUE to at least 3+/5 to aid in ADLs. 5. Patient will tolerate 20 minutes of OT treatment with as needed rest breaks to increase activity tolerance for ADLs. Timeframe: 7 visits       OCCUPATIONAL THERAPY: Initial Assessment 4/12/2017  INPATIENT: Hospital Day: 4  Payor: EB[employer] Stephen Poag / Plan[place] [employer] Stephen Poag / Product Type: Managed Care Medicaid /      NAME/AGE/GENDER: Kandi Foreman is a 61 y.o. male         PRIMARY DIAGNOSIS:  Aundrea-rectal abscess Aundrea-rectal abscess Aundrea-rectal abscess        ICD-10: Treatment Diagnosis:        · Localized edema (R60.1)   Precautions/Allergies:         Heparin analogues       ASSESSMENT:      Mr. Luis Garza is a 61year old male admitted with open and draining perirectal abscess and sepsis. Patient has history of colostomy bag and ESRD. Patient lives with his brother and reports that he is primarily wheelchair bound, although he has been unable to get out of bed for the past week. At baseline, he needs assistance with bathing, dressing, meal prep. He can usually wash his face, feed himself, and manage his own colostomy bag, per his report. No family present to confirm prior level of function and patient appears somewhat confused. Patient supine in bed upon arrival with multiple complaints. Perseverates on BUE weakness. BUE limited with ROM, strength to 2+/5 proximally and 3/5 distally. Patient is normally R hand dominant. Required maximal assistance for rolling and bed mobility today.  He is currently requiring maximal to total assistance for all ADLs due to BUE weakness and impaired mobility. Will plan to follow for acute OT to increase independence and safety. This section established at most recent assessment   PROBLEM LIST (Impairments causing functional limitations):  1. Decreased Strength  2. Decreased ADL/Functional Activities  3. Decreased Transfer Abilities  4. Decreased Ambulation Ability/Technique  5. Decreased Balance  6. Increased Pain  7. Decreased Activity Tolerance  8. Increased Fatigue  9. Decreased Skin Integrity/Hygeine    INTERVENTIONS PLANNED: (Benefits and precautions of occupational therapy have been discussed with the patient.)  1. Activities of daily living training  2. Adaptive equipment training  3. Theraputic activity  4. Theraputic exercise      TREATMENT PLAN: Frequency/Duration: Follow patient 3x/ week  to address above goals. Rehabilitation Potential For Stated Goals: FAIR       RECOMMENDED REHABILITATION/EQUIPMENT: (at time of discharge pending progress): Continue Skilled Therapy. OCCUPATIONAL PROFILE AND HISTORY:   History of Present Injury/Illness (Reason for Referral):  Per H&P, \"Patient is relatively immobile and has a wound on his buttock extensor draining about 3 days ago. It is very foul smelling. He has a history of perirectal abscess and a colostomy bag. He reports abdominal pain for 3 or 4 days. He's been going to dialysis Monday Wednesday and Friday. \"  Past Medical History/Comorbidities:   Mr. Rachel Marin  has a past medical history of Alcohol abuse, daily use (09/03/2016); Anemia (9/3/2016); Chronic kidney disease; HIT (heparin-induced thrombocytopenia) ([facility] [address] Hypertension; Hypokalemia (9/3/2016); and Tobacco abuse (9/3/2016).   Mr. Rachel Marin  has a past surgical history that includes debride necrotic skin/ tissue, abd wall (09/04/2016); abdomen surgery proc unlisted; and other surgical.  Social History/Living Environment:   Home Environment: Private residence  # Steps to Enter: 0  One/Two Story Residence: One story  Living Alone: No  Support Systems: Family member(s) (Brother)  Patient Expects to be Discharged to[de-identified] Unknown  Current DME Used/Available at Home: Wheelchair  Prior Level of Function/Work/Activity:  Patient lives with brother. Unable to bathe himself. Manages his own urinal and colostomy bag. Primarily wheelchair bound but has not been out of bed in the past week. Needs assistance with meals. R hand dominant. History provided by patient and no family present to confirm. Number of Personal Factors/Comorbidities that affect the Plan of Care: Expanded review of therapy/medical records (1-2):  MODERATE COMPLEXITY   ASSESSMENT OF OCCUPATIONAL PERFORMANCE[de-identified]   Activities of Daily Living:           Basic ADLs (From Assessment) Complex ADLs (From Assessment)   Basic ADL  Feeding: Maximum assistance  Oral Facial Hygiene/Grooming: Maximum assistance  Bathing: Total assistance  Upper Body Dressing: Maximum assistance  Lower Body Dressing: Total assistance  Toileting: Total assistance Instrumental ADL  Meal Preparation: Total assistance  Homemaking: Total assistance   Grooming/Bathing/Dressing Activities of Daily Living     Cognitive Retraining  Safety/Judgement: Fall prevention;Decreased insight into deficits                       Bed/Mat Mobility  Rolling: Maximum assistance  Supine to Sit: Maximum assistance  Sit to Supine: Maximum assistance  Sit to Stand:  (unable )  Scooting: Maximum assistance          Most Recent Physical Functioning:   Gross Assessment:  AROM: Grossly decreased, non-functional (BUE)  Strength: Grossly decreased, non-functional (BUE 2+/5 proximally, 3/5 distally )               Posture:  Posture (WDL): Exceptions to WDL  Posture Assessment:  Forward head, Rounded shoulders  Balance:  Sitting: Impaired  Sitting - Static: Prop sitting  Sitting - Dynamic: Poor (constant support)  Standing:  (unable to attempt) Bed Mobility:  Rolling: Maximum assistance  Supine to Sit: Maximum assistance  Sit to Supine: Maximum assistance  Scooting: Maximum assistance  Wheelchair Mobility:     Transfers:  Sit to Stand:  (unable )                    Patient Vitals for the past 6 hrs:       BP BP Patient Position SpO2 Pulse   17 1416 100/54 At rest 98 % (!) 59        Mental Status  Neurologic State: Alert, Confused  Orientation Level: Oriented to person, Oriented to place  Cognition: Decreased command following  Perception: Appears intact  Perseveration: No perseveration noted  Safety/Judgement: Fall prevention, Decreased insight into deficits                               Physical Skills Involved:  1. Range of Motion  2. Balance  3. Mobility  4. Strength  5. Endurance  6. Fine or Gross Motor Coordination Cognitive Skills Affected (resulting in the inability to perform in a timely and safe manner):  1. Attending  2. Understanding  3. Remembering Psychosocial Skills Affected:  1. Habits  2. Routines and Behaviors  3. Environmental Adaptations   Number of elements that affect the Plan of Care: 5+:  HIGH COMPLEXITY   CLINICAL DECISION MAKIN [facility] Mobility Inpatient Short Form  How much help from another person does the patient currently need. .. Total A Lot A Little None   1. Putting on and taking off regular lower body clothing? [X] 1   [ ] 2   [ ] 3   [ ] 4   2. Bathing (including washing, rinsing, drying)? [X] 1   [ ] 2   [ ] 3   [ ] 4   3. Toileting, which includes using toilet, bedpan or urinal?   [X] 1   [ ] 2   [ ] 3   [ ] 4   4. Putting on and taking off regular upper body clothing?   [ ] 1   [X] 2   [ ] 3   [ ] 4   5. Taking care of personal grooming such as brushing teeth? [ ] 1   [X] 2   [ ] 3   [ ] 4   6. Eating meals? [ ] 1   [X] 2   [ ] 3   [ ] 4   © , Trustees of [address] [place] [address] [address] [address], under license to [employer].  All rights reserved    Score:  Initial: 9 Most Recent: X (Date: -- )     Interpretation of Tool:  Represents activities that are increasingly more difficult (i.e. Bed mobility, Transfers, Gait). Score 24 23 22-20 19-15 14-10 9-7 6       Modifier CH CI CJ CK CL CM CN         · Self Care:               - CURRENT STATUS:    CM - 80%-99% impaired, limited or restricted               - GOAL STATUS:           CL - 60%-79% impaired, limited or restricted               - D/C STATUS:                       ---------------To be determined---------------  Payor: [employer] MEDICAID / Plan: [place] [employer] [place]ICA[place] / Product Type: Managed Care Medicaid /       Medical Necessity:     · Patient demonstrates fair rehab potential due to higher previous functional level. Reason for Services/Other Comments:  · Patient continues to require present interventions due to patient's inability to care for self without assistance from others. Use of outcome tool(s) and clinical judgement create a POC that gives a: HIGH COMPLEXITY             TREATMENT:   (In addition to Assessment/Re-Assessment sessions the following treatments were rendered)      Pre-treatment Symptoms/Complaints:  Confused   Pain: Initial:   Pain Intensity 1: 5  Pain Location 1: Buttocks  Post Session:  Same       Assessment/Reassessment only, no treatment provided today     Braces/Orthotics/Lines/Etc:   · IV  · drain    · colostomy  · O2 Device: Room air  Treatment/Session Assessment:    · Response to Treatment:  Tolerated well  · Interdisciplinary Collaboration:  · Occupational Therapist  · Registered Nurse  · After treatment position/precautions:  · Supine in bed  · Bed/Chair-wheels locked  · Bed in low position  · Call light within reach  · RN notified  · Compliance with Program/Exercises: Will assess as treatment progresses. · Recommendations/Intent for next treatment session: \"Next visit will focus on advancements to more challenging activities and reduction in assistance provided\".   Total Treatment Duration:  OT Patient Time In/Time Out  Time In: 1013  Time Out: [address] [place] [address], OTR/L

## 2017-04-12 NOTE — ADT AUTH CERT NOTES
Wound and Skin Management GRG - Care Day 3 (4/11/2017) by Maureen Patel RN        Review Status Review Entered       Completed 4/12/2017       Details              Care Day: 3 Care Date: 4/11/2017 Level of Care: Inpatient Floor       Guideline Day 3        Level Of Care       ( ) * Activity level acceptable       ( ) * Complete discharge planning              Clinical Status       (X) * Temperature status acceptable       ( ) * No infection, or status acceptable       (X) * Pain and nausea absent or adequately managed       ( ) * Wound and ulcer problems absent or status acceptable       (X) * No burn injury, or status acceptable       (X) * Traumatic injury absent or status appropriate       (X) * Skin disease absent or status appropriate       ( ) * General Discharge Criteria met              Interventions       (X) * Intake acceptable       ( ) * No inpatient interventions needed                                   * Milestone              Additional Notes       CONT STAY REVIEW FOR 4/11/17       Nephrology Progress Notes       Assessment                      Patient Active Problem List         Diagnosis Date Noted       o Aundrea-rectal abscess 04/09/2017       o Symptomatic anemia 03/29/2017       o Elevated total protein 03/29/2017       o Hyperbilirubinemia 03/29/2017       o Dizziness 03/29/2017       o ESRD (end stage renal disease) ([address] 03/29/2017       o HIT (heparin-induced thrombocytopenia) (HCC) 10/11/2016       o CKD (chronic kidney disease) requiring chronic dialysis ([address] 10/11/2016       o Nocturnal hypoxemia 10/04/2016       o Suspected sleep apnea 10/02/2016       o Acute blood loss anemia 09/29/2016       o Paroxysmal atrial fibrillation (HCC) 09/17/2016       o Pleural effusion on left 09/12/2016       o Acute hypoactive delirium due to multiple etiologies 09/11/2016       o Thrombocytopenia ([address] 09/08/2016       o HTN (hypertension) 09/04/2016       o GERD (gastroesophageal reflux disease) 09/04/2016       o Perirectal abscess 09/04/2016       o Alcohol abuse, daily use 09/03/2016       o VITO (acute kidney injury) ([facility] [address] 09/03/2016       o Pancytopenia ([facility] [address] 09/03/2016               Problems Addressed by Nephrology               ESRD       Anemia               Plan               Hgb stable. On Procrit. Dialysis for clearance. Will ask Palliative Care to see given his overall constellation of issues. And general prognosis.        MRSA POS-CONTACT ISOL, PROTONIX IV, VANCO IV, ZOSYN IV           Wound and Skin Management GRG - Care Day 2 (4/10/2017) by Finn New RN        Review Status Review Entered       Completed 4/12/2017       Details              Care Day: 2 Care Date: 4/10/2017 Level of Care: Inpatient Floor       Guideline Day 2        Level Of Care       (X) Floor              Clinical Status       (X) * No ICU or intermediate care needs              Interventions       (X) Inpatient interventions continue                                   * Milestone              Additional Notes       CONT STAY REVIEW FOR 4/10/17       General Surgery Progress Notes       PLAN:Consult IR for drainage of abscess       Cont Vanco/Zosyn       Appreciate Hospitalist and Nephrology       Active Problems:       Aundrea-rectal abscess (4/9/2017)        SUBJECTIVE:Patient well known to me from previous admit. Patient with history of perirectal abscess with sepsis leading to ESRD and multiple surgeries including a diverting colostomy. Patient has been follow as an OP doing well on HD. Perineal and rectal wound has completely healed but he preents to the ED last night with increasing pain and new abscess. Over the past few weeks he admits to decreasing appetite and 30 lb wt loss. CT showed new left sided perirectal abscess. There is drainage spontaneously in his bed. Foul odor noted. No cellulitis or skin redness. There is tenderness to palpation and a fullness consistent with abscess, but no typical fluctuance.  IR has been consulted for drainage. Blood cultures pending. His Hgb was 5 on admit and he has been transfused 2 Units PRBC. Nephrology has held HD due to dehydration.       Perirectal scar tissue noted. Mostly well healed. Left fullness palpated with drainage. Tender to palpation. Small 2cm sacral ulcer noted. Foul oder and purulent drainage noted. Dressing changed.       WOUND CARE- Patient seen for wound to left buttock low in gluteal fold. 2.1x0.6x2.5cm with some yellow thick and serosanguinous drainage. Packed with alginate and placed Duo Derm. Small area remains open around anus but no dressing placed. Noted small suture in place. Surgeon following, wound team will assist as needed.  Colostomy supplies given.       HGB: 7.3 (L)       HCT: 21.5 (L)       Potassium: 3.3 (L)       Chloride: 110 (H)       CO2: 20 (L)       BUN: 41 (H)       Creatinine: 5.00 (H)       Calcium: 7.4 (L)       GFR est non-AA: 12 (L)       GFR est AA: 15 (L)       Transferrin: 58 (L)       Ferritin: 1087 (H)        HR 59, BP 87/50, MRSA POS-CONTACT ISOL, PROTONIX IV, ALBUMIN IV, NS BOLUS,       ZOSYN IV Q12, KCL IVPB

## 2017-04-12 NOTE — PROGRESS NOTES
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 He underwent extensive debridement of the perineal area and eventually underwent a diverting colostomy at that time, unfortunately, he went into renal failure and never recovered and is on scheduled HD now ( ), he also has a history of pancytopenia + splenomegaly, he is regularly followed by hematology and was in the hospital 1 week ago , a BM biopsy was done at that time and reported no major findings. Admitted  to the surgical service with sepsis due to a perirectal abscess draining into the gluteal area, he was also anemic with a Hb of 5.5. He has received 2 U of pRBCs . Lova Mean His BP has been borderline low. The hospitalist group was consulted to help with the medical management of Mr. Leona Santiago. 4-12- s/p I&D (yesterday), no other overnight events. Complaining of pain after completing PT this AM.   Also complains of upper extremity weakness.     Review of Systems negative with exception of pertinent positives noted above  PHYSICAL EXAM     Visit Vitals    /69    Pulse 71    Temp 98 °F (36.7 °C)    Resp 19    Ht 5' 6\" (1.676 m)    Wt 82.6 kg (182 lb 1.6 oz)    SpO2 98%    BMI 29.39 kg/m2      Temp (24hrs), Av.8 °F (36.6 °C), Min:97.3 °F (36.3 °C), Max:98 °F (36.7 °C)    Oxygen Therapy  O2 Sat (%): 98 % (17 0549)  Pulse via Oximetry: 66 beats per minute (17 1600)  O2 Device: Room air (17 1615)  O2 Flow Rate (L/min): 2 l/min (17 1520)  ETCO2 (mmHg): 35 mmHg (17 1520)    Intake/Output Summary (Last 24 hours) at 17 0761  Last data filed at 17 0248   Gross per 24 hour Intake                0 ml   Output             2310 ml   Net            -2310 ml      General: Looking in mild distress due to pain.    Lungs:  CTA Bilaterally. Heart:  Regular rate and rhythm,  No murmur, rub, or gallop  Abdomen: Soft, Non distended, Non tender, Positive bowel sounds, colostomy bag in place with green stool  Extremities: No cyanosis, clubbing or edema  Neurologic:  Cranial n 2-12 grossly intact, cerebellar functions intact,     ASSESSMENT      Active Hospital Problems    Diagnosis Date Noted    Aundrea-rectal abscess 04/09/2017     Plan: .            Bilateral Upper extremity weakness: could be due to pain, no other neurological deficits. Will get CT head without contrast.  · Sepsis - r/t perirectal abscess s/p I&D on 4/11, continue with iv vanc/zosyn. Blood cx 1/2 GPC. Mgmt per sx team.   · Pancytopenia - s/p 2 units prbc on admit. Fecal occult neg. On EPO. Appreciate hematology recommendation. · Hx of pafib- BP marginally improved - low dose beta blocker can be started. · Hx of HIT - no heparin products  · Debility - pt/ot/ppd. Will likely need Short term rehab.     DVT Prophylaxis: scds    Signed By: Portia Guevara MD     April 12, 2017

## 2017-04-12 NOTE — PROGRESS NOTES
PLAN:  Drain management -- per IR direction  Cont Abx -- Zosyn/Vanc  Nephrology following for HD needs  Hospitalist following for medical management  CM following for dispo options  Cont PT/OT  VTE prophy      ASSESSMENT:  Admit Date: 4/9/2017   * No surgery found *  * No surgery found *    Principal Problem:    Aundrea-rectal abscess (4/9/2017)         SUBJECTIVE:  Resting in bed. Successful drain placement yesterday by IR. Denies fevers/chills/sweats. No pain at present. Reports feeling weak today - but wants to see PT. AF, VSS. OBJECTIVE:  Constitutional: Alert oriented cooperative patient in no acute distress; appears stated age   Visit Vitals    /61 (BP 1 Location: Right arm, BP Patient Position: At rest)    Pulse 79    Temp 97.6 °F (36.4 °C)    Resp 19    Ht 5' 6\" (1.676 m)    Wt 82.6 kg (182 lb 1.6 oz)    SpO2 98%    BMI 29.39 kg/m2     Eyes: Sclera are clear. EOMs intact  ENMT: No external lesions, gross hearing normal; no obvious neck masses, no ear or lip lesions  CV: RRR, S1S2. Normal perfusion, pulses palpable, no edema  Resp: No JVD. Breathing is non-labored; no audible wheezing. BBS clear, on RA    GI: Soft and non-distended, non-tender, +BS, stool in ostomy pouch    Musculoskeletal: Unremarkable with normal function - general weakness. No embolic signs or cyanosis.    Neuro: Alert, Oriented; moves all 4; no focal deficits  Psychiatric: Normal affect and mood, no memory impairment  Skin: Drain to L buttock -- c/d/i, scant drainage in bulb (10cc output), no surrounding cellulitis or erythema      Patient Vitals for the past 24 hrs:   BP Temp Pulse Resp SpO2   04/12/17 0808 118/61 97.6 °F (36.4 °C) 79 - 98 %   04/12/17 0549 118/69 98 °F (36.7 °C) 71 19 98 %   04/11/17 2254 119/59 98 °F (36.7 °C) 74 19 95 %   04/11/17 1940 109/55 98 °F (36.7 °C) 73 19 95 %   04/11/17 1715 137/45 - 69 - 100 %   04/11/17 1700 135/48 - 76 - 100 %   04/11/17 1645 130/51 - 76 - 99 %   04/11/17 1630 118/58 - 69 - 98 %   04/11/17 1615 111/59 97.5 °F (36.4 °C) 69 19 98 %   04/11/17 1605 130/65 - 64 18 100 %   04/11/17 1600 125/62 - 65 18 100 %   04/11/17 1556 131/60 - 65 16 100 %   04/11/17 1551 122/66 - 70 18 100 %   04/11/17 1546 118/65 - 71 15 100 %   04/11/17 1542 121/62 - 69 15 100 %   04/11/17 1520 120/61 - 65 11 100 %   04/11/17 1515 123/63 - 67 16 100 %   04/11/17 1510 122/61 - 66 15 100 %   04/11/17 1505 112/56 - 64 16 100 %   04/11/17 1500 134/63 - 63 18 100 %   04/11/17 1455 114/58 - 61 11 100 %   04/11/17 1450 114/58 - 64 30 100 %   04/11/17 1442 126/60 - 66 24 100 %   04/11/17 1127 127/64 97.3 °F (36.3 °C) 64 16 100 %   04/11/17 1049 105/71 - 63 - -   04/11/17 1028 124/69 - (!) 59 - -   04/11/17 1002 117/66 - 61 - -   04/11/17 0927 116/70 - (!) 57 - -       Labs:    Recent Labs      04/12/17   0639   04/09/17   1840   WBC  1.6*   < >  2.3*   HGB  7.5*   < >  5.5*   PLT  111*   < >  109*   NA  142   < >  140   K  3.4*   < >  2.9*   CL  108*   < >  107   CO2  26   < >  23   BUN  21   < >  39*   CREA  3.56*   < >  5.07*   GLU  82   < >  90   TBILI   --    --   0.5   SGOT   --    --   41*   ALT   --    --   14   AP   --    --   70    < > = values in this interval not displayed. Yari Ramires NP-C    Patient was seen and examined with NP. I agree with above assessment and plan.      Corbin Loser Ambika DO

## 2017-04-12 NOTE — PROGRESS NOTES
Palliative Care Progress Note    Patient: Leonidas Longo MRN: [id number]  SSN: [id number]    YOB: 1950  Age: 61 y.o. Sex: male       Assessment/Plan:     Chief Complaint/Interval History: alert, reports generalized weakness, left leg pain         Principal Diagnosis:    · Debility, Unspecified  R53.81    Additional Diagnoses:   · Fatigue, Lethargy  R53.83  · Pain, limb  M79.609  · Pain, general  R52  · Counseling, Encounter for Medical Advice  Z71.9  · Encounter for Palliative Care  Z51.5    Palliative Performance Scale (PPS)  PPS: 60    Medical Decision Making:   Reviewed and summarized notes over last 24 hours   Discussed case with appropriate providers- RICHMOND Paige  Reviewed laboratory and x-ray data- CBC, BMP    Pt resting in bed, no distress noted. No visitors at bedside. Pt reports mild rectal pain, generalized weakness, and left leg pain at present. PT to work with pt today. Discussed possibility that he may need STR post discharge, for both strengthening and wound care. Counseled on PRN availability of pain medications. Discussed with RICHMOND Paige.  Will continue to follow. Will discuss findings with members of the interdisciplinary team.         More than 50% of this 15 minute visit was spent counseling and coordination of care as outlined above. Subjective:     Review of Systems:  A comprehensive review of systems was negative except for:   Constitutional: Positive for fatigue. Gastrointestinal: Positive for mild rectal pain   Musculoskeletal: Positive for generalized weakness, left leg pain      Objective:     Visit Vitals    /60    Pulse 73    Temp 97.6 °F (36.4 °C)    Resp 19    Ht 5' 6\" (1.676 m)    Wt 82.6 kg (182 lb 1.6 oz)    SpO2 98%    BMI 29.39 kg/m2       Physical Exam:    General:  Cooperative. Debilitated. No acute distress. Eyes:  Conjunctivae/corneas clear    Nose: Nares normal. Septum midline.  O2 via NC    Neck: Supple, symmetrical, trachea midline Lungs:   Clear to auscultation bilaterally, unlabored   Heart:  Regular rate and rhythm   Abdomen:   Soft, non-tender, non-distended   Extremities: Normal, atraumatic, no cyanosis or edema   Skin: Skin color-pale, texture, turgor normal.   Neurologic: Nonfocal   Psych: Alert and oriented     Signed By: John Pate NP     April 12, 2017

## 2017-04-12 NOTE — PROGRESS NOTES
Problem: Mobility Impaired (Adult and Pediatric)  Goal: *Acute Goals and Plan of Care (Insert Text)  STG:  (1.)Mr. Pozo will perform bed mobility with MODERATE ASSIST within 3 day(s). (2.)Mr. Pzoo will demonstrate good dynamic sitting balance within 3 day(s). (3.)Mr. Pozo will tolerate 15+ minutes of therapeutic activity/exercise while maintaining stable vitals to improve functional strength and mobility within 3 day(s). (4.)Mr. Pozo will transfer from bed to chair and chair to bed with MAXIMAL ASSIST using the least restrictive device within 3 day(s). LTG:  (1.)Mr. Pozo will perform bed mobility with MINIMAL ASSIST within 7 day(s). (2.)Mr. Pozo will transfer from bed to chair and chair to bed with MINIMAL ASSIST using the least restrictive device within 7 day(s). (3.)Mr. Pozo will tolerate 25+ minutes of therapeutic activity/exercise while maintaining stable vitals to improve functional strength and mobility within 3 day(s). Will advance goals as patient is able. ________________________________________________________________________________________________      PHYSICAL THERAPY: INITIAL ASSESSMENT, AM    4/12/2017  INPATIENT: Hospital Day: 4  Payor: EBENEZER Brumfield / Plan[place] EBENEZER [place] / Product Type: Managed Care Medicaid /      NAME/AGE/GENDER: Gerson Prescott is a 61 y.o. male         PRIMARY DIAGNOSIS: Aundrea-rectal abscess Aundrea-rectal abscess Aundrea-rectal abscess        ICD-10: Treatment Diagnosis:       · Generalized Muscle Weakness (M62.81)  · Other abnormalities of gait and mobility (R26.89)   Precaution/Allergies:  Heparin analogues       ASSESSMENT:      Mr. Rachel Marin is a 61year old male admitted with aundrea-rectal absess. Patient with recent admission secondary to abscess with sepsis, s/p debridement and recent colostomy.  Patient seen this AM for initial physical therapy evaluation: presents supine in bed s/p OT assessment and endorses buttock pain (visually 5/10 with mobility). Patient appears anxious and mildly confused with decreased command following; responded fair to education and re-directing. Patient perseverated on LUISA drain and positioning. Mr. Kameron Wilson lives with his brother in a single story residence, endorses difficulty with ADLs, and has had a decline in functional mobility xweeks primarily utilizing a WC for mobility as of late. Last admission, patient able to ambulate short distances last admission and transfer with CGA. Mobility much more limited this admission: Today, B LE PROM WFL but stiff proximally>distally, B LE strength limited 3-/5, and sensation is intact to light touch L3-S1 B. Patient performed rolling bilaterally and maximal assistance and transfers supine to sitting with maximal assistance. Prop sitting and fair- dynamic sitting balance. Scooting limited to prevent sheering. Standing/transfers/gait unable to assess secondary to B LE weakness. Ms. Shiva Pearson presents with a significant decline in functional mobility and balance/gait status from baseline. Recommend continued skilled PT services to address stated deficits. Will follow and progress toward stated goals during acute stay. This section established at most recent assessment   PROBLEM LIST (Impairments causing functional limitations):  1. Decreased Strength  2. Decreased ADL/Functional Activities  3. Decreased Transfer Abilities  4. Decreased Ambulation Ability/Technique  5. Decreased Balance  6. Increased Pain  7. Decreased Activity Tolerance  8. Decreased Flexibility/Joint Mobility  9. Decreased Knowledge of Precautions  10. Decreased [place] with Home Exercise Program    INTERVENTIONS PLANNED: (Benefits and precautions of physical therapy have been discussed with the patient.)  1. Balance Exercise  2. Bed Mobility  3. Family Education  4. Gait Training  5. Home Exercise Program (HEP)  6. Neuromuscular Re-education/Strengthening  7.  Range of Motion (ROM)  8. Therapeutic Activites  9. Therapeutic Exercise/Strengthening  10. Transfer Training  11. Group Therapy      TREATMENT PLAN: Frequency/Duration: 3 times a week for duration of hospital stay  Rehabilitation Potential For Stated Goals: FAIR      RECOMMENDED REHABILITATION/EQUIPMENT: (at time of discharge pending progress): Continue Skilled Therapy and Rehab. HISTORY:   History of Present Injury/Illness (Reason for Referral):  Generalized weakness; Decreased mobility   Past Medical History/Comorbidities:   Mr. Fredy Mtz  has a past medical history of Alcohol abuse, daily use (09/03/2016); Anemia (9/3/2016); Chronic kidney disease; HIT (heparin-induced thrombocytopenia) ([address] Hypertension; Hypokalemia (9/3/2016); and Tobacco abuse (9/3/2016). Mr. Fredy Mtz  has a past surgical history that includes debride necrotic skin/ tissue, abd wall (09/04/2016); abdomen surgery proc unlisted; and other surgical.  Social History/Living Environment:   Home Environment: Private residence  # Steps to Enter: 0  One/Two Story Residence: One story  Living Alone: No  Support Systems: Family member(s) (Brother)  Patient Expects to be Discharged to[de-identified] Unknown  Current DME Used/Available at Home: Wheelchair  Prior Level of Function/Work/Activity:   At baseline, patient has has a decline in functional mobility xweeks primarily utilizing a WC for mobility but able to ambulate short distances last admission transfers with CGA. Lives with his brother in a single story residence and endorses difficulty with ADLs. Number of Personal Factors/Comorbidities that affect the Plan of Care: 1-2: MODERATE COMPLEXITY   EXAMINATION:   Most Recent Physical Functioning:   Gross Assessment:  AROM: Generally decreased, functional (B LE)  Strength: Grossly decreased, non-functional (B LE)  Sensation: Intact (Light touch L3-S1 B)               Posture:  Posture (WDL): Exceptions to WDL  Posture Assessment:  Forward head, Rounded shoulders  Balance:  Sitting: Impaired  Sitting - Static: Prop sitting  Sitting - Dynamic: Poor (constant support)  Standing:  (unable to attempt) Bed Mobility:  Rolling: Maximum assistance  Supine to Sit: Maximum assistance  Sit to Supine: Maximum assistance  Scooting: Maximum assistance  Wheelchair Mobility:     Transfers:     Gait:             Body Structures Involved:  1. Muscles Body Functions Affected:  1. Cardio  2. Neuromusculoskeletal  3. Movement Related  4. Skin Related Activities and Participation Affected:  1. General Tasks and Demands  2. Mobility  3. Self Care  4. Domestic Life   Number of elements that affect the Plan of Care: 4+: HIGH COMPLEXITY   CLINICAL PRESENTATION:   Presentation: Evolving clinical presentation with changing clinical characteristics: MODERATE COMPLEXITY   CLINICAL DECISION MAKIN [facility] Inpatient Short Form  How much difficulty does the patient currently have. .. Unable A Lot A Little None   1. Turning over in bed (including adjusting bedclothes, sheets and blankets)? [ ] 1   [X] 2   [ ] 3   [ ] 4   2. Sitting down on and standing up from a chair with arms ( e.g., wheelchair, bedside commode, etc.)   [ ] 1   [X] 2   [ ] 3   [ ] 4   3. Moving from lying on back to sitting on the side of the bed? [ ] 1   [X] 2   [ ] 3   [ ] 4   How much help from another person does the patient currently need. .. Total A Lot A Little None   4. Moving to and from a bed to a chair (including a wheelchair)? [X] 1   [ ] 2   [ ] 3   [ ] 4   5. Need to walk in hospital room? [X] 1   [ ] 2   [ ] 3   [ ] 4   6. Climbing 3-5 steps with a railing? [X] 1   [ ] 2   [ ] 3   [ ] 4   © , Trustees of [address] [place] [address] [address] [address], under license to [employer].  All rights reserved    Score:  Initial: 9 Most Recent: 9 (Date: 17 )     Interpretation of Tool:  Represents activities that are increasingly more difficult (i.e. Bed mobility, Transfers, Gait).       Score 24 23 22-20 19-15 14-10 9-7 6       Modifier CH CI CJ CK CL CM CN         · Mobility - Walking and Moving Around:               - CURRENT STATUS:    CM - 80%-99% impaired, limited or restricted               - GOAL STATUS:           CK - 40%-59% impaired, limited or restricted               - D/C STATUS:                       ---------------To be determined---------------  Payor: [employer] MEDICAID / Plan[place] [employer] [place] / Product Type: Managed Care Medicaid /       Medical Necessity:     · Patient demonstrates fair rehab potential due to higher previous functional level. Reason for Services/Other Comments:  · Patient continues to require skilled intervention due to decreased functional mobility and balance/gait status from baseline. .   Use of outcome tool(s) and clinical judgement create a POC that gives a: Questionable prediction of patient's progress: MODERATE COMPLEXITY                 TREATMENT:       Pre-treatment Symptoms/Complaints:    Pain: Initial:   Pain Intensity 1: 5  Pain Location 1: Buttocks  Pain Intervention(s) 1: Position, Repositioned, Rest  Post Session:  RN notified of pain with mobility; positioned to comfort s/p evaluation. Assessment/Reassessment only, no treatment provided today     Braces/Orthotics/Lines/Etc:   · LUISA Drain  · O2 Device: Room air  Treatment/Session Assessment:    · Response to Treatment: See above. · Interdisciplinary Collaboration:  · Physical Therapist, Occupational Therapist, Registered Nurse and   · After treatment position/precautions:  · Supine in bed  · Bed/Chair-wheels locked  · Bed in low position  · Call light within reach  · RN notified  · Nurse at bedside  · needs met; positioned to comfort; student RN at bedside to assist  · Compliance with Program/Exercises: Will assess as treatment progresses. · Recommendations/Intent for next treatment session:   \"Next visit will focus on advancements to more challenging activities and reduction in assistance provided\".   Total Treatment Duration:  PT Patient Time In/Time Out  Time In: 1025  Time Out: [address], DPT

## 2017-04-12 NOTE — PROGRESS NOTES
Care Management Interventions  PCP Verified by CM: Yes  [facility] pt states they do not have a vehicle )  Transition of Care Consult (CM Consult): Discharge Planning, SNF  Discharge Durable Medical Equipment: No  Physical Therapy Consult: Yes  Occupational Therapy Consult: Yes  Speech Therapy Consult: No  Current Support Network: Own Home  Confirm Follow Up Transport: Other (see comment) [facility])  Discharge Location  Discharge Placement: Rehab Unit Subacute [facility])    SW met with pt to discuss discharge plans. Pt is in agreement that he needs to go to a rehab facility prior to going home. From a list of facilities in network with his insurance he requested referrals to, in order of preference, [facility] and [facility]. Referral submitted to [facility] and pt accepted for admission pending insurance approval.  Facility is initiating the pre cert today. Pt will also require a CLTC Level of Care evaluation. Referral submitted to [facility] today as well. Both approval processes can take a few days to receive. PPD placed 4/11/17. SW following.

## 2017-04-12 NOTE — CONSULTS
Inpatient Hematology / Oncology Consult Note    Reason for Consult:  Aundrea-rectal abscess  Referring Physician:  Liudmila Apple DO    History of Present Illness:  Mr. 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 He underwent extensive debridement of the perineal area and eventually underwent a diverting colostomy at that time, unfortunately, he went into renal failure and never recovered and is on scheduled HD now ( M-W-F), he also has a history of pancytopenia + splenomegaly. He was seen a week ago by Dr. Jeremy Nunes while here at hospital, a BM biopsy was done at that time and reported no major findings. Of note he was to follow up with Dr. Benja Ye in clinic and failed to show. He was admitted yesterday to the surgical service with sepsis due to a perirectal abscess draining into the gluteal area, he was also anemic with a Hb of 5.5. He has received 2 U of pRBCs and his Hb is up to 7.4 after that. He on on HD 3 times a week and receives 20,000 unit EPO once weekly. He has postive blood cultures and mrsa + on vancomycin. We are consulted for his pancytopenia. Review of Systems:  Constitutional Denies fever, chills, appetite changes. HEENT Denies trauma, hearing loss, ear pain, nosebleeds, sore throat, neck pain     Skin Denies lesions or rashes. Lungs Denies dyspnea, cough, sputum production or hemoptysis. Cardiovascular Denies chest pain, palpitations, or lower extremity edema. Gastrointestinal Denies nausea, vomiting, changes in bowel habits, bloody or black stools, abdominal pain.  Denies dysuria, frequency or hesitancy of urination. Neuro Denies headaches, visual changes or ataxia. Denies dizziness, tingling, tremors, sensory change, speech change, focal weakness or headaches.      Hematology Denies easy bruising or bleeding, denies gingival bleeding or epistaxis. MSK Denies back pain, arthralgias, myalgias or frequent falls. Psychiatric/Behavioral  The patient is not nervous/anxious. Allergies   Allergen Reactions    Heparin Analogues Other (comments)     H. I.T positive     Past Medical History:   Diagnosis Date    Alcohol abuse, daily use 09/03/2016    NONE X 7 DAYS, USUALLY DRINKS 10-15 BEERS DAILY. TAKES A WEEK OFF AT A TIME ONCE A MONTH    Anemia 9/3/2016    HGB 7.8 ON ADMISSION    Chronic kidney disease     end stage renal disease- dialysis on Tues, Thurs. , Saturday mornings.  H[facility]in[facility]ocyt[facility]) [facility]     Hypertension     Hypokalemia 9/3/2016    2.7 ON ADMISSION    Tobacco abuse 9/3/2016    QUIT 7 DAYS AGO     Past Surgical History:   Procedure Laterality Date    ABDOMEN SURGERY PROC UNLISTED      Sept. 2016.  DEBRIDE NECROTIC SKIN/ TISSUE, ABD WALL  09/04/2016    I&D rectal abscess with drain placement - Dr. Raymond Tarango      rectal abcess surgery that also got infected-  Subsequent I & D of the rectal abcess. Family History   Problem Relation Age of Onset    Stroke Mother      Social History     Social History    Marital status: SINGLE     Spouse name: N/A    Number of children: N/A    Years of education: N/A     Occupational History    Not on file.      Social History Main Topics    Smoking status: Former Smoker     Packs/day: 2.00     Years: 40.00     Types: Cigarettes    Smokeless tobacco: Not on file      Comment: STATES HE QUIT 7 DAYS AGO    Alcohol use 42.0 - 63.0 oz/week     70 - 105 Cans of beer per week      Comment: 10-15 CANS DAILY    Drug use: No    Sexual activity: Not on file     Other Topics Concern    Not on file     Social History Narrative    9/3/16:  PATIENT IS SINGLE, LIVES WITH BROTHERDEVENDRA     Current Facility-Administered Medications   Medication Dose Route Frequency Provider Last Rate Last Dose    piperacillin-tazobactam (ZOSYN) 3.375 g in 0.9% sodium chloride (MBP/ADV) 100 mL  3.375 g IntraVENous Q12H Soumya Bennett MD        metoprolol tartrate (LOPRESSOR) tablet 12.5 mg  12.5 mg Oral Q12H Anitha Maza MD   12.5 mg at 04/12/17 6589    midazolam (VERSED) injection 0.5-2 mg  0.5-2 mg IntraVENous Radha Benton MD   1 mg at 04/11/17 1516    fentaNYL citrate (PF) injection 12.5-50 mcg  12.5-50 mcg IntraVENous Radha Benton MD        midazolam (VERSED) injection 0.5-2 mg  0.5-2 mg IntraVENous Radha Benton MD        fentaNYL citrate (PF) injection  mcg   mcg IntraVENous Radha Benton MD   50 mcg at 04/11/17 1516    polyvinyl alcohol (LIQUIFILM TEARS) 1.4 % ophthalmic solution 1 Drop  1 Drop Both Eyes PRN Amari Granda MD        vancomycin (VANCOCIN) 1250 mg in  ml infusion  1,250 mg IntraVENous See Admin Instructions Amari Granda MD        mupirocin (BACTROBAN) 2 % ointment   Topical BID Tyler Apple,         pantoprazole (PROTONIX) 40 mg in sodium chloride 0.9 % 10 mL injection  40 mg IntraVENous Q12H Soumya Bennett MD   40 mg at 04/12/17 0930    sodium chloride (NS) flush 5-10 mL  5-10 mL IntraVENous PRN Joselito Chiang MD        epoetin miky (EPOGEN;PROCRIT) injection 20,000 Units  20,000 Units SubCUTAneous Q7D Azar Neumann MD   20,000 Units at 04/09/17 2346    0.9% sodium chloride infusion 250 mL  250 mL IntraVENous PRN Azar Neumann MD        albuterol (PROVENTIL VENTOLIN) nebulizer solution 2.5 mg  2.5 mg Nebulization Q6H PRN Santana Barth MD        ferrous sulfate tablet 325 mg  325 mg Oral BID WITH MEALS Santana Barth MD   325 mg at 04/12/17 0846    cholecalciferol (VITAMIN D3) tablet 2,000 Units  2,000 Units Oral DAILY Santana Barth MD   2,000 Units at 04/12/17 0929    magnesium oxide (MAG-OX) tablet 400 mg  400 mg Oral DAILY Santana Barth MD   400 mg at 04/12/17 0971    sodium chloride (NS) flush 5-10 mL 5-10 mL IntraVENous Q8H Cesar Gupta MD   5 mL at 17 0531    naloxone [facility]) injection 0.4 mg  0.4 mg IntraVENous PRSILVER Gupta MD        diphenhydrAMINE (BENADRYL) injection 12.5 mg  12.5 mg IntraVENous Q4H MADISON Gupta MD        LORazepam (ATIVAN) injection 1 mg  1 mg IntraVENous Q6H MADISON Gupta MD        oxyCODONE-acetaminophen (PERCOCET) 5-325 mg per tablet 2 Tab  2 Tab Oral Q4H MADISON Gupta MD   2 Tab at 17 0933    oxyCODONE-acetaminophen (PERCOCET) 5-325 mg per tablet 1 Tab  1 Tab Oral Q4H MADISON Gupta MD   1 Tab at 17 1833    HYDROmorphone (PF) (DILAUDID) injection 0.5 mg  0.5 mg IntraVENous Q1H MADISON Gupta MD        HYDROmorphone (PF) (DILAUDID) injection 1 mg  1 mg IntraVENous Q1H PRSILVER Gupta MD   1 mg at 17 1102       OBJECTIVE:  Patient Vitals for the past 8 hrs:   BP Temp Pulse Resp SpO2   17 0926 115/60 - 73 - -   17 0808 118/61 97.6 °F (36.4 °C) 79 - 98 %   17 0549 118/69 98 °F (36.7 °C) 71 19 98 %     Temp (24hrs), Av.8 °F (36.6 °C), Min:97.5 °F (36.4 °C), Max:98 °F (36.7 °C)     0701 -  1900  In: -   Out: 160 [Drains:10]    Physical Exam:  Constitutional: Well developed, well nourished male in no acute distress, sitting comfortably in the hospital bed. HEENT: Normocephalic and atraumatic. Oropharynx is clear, mucous membranes are moist. Extraocular muscles are intact. Sclerae anicteric. Lymph node   No palpable submandibular, cervical, supraclavicular, axillarylymph nodes. Skin Warm and dry. No bruising and no rash noted. No erythema. No pallor. Respiratory Lungs are clear to auscultation bilaterally without wheezes, rales or rhonchi, normal air exchange without accessory muscle use. CVS Normal rate, regular rhythm and normal S1 and S2. No murmurs, gallops, or rubs. Abdomen Soft, nontender and nondistended, normoactive bowel sounds.   No palpable mass.  No hepatosplenomegaly. Neuro Grossly nonfocal with no obvious sensory or motor deficits. MSK Normal range of motion in general.  No edema and no tenderness. Psych Appropriate mood and affect.         Labs:    Recent Results (from the past 24 hour(s))   CULTURE, BODY FLUID W GRAM STAIN    Collection Time: 04/11/17  2:45 PM   Result Value Ref Range    Special Requests: PERIRECTAL      GRAM STAIN FEW  GRAM NEGATIVE RODS        GRAM STAIN MODERATE  GRAM NEGATIVE COCCI        GRAM STAIN FEW  GRAM POSITIVE RODS        GRAM STAIN 10 TO 76 WBC'S PER OIF     Culture result: LIGHT  GRAM NEGATIVE RODS   (A)      Culture result: SUBCULTURE IN PROGRESS     VANCOMYCIN, RANDOM    Collection Time: 04/11/17  4:47 PM   Result Value Ref Range    VANCOMYCIN,RANDOM 9.9 UG/ML   CBC W/O DIFF    Collection Time: 04/12/17  6:39 AM   Result Value Ref Range    WBC 1.6 (LL) 4.3 - 11.1 K/uL    RBC 2.49 (L) 4.23 - 5.67 M/uL    HGB 7.5 (L) 13.6 - 17.2 g/dL    HCT 22.6 (L) 41.1 - 50.3 %    MCV 90.8 79.6 - 97.8 FL    MCH 30.1 26.1 - 32.9 PG    MCHC 33.2 31.4 - 35.0 g/dL    RDW 18.0 (H) 11.9 - 14.6 %    PLATELET 455 (L) 473 - 450 K/uL    MPV 9.1 (L) 10.8 - 21.1 FL   METABOLIC PANEL, BASIC    Collection Time: 04/12/17  6:39 AM   Result Value Ref Range    Sodium 142 136 - 145 mmol/L    Potassium 3.4 (L) 3.5 - 5.1 mmol/L    Chloride 108 (H) 98 - 107 mmol/L    CO2 26 21 - 32 mmol/L    Anion gap 8 7 - 16 mmol/L    Glucose 82 65 - 100 mg/dL    BUN 21 8 - 23 MG/DL    Creatinine 3.56 (H) 0.8 - 1.5 MG/DL    GFR est AA 22 (L) >60 ml/min/1.73m2    GFR est non-AA 18 (L) >60 ml/min/1.73m2    Calcium 7.7 (L) 8.3 - 10.4 MG/DL       Imaging:  [unfilled]    ASSESSMENT:  Problem List  Date Reviewed: 1/18/2017          Codes Class Noted    * (Principal)Aundrea-rectal abscess ICD-10-CM: K61.1  ICD-9-CM: 839  4/9/2017        Symptomatic anemia ICD-10-CM: D64.9  ICD-9-CM: 285.9  3/29/2017        Elevated total protein ICD-10-CM: R77.8  ICD-9-CM: 790.99  3/29/2017 Hyperbilirubinemia ICD-10-CM: E80.6  ICD-9-CM: 782.4  3/29/2017        Dizziness ICD-10-CM: R42  ICD-9-CM: 780.4  3/29/2017        ESRD (end stage renal disease) ([facility] ICD-10-CM: N18.6  ICD-9-CM: 585.6  3/29/2017        HIT (heparin-induced thrombocytopenia) (HCC) ICD-10-CM: K48.85  ICD-9-CM: 289.84  10/11/2016        CKD (chronic kidney disease) requiring chronic dialysis [facility]) ICD-10-CM: N18.6, Z99.2  ICD-9-CM: 585.6, V45.11  10/11/2016        Nocturnal hypoxemia (Chronic) ICD-10-CM: G47.34  ICD-9-CM: 327.24  10/4/2016        Suspected sleep apnea ICD-10-CM: G47.30  ICD-9-CM: 780.57  10/2/2016        Acute blood loss anemia ICD-10-CM: D62  ICD-9-CM: 285.1  9/29/2016        Paroxysmal atrial fibrillation (HCC) ICD-10-CM: I48.0  ICD-9-CM: 427.31  9/17/2016        Pleural effusion on left ICD-10-CM: J90  ICD-9-CM: 511.9  9/12/2016        Acute hypoactive delirium due to multiple etiologies ICD-10-CM: F05  ICD-9-CM: 293.0  9/11/2016        Thrombocytopenia ([facility] ICD-10-CM: D69.6  ICD-9-CM: 287.5  9/8/2016        HTN (hypertension) (Chronic) ICD-10-CM: I10  ICD-9-CM: 401.9  9/4/2016        GERD (gastroesophageal reflux disease) (Chronic) ICD-10-CM: K21.9  ICD-9-CM: 530.81  9/4/2016        Perirectal abscess ICD-10-CM: K61.1  ICD-9-CM: 655  9/4/2016    Overview Signed 9/8/2016  9:59 AM by Kay Galindo NP     9/4/16:  Incision and drainage of perirectal abscess. Alcohol abuse, daily use (Chronic) ICD-10-CM: F10.10  ICD-9-CM: 305.01  9/3/2016        VITO (acute kidney injury) ([facility] ICD-10-CM: N17.9  ICD-9-CM: 584.9  9/3/2016        Pancytopenia ([facility] ICD-10-CM: O14.984  ICD-9-CM: 284.19  9/3/2016    Overview Signed 9/3/2016  8:22 PM by Ashley Noel NP     HGB 7.8 ON ADMISSION                     RECOMMENDATIONS:  Pancytopenia  4/12 Give granix 300 x 5 days. Transfuse for hemoglobin < 7 and for platelets < 02,223. Follow up with Dr. Darlene Ruiz once discharged. ESRD  4/12 per nephology.  May be responsible for low counts. MRSA  4/12 continue antibiotics      Lab studies and imaging studies  were personally reviewed. Pertinent old records were reviewed. Thank you for allowing us to participate in the care of Mr. Luis Garza. Dane Bruno NP   [facility]  [address] [place] [address]  Office : [phone]  Fax : [phone]         Attending Addendum:  I personally evaluated the patient with Dane Bruno N.P.,  and agree with the assessment, findings and plan as documented. Stage G- mcg/day, we will follow this patient.               Chico Longo MD  [address]  Office : [phone]  Fax : [phone]

## 2017-04-13 LAB
ANION GAP BLD CALC-SCNC: 9 MMOL/L (ref 7–16)
BASOPHILS # BLD AUTO: 0 K/UL (ref 0–0.2)
BASOPHILS # BLD: 0 % (ref 0–2)
BUN SERPL-MCNC: 24 MG/DL (ref 8–23)
CALCIUM SERPL-MCNC: 7.7 MG/DL (ref 8.3–10.4)
CHLORIDE SERPL-SCNC: 107 MMOL/L (ref 98–107)
CO2 SERPL-SCNC: 24 MMOL/L (ref 21–32)
COLLECTION COMMENT, COLCM: NORMAL
CREAT SERPL-MCNC: 4.2 MG/DL (ref 0.8–1.5)
DIFFERENTIAL METHOD BLD: ABNORMAL
EOSINOPHIL # BLD: 0 K/UL (ref 0–0.8)
EOSINOPHIL NFR BLD: 1 % (ref 0.5–7.8)
ERYTHROCYTE [DISTWIDTH] IN BLOOD BY AUTOMATED COUNT: 17.9 % (ref 11.9–14.6)
GLUCOSE SERPL-MCNC: 76 MG/DL (ref 65–100)
HCT VFR BLD AUTO: 23.2 % (ref 41.1–50.3)
HGB BLD-MCNC: 7.7 G/DL (ref 13.6–17.2)
IMM GRANULOCYTES # BLD: 0 K/UL (ref 0–0.5)
IMM GRANULOCYTES NFR BLD AUTO: 0.2 % (ref 0–5)
LYMPHOCYTES # BLD AUTO: 13 % (ref 13–44)
LYMPHOCYTES # BLD: 0.5 K/UL (ref 0.5–4.6)
MCH RBC QN AUTO: 29.6 PG (ref 26.1–32.9)
MCHC RBC AUTO-ENTMCNC: 33.2 G/DL (ref 31.4–35)
MCV RBC AUTO: 89.2 FL (ref 79.6–97.8)
MM INDURATION POC: 0 MM (ref 0–5)
MONOCYTES # BLD: 0.2 K/UL (ref 0.1–1.3)
MONOCYTES NFR BLD AUTO: 5 % (ref 4–12)
NEUTS SEG # BLD: 3.2 K/UL (ref 1.7–8.2)
NEUTS SEG NFR BLD AUTO: 81 % (ref 43–78)
PLATELET # BLD AUTO: 109 K/UL (ref 150–450)
PMV BLD AUTO: 9.2 FL (ref 10.8–14.1)
POTASSIUM SERPL-SCNC: 4 MMOL/L (ref 3.5–5.1)
PPD POC: NEGATIVE NEGATIVE
RBC # BLD AUTO: 2.6 M/UL (ref 4.23–5.67)
SODIUM SERPL-SCNC: 140 MMOL/L (ref 136–145)
VANCOMYCIN SERPL-MCNC: 11.6 UG/ML
WBC # BLD AUTO: 4 K/UL (ref 4.3–11.1)

## 2017-04-13 PROCEDURE — C9113 INJ PANTOPRAZOLE SODIUM, VIA: HCPCS | Performed by: INTERNAL MEDICINE

## 2017-04-13 PROCEDURE — 74011250637 HC RX REV CODE- 250/637: Performed by: SURGERY

## 2017-04-13 PROCEDURE — 85025 COMPLETE CBC W/AUTO DIFF WBC: CPT | Performed by: HOSPITALIST

## 2017-04-13 PROCEDURE — 90935 HEMODIALYSIS ONE EVALUATION: CPT

## 2017-04-13 PROCEDURE — 80048 BASIC METABOLIC PNL TOTAL CA: CPT | Performed by: HOSPITALIST

## 2017-04-13 PROCEDURE — 74011250637 HC RX REV CODE- 250/637: Performed by: HOSPITALIST

## 2017-04-13 PROCEDURE — 65270000029 HC RM PRIVATE

## 2017-04-13 PROCEDURE — 36415 COLL VENOUS BLD VENIPUNCTURE: CPT | Performed by: HOSPITALIST

## 2017-04-13 PROCEDURE — 74011250636 HC RX REV CODE- 250/636: Performed by: INTERNAL MEDICINE

## 2017-04-13 PROCEDURE — 74011000258 HC RX REV CODE- 258: Performed by: INTERNAL MEDICINE

## 2017-04-13 PROCEDURE — 74011250636 HC RX REV CODE- 250/636: Performed by: NURSE PRACTITIONER

## 2017-04-13 PROCEDURE — 80202 ASSAY OF VANCOMYCIN: CPT | Performed by: INTERNAL MEDICINE

## 2017-04-13 PROCEDURE — 74011000250 HC RX REV CODE- 250: Performed by: INTERNAL MEDICINE

## 2017-04-13 RX ORDER — VANCOMYCIN HYDROCHLORIDE
1250 ONCE
Status: COMPLETED | OUTPATIENT
Start: 2017-04-13 | End: 2017-04-13

## 2017-04-13 RX ADMIN — SODIUM CHLORIDE 40 MG: 9 INJECTION INTRAMUSCULAR; INTRAVENOUS; SUBCUTANEOUS at 12:32

## 2017-04-13 RX ADMIN — METOPROLOL TARTRATE 12.5 MG: 25 TABLET, FILM COATED ORAL at 21:35

## 2017-04-13 RX ADMIN — VITAMIN D, TAB 1000IU (100/BT) 2000 UNITS: 25 TAB at 12:32

## 2017-04-13 RX ADMIN — SODIUM CHLORIDE 40 MG: 9 INJECTION INTRAMUSCULAR; INTRAVENOUS; SUBCUTANEOUS at 21:35

## 2017-04-13 RX ADMIN — FERROUS SULFATE TAB 325 MG (65 MG ELEMENTAL FE) 325 MG: 325 (65 FE) TAB at 12:32

## 2017-04-13 RX ADMIN — FERROUS SULFATE TAB 325 MG (65 MG ELEMENTAL FE) 325 MG: 325 (65 FE) TAB at 17:58

## 2017-04-13 RX ADMIN — Medication 400 MG: at 12:32

## 2017-04-13 RX ADMIN — Medication 5 ML: at 12:23

## 2017-04-13 RX ADMIN — VANCOMYCIN HYDROCHLORIDE 1250 MG: 10 INJECTION, POWDER, LYOPHILIZED, FOR SOLUTION INTRAVENOUS at 17:58

## 2017-04-13 RX ADMIN — METOPROLOL TARTRATE 12.5 MG: 25 TABLET, FILM COATED ORAL at 12:32

## 2017-04-13 RX ADMIN — Medication 5 ML: at 21:38

## 2017-04-13 RX ADMIN — POTASSIUM CHLORIDE 20 MEQ: 14.9 INJECTION, SOLUTION INTRAVENOUS at 01:58

## 2017-04-13 RX ADMIN — TBO-FILGRASTIM 300 MCG: 300 INJECTION, SOLUTION SUBCUTANEOUS at 17:58

## 2017-04-13 RX ADMIN — PIPERACILLIN SODIUM,TAZOBACTAM SODIUM 3.38 G: 3; .375 INJECTION, POWDER, FOR SOLUTION INTRAVENOUS at 05:32

## 2017-04-13 RX ADMIN — PIPERACILLIN SODIUM,TAZOBACTAM SODIUM 3.38 G: 3; .375 INJECTION, POWDER, FOR SOLUTION INTRAVENOUS at 17:58

## 2017-04-13 RX ADMIN — MUPIROCIN: 20 OINTMENT TOPICAL at 18:22

## 2017-04-13 NOTE — PROGRESS NOTES
HD treatment completed without complication. Total of 2.0  kgs removed. Flushed both ports with 0.9 mL of NS.  VS stable, NAD. CVC dressing clean, dry, and intact, tego caps intact, bilateral lumen flushed with 9 cc NS. Transport contacted for return to room.

## 2017-04-13 NOTE — PROGRESS NOTES
[address]  [address], [address], [address]      PLAN:Discharge planning in process to rehab  Continue to encourage PO intake and improve nutrition  Continue ABX will start PO today  Continue PT and OT  Continue drain of perirectal abscess      ASSESSMENT:  Admit Date: 4/9/2017   * No surgery found *  * No surgery found *    Principal Problem:    Aundrea-rectal abscess (4/9/2017)         SUBJECTIVE:Feeling better. Working with PT/OT. CT head negative, but CT C-spine significant C7 impingement. Patient denies F/N/V. No appetite. Discharge planning in process. OBJECTIVE:  Constitutional: Alert oriented cooperative patient in no acute distress; appears stated age   Visit Vitals    /58    Pulse 76    Temp 98.3 °F (36.8 °C)    Resp 18    Ht 5' 6\" (1.676 m)    Wt 182 lb 1.6 oz (82.6 kg)    SpO2 98%    BMI 29.39 kg/m2     Eyes:Sclera are clear. ENMT: no external lesions gross hearing normal; no obvious neck masses, no ear or lip lesions  CV: RRR. Normal perfusion  Resp: No JVD. Breathing is  non-labored; no audible wheezing. GI: soft no TTP normal BS ostomy in place    Musculoskeletal: unremarkable with normal function. No embolic signs or cyanosis. Neuro:  Oriented; moves all 4; no focal deficits  Psychiatric: normal affect and mood, no memory impairment  Rectal: Drain inplace, Minimal serosang output. No cellulitis. Mild exterior drainage. No fluctuance.     Patient Vitals for the past 24 hrs:   BP Temp Pulse Resp SpO2   04/13/17 0802 109/58 - 76 - -   04/13/17 0404 114/64 98.3 °F (36.8 °C) 71 18 98 %   04/12/17 2346 105/58 98.5 °F (36.9 °C) 73 18 96 %   04/12/17 2049 105/66 97.9 °F (36.6 °C) 80 18 98 %   04/12/17 1416 100/54 97.9 °F (36.6 °C) (!) 59 16 98 %   04/12/17 0926 115/60 - 73 - -     Labs:  Recent Labs      04/13/17   0608   WBC  4.0*   HGB  7.7*   PLT  109*   NA  140   K  4.0   CL  107   CO2  24   BUN  24*   CREA  4.20*   [address] DO

## 2017-04-13 NOTE — PROGRESS NOTES
Subjective:   Daily Progress Note: 4/13/2017 9:57 AM    No complaints    Current Facility-Administered Medications   Medication Dose Route Frequency    piperacillin-tazobactam (ZOSYN) 3.375 g in 0.9% sodium chloride (MBP/ADV) 100 mL  3.375 g IntraVENous Q12H    metoprolol tartrate (LOPRESSOR) tablet 12.5 mg  12.5 mg Oral Q12H    tbo-filgrastim (GRANIX) injection 300 mcg  300 mcg SubCUTAneous DAILY    midazolam (VERSED) injection 0.5-2 mg  0.5-2 mg IntraVENous Multiple    fentaNYL citrate (PF) injection 12.5-50 mcg  12.5-50 mcg IntraVENous Multiple    midazolam (VERSED) injection 0.5-2 mg  0.5-2 mg IntraVENous Multiple    fentaNYL citrate (PF) injection  mcg   mcg IntraVENous Multiple    polyvinyl alcohol (LIQUIFILM TEARS) 1.4 % ophthalmic solution 1 Drop  1 Drop Both Eyes PRN    vancomycin (VANCOCIN) 1250 mg in  ml infusion  1,250 mg IntraVENous See Admin Instructions    mupirocin (BACTROBAN) 2 % ointment   Topical BID    pantoprazole (PROTONIX) 40 mg in sodium chloride 0.9 % 10 mL injection  40 mg IntraVENous Q12H    sodium chloride (NS) flush 5-10 mL  5-10 mL IntraVENous PRN    epoetin miky (EPOGEN;PROCRIT) injection 20,000 Units  20,000 Units SubCUTAneous Q7D    0.9% sodium chloride infusion 250 mL  250 mL IntraVENous PRN    albuterol (PROVENTIL VENTOLIN) nebulizer solution 2.5 mg  2.5 mg Nebulization Q6H PRN    ferrous sulfate tablet 325 mg  325 mg Oral BID WITH MEALS    cholecalciferol (VITAMIN D3) tablet 2,000 Units  2,000 Units Oral DAILY    magnesium oxide (MAG-OX) tablet 400 mg  400 mg Oral DAILY    sodium chloride (NS) flush 5-10 mL  5-10 mL IntraVENous Q8H    naloxone (NARCAN) injection 0.4 mg  0.4 mg IntraVENous PRN    diphenhydrAMINE (BENADRYL) injection 12.5 mg  12.5 mg IntraVENous Q4H PRN    LORazepam (ATIVAN) injection 1 mg  1 mg IntraVENous Q6H PRN    oxyCODONE-acetaminophen (PERCOCET) 5-325 mg per tablet 2 Tab  2 Tab Oral Q4H PRN    oxyCODONE-acetaminophen (PERCOCET) 5-325 mg per tablet 1 Tab  1 Tab Oral Q4H PRN    HYDROmorphone (PF) (DILAUDID) injection 0.5 mg  0.5 mg IntraVENous Q1H PRN    HYDROmorphone (PF) (DILAUDID) injection 1 mg  1 mg IntraVENous Q1H PRN               Objective:     Visit Vitals    /62    Pulse 66    Temp 98.3 °F (36.8 °C)    Resp 18    Ht 5' 6\" (1.676 m)    Wt 82.6 kg (182 lb 1.6 oz)    SpO2 98%    BMI 29.39 kg/m2    O2 Flow Rate (L/min): 2 l/min O2 Device: Room air    Temp (24hrs), Av.2 °F (36.8 °C), Min:97.9 °F (36.6 °C), Max:98.5 °F (36.9 °C)          1901 -  0700  In: 170 [P.O.:120; I.V.:50]  Out: 1125 [Urine:650; Drains:25]      Visit Vitals    /62    Pulse 66    Temp 98.3 °F (36.8 °C)    Resp 18    Ht 5' 6\" (1.676 m)    Wt 82.6 kg (182 lb 1.6 oz)    SpO2 98%    BMI 29.39 kg/m2     Head: Normocephalic, without obvious abnormality  Neck: no JVD  Lungs: clear to auscultation bilaterally  Heart: regular rate and rhythm  Abdomen: soft, non-tender.   Extremities:no edema          Data Review    Recent Results (from the past 50 hour(s))   CULTURE, BODY FLUID W GRAM STAIN    Collection Time: 17  2:45 PM   Result Value Ref Range    Special Requests: PERIRECTAL      GRAM STAIN FEW  GRAM NEGATIVE RODS        GRAM STAIN MODERATE  GRAM NEGATIVE COCCI        GRAM STAIN FEW  GRAM POSITIVE RODS        GRAM STAIN 10 TO 75 WBC'S PER OIF     Culture result: LIGHT  GRAM NEGATIVE RODS   (A)      Culture result: IDENTIFICATION AND SUSCEPTIBILITY TO FOLLOW      Culture result: CULTURE IN PROGRESS,FURTHER UPDATES TO FOLLOW     VANCOMYCIN, RANDOM    Collection Time: 17  4:47 PM   Result Value Ref Range    VANCOMYCIN,RANDOM 9.9 UG/ML   CBC W/O DIFF    Collection Time: 17  6:39 AM   Result Value Ref Range    WBC 1.6 (LL) 4.3 - 11.1 K/uL    RBC 2.49 (L) 4.23 - 5.67 M/uL    HGB 7.5 (L) 13.6 - 17.2 g/dL    HCT 22.6 (L) 41.1 - 50.3 %    MCV 90.8 79.6 - 97.8 FL    MCH 30.1 26.1 - 32.9 PG    MCHC 33.2 31.4 - 35.0 g/dL    RDW 18.0 (H) 11.9 - 14.6 %    PLATELET 540 (L) 737 - 450 K/uL    MPV 9.1 (L) 10.8 - 89.8 FL   METABOLIC PANEL, BASIC    Collection Time: 04/12/17  6:39 AM   Result Value Ref Range    Sodium 142 136 - 145 mmol/L    Potassium 3.4 (L) 3.5 - 5.1 mmol/L    Chloride 108 (H) 98 - 107 mmol/L    CO2 26 21 - 32 mmol/L    Anion gap 8 7 - 16 mmol/L    Glucose 82 65 - 100 mg/dL    BUN 21 8 - 23 MG/DL    Creatinine 3.56 (H) 0.8 - 1.5 MG/DL    GFR est AA 22 (L) >60 ml/min/1.73m2    GFR est non-AA 18 (L) >60 ml/min/1.73m2    Calcium 7.7 (L) 8.3 - 10.4 MG/DL   PLEASE READ & DOCUMENT PPD TEST IN 24 HRS    Collection Time: 04/12/17  6:16 PM   Result Value Ref Range    PPD  Negative    mm Induration 0 mm   METABOLIC PANEL, BASIC    Collection Time: 04/13/17  6:08 AM   Result Value Ref Range    Sodium 140 136 - 145 mmol/L    Potassium 4.0 3.5 - 5.1 mmol/L    Chloride 107 98 - 107 mmol/L    CO2 24 21 - 32 mmol/L    Anion gap 9 7 - 16 mmol/L    Glucose 76 65 - 100 mg/dL    BUN 24 (H) 8 - 23 MG/DL    Creatinine 4.20 (H) 0.8 - 1.5 MG/DL    GFR est AA 18 (L) >60 ml/min/1.73m2    GFR est non-AA 15 (L) >60 ml/min/1.73m2    Calcium 7.7 (L) 8.3 - 10.4 MG/DL   CBC WITH AUTOMATED DIFF    Collection Time: 04/13/17  6:08 AM   Result Value Ref Range    WBC 4.0 (L) 4.3 - 11.1 K/uL    RBC 2.60 (L) 4.23 - 5.67 M/uL    HGB 7.7 (L) 13.6 - 17.2 g/dL    HCT 23.2 (L) 41.1 - 50.3 %    MCV 89.2 79.6 - 97.8 FL    MCH 29.6 26.1 - 32.9 PG    MCHC 33.2 31.4 - 35.0 g/dL    RDW 17.9 (H) 11.9 - 14.6 %    PLATELET 938 (L) 080 - 450 K/uL    MPV 9.2 (L) 10.8 - 14.1 FL    DF AUTOMATED      NEUTROPHILS 81 (H) 43 - 78 %    LYMPHOCYTES 13 13 - 44 %    MONOCYTES 5 4.0 - 12.0 %    EOSINOPHILS 1 0.5 - 7.8 %    BASOPHILS 0 0.0 - 2.0 %    IMMATURE GRANULOCYTES 0.2 0.0 - 5.0 %    ABS. NEUTROPHILS 3.2 1.7 - 8.2 K/UL    ABS. LYMPHOCYTES 0.5 0.5 - 4.6 K/UL    ABS. MONOCYTES 0.2 0.1 - 1.3 K/UL    ABS. EOSINOPHILS 0.0 0.0 - 0.8 K/UL    ABS.  BASOPHILS 0.0 0.0 - 0.2 K/UL ABS. IMM. GRANS. 0.0 0.0 - 0.5 K/UL       Assessment     Patient Active Problem List    Diagnosis Date Noted    Aundrea-rectal abscess 04/09/2017    Symptomatic anemia 03/29/2017    Elevated total protein 03/29/2017    Hyperbilirubinemia 03/29/2017    Dizziness 03/29/2017    ESRD (end stage renal disease) ([facility] [address] 03/29/2017    HIT (heparin-induced thrombocytopenia) ([facility] [address] 10/11/2016    CKD (chronic kidney disease) requiring chronic dialysis ([facility] [address] 10/11/2016    Nocturnal hypoxemia 10/04/2016    Suspected sleep apnea 10/02/2016    Acute blood loss anemia 09/29/2016    Paroxysmal atrial fibrillation (HCC) 09/17/2016    Pleural effusion on left 09/12/2016    Acute hypoactive delirium due to multiple etiologies 09/11/2016    Thrombocytopenia ([facility] [address] 09/08/2016    HTN (hypertension) 09/04/2016    GERD (gastroesophageal reflux disease) 09/04/2016    Perirectal abscess 09/04/2016    Alcohol abuse, daily use 09/03/2016    VITO (acute kidney injury) ([facility] [address] 09/03/2016    Pancytopenia ([facility] [address] 09/03/2016           Problems Addressed by Nephrology     ESRD    Plan     Seen on dialysis. Tolerating well. Rehab pending.

## 2017-04-13 NOTE — PROGRESS NOTES
Palliative Care Progress Note    Patient: Geoff Lane MRN: [id number]  SSN: [id number]    YOB: 1950  Age: 61 y.o. Sex: male       Assessment/Plan:     Chief Complaint/Interval History: alert, reports ongoing generalized weakness        Principal Diagnosis:    · Debility, Unspecified  R53.81    Additional Diagnoses:   · Fatigue, Lethargy  R53.83  · Pain, limb  M79.609  · Pain, general  R52  · Counseling, Encounter for Medical Advice  Z71.9  · Encounter for Palliative Care  Z51.5    Palliative Performance Scale (PPS)  PPS: 60    Medical Decision Making:   Reviewed and summarized notes over last 24 hours   Discussed case with appropriate providersRICHMOND Brady  Reviewed laboratory and x-ray data- CBC, BMP    Pt seen in dialysis, appears comfortable. He reports he is doing fair. He reports ongoing weakness. Discussed his bed offer at [facility] for STR, and that insurance approval is pending. His goal is to get stronger and return to independent living. Briefly discussed option of T[facility]REE or long term placement via Medicaid if he is unable to return to independent living. No further PC needs identified- we will sign off. Thank you for allowing us to participate in Mr Pozo's care     Will discuss findings with members of the interdisciplinary team.         More than 50% of this 15 minute visit was spent counseling and coordination of care as outlined above. Subjective:     Review of Systems:  A comprehensive review of systems was negative except for:   Constitutional: Positive for fatigue. Gastrointestinal: Positive for mild rectal pain   Musculoskeletal: Positive for generalized weakness     Objective:     Visit Vitals    BP 96/59    Pulse 67    Temp 98.3 °F (36.8 °C)    Resp 18    Ht 5' 6\" (1.676 m)    Wt 82.6 kg (182 lb 1.6 oz)    SpO2 98%    BMI 29.39 kg/m2       Physical Exam:    General:  Cooperative. Debilitated. No acute distress.    Eyes:  Conjunctivae/corneas clear    Nose: Nares normal. Septum midline.  O2 via NC    Neck: Supple, symmetrical, trachea midline   Lungs:   Clear to auscultation bilaterally, unlabored   Heart:  Regular rate and rhythm   Abdomen:   Soft, non-tender, non-distended   Extremities: Normal, atraumatic, no cyanosis or edema   Skin: Skin color-pale, texture, turgor normal.   Neurologic: Nonfocal   Psych: Alert and oriented     Signed By: Olaf Melgoza NP     April 13, 2017

## 2017-04-13 NOTE — PROGRESS NOTES
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 He underwent extensive debridement of the perineal area and eventually underwent a diverting colostomy at that time, unfortunately, he went into renal failure and never recovered and is on scheduled HD now ( ), he also has a history of pancytopenia + splenomegaly, he is regularly followed by hematology and was in the hospital 1 week ago , a BM biopsy was done at that time and reported no major findings. Admitted  to the surgical service with sepsis due to a perirectal abscess draining into the gluteal area, he was also anemic with a Hb of 5.5. He has received 2 U of pRBCs . Ridgebenson Buricaga His BP has been borderline low. The hospitalist group was consulted to help with the medical management of Mr. Shemar Grover. - s/p I&D   -  no other overnight events. Still feeling weak and fatigued. Pain control is optimal.  No fever, chills, nightsweats, chest pain, or palpitations or SOB.     Review of Systems negative with exception of pertinent positives noted above  PHYSICAL EXAM     Visit Vitals    /64 (BP 1 Location: Right arm, BP Patient Position: At rest)    Pulse 71    Temp 98.3 °F (36.8 °C)    Resp 18    Ht 5' 6\" (1.676 m)    Wt 82.6 kg (182 lb 1.6 oz)    SpO2 98%    BMI 29.39 kg/m2      Temp (24hrs), Av °F (36.7 °C), Min:97.6 °F (36.4 °C), Max:98.5 °F (36.9 °C)    Oxygen Therapy  O2 Sat (%): 98 % (17 0404)  Pulse via Oximetry: 66 beats per minute (17 1600)  O2 Device: Room air (17 1615)  O2 Flow Rate (L/min): 2 l/min (17 1520)  ETCO2 (mmHg): 35 mmHg (17 152)    Intake/Output Summary (Last 24 hours) at 04/13/17 0748  Last data filed at 04/13/17 9744   Gross per 24 hour   Intake              170 ml   Output              715 ml   Net             -545 ml      LAB DATA:     General: No acute distress.    Lungs:  CTA Bilaterally. Heart:  Regular rate and rhythm,  No murmur, rub, or gallop  Abdomen: Soft, Non distended, Non tender, Positive bowel sounds, colostomy bag in place with green stool  Extremities: No cyanosis, clubbing or edema, ROM WNL in all 4 ext  Neurologic:  Cranial n 2-12 grossly intact, cerebellar functions intact     ASSESSMENT      Active Hospital Problems    Diagnosis Date Noted    Aundrea-rectal abscess 04/09/2017     Plan:  ·   Bilateral Upper extremity weakness: CT head is unremarkable but CT spine showed C6-7 radiculopathy, with possible C7 nerve root impingement. · Will consult neurosurgery for evaluation. · Sepsis: resolving; r/t perirectal abscess s/p I&D on 4/11, continue with iv vanc/zosyn. Blood cx 1/2 GPC. Mgmt per sx team.   · ESRD-DD; appreciate nephrology recs. · Pancytopenia - improving. Continue to monitor cbc daily  · Hx of pafib- BP marginally improved. · Hx of HIT - no heparin products  · Debility - pt/ot/ppd. Will likely need Short term rehab.     DVT Prophylaxis: scds    Signed By: Monserrat Rowell MD     April 13, 2017

## 2017-04-13 NOTE — DIALYSIS
On dialysis via right SC perma catheter. Lines reversed as arterial port sluggish to draw back but flushed easily. No heparin used for this treatment. Vital signs HR=76, BS=023/58. Pt noted alert and oriented x 4. Will continue to monitor throughout treatment.

## 2017-04-14 ENCOUNTER — APPOINTMENT (OUTPATIENT)
Dept: MRI IMAGING | Age: 67
DRG: 720 | End: 2017-04-14
Attending: HOSPITALIST
Payer: MEDICAID

## 2017-04-14 LAB
BACTERIA SPEC CULT: NORMAL
DIFFERENTIAL METHOD BLD: ABNORMAL
ERYTHROCYTE [DISTWIDTH] IN BLOOD BY AUTOMATED COUNT: 17.8 % (ref 11.9–14.6)
HCT VFR BLD AUTO: 22.9 % (ref 41.1–50.3)
HGB BLD-MCNC: 7.7 G/DL (ref 13.6–17.2)
LYMPHOCYTES # BLD: 0.8 K/UL (ref 0.5–4.6)
LYMPHOCYTES NFR BLD MANUAL: 16 % (ref 16–44)
MCH RBC QN AUTO: 30.1 PG (ref 26.1–32.9)
MCHC RBC AUTO-ENTMCNC: 33.6 G/DL (ref 31.4–35)
MCV RBC AUTO: 89.5 FL (ref 79.6–97.8)
MM INDURATION POC: NORMAL MM (ref 0–5)
MONOCYTES # BLD: 0.1 K/UL (ref 0.1–1.3)
MONOCYTES NFR BLD MANUAL: 1 % (ref 3–9)
NEUTS BAND NFR BLD MANUAL: 6 % (ref 0–10)
NEUTS SEG # BLD: 4.1 K/UL (ref 1.7–8.2)
NEUTS SEG NFR BLD MANUAL: 77 % (ref 47–75)
PLATELET # BLD AUTO: 126 K/UL (ref 150–450)
PLATELET COMMENTS,PCOM: SLIGHT
PMV BLD AUTO: 9.4 FL (ref 10.8–14.1)
PPD POC: NORMAL NEGATIVE
RBC # BLD AUTO: 2.56 M/UL (ref 4.23–5.67)
RBC MORPH BLD: ABNORMAL
SERVICE CMNT-IMP: NORMAL
WBC # BLD AUTO: 5 K/UL (ref 4.3–11.1)

## 2017-04-14 PROCEDURE — 74011000250 HC RX REV CODE- 250: Performed by: INTERNAL MEDICINE

## 2017-04-14 PROCEDURE — 77030012939 HC DRSG HYDRCOIL BMS -A

## 2017-04-14 PROCEDURE — 85025 COMPLETE CBC W/AUTO DIFF WBC: CPT | Performed by: NURSE PRACTITIONER

## 2017-04-14 PROCEDURE — 74011250637 HC RX REV CODE- 250/637: Performed by: SURGERY

## 2017-04-14 PROCEDURE — 72141 MRI NECK SPINE W/O DYE: CPT

## 2017-04-14 PROCEDURE — 97530 THERAPEUTIC ACTIVITIES: CPT

## 2017-04-14 PROCEDURE — 74011000258 HC RX REV CODE- 258: Performed by: INTERNAL MEDICINE

## 2017-04-14 PROCEDURE — 97110 THERAPEUTIC EXERCISES: CPT

## 2017-04-14 PROCEDURE — 74011250636 HC RX REV CODE- 250/636: Performed by: NURSE PRACTITIONER

## 2017-04-14 PROCEDURE — 74011250637 HC RX REV CODE- 250/637: Performed by: HOSPITALIST

## 2017-04-14 PROCEDURE — 36415 COLL VENOUS BLD VENIPUNCTURE: CPT | Performed by: NURSE PRACTITIONER

## 2017-04-14 PROCEDURE — C9113 INJ PANTOPRAZOLE SODIUM, VIA: HCPCS | Performed by: INTERNAL MEDICINE

## 2017-04-14 PROCEDURE — 74011250636 HC RX REV CODE- 250/636: Performed by: INTERNAL MEDICINE

## 2017-04-14 PROCEDURE — 65270000029 HC RM PRIVATE

## 2017-04-14 RX ADMIN — METOPROLOL TARTRATE 12.5 MG: 25 TABLET, FILM COATED ORAL at 21:28

## 2017-04-14 RX ADMIN — PIPERACILLIN SODIUM,TAZOBACTAM SODIUM 3.38 G: 3; .375 INJECTION, POWDER, FOR SOLUTION INTRAVENOUS at 18:01

## 2017-04-14 RX ADMIN — Medication 5 ML: at 06:46

## 2017-04-14 RX ADMIN — TBO-FILGRASTIM 300 MCG: 300 INJECTION, SOLUTION SUBCUTANEOUS at 08:50

## 2017-04-14 RX ADMIN — Medication 10 ML: at 21:29

## 2017-04-14 RX ADMIN — MUPIROCIN: 20 OINTMENT TOPICAL at 18:01

## 2017-04-14 RX ADMIN — PIPERACILLIN SODIUM,TAZOBACTAM SODIUM 3.38 G: 3; .375 INJECTION, POWDER, FOR SOLUTION INTRAVENOUS at 06:46

## 2017-04-14 RX ADMIN — METOPROLOL TARTRATE 12.5 MG: 25 TABLET, FILM COATED ORAL at 08:49

## 2017-04-14 RX ADMIN — MUPIROCIN: 20 OINTMENT TOPICAL at 09:03

## 2017-04-14 RX ADMIN — Medication 400 MG: at 08:49

## 2017-04-14 RX ADMIN — FERROUS SULFATE TAB 325 MG (65 MG ELEMENTAL FE) 325 MG: 325 (65 FE) TAB at 08:49

## 2017-04-14 RX ADMIN — VITAMIN D, TAB 1000IU (100/BT) 2000 UNITS: 25 TAB at 08:50

## 2017-04-14 RX ADMIN — SODIUM CHLORIDE 40 MG: 9 INJECTION INTRAMUSCULAR; INTRAVENOUS; SUBCUTANEOUS at 21:28

## 2017-04-14 RX ADMIN — SODIUM CHLORIDE 40 MG: 9 INJECTION INTRAMUSCULAR; INTRAVENOUS; SUBCUTANEOUS at 08:50

## 2017-04-14 RX ADMIN — FERROUS SULFATE TAB 325 MG (65 MG ELEMENTAL FE) 325 MG: 325 (65 FE) TAB at 18:00

## 2017-04-14 NOTE — PROGRESS NOTES
[address]  [address] [facility], [address] [place] [address], [address]      PLAN:  Continue Zosyn wound cultures sensitive to Pip/Tazo  Await MRI of C spine  Cont to increase activity  PT OT  HD per Nephrology        ASSESSMENT:  Admit Date: 4/9/2017   * No surgery found *  * No surgery found *    Principal Problem:    Aundrea-rectal abscess (4/9/2017)         SUBJECTIVE:Patient not seen today after 3 visits to room and patient absent. Last attempt patient in MRI for C spine. Patient VSS afebrile. Wound cultures sensitive to Zosyn. Will cont drain and ABX over weekend. DC planning for early next week. OBJECTIVE:  Constitutional: Alert oriented cooperative patient in no acute distress; appears stated age   Visit Vitals    /63    Pulse 80    Temp 97.3 °F (36.3 °C)    Resp 20    Ht 5' 6\" (1.676 m)    Wt 175 lb 11.2 oz (79.7 kg)    SpO2 100%    BMI 28.36 kg/m2     Eyes:Sclera are clear. ENMT: no external lesions gross hearing normal; no obvious neck masses, no ear or lip lesions  CV: RRR. Normal perfusion  Resp: No JVD. Breathing is  non-labored; no audible wheezing. GI: soft     Musculoskeletal: unremarkable with normal function. No embolic signs or cyanosis.    Neuro:  Oriented; moves all 4; no focal deficits  Psychiatric: normal affect and mood, no memory impairment      Patient Vitals for the past 24 hrs:   BP Temp Pulse Resp SpO2 Weight   04/14/17 1121 107/63 97.3 °F (36.3 °C) 80 - 100 % -   04/14/17 0820 111/54 97.6 °F (36.4 °C) 64 20 97 % -   04/14/17 0730 112/54 97.8 °F (36.6 °C) - - - -   04/14/17 0417 110/58 97.2 °F (36.2 °C) 61 20 98 % -   04/14/17 0014 117/67 97.9 °F (36.6 °C) 63 20 97 % -   04/13/17 2134 - - - - - 175 lb 11.2 oz (79.7 kg)   04/13/17 1956 111/63 98.3 °F (36.8 °C) 69 20 97 % -   04/13/17 1552 110/60 98.2 °F (36.8 °C) 70 20 - -     Labs:  Recent Labs      04/13/17   0608   WBC  4.0*   HGB  7.7*   PLT  109*   NA  140   K  4.0   CL  107   CO2 24   BUN  24*   CREA  4.20*   GLU  76         Carney Hashimoto [place], DO

## 2017-04-14 NOTE — PROGRESS NOTES
Subjective:   Daily Progress Note: 4/14/2017 9:57 AM    No complaints    Current Facility-Administered Medications   Medication Dose Route Frequency    piperacillin-tazobactam (ZOSYN) 3.375 g in 0.9% sodium chloride (MBP/ADV) 100 mL  3.375 g IntraVENous Q12H    metoprolol tartrate (LOPRESSOR) tablet 12.5 mg  12.5 mg Oral Q12H    tbo-filgrastim (GRANIX) injection 300 mcg  300 mcg SubCUTAneous DAILY    polyvinyl alcohol (LIQUIFILM TEARS) 1.4 % ophthalmic solution 1 Drop  1 Drop Both Eyes PRN    vancomycin (VANCOCIN) 1250 mg in  ml infusion  1,250 mg IntraVENous See Admin Instructions    mupirocin (BACTROBAN) 2 % ointment   Topical BID    pantoprazole (PROTONIX) 40 mg in sodium chloride 0.9 % 10 mL injection  40 mg IntraVENous Q12H    sodium chloride (NS) flush 5-10 mL  5-10 mL IntraVENous PRN    epoetin miky (EPOGEN;PROCRIT) injection 20,000 Units  20,000 Units SubCUTAneous Q7D    0.9% sodium chloride infusion 250 mL  250 mL IntraVENous PRN    albuterol (PROVENTIL VENTOLIN) nebulizer solution 2.5 mg  2.5 mg Nebulization Q6H PRN    ferrous sulfate tablet 325 mg  325 mg Oral BID WITH MEALS    cholecalciferol (VITAMIN D3) tablet 2,000 Units  2,000 Units Oral DAILY    magnesium oxide (MAG-OX) tablet 400 mg  400 mg Oral DAILY    sodium chloride (NS) flush 5-10 mL  5-10 mL IntraVENous Q8H    naloxone (NARCAN) injection 0.4 mg  0.4 mg IntraVENous PRN    diphenhydrAMINE (BENADRYL) injection 12.5 mg  12.5 mg IntraVENous Q4H PRN    LORazepam (ATIVAN) injection 1 mg  1 mg IntraVENous Q6H PRN    oxyCODONE-acetaminophen (PERCOCET) 5-325 mg per tablet 2 Tab  2 Tab Oral Q4H PRN    oxyCODONE-acetaminophen (PERCOCET) 5-325 mg per tablet 1 Tab  1 Tab Oral Q4H PRN    HYDROmorphone (PF) (DILAUDID) injection 0.5 mg  0.5 mg IntraVENous Q1H PRN    HYDROmorphone (PF) (DILAUDID) injection 1 mg  1 mg IntraVENous Q1H PRN               Objective:     Visit Vitals    /54 (BP 1 Location: Right arm, BP Patient Position: At rest)    Pulse 64    Temp 97.6 °F (36.4 °C)    Resp 20    Ht 5' 6\" (1.676 m)    Wt 79.7 kg (175 lb 11.2 oz)    SpO2 97%    BMI 28.36 kg/m2    O2 Flow Rate (L/min): 2 l/min O2 Device: Room air    Temp (24hrs), Av.8 °F (36.6 °C), Min:97.2 °F (36.2 °C), Max:98.3 °F (36.8 °C)      701 - 1900  In: -   Out: 150   1901 - 700  In: -   Out: 36 [Urine:900; Drains:10]      Visit Vitals    /54 (BP 1 Location: Right arm, BP Patient Position: At rest)    Pulse 64    Temp 97.6 °F (36.4 °C)    Resp 20    Ht 5' 6\" (1.676 m)    Wt 79.7 kg (175 lb 11.2 oz)    SpO2 97%    BMI 28.36 kg/m2     Head: Normocephalic, without obvious abnormality  Neck: no JVD  Lungs: clear to auscultation bilaterally  Heart: regular rate and rhythm  Abdomen: soft, non-tender.   Extremities:no edema          Data Review    Recent Results (from the past 50 hour(s))   PLEASE READ & DOCUMENT PPD TEST IN 24 HRS    Collection Time: 17  6:16 PM   Result Value Ref Range    PPD  Negative    mm Induration 0 mm   METABOLIC PANEL, BASIC    Collection Time: 17  6:08 AM   Result Value Ref Range    Sodium 140 136 - 145 mmol/L    Potassium 4.0 3.5 - 5.1 mmol/L    Chloride 107 98 - 107 mmol/L    CO2 24 21 - 32 mmol/L    Anion gap 9 7 - 16 mmol/L    Glucose 76 65 - 100 mg/dL    BUN 24 (H) 8 - 23 MG/DL    Creatinine 4.20 (H) 0.8 - 1.5 MG/DL    GFR est AA 18 (L) >60 ml/min/1.73m2    GFR est non-AA 15 (L) >60 ml/min/1.73m2    Calcium 7.7 (L) 8.3 - 10.4 MG/DL   CBC WITH AUTOMATED DIFF    Collection Time: 04/13/17  6:08 AM   Result Value Ref Range    WBC 4.0 (L) 4.3 - 11.1 K/uL    RBC 2.60 (L) 4.23 - 5.67 M/uL    HGB 7.7 (L) 13.6 - 17.2 g/dL    HCT 23.2 (L) 41.1 - 50.3 %    MCV 89.2 79.6 - 97.8 FL    MCH 29.6 26.1 - 32.9 PG    MCHC 33.2 31.4 - 35.0 g/dL    RDW 17.9 (H) 11.9 - 14.6 %    PLATELET 236 (L) 490 - 450 K/uL    MPV 9.2 (L) 10.8 - 14.1 FL    DF AUTOMATED      NEUTROPHILS 81 (H) 43 - 78 % LYMPHOCYTES 13 13 - 44 %    MONOCYTES 5 4.0 - 12.0 %    EOSINOPHILS 1 0.5 - 7.8 %    BASOPHILS 0 0.0 - 2.0 %    IMMATURE GRANULOCYTES 0.2 0.0 - 5.0 %    ABS. NEUTROPHILS 3.2 1.7 - 8.2 K/UL    ABS. LYMPHOCYTES 0.5 0.5 - 4.6 K/UL    ABS. MONOCYTES 0.2 0.1 - 1.3 K/UL    ABS. EOSINOPHILS 0.0 0.0 - 0.8 K/UL    ABS. BASOPHILS 0.0 0.0 - 0.2 K/UL    ABS. IMM. GRANS. 0.0 0.0 - 0.5 K/UL   VANCOMYCIN, RANDOM    Collection Time: 04/13/17  1:34 PM   Result Value Ref Range    VANCOMYCIN,RANDOM 11.6 UG/ML   COLLECTION COMMENT    Collection Time: 04/13/17  1:34 PM   Result Value Ref Range    Collection Comment 2HRS AFTER DIALYSIS    PLEASE READ & DOCUMENT PPD TEST IN 48 HRS    Collection Time: 04/13/17  7:15 PM   Result Value Ref Range    PPD negative Negative    mm Induration 0 mm       Assessment     Patient Active Problem List    Diagnosis Date Noted    Aundrea-rectal abscess 04/09/2017    Symptomatic anemia 03/29/2017    Elevated total protein 03/29/2017    Hyperbilirubinemia 03/29/2017    Dizziness 03/29/2017    ESRD (end stage renal disease) ([facility] [address] 03/29/2017    HIT (heparin-induced thrombocytopenia) ([facility] [address] 10/11/2016    CKD (chronic kidney disease) requiring chronic dialysis ([facility] [address] 10/11/2016    Nocturnal hypoxemia 10/04/2016    Suspected sleep apnea 10/02/2016    Acute blood loss anemia 09/29/2016    Paroxysmal atrial fibrillation (HCC) 09/17/2016    Pleural effusion on left 09/12/2016    Acute hypoactive delirium due to multiple etiologies 09/11/2016    Thrombocytopenia ([address] 09/08/2016    HTN (hypertension) 09/04/2016    GERD (gastroesophageal reflux disease) 09/04/2016    Perirectal abscess 09/04/2016    Alcohol abuse, daily use 09/03/2016    VITO (acute kidney injury) ([address] 09/03/2016    Pancytopenia ([facility] [address] 09/03/2016           Problems Addressed by Nephrology     ESRD    Plan     -- ESRD: Currently off schedule with HD on TTS (Normally MWF). HD tomorrow and get back on outpatient schedule prior to D/c. -- perirectal abscess.    -- Anemia    Madeline Dorbharat Ferguson, NP-c

## 2017-04-14 NOTE — PROGRESS NOTES
Hospitalist Progress Note    2017  Admit Date: 2017  6:24 PM   NAME: Marion Albarran   :  1950   MRN:  [id number]   Attending: Raul Santos DO  PCP:  Leonard Monroe MD    SUBJECTIVE:   This is a 61year old gentleman with history of alcohol use in the past, who was in the hospital on 2016 with septic shock due to a perirectal abscess and Carol's gangrene. He underwent extensive debridement of the perineal area and eventually underwent a diverting colostomy at that time, unfortunately, he went into renal failure and never recovered and is on scheduled HD now ( ), he also has a history of pancytopenia + splenomegaly, he is regularly followed by hematology and was in the hospital 1 week ago , a BM biopsy was done at that time and reported no major findings. Admitted  to the surgical service with sepsis due to a perirectal abscess draining into the gluteal area, he was also anemic with a Hb of 5.5. He has received 2 U of pRBCs . Ceclia Roverto His BP has been borderline low. The hospitalist group was consulted to help with the medical management of Mr. Shaina Castillo. : doing well, and denies any new complaints. States that he has difficulty in moving as he feels very weak. No chest pain, SOB, palpitations, fever, chills or night sweats.     Review of Systems negative with exception of pertinent positives noted above  PHYSICAL EXAM     Visit Vitals    /54    Pulse 61    Temp 97.8 °F (36.6 °C)    Resp 20    Ht 5' 6\" (1.676 m)    Wt 79.7 kg (175 lb 11.2 oz)    SpO2 98%    BMI 28.36 kg/m2      Temp (24hrs), Av.9 °F (36.6 °C), Min:97.2 °F (36.2 °C), Max:98.3 °F (36.8 °C)    Oxygen Therapy  O2 Sat (%):  (98) (17 0730)  Pulse via Oximetry: 66 beats per minute (17 1600)  O2 Device: Room air (17 1615)  O2 Flow Rate (L/min): 2 l/min (17 1520)  ETCO2 (mmHg): 35 mmHg (17 1520)    Intake/Output Summary (Last 24 hours) at 17 0748  Last data filed at 04/13/17 6940   Gross per 24 hour   Intake              170 ml   Output              715 ml   Net             -545 ml      LAB DATA: personally reviewed by myself. General: No acute distress.    Lungs:  CTA Bilaterally. Heart:  Regular rate and rhythm,  No murmur, rub, or gallop  Abdomen: Soft, Non distended, Non tender, Positive bowel sounds, colostomy bag in place with green stool  Extremities: No cyanosis, clubbing or edema, ROM WNL in all 4 ext  Neurologic:  Cranial n 2-12 grossly intact, cerebellar functions intact     ASSESSMENT      Active Hospital Problems    Diagnosis Date Noted    Aundrea-rectal abscess 04/09/2017     Plan:  ·   Bilateral Upper extremity weakness: improved as compared to yesterday. Still has intermittent numbness or tingling. · Will get an MRI of cervical spine. · Sepsis: resolved  · Right perirectal abscess s/p I&D on 4/11, continue with iv vanc/zosyn. Mgmt per sx team.   · ESRD-DD; appreciate nephrology recs. · Pancytopenia - improving. Continue to monitor cbc daily  · Hx of pafib- BP marginally improved. · Hx of HIT - no heparin products  · Debility - pt/ot/ppd. Will likely need Short term rehab.     DVT Prophylaxis: scds    Signed By: Portia Guevara MD     April 14, 2017

## 2017-04-14 NOTE — PROGRESS NOTES
Problem: Mobility Impaired (Adult and Pediatric)  Goal: *Acute Goals and Plan of Care (Insert Text)  _NEW GOALS (UPDATED 4/14/17)  1. Patient will perform bed mobility with independence within 7 day(s). 2. Patient will transfer with modified independence within 7 day(s). 3. Patient will ambulate 100+ ft with modified independence utilizing least restrictive assistive device within 7 day(s). 4. Patient will tolerate 25+ minutes of therapeutic activity/exercise while maintaining stable vitals to improve functional strength and mobility within 7 day(s). 5. Patient will demonstrate good dynamic standing balance throughout functional mobility within 7 day(s).    ----  Previously Met Goals:  (1.)Mr. Pozo will perform bed mobility with MODERATE ASSIST within 3 day(s). MET 4/14/17   (2.)Mr. Pozo will transfer from bed to chair and chair to bed with MAXIMAL ASSIST using the least restrictive device within 3 day(s). MET 4/14/17   (3.)Mr. Pozo will perform bed mobility with MINIMAL ASSIST within 7 day(s). MET 4/14/17   (4.)Mr. Pozo will transfer from bed to chair and chair to bed with MINIMAL ASSIST using the least restrictive device within 7 day(s). MET 4/14/17   ________________________________________________________________      PHYSICAL THERAPY: Daily Note, Treatment Day: 1st and AM    4/14/2017  INPATIENT: Hospital Day: 6  Payor: BLUE CHOICE Romayne Hymen / Plan: [place] BLUE CHOICE Romayne Hymen / Product Type: Managed Care Medicaid /      NAME/AGE/GENDER: Darlene Hernandez is a 61 y.o. male         PRIMARY DIAGNOSIS: Anudrea-rectal abscess Aundrea-rectal abscess Aundrea-rectal abscess        ICD-10: Treatment Diagnosis:       · Generalized Muscle Weakness (M62.81)  · Other abnormalities of gait and mobility (R26.89)   Precaution/Allergies:  Heparin analogues       ASSESSMENT:      Mr. Charlie Gil is a 61year old male admitted with aundrea-rectal absess.  Patient with recent admission secondary to abscess with sepsis, s/p debridement and recent colostomy. Patient supine upon contact and agreeable to PT. Patient performed supine to sit transfer with SBA and sit to stand transfer with CGA and cues to peform both with proper technique. Patient ambulated 2' forward and backward and stated he felt good afterward. Patient then ambulated an additional 15' with rolling walker, CGA, and cues for sequencing. Patient doing much better than initial evaluation so evaluating PT came to bedside to observe patient perform transfer training and update goals. See above goals met during this treatment and new goals established. Patient returned to sitting EOB. After a rest, patient performed seated exercises for LE strengthening to improve functional strength for transfers, gait and overall mobility. Patient states he feels weak and is willing to participate in therapy as often as possible. Patient returned to supine with Min A to get LE's on bed. Will continue efforts. This section established at most recent assessment   PROBLEM LIST (Impairments causing functional limitations):  1. Decreased Strength  2. Decreased ADL/Functional Activities  3. Decreased Transfer Abilities  4. Decreased Ambulation Ability/Technique  5. Decreased Balance  6. Increased Pain  7. Decreased Activity Tolerance  8. Decreased Flexibility/Joint Mobility  9. Decreased Knowledge of Precautions  10. Decreased [place] with Home Exercise Program    INTERVENTIONS PLANNED: (Benefits and precautions of physical therapy have been discussed with the patient.)  1. Balance Exercise  2. Bed Mobility  3. Family Education  4. Gait Training  5. Home Exercise Program (HEP)  6. Neuromuscular Re-education/Strengthening  7. Range of Motion (ROM)  8. Therapeutic Activites  9. Therapeutic Exercise/Strengthening  10. Transfer Training  11.  Group Therapy      TREATMENT PLAN: Frequency/Duration: 3 times a week for duration of hospital stay  Rehabilitation Potential For Stated Goals: Daisy Jennings RECOMMENDED REHABILITATION/EQUIPMENT: (at time of discharge pending progress): Continue Skilled Therapy and Rehab. HISTORY:   History of Present Injury/Illness (Reason for Referral):  Generalized weakness; Decreased mobility   Past Medical History/Comorbidities:   Mr. Fredy Mtz  has a past medical history of Alcohol abuse, daily use (09/03/2016); Anemia (9/3/2016); Chronic kidney disease; HIT (heparin-induced thrombocytopenia) ([facility] [address] Hypertension; Hypokalemia (9/3/2016); and Tobacco abuse (9/3/2016). Mr. Fredy Mtz  has a past surgical history that includes debride necrotic skin/ tissue, abd wall (09/04/2016); abdomen surgery proc unlisted; and other surgical.  Social History/Living Environment:   Home Environment: Private residence  # Steps to Enter: 0  One/Two Story Residence: One story  Living Alone: No  Support Systems: Family member(s) (Brother)  Patient Expects to be Discharged to[de-identified] Unknown  Current DME Used/Available at Home: Wheelchair  Prior Level of Function/Work/Activity:   At baseline, patient has has a decline in functional mobility xweeks primarily utilizing a WC for mobility but able to ambulate short distances last admission transfers with CGA. Lives with his brother in a single story residence and endorses difficulty with ADLs. Number of Personal Factors/Comorbidities that affect the Plan of Care: 1-2: MODERATE COMPLEXITY   EXAMINATION:   Most Recent Physical Functioning:   Gross Assessment:                  Posture:     Balance:  Sitting: Impaired  Sitting - Static: Supported sitting  Sitting - Dynamic: Supported sitting  Standing: Impaired  Standing - Static: Fair  Standing - Dynamic : Fair Bed Mobility:  Supine to Sit: Stand-by asssistance  Sit to Supine: Minimum assistance  Scooting: Stand-by asssistance  Interventions: Visual cues; Verbal cues; Safety awareness training  Wheelchair Mobility:     Transfers:  Sit to Stand: Minimum assistance  Stand to Sit: Contact guard assistance  Interventions: Visual cues; Verbal cues; Safety awareness training; Tactile cues  Gait:     Base of Support: Narrowed  Speed/Kell: Slow  Step Length: Right shortened;Left shortened  Gait Abnormalities: Decreased step clearance  Distance (ft): 10 Feet (ft)  Assistive Device: Walker, rolling;Gait belt  Ambulation - Level of Assistance: Contact guard assistance       Body Structures Involved:  1. Muscles Body Functions Affected:  1. Cardio  2. Neuromusculoskeletal  3. Movement Related  4. Skin Related Activities and Participation Affected:  1. General Tasks and Demands  2. Mobility  3. Self Care  4. Domestic Life   Number of elements that affect the Plan of Care: 4+: HIGH COMPLEXITY   CLINICAL PRESENTATION:   Presentation: Evolving clinical presentation with changing clinical characteristics: MODERATE COMPLEXITY   CLINICAL DECISION MAKIN [facility] Mobility Inpatient Short Form  How much difficulty does the patient currently have. .. Unable A Lot A Little None   1. Turning over in bed (including adjusting bedclothes, sheets and blankets)? [ ] 1   [X] 2   [ ] 3   [ ] 4   2. Sitting down on and standing up from a chair with arms ( e.g., wheelchair, bedside commode, etc.)   [ ] 1   [X] 2   [ ] 3   [ ] 4   3. Moving from lying on back to sitting on the side of the bed? [ ] 1   [X] 2   [ ] 3   [ ] 4   How much help from another person does the patient currently need. .. Total A Lot A Little None   4. Moving to and from a bed to a chair (including a wheelchair)? [X] 1   [ ] 2   [ ] 3   [ ] 4   5. Need to walk in hospital room? [X] 1   [ ] 2   [ ] 3   [ ] 4   6. Climbing 3-5 steps with a railing? [X] 1   [ ] 2   [ ] 3   [ ] 4   © , Trustees of [address] [place] [address] [address] [address], under license to [employer].  All rights reserved    Score:  Initial: 9 Most Recent: 9 (Date: 17 )     Interpretation of Tool:  Represents activities that are increasingly more difficult (i.e. Bed mobility, Transfers, Gait). Score 24 23 22-20 19-15 14-10 9-7 6       Modifier CH CI CJ CK CL CM CN         · Mobility - Walking and Moving Around:               - CURRENT STATUS:    CM - 80%-99% impaired, limited or restricted               - GOAL STATUS:           CK - 40%-59% impaired, limited or restricted               - D/C STATUS:                       ---------------To be determined---------------  Payor: [employer] MEDICA[employer] / Plan: [place] [employer] MEDICA[employer] / Product Type: Managed Care Medicaid /       Medical Necessity:     · Patient demonstrates fair rehab potential due to higher previous functional level. Reason for Services/Other Comments:  · Patient continues to require skilled intervention due to decreased functional mobility and balance/gait status from baseline. .   Use of outcome tool(s) and clinical judgement create a POC that gives a: Questionable prediction of patient's progress: MODERATE COMPLEXITY                 TREATMENT:       Pre-treatment Symptoms/Complaints:    Pain: Initial:   Pain Intensity 1: 0 (before and after treatment)  Post Session:  0/10      Therapeutic Activity: (    20 minutes): Therapeutic activities including bed mobility training, transfer training, Ambulation on level ground, instruction in sequencing with rolling walker, scooting, and patient education   to improve mobility, strength, balance and activity tolerance. Required minimal verbal, visual cues   to promote coordination of bilateral, lower extremity(s) and promote motor control of bilateral, lower extremity(s)    Therapeutic Exercise: ( 13 minutes):  Exercises per grid below to improve mobility, strength and balance. Required minimal visual and verbal cues to promote proper body alignment, promote proper body posture and promote proper body mechanics. Progressed range and repetitions as indicated.        Date:  4/14/17 Date:   Date:     ACTIVITY/EXERCISE AM PM AM PM AM PM   Ambulation:           Distance  Device  Duration         Seated Heel Raises x15B A        Seated Toe Raises x15B A        Seated Long Arc Quads x15B A        Seated Marching x15B A        Seated Hip Abduction                  B = bilateral; AA = active assistive; A = active; P = passive       Braces/Orthotics/Lines/Etc:   · IV and LUISA Drain  Treatment/Session Assessment:    · Response to Treatment: See above. · Interdisciplinary Collaboration:  · Physical Therapist, Physical Therapy Assistant, Registered Nurse and SPTA  · After treatment position/precautions:  · Supine in bed, Bed/Chair-wheels locked, Bed in low position, Call light within reach and RN notified  · Compliance with Program/Exercises: Will assess as treatment progresses. · Recommendations/Intent for next treatment session: \"Next visit will focus on advancements to more challenging activities and reduction in assistance provided\".   Total Treatment Duration:  PT Patient Time In/Time Out  Time In: 0940  Time Out: [facility][1]

## 2017-04-15 PROBLEM — B95.7 STAPHYLOCOCCUS EPIDERMIDIS BACTEREMIA: Status: ACTIVE | Noted: 2017-04-15

## 2017-04-15 PROBLEM — R78.81 STAPHYLOCOCCUS EPIDERMIDIS BACTEREMIA: Status: ACTIVE | Noted: 2017-04-15

## 2017-04-15 LAB
BACTERIA SPEC CULT: ABNORMAL
BASOPHILS # BLD AUTO: 0 K/UL (ref 0–0.2)
BASOPHILS # BLD: 0 % (ref 0–2)
DIFFERENTIAL METHOD BLD: ABNORMAL
EOSINOPHIL # BLD: 0 K/UL (ref 0–0.8)
EOSINOPHIL NFR BLD: 1 % (ref 0.5–7.8)
ERYTHROCYTE [DISTWIDTH] IN BLOOD BY AUTOMATED COUNT: 17.3 % (ref 11.9–14.6)
GRAM STN SPEC: ABNORMAL
HCT VFR BLD AUTO: 29.9 % (ref 41.1–50.3)
HGB BLD-MCNC: 9.8 G/DL (ref 13.6–17.2)
IMM GRANULOCYTES # BLD: 0 K/UL (ref 0–0.5)
IMM GRANULOCYTES NFR BLD AUTO: 0.2 % (ref 0–5)
LYMPHOCYTES # BLD AUTO: 22 % (ref 13–44)
LYMPHOCYTES # BLD: 0.9 K/UL (ref 0.5–4.6)
MCH RBC QN AUTO: 29.9 PG (ref 26.1–32.9)
MCHC RBC AUTO-ENTMCNC: 32.8 G/DL (ref 31.4–35)
MCV RBC AUTO: 91.2 FL (ref 79.6–97.8)
MONOCYTES # BLD: 0.2 K/UL (ref 0.1–1.3)
MONOCYTES NFR BLD AUTO: 4 % (ref 4–12)
NEUTS SEG # BLD: 3 K/UL (ref 1.7–8.2)
NEUTS SEG NFR BLD AUTO: 73 % (ref 43–78)
PLATELET # BLD AUTO: 149 K/UL (ref 150–450)
PMV BLD AUTO: 9.4 FL (ref 10.8–14.1)
RBC # BLD AUTO: 3.28 M/UL (ref 4.23–5.67)
SERVICE CMNT-IMP: ABNORMAL
SERVICE CMNT-IMP: ABNORMAL
VANCOMYCIN SERPL-MCNC: 17.1 UG/ML
WBC # BLD AUTO: 4.2 K/UL (ref 4.3–11.1)

## 2017-04-15 PROCEDURE — 80202 ASSAY OF VANCOMYCIN: CPT | Performed by: SURGERY

## 2017-04-15 PROCEDURE — 74011000250 HC RX REV CODE- 250: Performed by: INTERNAL MEDICINE

## 2017-04-15 PROCEDURE — 36415 COLL VENOUS BLD VENIPUNCTURE: CPT | Performed by: NURSE PRACTITIONER

## 2017-04-15 PROCEDURE — 74011250637 HC RX REV CODE- 250/637: Performed by: SURGERY

## 2017-04-15 PROCEDURE — 77030010541

## 2017-04-15 PROCEDURE — 85025 COMPLETE CBC W/AUTO DIFF WBC: CPT | Performed by: NURSE PRACTITIONER

## 2017-04-15 PROCEDURE — 90935 HEMODIALYSIS ONE EVALUATION: CPT

## 2017-04-15 PROCEDURE — 65270000029 HC RM PRIVATE

## 2017-04-15 PROCEDURE — 74011250637 HC RX REV CODE- 250/637: Performed by: HOSPITALIST

## 2017-04-15 PROCEDURE — 74011250636 HC RX REV CODE- 250/636: Performed by: INTERNAL MEDICINE

## 2017-04-15 PROCEDURE — 74011000258 HC RX REV CODE- 258: Performed by: INTERNAL MEDICINE

## 2017-04-15 PROCEDURE — C9113 INJ PANTOPRAZOLE SODIUM, VIA: HCPCS | Performed by: INTERNAL MEDICINE

## 2017-04-15 PROCEDURE — 74011250636 HC RX REV CODE- 250/636: Performed by: NURSE PRACTITIONER

## 2017-04-15 RX ORDER — VANCOMYCIN HYDROCHLORIDE
1250 ONCE
Status: COMPLETED | OUTPATIENT
Start: 2017-04-15 | End: 2017-04-15

## 2017-04-15 RX ADMIN — TBO-FILGRASTIM 300 MCG: 300 INJECTION, SOLUTION SUBCUTANEOUS at 18:12

## 2017-04-15 RX ADMIN — SODIUM CHLORIDE 40 MG: 9 INJECTION INTRAMUSCULAR; INTRAVENOUS; SUBCUTANEOUS at 11:34

## 2017-04-15 RX ADMIN — PIPERACILLIN SODIUM,TAZOBACTAM SODIUM 3.38 G: 3; .375 INJECTION, POWDER, FOR SOLUTION INTRAVENOUS at 05:58

## 2017-04-15 RX ADMIN — METOPROLOL TARTRATE 12.5 MG: 25 TABLET, FILM COATED ORAL at 21:30

## 2017-04-15 RX ADMIN — Medication 10 ML: at 05:59

## 2017-04-15 RX ADMIN — METOPROLOL TARTRATE 12.5 MG: 25 TABLET, FILM COATED ORAL at 11:35

## 2017-04-15 RX ADMIN — VITAMIN D, TAB 1000IU (100/BT) 2000 UNITS: 25 TAB at 11:34

## 2017-04-15 RX ADMIN — FERROUS SULFATE TAB 325 MG (65 MG ELEMENTAL FE) 325 MG: 325 (65 FE) TAB at 11:34

## 2017-04-15 RX ADMIN — PIPERACILLIN SODIUM,TAZOBACTAM SODIUM 3.38 G: 3; .375 INJECTION, POWDER, FOR SOLUTION INTRAVENOUS at 18:04

## 2017-04-15 RX ADMIN — Medication 400 MG: at 11:34

## 2017-04-15 RX ADMIN — SODIUM CHLORIDE 40 MG: 9 INJECTION INTRAMUSCULAR; INTRAVENOUS; SUBCUTANEOUS at 21:30

## 2017-04-15 RX ADMIN — Medication 10 ML: at 21:32

## 2017-04-15 RX ADMIN — FERROUS SULFATE TAB 325 MG (65 MG ELEMENTAL FE) 325 MG: 325 (65 FE) TAB at 18:04

## 2017-04-15 RX ADMIN — VANCOMYCIN HYDROCHLORIDE 1250 MG: 10 INJECTION, POWDER, LYOPHILIZED, FOR SOLUTION INTRAVENOUS at 18:04

## 2017-04-15 NOTE — DIALYSIS
Hemodialysis treatment initiated using Right CVC accessed by ABBY Washington RN . Aspirated and flushed both ports without problem. Dressing clean, dry and intact. Pt alert and VS stable. Machine settings per MD order. Will monitor during treatment. IV zosyn infusion stopped during HD. Will resume infusion upon tx termination.

## 2017-04-15 NOTE — PROGRESS NOTES
Hospitalist Progress Note    4/15/2017  Admit Date: 2017  6:24 PM   NAME: Deepak Nunes   :  1950   MRN:  [id number]   Attending: Edvin Santiago DO  PCP:  Kenyon Jacob MD    SUBJECTIVE:   This is a 61year old gentleman with history of alcohol use in the past, who was in the hospital on 2016 with septic shock due to a perirectal abscess and Carol's gangrene. He underwent extensive debridement of the perineal area and eventually underwent a diverting colostomy at that time, unfortunately, he went into renal failure and never recovered and is on scheduled HD now ( ), he also has a history of pancytopenia + splenomegaly, he is regularly followed by hematology and was in the hospital 1 week ago , a BM biopsy was done at that time and reported no major findings. Admitted  to the surgical service with sepsis due to a perirectal abscess draining into the gluteal area, he was also anemic with a Hb of 5.5. He has received 2 U of pRBCs . Bhavana Daniels His BP has been borderline low. The hospitalist group was consulted to help with the medical management of Mr. Jacoby Porras. 4/15: No new events. Had MRI c-spine yesterday. Pain is optimally controlled. Denies any chest pain, SOB, palpitations.       Review of Systems negative with exception of pertinent positives noted above  PHYSICAL EXAM     Visit Vitals    /70    Pulse 61    Temp 97.4 °F (36.3 °C)    Resp 20    Ht 5' 6\" (1.676 m)    Wt 80.6 kg (177 lb 11.2 oz)    SpO2 100%    BMI 28.68 kg/m2      Temp (24hrs), Av.6 °F (36.4 °C), Min:97.3 °F (36.3 °C), Max:98.1 °F (36.7 °C)    Oxygen Therapy  O2 Sat (%): 100 % (04/15/17 0339)  Pulse via Oximetry: 66 beats per minute (17 1600)  O2 Device: Room air (17 1615)  O2 Flow Rate (L/min): 2 l/min (17 1520)  ETCO2 (mmHg): 35 mmHg (17 1520)    Intake/Output Summary (Last 24 hours) at 17 0768  Last data filed at 17 1849   Gross per 24 hour   Intake 170 ml   Output              715 ml   Net             -545 ml      LAB DATA: personally reviewed by myself. Wbc 4.2, Hb 9.8    General: No acute distress.    Lungs:  CTA Bilaterally. Heart:  Regular rate and rhythm,  No murmur, rub, or gallop  Abdomen: Soft, Non distended, Non tender, Positive bowel sounds, colostomy bag in place with green stool  Extremities: No cyanosis, clubbing or edema, ROM WNL in all 4 ext  Neurologic:  Cranial n 2-12 grossly intact, cerebellar functions intact     ASSESSMENT      Active Hospital Problems    Diagnosis Date Noted    Aundrea-rectal abscess 04/09/2017     Plan:  ·   Bilateral Upper extremity weakness:           Improved  MRI  Showed mild central canal stenosis at C3-4, C4-5, C6-7 and multilevel neural foraminal narrowing, moderate on the left C3-4. C4-5, C6-7. Neurosurgery eval is pending. · Sepsis: resolved  · Right perirectal abscess s/p I&D on 4/11, continue with iv vanc/zosyn. Mgmt per sx team.   · ESRD-DD; appreciate nephrology recs. · Pancytopenia - improving. Continue to monitor cbc daily  · Hx of pafib- BP marginally improved. · Hx of HIT - no heparin products  · Debility - pt/ot/ppd. Will likely need Short term rehab. DVT Prophylaxis: scds  Hospitalist team will sign off. WBC count has improved with stable H/H. Will be available for further question if necessary. Signed By: Sonny Diaz MD     April 15, 2017    I have spoken to on call NeuroSx attending Dr. Nicole Carlisle, he will evaluate the patient tomorrow.

## 2017-04-15 NOTE — PROGRESS NOTES
Pharmacokinetic Consult to Pharmacist    Rebekah Hutson is a 61 y.o. male being treated for perirectal abscess with vanc/zosyn. Height: 5' 6\" (167.6 cm)  Weight: 80.6 kg (177 lb 11.2 oz)  Lab Results   Component Value Date/Time    BUN 24 04/13/2017 06:08 AM    Creatinine 4.20 04/13/2017 06:08 AM    WBC 4.2 04/15/2017 11:05 AM    Procalcitonin 4.5 04/09/2017 06:40 PM      Estimated Creatinine Clearance: 17.7 mL/min (based on Cr of 4.2). Lab Results   Component Value Date/Time    Vancomycin,trough 42.4 09/05/2016 12:00 PM    VANCOMYCIN,RANDOM 17.1 04/15/2017 02:30 PM       Day 7 of vancomycin. Goal trough is 12-18. Dosing per random post-HD levels. Random post-HD level today resulted at 17.1, so will give 1250 mg X 1. Will plan to check post-HD random level after next scheduled HD session and re-dose if level less than 20. Pharmacy will continue to follow. Please call with any questions.     Thank you,  Vickie Martinez, PharmD  Clinical Pharmacist  [phone]

## 2017-04-15 NOTE — PROGRESS NOTES
Inpatient Hematology / Oncology Progress Note      Admission Date: 2017  6:24 PM  Reason for Admission/Hospital Course: Aundrea-rectal abscess    24 Hour Events:  Counts improving with granix  Continues with rectal pain and scant drainage to LUISA drain        ROS:  Constitutional: Positive for fatigue; negative for fever, chills, weakness   CV: Negative for chest pain, palpitations, edema. Respiratory: Negative for dyspnea, cough, wheezing. GI: Negative for nausea, abdominal pain, diarrhea. 10 point review of systems is otherwise negative with the exception of the elements mentioned above in the HPI. Allergies   Allergen Reactions    Heparin Analogues Other (comments)     H. I.T positive       OBJECTIVE:  Patient Vitals for the past 8 hrs:   BP Temp Pulse Resp SpO2   04/15/17 1248 106/59 97.8 °F (36.6 °C) 72 19 100 %   04/15/17 1031 108/68 - 73 - -   04/15/17 1000 135/67 - 72 - -   04/15/17 0929 117/67 - 62 - -   04/15/17 0857 128/68 - (!) 55 - -   04/15/17 0837 114/70 - 62 - -   04/15/17 0805 123/69 - (!) 58 - -   04/15/17 0730 116/70 - 61 - -   04/15/17 0701 107/84 - (!) 59 - -     Temp (24hrs), Av.6 °F (36.4 °C), Min:97.4 °F (36.3 °C), Max:98.1 °F (36.7 °C)    04/15 0701 - 04/15 1900  In: 120 [P.O.:120]  Out: 2200     Physical Exam:  Constitutional: Well developed, well nourished male in no acute distress, sitting comfortably in the hospital bed. HEENT: Normocephalic and atraumatic. Oropharynx is clear, mucous membranes are moist.    Neck supple     Lymph node   Deferred   Skin Warm and dry. No bruising and no rash noted. No erythema. Mild pallor. Respiratory Lungs are clear to auscultation bilaterally without wheezes, rales or rhonchi, normal air exchange without accessory muscle use. CVS Normal rate, regular rhythm and normal S1 and S2. No murmurs, gallops, or rubs. Abdomen Soft, nontender and nondistended, normoactive bowel sounds. No palpable mass. No hepatosplenomegaly. Neuro Grossly nonfocal with no obvious sensory or motor deficits. MSK Weakness to BLE. No edema and no tenderness. Psych Appropriate mood and affect. Labs:    Recent Labs      04/15/17   1105  04/14/17   1610  04/13/17   0608   WBC  4.2*  5.0  4.0*   RBC  3.28*  2.56*  2.60*   HGB  9.8*  7.7*  7.7*   HCT  29.9*  22.9*  23.2*   MCV  91.2  89.5  89.2   MCH  29.9  30.1  29.6   MCHC  32.8  33.6  33.2   RDW  17.3*  17.8*  17.9*   PLT  149*  126*  109*   GRANS  73  77*  81*   LYMPH  22  16  13   MONOS  4  1*  5   EOS  1   --   1   BASOS  0   --   0   IG  0.2   --   0.2   DF  AUTOMATED  MANUAL  AUTOMATED   ANEU  3.0  4.1  3.2   ABL  0.9  0.8  0.5   ABM  0.2  0.1  0.2   DANGELO  0.0   --   0.0   ABB  0.0   --   0.0   AIG  0.0   --   0.0      Recent Labs      04/13/17   0608   NA  140   K  4.0   CL  107   CO2  24   AGAP  9   GLU  76   BUN  24*   CREA  4.20*   GFRAA  18*   GFRNA  15*   CA  7.7*     Imaging:  CT abd/pevis 4/9/17  FINDINGS:  Abdomen CT: There are tiny bilateral pleural effusions. There is splenomegaly. There are no lesions in the liver or spleen. There are small gallstones. There  is no bile duct dilatation. The adrenal glands and pancreas appear normal.   There is normal enhancement of the kidneys. There is no hydronephrosis. There  is no adenopathy. There are no inflammatory changes or fluid collections in the  abdomen.     Pelvis CT: There is now an air-fluid collection to the left of the mid rectum,  consistent with abscess. It measures 4.4 cm in size, communicates with the area  of inflammation in the left perianal/gluteal region. There is increased  presacral edema. There is no significant adenopathy. Left lower quadrant  ostomy is again noted. There are surgical changes in the right hip.     IMPRESSION  IMPRESSION:   1. The area of inflammation in the perianal region now extends into the left  perirectal region where there is a discrete abscess.   2. Stable splenomegaly.     CT head 4/12/17  IMPRESSION: NO ACUTE INTRACRANIAL ABNORMALITY IDENTIFIED AT NONCONTRAST CT.    CT spine cervical without contrast 4/12/17  FINDINGS: No definite acute fracture or malalignment is identified.     At C3-4, there is a small central disc protrusion without significant  impingement upon the cervical cord.     At C4-5, there is mild central disc bulge as well as mild bony narrowing of the  left foramen.     At C5-6, there is mild left foraminal narrowing.     At C6-7, there is broad-based posterior disc protrusion/spur complex which  results in mild central spinal stenosis. There is also bony narrowing of both  neural foramina with probable impingement upon the exiting C7 nerve roots  bilaterally.     IMPRESSION  IMPRESSION: Multilevel spondylosis is most marked at C6-7, where there is mild  central stenosis as well as bilateral foraminal narrowing with probable C7 nerve  root impingement. Are the upper extremity symptoms consistent with C7  radiculopathies?     MRI Cspine 4/14/17  FINDINGS: The examination is mildly compromised due to motion. The vertebral  body height and alignment are well-maintained. The disc space heights are  well-preserved.     C2-3: Negative     C3-4: A disc-osteophyte complex causes mild central canal stenosis. There is  subtle cord deformity. Uncovertebral joint spurring on the left causes moderate  neural foraminal narrowing. The right neural foramen is patent.     C4-5: A disc-osteophyte complex causes mild central canal stenosis and mild cord  deformity. Uncovertebral joint spurring is present bilaterally. There are also  mild degenerative changes of the left facet joint. There is moderate left  foraminal narrowing. The right neural foramen is minimally narrowed.     C5-6: There our mild degenerative changes of the left facet joint. The left  neural foramen is mildly narrowed as a result. There is no central canal  stenosis.  The right neural foramen is patent.     C6-7: There is a disc-osteophyte complex. There is suspicion for up to mild  central canal stenosis. Uncovertebral joint spurring bilaterally causes moderate  left and mild right neural foraminal narrowing.     C7-T1: Negative     The cervical cord is grossly normal with respect to signal intensity. The  craniocervical junction is unremarkable. The marrow is normal in signal  intensity.     IMPRESSION  IMPRESSION:  1. Mildly compromised examination due to motion. 2. Mild central canal stenosis at C3-4 and C4-5 and possibly at C6-7.  3. Multilevel neural foraminal narrowing, moderate on the left at C3-4, C4-5 and  C6-7.     ASSESSMENT:    Problem List  Date Reviewed: 1/18/2017          Codes Class Noted    Staphylococcus epidermidis bacteremia ICD-10-CM: R78.81  ICD-9-CM: 790.7, 041.19  4/15/2017        * (Principal)Aundrea-rectal abscess ICD-10-CM: K61.1  ICD-9-CM: 566  4/9/2017        Symptomatic anemia ICD-10-CM: D64.9  ICD-9-CM: 285.9  3/29/2017        Elevated total protein ICD-10-CM: R77.8  ICD-9-CM: 790.99  3/29/2017        Hyperbilirubinemia ICD-10-CM: E80.6  ICD-9-CM: 782.4  3/29/2017        Dizziness ICD-10-CM: R42  ICD-9-CM: 780.4  3/29/2017        ESRD (end stage renal disease) ([facility] ICD-10-CM: N18.6  ICD-9-CM: 585.6  3/29/2017        HIT (heparin-induced thrombocytopenia) (HCC) ICD-10-CM: Q81.35  ICD-9-CM: 289.84  10/11/2016        CKD (chronic kidney disease) requiring chronic dialysis (HCC) ICD-10-CM: N18.6, Z99.2  ICD-9-CM: 585.6, V45.11  10/11/2016        Nocturnal hypoxemia (Chronic) ICD-10-CM: G47.34  ICD-9-CM: 327.24  10/4/2016        Suspected sleep apnea ICD-10-CM: G47.30  ICD-9-CM: 780.57  10/2/2016        Acute blood loss anemia ICD-10-CM: D62  ICD-9-CM: 285.1  9/29/2016        Paroxysmal atrial fibrillation (HCC) ICD-10-CM: I48.0  ICD-9-CM: 427.31  9/17/2016        Pleural effusion on left ICD-10-CM: J90  ICD-9-CM: 511.9  9/12/2016        Acute hypoactive delirium due to multiple etiologies ICD-10-CM: F05  ICD-9-CM: 293.0  9/11/2016        Thrombocytopenia ([facility] ICD-10-CM: D69.6  ICD-9-CM: 287.5  9/8/2016        HTN (hypertension) (Chronic) ICD-10-CM: I10  ICD-9-CM: 401.9  9/4/2016        GERD (gastroesophageal reflux disease) (Chronic) ICD-10-CM: K21.9  ICD-9-CM: 530.81  9/4/2016        Alcohol abuse, daily use (Chronic) ICD-10-CM: F10.10  ICD-9-CM: 305.01  9/3/2016        VITO (acute kidney injury) ([facility] ICD-10-CM: N17.9  ICD-9-CM: 584.9  9/3/2016        Pancytopenia ([facility] ICD-10-CM: S14.747  ICD-9-CM: 284.19  9/3/2016    Overview Signed 9/3/2016  8:22 PM by Ashley Noel NP     HGB 7.8 ON ADMISSION                 Mr. 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 He underwent extensive debridement of the perineal area and eventually underwent a diverting colostomy at that time, unfortunately, he went into renal failure and never recovered and is on scheduled HD now ( M-W-F), he also has a history of pancytopenia + splenomegaly. He was seen a week ago by Dr. Christi Lopez while here at hospital, a BM biopsy was done at that time and reported no major findings. Of note he was to follow up with Dr. Darlene Ruiz in clinic and failed to show. He was admitted yesterday to the surgical service with sepsis due to a perirectal abscess draining into the gluteal area, he was also anemic with a Hb of 5.5. He has received 2 U of pRBCs and his Hb is up to 7.4 after that. He on on HD 3 times a week and receives 20,000 unit EPO once weekly. He has postive blood cultures and mrsa + on vancomycin. We are consulted for his pancytopenia.        PLAN:  Pancytopenia  - Bone marrow done 3/31 negative for acute findings. Likely multifactorial - sepsis resolving, hypersplenism. Improved with granix which should help healing of rectal abscess. Last dose of granix tomorrow.     Perirectal abscess  - Continues with pain. Surgery managing. LUISA drain in place    ESRD  - Continues dialysis MWF with neprhology      No further recommendations from hemonc. Needs follow up in about 2 weeks in the clinic with Dr. Jolie Liu. Follow up orders placed. We will sign off. Please call for further questions    Ashish Ledesma NP   [facility] Hematology/Oncology  [address] [place] [address]  Office : [phone]  Fax : [phone]         Attending Addendum:  I personally evaluated the patient with Fuentes Meza N.P.,  and agree with the assessment, findings and plan as documented. Appears stable, he needs to follow-up with Dr. Jolie Liu after discharge.               Toshia Marquez MD  [address] [place] [address]  Office : [phone]  Fax : [phone]

## 2017-04-15 NOTE — PROGRESS NOTES
PLAN:  Neurosurgery consulted for abnormal CT and MRI of neck    Continue IV Vanc/Zosyn   Aundrea-rectal abscess cultures with Proteus and E Coli (resist to quinolones but sens to zosyn  Previous blood culture with Staph Epi (resist to oxacillin)    Cont to increase activity  PT/OT  HD this am (Nephrology following)    ASSESSMENT:  Admit Date: 4/9/2017       Principal Problem:    Aundrea-rectal abscess (4/9/2017)    Active Problems: Thrombocytopenia ([facility] (9/8/2016)      CKD (chronic kidney disease) requiring chronic dialysis ([facility] (10/11/2016)      Staphylococcus epidermidis bacteremia (4/15/2017)         SUBJECTIVE:  No interval changes      OBJECTIVE:    Pt seen while on dialysis (could not assess wound in dialysis unit)  Constitutional: Alert oriented cooperative patient in no acute distress; appears stated age   Visit Vitals    /67    Pulse 72    Temp 97.4 °F (36.3 °C)    Resp 20    Ht 5' 6\" (1.676 m)    Wt 177 lb 11.2 oz (80.6 kg)    SpO2 100%    BMI 28.68 kg/m2     Eyes:Sclera are clear. ENMT: no external lesions gross hearing normal; no obvious neck masses, no ear or lip lesions  CV: RRR. Normal perfusion  Resp: No JVD. Breathing is  non-labored; no audible wheezing. GI: soft and non-distended     Musculoskeletal: unremarkable with normal function. No embolic signs or cyanosis.    Neuro:  Oriented; moves all 4; no focal deficits  Psychiatric: normal affect and mood, no memory impairment      Patient Vitals for the past 24 hrs:   BP Temp Pulse Resp SpO2 Weight   04/15/17 1000 135/67 - 72 - - -   04/15/17 0929 117/67 - 62 - - -   04/15/17 0857 128/68 - (!) 55 - - -   04/15/17 0837 114/70 - 62 - - -   04/15/17 0805 123/69 - (!) 58 - - -   04/15/17 0730 116/70 - 61 - - -   04/15/17 0701 107/84 - (!) 59 - - -   04/15/17 0535 - - - - - 177 lb 11.2 oz (80.6 kg)   04/15/17 0339 135/65 97.4 °F (36.3 °C) 62 20 100 % -   04/15/17 0012 134/62 97.4 °F (36.3 °C) 62 18 99 % -   04/14/17 1938 120/58 97.5 °F (36.4 °C) 63 20 99 % -   04/14/17 1451 114/61 98.1 °F (36.7 °C) 64 - 100 % -   04/14/17 1121 107/63 97.3 °F (36.3 °C) 80 - 100 % -     Labs:    Recent Labs      04/14/17   1610  04/13/17   0608   WBC  5.0  4.0*   HGB  7.7*  7.7*   PLT  126*  109*   NA   --   140   K   --   4.0   CL   --   107   CO2   --   24   BUN   --   24*   CREA   --   4.20*   GLU   --   76

## 2017-04-15 NOTE — DIALYSIS
Off dialysis at 1031 and 2.2 Kg's removed. Right perma Catheter flushed with 10 ml NS per protocol. Vital signs  HR=73, KW=024/68 . Pt noted alert and oriented. Pt to room after treatment completed.

## 2017-04-15 NOTE — PROGRESS NOTES
Pt back to room after dialysis. Awake in bed with no complaints. Denies pain. Rectal drain in place with minimal serosang output. No surrounding cellulitis. Mild exterior drainage with no fluctuance.

## 2017-04-15 NOTE — PROGRESS NOTES
[facility]    Follow-Up on:4/15/17    HPI:pt seen on HD tolerating it well  ROS:  Denies CP, SOB.     Current Facility-Administered Medications   Medication Dose Route Frequency    piperacillin-tazobactam (ZOSYN) 3.375 g in 0.9% sodium chloride (MBP/ADV) 100 mL  3.375 g IntraVENous Q12H    metoprolol tartrate (LOPRESSOR) tablet 12.5 mg  12.5 mg Oral Q12H    tbo-filgrastim (GRANIX) injection 300 mcg  300 mcg SubCUTAneous DAILY    polyvinyl alcohol (LIQUIFILM TEARS) 1.4 % ophthalmic solution 1 Drop  1 Drop Both Eyes PRN    vancomycin (VANCOCIN) 1250 mg in  ml infusion  1,250 mg IntraVENous See Admin Instructions    mupirocin (BACTROBAN) 2 % ointment   Topical BID    pantoprazole (PROTONIX) 40 mg in sodium chloride 0.9 % 10 mL injection  40 mg IntraVENous Q12H    sodium chloride (NS) flush 5-10 mL  5-10 mL IntraVENous PRN    epoetin miky (EPOGEN;PROCRIT) injection 20,000 Units  20,000 Units SubCUTAneous Q7D    0.9% sodium chloride infusion 250 mL  250 mL IntraVENous PRN    albuterol (PROVENTIL VENTOLIN) nebulizer solution 2.5 mg  2.5 mg Nebulization Q6H PRN    ferrous sulfate tablet 325 mg  325 mg Oral BID WITH MEALS    cholecalciferol (VITAMIN D3) tablet 2,000 Units  2,000 Units Oral DAILY    magnesium oxide (MAG-OX) tablet 400 mg  400 mg Oral DAILY    sodium chloride (NS) flush 5-10 mL  5-10 mL IntraVENous Q8H    naloxone (NARCAN) injection 0.4 mg  0.4 mg IntraVENous PRN    diphenhydrAMINE (BENADRYL) injection 12.5 mg  12.5 mg IntraVENous Q4H PRN    LORazepam (ATIVAN) injection 1 mg  1 mg IntraVENous Q6H PRN    oxyCODONE-acetaminophen (PERCOCET) 5-325 mg per tablet 2 Tab  2 Tab Oral Q4H PRN    oxyCODONE-acetaminophen (PERCOCET) 5-325 mg per tablet 1 Tab  1 Tab Oral Q4H PRN    HYDROmorphone (PF) (DILAUDID) injection 0.5 mg  0.5 mg IntraVENous Q1H PRN    HYDROmorphone (PF) (DILAUDID) injection 1 mg  1 mg IntraVENous Q1H PRN       Exam:  Vitals:    04/15/17 0701 04/15/17 0730 04/15/17 0805 04/15/17 0837   BP: 107/84 116/70 123/69 (P) 114/70   Pulse: (!) 59 61 (!) 58 (P) 62   Resp:       Temp:       SpO2:       Weight:       Height:             Intake/Output Summary (Last 24 hours) at 04/15/17 0843  Last data filed at 04/15/17 0021   Gross per 24 hour   Intake                0 ml   Output              300 ml   Net             -300 ml     PE:  GEN - in no distress  CV - regular, no murmur, no rub  Lung - clear bilaterally  Abd - soft, nontender  Ext - no edema    Labs  Recent Labs      04/14/17   1610  04/13/17   0608   WBC  5.0  4.0*   HGB  7.7*  7.7*   HCT  22.9*  23.2*   PLT  126*  109*     Recent Labs      04/13/17   0608   NA  140   K  4.0   CL  107   CO2  24   BUN  24*   CREA  4.20*   GLU  76   CA  7.7*     No results for input(s): PH, PCO2, PO2, PCO2 in the last 72 hours. Problem List:  Patient Active Problem List    Diagnosis Date Noted    Aundrea-rectal abscess 04/09/2017    Symptomatic anemia 03/29/2017    Elevated total protein 03/29/2017    Hyperbilirubinemia 03/29/2017    Dizziness 03/29/2017    ESRD (end stage renal disease) ([facility] 03/29/2017    HIT (heparin-induced thrombocytopenia) ([facility] 10/11/2016    CKD (chronic kidney disease) requiring chronic dialysis ([facility] 10/11/2016    Nocturnal hypoxemia 10/04/2016    Suspected sleep apnea 10/02/2016    Acute blood loss anemia 09/29/2016    Paroxysmal atrial fibrillation (HCC) 09/17/2016    Pleural effusion on left 09/12/2016    Acute hypoactive delirium due to multiple etiologies 09/11/2016    Thrombocytopenia ([address] 09/08/2016    HTN (hypertension) 09/04/2016    GERD (gastroesophageal reflux disease) 09/04/2016    Perirectal abscess 09/04/2016    Alcohol abuse, daily use 09/03/2016    VITO (acute kidney injury) ([address] 09/03/2016    Pancytopenia ([facility] 09/03/2016       Issues Addressed By Nephrology:    Plan:  1. Stage IV decubitus  2. Colostomy  3. Malnutrition  4. High transit GI time  5. ESRD HD TTS  6.  Perirectal abscess s/p drain  7.  Disposition will need NH upon discharge

## 2017-04-16 LAB
BASOPHILS # BLD AUTO: 0 K/UL (ref 0–0.2)
BASOPHILS # BLD: 0 % (ref 0–2)
DIFFERENTIAL METHOD BLD: ABNORMAL
EOSINOPHIL # BLD: 0 K/UL (ref 0–0.8)
EOSINOPHIL NFR BLD: 1 % (ref 0.5–7.8)
ERYTHROCYTE [DISTWIDTH] IN BLOOD BY AUTOMATED COUNT: 17.6 % (ref 11.9–14.6)
HCT VFR BLD AUTO: 24 % (ref 41.1–50.3)
HGB BLD-MCNC: 7.9 G/DL (ref 13.6–17.2)
IMM GRANULOCYTES # BLD: 0 K/UL (ref 0–0.5)
IMM GRANULOCYTES NFR BLD AUTO: 0.8 % (ref 0–5)
LYMPHOCYTES # BLD AUTO: 14 % (ref 13–44)
LYMPHOCYTES # BLD: 0.7 K/UL (ref 0.5–4.6)
MCH RBC QN AUTO: 30 PG (ref 26.1–32.9)
MCHC RBC AUTO-ENTMCNC: 32.9 G/DL (ref 31.4–35)
MCV RBC AUTO: 91.3 FL (ref 79.6–97.8)
MONOCYTES # BLD: 0.2 K/UL (ref 0.1–1.3)
MONOCYTES NFR BLD AUTO: 4 % (ref 4–12)
NEUTS SEG # BLD: 3.9 K/UL (ref 1.7–8.2)
NEUTS SEG NFR BLD AUTO: 80 % (ref 43–78)
PLATELET # BLD AUTO: 128 K/UL (ref 150–450)
PMV BLD AUTO: 9.4 FL (ref 10.8–14.1)
RBC # BLD AUTO: 2.63 M/UL (ref 4.23–5.67)
WBC # BLD AUTO: 4.9 K/UL (ref 4.3–11.1)

## 2017-04-16 PROCEDURE — 74011250637 HC RX REV CODE- 250/637: Performed by: HOSPITALIST

## 2017-04-16 PROCEDURE — 85025 COMPLETE CBC W/AUTO DIFF WBC: CPT | Performed by: NURSE PRACTITIONER

## 2017-04-16 PROCEDURE — 74011250636 HC RX REV CODE- 250/636: Performed by: NURSE PRACTITIONER

## 2017-04-16 PROCEDURE — 74011250636 HC RX REV CODE- 250/636: Performed by: INTERNAL MEDICINE

## 2017-04-16 PROCEDURE — 74011250637 HC RX REV CODE- 250/637: Performed by: SURGERY

## 2017-04-16 PROCEDURE — C9113 INJ PANTOPRAZOLE SODIUM, VIA: HCPCS | Performed by: INTERNAL MEDICINE

## 2017-04-16 PROCEDURE — 74011000258 HC RX REV CODE- 258: Performed by: INTERNAL MEDICINE

## 2017-04-16 PROCEDURE — 74011000250 HC RX REV CODE- 250: Performed by: INTERNAL MEDICINE

## 2017-04-16 PROCEDURE — 65270000029 HC RM PRIVATE

## 2017-04-16 PROCEDURE — 36415 COLL VENOUS BLD VENIPUNCTURE: CPT | Performed by: NURSE PRACTITIONER

## 2017-04-16 RX ADMIN — ERYTHROPOIETIN 20000 UNITS: 20000 INJECTION, SOLUTION INTRAVENOUS; SUBCUTANEOUS at 21:20

## 2017-04-16 RX ADMIN — Medication 400 MG: at 09:00

## 2017-04-16 RX ADMIN — SODIUM CHLORIDE 40 MG: 9 INJECTION INTRAMUSCULAR; INTRAVENOUS; SUBCUTANEOUS at 21:22

## 2017-04-16 RX ADMIN — Medication 10 ML: at 06:00

## 2017-04-16 RX ADMIN — OXYCODONE HYDROCHLORIDE AND ACETAMINOPHEN 2 TABLET: 5; 325 TABLET ORAL at 21:22

## 2017-04-16 RX ADMIN — Medication 5 ML: at 14:00

## 2017-04-16 RX ADMIN — METOPROLOL TARTRATE 12.5 MG: 25 TABLET, FILM COATED ORAL at 21:15

## 2017-04-16 RX ADMIN — SODIUM CHLORIDE 40 MG: 9 INJECTION INTRAMUSCULAR; INTRAVENOUS; SUBCUTANEOUS at 09:00

## 2017-04-16 RX ADMIN — FERROUS SULFATE TAB 325 MG (65 MG ELEMENTAL FE) 325 MG: 325 (65 FE) TAB at 17:00

## 2017-04-16 RX ADMIN — FERROUS SULFATE TAB 325 MG (65 MG ELEMENTAL FE) 325 MG: 325 (65 FE) TAB at 08:00

## 2017-04-16 RX ADMIN — TBO-FILGRASTIM 300 MCG: 300 INJECTION, SOLUTION SUBCUTANEOUS at 09:00

## 2017-04-16 RX ADMIN — PIPERACILLIN SODIUM,TAZOBACTAM SODIUM 3.38 G: 3; .375 INJECTION, POWDER, FOR SOLUTION INTRAVENOUS at 05:51

## 2017-04-16 RX ADMIN — PIPERACILLIN SODIUM,TAZOBACTAM SODIUM 3.38 G: 3; .375 INJECTION, POWDER, FOR SOLUTION INTRAVENOUS at 18:00

## 2017-04-16 RX ADMIN — Medication 10 ML: at 21:22

## 2017-04-16 RX ADMIN — VITAMIN D, TAB 1000IU (100/BT) 2000 UNITS: 25 TAB at 09:00

## 2017-04-16 RX ADMIN — METOPROLOL TARTRATE 12.5 MG: 25 TABLET, FILM COATED ORAL at 09:00

## 2017-04-16 NOTE — PROGRESS NOTES
PLAN:  Continue IV Vanc/Zosyn   Aundrea-rectal abscess cultures with Proteus and E Coli (resist to quinolones but sens to zosyn  Previous blood culture with Staph Epi (resist to oxacillin)    Cont to increase activity  PT/OT  HD last on 4/15/17 (Nephrology following)    ASSESSMENT:  Admit Date: 4/9/2017       Principal Problem:    Aundrea-rectal abscess (4/9/2017)    Active Problems: Thrombocytopenia ([facility] (9/8/2016)      CKD (chronic kidney disease) requiring chronic dialysis ([facility] (10/11/2016)      Staphylococcus epidermidis bacteremia (4/15/2017)         SUBJECTIVE:  No interval changes      OBJECTIVE:    Pt seen while on dialysis (could not assess wound in dialysis unit)  Constitutional: Alert oriented cooperative patient in no acute distress; appears stated age   Visit Vitals    /75 (BP 1 Location: Right arm, BP Patient Position: At rest)    Pulse 62    Temp 97.7 °F (36.5 °C)    Resp 16    Ht 5' 6\" (1.676 m)    Wt 184 lb 1.6 oz (83.5 kg)    SpO2 99%    BMI 29.71 kg/m2     Eyes:Sclera are clear. ENMT: no external lesions gross hearing normal; no obvious neck masses, no ear or lip lesions  CV: RRR. Normal perfusion  Resp: No JVD. Breathing is  non-labored; no audible wheezing. GI: soft and non-distended     Musculoskeletal: unremarkable with normal function. No embolic signs or cyanosis.    Gluteal drain intact; no visible buttock cellulitis or fluctuance  Neuro:  Oriented; moves all 4; no focal deficits  Psychiatric: normal affect and mood, no memory impairment      Patient Vitals for the past 24 hrs:   BP Temp Pulse Resp SpO2 Weight   04/16/17 0754 111/75 97.7 °F (36.5 °C) 62 16 99 % -   04/16/17 0546 120/62 97.7 °F (36.5 °C) 61 18 92 % -   04/16/17 0019 - - - - - 184 lb 1.6 oz (83.5 kg)   04/15/17 2305 117/57 98.2 °F (36.8 °C) 61 20 96 % -   04/15/17 2008 112/66 98.6 °F (37 °C) 66 18 94 % -   04/15/17 1709 113/64 97.3 °F (36.3 °C) 66 20 99 % -   04/15/17 1248 106/59 97.8 °F (36.6 °C) 72 19 100 % -     Labs:    Recent Labs      04/16/17   0708   WBC  4.9   HGB  7.9*   PLT  128*

## 2017-04-16 NOTE — PROGRESS NOTES
[facility]    Follow-Up on:4/16/17    HPI:pt seen nothing new  ROS:  Denies CP, SOB.     Current Facility-Administered Medications   Medication Dose Route Frequency    piperacillin-tazobactam (ZOSYN) 3.375 g in 0.9% sodium chloride (MBP/ADV) 100 mL  3.375 g IntraVENous Q12H    metoprolol tartrate (LOPRESSOR) tablet 12.5 mg  12.5 mg Oral Q12H    polyvinyl alcohol (LIQUIFILM TEARS) 1.4 % ophthalmic solution 1 Drop  1 Drop Both Eyes PRN    vancomycin (VANCOCIN) 1250 mg in  ml infusion  1,250 mg IntraVENous See Admin Instructions    pantoprazole (PROTONIX) 40 mg in sodium chloride 0.9 % 10 mL injection  40 mg IntraVENous Q12H    sodium chloride (NS) flush 5-10 mL  5-10 mL IntraVENous PRN    epoetin miky (EPOGEN;PROCRIT) injection 20,000 Units  20,000 Units SubCUTAneous Q7D    0.9% sodium chloride infusion 250 mL  250 mL IntraVENous PRN    albuterol (PROVENTIL VENTOLIN) nebulizer solution 2.5 mg  2.5 mg Nebulization Q6H PRN    ferrous sulfate tablet 325 mg  325 mg Oral BID WITH MEALS    cholecalciferol (VITAMIN D3) tablet 2,000 Units  2,000 Units Oral DAILY    magnesium oxide (MAG-OX) tablet 400 mg  400 mg Oral DAILY    sodium chloride (NS) flush 5-10 mL  5-10 mL IntraVENous Q8H    naloxone (NARCAN) injection 0.4 mg  0.4 mg IntraVENous PRN    diphenhydrAMINE (BENADRYL) injection 12.5 mg  12.5 mg IntraVENous Q4H PRN    LORazepam (ATIVAN) injection 1 mg  1 mg IntraVENous Q6H PRN    oxyCODONE-acetaminophen (PERCOCET) 5-325 mg per tablet 2 Tab  2 Tab Oral Q4H PRN    oxyCODONE-acetaminophen (PERCOCET) 5-325 mg per tablet 1 Tab  1 Tab Oral Q4H PRN    HYDROmorphone (PF) (DILAUDID) injection 0.5 mg  0.5 mg IntraVENous Q1H PRN    HYDROmorphone (PF) (DILAUDID) injection 1 mg  1 mg IntraVENous Q1H PRN       Exam:  Vitals:    04/15/17 2305 04/16/17 0019 04/16/17 0546 04/16/17 0754   BP: 117/57  120/62 111/75   Pulse: 61  61 62   Resp: 20  18 16   Temp: 98.2 °F (36.8 °C)  97.7 °F (36.5 °C) 97.7 °F (36.5 °C)   SpO2: 96%  92% 99%   Weight:  83.5 kg (184 lb 1.6 oz)     Height:             Intake/Output Summary (Last 24 hours) at 04/16/17 1124  Last data filed at 04/16/17 0921   Gross per 24 hour   Intake              120 ml   Output             1090 ml   Net             -970 ml     PE:  GEN - in no distress  CV - regular, no murmur, no rub  Lung - clear bilaterally  Abd - soft, nontender  Ext - no edema    Labs  Recent Labs      04/16/17   0708  04/15/17   1105  04/14/17   1610   WBC  4.9  4.2*  5.0   HGB  7.9*  9.8*  7.7*   HCT  24.0*  29.9*  22.9*   PLT  128*  149*  126*     No results for input(s): NA, K, CL, CO2, BUN, CREA, GLU, CA, MG, PHOS in the last 72 hours. No results for input(s): PH, PCO2, PO2, PCO2 in the last 72 hours. Problem List:  Patient Active Problem List    Diagnosis Date Noted    Staphylococcus epidermidis bacteremia 04/15/2017    Aundrea-rectal abscess 04/09/2017    Symptomatic anemia 03/29/2017    Elevated total protein 03/29/2017    Hyperbilirubinemia 03/29/2017    Dizziness 03/29/2017    ESRD (end stage renal disease) ([facility] 03/29/2017    HIT (heparin-induced thrombocytopenia) ([facility] 10/11/2016    CKD (chronic kidney disease) requiring chronic dialysis ([facility] 10/11/2016    Nocturnal hypoxemia 10/04/2016    Suspected sleep apnea 10/02/2016    Acute blood loss anemia 09/29/2016    Paroxysmal atrial fibrillation (HCC) 09/17/2016    Pleural effusion on left 09/12/2016    Acute hypoactive delirium due to multiple etiologies 09/11/2016    Thrombocytopenia ([address] 09/08/2016    HTN (hypertension) 09/04/2016    GERD (gastroesophageal reflux disease) 09/04/2016    Alcohol abuse, daily use 09/03/2016    VITO (acute kidney injury) ([facility] 09/03/2016    Pancytopenia ([facility] 09/03/2016       Issues Addressed By Nephrology:    Plan:  1. Stage IV decubitus  2. Colostomy  3. Malnutrition  4. High transit GI time  5. ESRD HD TTS  6. Perirectal abscess s/p drain  7.  Disposition will need NH upon discharge

## 2017-04-16 NOTE — PROGRESS NOTES
Chart reviewed, MRI images reviewed. No surgical lesion noted in cervical spine, no neurosurgical evaluation needed. Please call with new relevant problems. Thanks!

## 2017-04-17 LAB
BASOPHILS # BLD AUTO: 0 K/UL (ref 0–0.2)
BASOPHILS # BLD: 0 % (ref 0–2)
DIFFERENTIAL METHOD BLD: ABNORMAL
EOSINOPHIL # BLD: 0 K/UL (ref 0–0.8)
EOSINOPHIL NFR BLD: 1 % (ref 0.5–7.8)
ERYTHROCYTE [DISTWIDTH] IN BLOOD BY AUTOMATED COUNT: 18 % (ref 11.9–14.6)
HCT VFR BLD AUTO: 22.5 % (ref 41.1–50.3)
HGB BLD-MCNC: 7.5 G/DL (ref 13.6–17.2)
IMM GRANULOCYTES # BLD: 0 K/UL (ref 0–0.5)
IMM GRANULOCYTES NFR BLD AUTO: 0.3 % (ref 0–5)
LYMPHOCYTES # BLD AUTO: 26 % (ref 13–44)
LYMPHOCYTES # BLD: 0.8 K/UL (ref 0.5–4.6)
MCH RBC QN AUTO: 30 PG (ref 26.1–32.9)
MCHC RBC AUTO-ENTMCNC: 33.3 G/DL (ref 31.4–35)
MCV RBC AUTO: 90 FL (ref 79.6–97.8)
MONOCYTES # BLD: 0.2 K/UL (ref 0.1–1.3)
MONOCYTES NFR BLD AUTO: 5 % (ref 4–12)
NEUTS SEG # BLD: 2.1 K/UL (ref 1.7–8.2)
NEUTS SEG NFR BLD AUTO: 68 % (ref 43–78)
PLATELET # BLD AUTO: 125 K/UL (ref 150–450)
PMV BLD AUTO: 9.4 FL (ref 10.8–14.1)
RBC # BLD AUTO: 2.5 M/UL (ref 4.23–5.67)
WBC # BLD AUTO: 3.1 K/UL (ref 4.3–11.1)

## 2017-04-17 PROCEDURE — 74011250637 HC RX REV CODE- 250/637: Performed by: SURGERY

## 2017-04-17 PROCEDURE — 97150 GROUP THERAPEUTIC PROCEDURES: CPT

## 2017-04-17 PROCEDURE — C9113 INJ PANTOPRAZOLE SODIUM, VIA: HCPCS | Performed by: INTERNAL MEDICINE

## 2017-04-17 PROCEDURE — 65270000029 HC RM PRIVATE

## 2017-04-17 PROCEDURE — 97530 THERAPEUTIC ACTIVITIES: CPT

## 2017-04-17 PROCEDURE — 74011250636 HC RX REV CODE- 250/636: Performed by: INTERNAL MEDICINE

## 2017-04-17 PROCEDURE — 74011000250 HC RX REV CODE- 250: Performed by: INTERNAL MEDICINE

## 2017-04-17 PROCEDURE — 85025 COMPLETE CBC W/AUTO DIFF WBC: CPT | Performed by: SURGERY

## 2017-04-17 PROCEDURE — 74011000258 HC RX REV CODE- 258: Performed by: INTERNAL MEDICINE

## 2017-04-17 PROCEDURE — 36415 COLL VENOUS BLD VENIPUNCTURE: CPT | Performed by: SURGERY

## 2017-04-17 PROCEDURE — 74011250637 HC RX REV CODE- 250/637: Performed by: HOSPITALIST

## 2017-04-17 RX ADMIN — Medication 10 ML: at 05:59

## 2017-04-17 RX ADMIN — SODIUM CHLORIDE 40 MG: 9 INJECTION INTRAMUSCULAR; INTRAVENOUS; SUBCUTANEOUS at 08:58

## 2017-04-17 RX ADMIN — Medication 5 ML: at 14:55

## 2017-04-17 RX ADMIN — PIPERACILLIN SODIUM,TAZOBACTAM SODIUM 3.38 G: 3; .375 INJECTION, POWDER, FOR SOLUTION INTRAVENOUS at 19:32

## 2017-04-17 RX ADMIN — METOPROLOL TARTRATE 12.5 MG: 25 TABLET, FILM COATED ORAL at 23:12

## 2017-04-17 RX ADMIN — Medication 10 ML: at 23:12

## 2017-04-17 RX ADMIN — SODIUM CHLORIDE 40 MG: 9 INJECTION INTRAMUSCULAR; INTRAVENOUS; SUBCUTANEOUS at 23:13

## 2017-04-17 RX ADMIN — PIPERACILLIN SODIUM,TAZOBACTAM SODIUM 3.38 G: 3; .375 INJECTION, POWDER, FOR SOLUTION INTRAVENOUS at 05:59

## 2017-04-17 RX ADMIN — Medication 400 MG: at 08:56

## 2017-04-17 RX ADMIN — FERROUS SULFATE TAB 325 MG (65 MG ELEMENTAL FE) 325 MG: 325 (65 FE) TAB at 08:56

## 2017-04-17 RX ADMIN — METOPROLOL TARTRATE 12.5 MG: 25 TABLET, FILM COATED ORAL at 08:56

## 2017-04-17 RX ADMIN — Medication 5 ML: at 19:40

## 2017-04-17 RX ADMIN — FERROUS SULFATE TAB 325 MG (65 MG ELEMENTAL FE) 325 MG: 325 (65 FE) TAB at 19:33

## 2017-04-17 RX ADMIN — VITAMIN D, TAB 1000IU (100/BT) 2000 UNITS: 25 TAB at 08:55

## 2017-04-17 NOTE — PROGRESS NOTES
Received message of auth completed for [facility] NP notified can go today if medically ready.  States will inform MD. Campos going to room 408, report number [phone],

## 2017-04-17 NOTE — PROGRESS NOTES
Problem: Mobility Impaired (Adult and Pediatric)  Goal: *Acute Goals and Plan of Care (Insert Text)  NEW GOALS (UPDATED 4/14/17)  1. Patient will perform bed mobility with independence within 7 day(s). 2. Patient will transfer with modified independence within 7 day(s). 3. Patient will ambulate 100+ ft with modified independence utilizing least restrictive assistive device within 7 day(s). 4. Patient will tolerate 25+ minutes of therapeutic activity/exercise while maintaining stable vitals to improve functional strength and mobility within 7 day(s). 5. Patient will demonstrate good dynamic standing balance throughout functional mobility within 7 day(s).    ----  Previously Met Goals:  (1.)Mr. Pozo will perform bed mobility with MODERATE ASSIST within 3 day(s). MET 4/14/17   (2.)Mr. Pozo will transfer from bed to chair and chair to bed with MAXIMAL ASSIST using the least restrictive device within 3 day(s). MET 4/14/17   (3.)Mr. Pozo will perform bed mobility with MINIMAL ASSIST within 7 day(s). MET 4/14/17   (4.)Mr. Pozo will transfer from bed to chair and chair to bed with MINIMAL ASSIST using the least restrictive device within 7 day(s). MET 4/14/17   ________________________________________________________________      PHYSICAL THERAPY: Daily Note, Treatment Day: 2nd and PM    4/17/2017  INPATIENT: Hospital Day: 9  Payor: BLUE CHOICE Nannette Issa / Plan: [place] BLUE CHOICE Nannette Issa / Product Type: Managed Care Medicaid /      NAME/AGE/GENDER: Zaid Hunt is a 61 y.o. male         PRIMARY DIAGNOSIS: Aundrea-rectal abscess Aundrea-rectal abscess Aundrea-rectal abscess        ICD-10: Treatment Diagnosis:       · Generalized Muscle Weakness (M62.81)  · Other abnormalities of gait and mobility (R26.89)   Precaution/Allergies:  Heparin analogues       ASSESSMENT:      Mr. Rosa Oliva is a 61year old male admitted with aundrea-rectal absess.  Patient with recent admission secondary to abscess with sepsis, s/p debridement and recent colostomy. Patient seen this PM for physical therapy treatment session to assist back into bed per staff request. Patient present sitting up in bedside chair, endorses acute on chronic buttock pain secondary to sitting; reviewed that RN staff can assist patient back into bed when ready; patient perseverating on PT staff assisting. Scooted with SBA, transferred with CGA, addressed standing dynamic balance activity, and ambulation x5ft from chair to bed with RW and CGA, sit to supine with CGA. Good progress continues today requiring less assistance with mobility. Patient comfortable and needs met at end of treatment session. Continue with plan of care. This section established at most recent assessment   PROBLEM LIST (Impairments causing functional limitations):  1. Decreased Strength  2. Decreased ADL/Functional Activities  3. Decreased Transfer Abilities  4. Decreased Ambulation Ability/Technique  5. Decreased Balance  6. Increased Pain  7. Decreased Activity Tolerance  8. Decreased Flexibility/Joint Mobility  9. Decreased Knowledge of Precautions  10. Decreased [place] with Home Exercise Program    INTERVENTIONS PLANNED: (Benefits and precautions of physical therapy have been discussed with the patient.)  1. Balance Exercise  2. Bed Mobility  3. Family Education  4. Gait Training  5. Home Exercise Program (HEP)  6. Neuromuscular Re-education/Strengthening  7. Range of Motion (ROM)  8. Therapeutic Activites  9. Therapeutic Exercise/Strengthening  10. Transfer Training  11. Group Therapy      TREATMENT PLAN: Frequency/Duration: 3 times a week for duration of hospital stay  Rehabilitation Potential For Stated Goals: FAIR      RECOMMENDED REHABILITATION/EQUIPMENT: (at time of discharge pending progress): Continue Skilled Therapy and Rehab.                    HISTORY:   History of Present Injury/Illness (Reason for Referral):  Generalized weakness; Decreased mobility   Past Medical History/Comorbidities:   Mr. Charlie Gil  has a past medical history of Alcohol abuse, daily use (09/03/2016); Anemia (9/3/2016); Chronic kidney disease; HIT (heparin-induced thrombocytopenia) ([facility] [address] Hypertension; Hypokalemia (9/3/2016); and Tobacco abuse (9/3/2016). Mr. Charlie Gil  has a past surgical history that includes debride necrotic skin/ tissue, abd wall (09/04/2016); abdomen surgery proc unlisted; and other surgical.  Social History/Living Environment:   Home Environment: Private residence  # Steps to Enter: 0  One/Two Story Residence: One story  Living Alone: No  Support Systems: Family member(s) (Brother)  Patient Expects to be Discharged to[de-identified] Unknown  Current DME Used/Available at Home: Wheelchair  Prior Level of Function/Work/Activity:   At baseline, patient has has a decline in functional mobility xweeks primarily utilizing a WC for mobility but able to ambulate short distances last admission transfers with CGA. Lives with his brother in a single story residence and endorses difficulty with ADLs. Number of Personal Factors/Comorbidities that affect the Plan of Care: 1-2: MODERATE COMPLEXITY   EXAMINATION:   Most Recent Physical Functioning:   Gross Assessment:  AROM: Generally decreased, functional (B LE)  Strength: Grossly decreased, non-functional (B LE)  Sensation: Intact (Light touch L3-S1 B)               Posture:  Posture (WDL): Exceptions to WDL  Posture Assessment: Forward head, Rounded shoulders  Balance:  Sitting: Intact  Sitting - Static: Good (unsupported)  Sitting - Dynamic: Good (unsupported)  Standing: Impaired  Standing - Static: Fair  Standing - Dynamic : Fair Bed Mobility:  Rolling: Contact guard assistance  Supine to Sit: Stand-by asssistance; Additional time  Sit to Supine: Contact guard assistance  Scooting: Minimum assistance  Wheelchair Mobility:     Transfers:  Sit to Stand: Contact guard assistance  Stand to Sit: Contact guard assistance  Bed to Chair: Contact guard assistance  Gait:     Base of Support: Narrowed; Center of gravity altered  Speed/Kell: Shuffled; Slow  Step Length: Right shortened;Left shortened  Gait Abnormalities: Decreased step clearance;Shuffling gait; Step to gait  Distance (ft): 5 Feet (ft)  Assistive Device: Walker, rolling  Ambulation - Level of Assistance: Contact guard assistance  Interventions: Safety awareness training; Tactile cues; Verbal cues       Body Structures Involved:  1. Muscles Body Functions Affected:  1. Cardio  2. Neuromusculoskeletal  3. Movement Related  4. Skin Related Activities and Participation Affected:  1. General Tasks and Demands  2. Mobility  3. Self Care  4. Domestic Life   Number of elements that affect the Plan of Care: 4+: HIGH COMPLEXITY   CLINICAL PRESENTATION:   Presentation: Evolving clinical presentation with changing clinical characteristics: MODERATE COMPLEXITY   CLINICAL DECISION MAKIN [facility] Inpatient Short Form  How much difficulty does the patient currently have. .. Unable A Lot A Little None   1. Turning over in bed (including adjusting bedclothes, sheets and blankets)? [ ] 1   [X] 2   [ ] 3   [ ] 4   2. Sitting down on and standing up from a chair with arms ( e.g., wheelchair, bedside commode, etc.)   [ ] 1   [X] 2   [ ] 3   [ ] 4   3. Moving from lying on back to sitting on the side of the bed? [ ] 1   [X] 2   [ ] 3   [ ] 4   How much help from another person does the patient currently need. .. Total A Lot A Little None   4. Moving to and from a bed to a chair (including a wheelchair)? [X] 1   [ ] 2   [ ] 3   [ ] 4   5. Need to walk in hospital room? [X] 1   [ ] 2   [ ] 3   [ ] 4   6. Climbing 3-5 steps with a railing? [X] 1   [ ] 2   [ ] 3   [ ] 4   © , Trustees of [address] [place] [address] [address] [id number], under license to [employer].  All rights reserved    Score:  Initial: 9 Most Recent: 9 (Date: 17 )     Interpretation of Tool:  Represents activities that are increasingly more difficult (i.e. Bed mobility, Transfers, Gait). Score 24 23 22-20 19-15 14-10 9-7 6       Modifier CH CI CJ CK CL CM CN         · Mobility - Walking and Moving Around:               - CURRENT STATUS:    CM - 80%-99% impaired, limited or restricted               - GOAL STATUS:           CK - 40%-59% impaired, limited or restricted               - D/C STATUS:                       ---------------To be determined---------------  Payor: pay[place] MEDICAID / Plan[place] MEDICA[place] / Product Type: Managed Care Medicaid /       Medical Necessity:     · Patient demonstrates fair rehab potential due to higher previous functional level. Reason for Services/Other Comments:  · Patient continues to require skilled intervention due to decreased functional mobility and balance/gait status from baseline. .   Use of outcome tool(s) and clinical judgement create a POC that gives a: Questionable prediction of patient's progress: MODERATE COMPLEXITY                 TREATMENT:       Pre-treatment Symptoms/Complaints:    Pain: Initial:   Pain Intensity 1: 5  Pain Location 1: Buttocks  Pain Intervention(s) 1: Position, Repositioned, Rest  Post Session:  5/10 buttock; comfortable in supine. Therapeutic Activity: (    10 minutes): Therapeutic activities including bed mobility, transfer training, balance training, safety awareness training, ambulation on level ground xft, and patient education on activity pacing and mobility stratigies to improve mobility, strength, balance and activity tolerance. Required minimal verbal, visual cues Safety awareness training; Tactile cues; Verbal cues to promote static and dynamic balance in standing    GROUP Therapeutic Exercise: (0 Minutes): Exercises per grid below to improve mobility, strength and balance.   Required minimal visual and verbal cues to promote proper body alignment, promote proper body posture and promote proper body mechanics. Progressed range and repetitions as indicated. Date:  4/14/17 Date:  4/17/17 Date:     ACTIVITY/EXERCISE AM PM AM PM AM PM   Ambulation:           Distance  Device  Duration         Seated Heel Raises x15B A  x20B      Seated Toe Raises x15B A  x20B      Seated Long Arc Quads x15B A  x15B      Seated Marching x15B A  x15B      Seated Hip Abduction   x15B      B = bilateral; AA = active assistive; A = active; P = passive       Braces/Orthotics/Lines/Etc:   · IV and LUISA Drain  Treatment/Session Assessment:    · Response to Treatment: See above. · Interdisciplinary Collaboration:  · Physical Therapist, Certified Occupational Therapy Assistant, Registered Nurse and Certified Nursing Assistant/Patient Care Technician  · After treatment position/precautions:  · Supine in bed, Bed/Chair-wheels locked, Bed in low position, Call light within reach, RN notified and RN notified  · Compliance with Program/Exercises: Will assess as treatment progresses. · Recommendations/Intent for next treatment session: \"Next visit will focus on advancements to more challenging activities and reduction in assistance provided\".   Total Treatment Duration:  PT Patient Time In/Time Out  Time In: 1510  Time Out: 203 [facility]

## 2017-04-17 NOTE — PROGRESS NOTES
PLAN:  Continue IV Vanc/Zosyn for now  Aundrea-rectal abscess cultures with Proteus and E Coli (resist to quinolones but sens to zosyn)  Previous blood culture with Staph Epi (resist to oxacillin)    Cont to increase activity as tolerated -- PT/OT  HD -- per Nephro  CM assisting with dispo  Drain management -- per IR        ASSESSMENT:  Admit Date: 4/9/2017     Principal Problem:    Aundrea-rectal abscess (4/9/2017)    Active Problems: Thrombocytopenia ([facility] [address] (9/8/2016)      CKD (chronic kidney disease) requiring chronic dialysis ([facility] [address] (10/11/2016)      Staphylococcus epidermidis bacteremia (4/15/2017)         SUBJECTIVE:  Resting in bed. Has bed at [facility] but awaiting Ins approval. Denies pain but reports drain is in awkward site. No N/V -- tolerating diet. Tolerating HD. No fevers/chills/sweats. AF, VSS. OBJECTIVE:  Constitutional: Alert oriented cooperative patient in no acute distress; appears stated age   Visit Vitals    /67 (BP 1 Location: Right arm, BP Patient Position: At rest;Head of bed elevated (Comment degrees))    Pulse 65    Temp 98.4 °F (36.9 °C)    Resp 18    Ht 5' 6\" (1.676 m)    Wt 83.5 kg (184 lb 1.6 oz)    SpO2 100%    BMI 29.71 kg/m2     Eyes: Sclera are clear. ENMT: No external lesions gross hearing normal; no obvious neck masses, no ear or lip lesions  CV: RRR, S1S2. Normal perfusion  Resp: No JVD. Breathing is non-labored; no audible wheezing. BBS clear, on RA    GI: Soft and non-distended, non-tender, ostomy viable and functioning with stool in pouch     Musculoskeletal: Generalized weakness and deconditioning but with normal function. No embolic signs or cyanosis.    Neuro: Alert, oriented; moves all 4; no focal deficits  Psychiatric: Normal affect and mood, no memory impairment  Skin: Gluteal drain intact; no visible buttock cellulitis or fluctuance - tan creamy drainage in bulb - scant    Patient Vitals for the past 24 hrs:   BP Temp Pulse Resp SpO2   04/17/17 0805 116/67 98.4 °F (36.9 °C) 65 18 100 %   04/17/17 0404 123/65 98 °F (36.7 °C) 60 20 99 %   04/16/17 2332 122/66 97.7 °F (36.5 °C) 66 20 96 %   04/16/17 2048 132/67 97.7 °F (36.5 °C) 63 20 99 %   04/16/17 1638 113/61 97.7 °F (36.5 °C) 65 20 99 %   04/16/17 1205 112/60 97.6 °F (36.4 °C) 68 20 100 %     Labs:    Recent Labs      04/17/17   0522   WBC  3.1*   HGB  7.5*   PLT  125*     JULIENNE LittleC     Patient was seen and examined with NP. I agree with above assessment and plan.      Denton Apple DO

## 2017-04-17 NOTE — PROGRESS NOTES
Renal Progress Note    Admission Date: 4/9/2017   Subjective:      No new complaints, but some frustration    Objective:     Physical Exam:    Patient Vitals for the past 8 hrs:   BP Temp Pulse Resp SpO2   04/17/17 1222 120/63 97 °F (36.1 °C) 60 18 100 %   04/17/17 0805 116/67 98.4 °F (36.9 °C) 65 18 100 %      Gen: comfortable , NAD  HEENT: moist membranes  CV: S1, S2  Lungs: Clear bilaterally  Extem: no edema     Current Facility-Administered Medications   Medication Dose Route Frequency    piperacillin-tazobactam (ZOSYN) 3.375 g in 0.9% sodium chloride (MBP/ADV) 100 mL  3.375 g IntraVENous Q12H    metoprolol tartrate (LOPRESSOR) tablet 12.5 mg  12.5 mg Oral Q12H    polyvinyl alcohol (LIQUIFILM TEARS) 1.4 % ophthalmic solution 1 Drop  1 Drop Both Eyes PRN    vancomycin (VANCOCIN) 1250 mg in  ml infusion  1,250 mg IntraVENous See Admin Instructions    pantoprazole (PROTONIX) 40 mg in sodium chloride 0.9 % 10 mL injection  40 mg IntraVENous Q12H    sodium chloride (NS) flush 5-10 mL  5-10 mL IntraVENous PRN    epoetin miky (EPOGEN;PROCRIT) injection 20,000 Units  20,000 Units SubCUTAneous Q7D    0.9% sodium chloride infusion 250 mL  250 mL IntraVENous PRN    albuterol (PROVENTIL VENTOLIN) nebulizer solution 2.5 mg  2.5 mg Nebulization Q6H PRN    ferrous sulfate tablet 325 mg  325 mg Oral BID WITH MEALS    cholecalciferol (VITAMIN D3) tablet 2,000 Units  2,000 Units Oral DAILY    magnesium oxide (MAG-OX) tablet 400 mg  400 mg Oral DAILY    sodium chloride (NS) flush 5-10 mL  5-10 mL IntraVENous Q8H    naloxone (NARCAN) injection 0.4 mg  0.4 mg IntraVENous PRN    diphenhydrAMINE (BENADRYL) injection 12.5 mg  12.5 mg IntraVENous Q4H PRN    LORazepam (ATIVAN) injection 1 mg  1 mg IntraVENous Q6H PRN    oxyCODONE-acetaminophen (PERCOCET) 5-325 mg per tablet 2 Tab  2 Tab Oral Q4H PRN    oxyCODONE-acetaminophen (PERCOCET) 5-325 mg per tablet 1 Tab  1 Tab Oral Q4H PRN    HYDROmorphone (PF) (DILAUDID) injection 0.5 mg  0.5 mg IntraVENous Q1H PRN    HYDROmorphone (PF) (DILAUDID) injection 1 mg  1 mg IntraVENous Q1H PRN            Data Review:     LABS:   Recent Results (from the past 12 hour(s))   CBC WITH AUTOMATED DIFF    Collection Time: 04/17/17  5:22 AM   Result Value Ref Range    WBC 3.1 (L) 4.3 - 11.1 K/uL    RBC 2.50 (L) 4.23 - 5.67 M/uL    HGB 7.5 (L) 13.6 - 17.2 g/dL    HCT 22.5 (L) 41.1 - 50.3 %    MCV 90.0 79.6 - 97.8 FL    MCH 30.0 26.1 - 32.9 PG    MCHC 33.3 31.4 - 35.0 g/dL    RDW 18.0 (H) 11.9 - 14.6 %    PLATELET 810 (L) 567 - 450 K/uL    MPV 9.4 (L) 10.8 - 14.1 FL    DF AUTOMATED      NEUTROPHILS 68 43 - 78 %    LYMPHOCYTES 26 13 - 44 %    MONOCYTES 5 4.0 - 12.0 %    EOSINOPHILS 1 0.5 - 7.8 %    BASOPHILS 0 0.0 - 2.0 %    IMMATURE GRANULOCYTES 0.3 0.0 - 5.0 %    ABS. NEUTROPHILS 2.1 1.7 - 8.2 K/UL    ABS. LYMPHOCYTES 0.8 0.5 - 4.6 K/UL    ABS. MONOCYTES 0.2 0.1 - 1.3 K/UL    ABS. EOSINOPHILS 0.0 0.0 - 0.8 K/UL    ABS. BASOPHILS 0.0 0.0 - 0.2 K/UL    ABS. IMM. GRANS. 0.0 0.0 - 0.5 K/UL         Plan:     Principal Problem:    Aundrea-rectal abscess (4/9/2017)    Active Problems: Thrombocytopenia ([facility] [address] (9/8/2016)      CKD (chronic kidney disease) requiring chronic dialysis ([facility] [address] (10/11/2016)      Staphylococcus epidermidis bacteremia (4/15/2017)      ESRD on dialysis tomorrow. Access catheter - right IJ  Aundrea-rectal abscess with drain.

## 2017-04-17 NOTE — PROGRESS NOTES
Patient is calm. Engaged with  about his illness and his long journey. Demonstrates a hopeful spirit for recovery. Assured patient of our ongoing support and prayers. Patient was calm when visit ended.   Signed by Dartha Mortimer, chaplain

## 2017-04-17 NOTE — PROGRESS NOTES
Problem: Self Care Deficits Care Plan (Adult)  Goal: *Acute Goals and Plan of Care (Insert Text)  1. Patient will feed self entire meal with setup. 2. Patient will complete grooming with setup. 3. Patient will roll in supine with moderate assistance to aid with supine ADLs. 4. Patient will participate in BUE therapeutic exercises to increase strength in BUE to at least 3+/5 to aid in ADLs. 5. Patient will tolerate 20 minutes of OT treatment with as needed rest breaks to increase activity tolerance for ADLs. Timeframe: 7 visits       OCCUPATIONAL THERAPY: Daily Note, Treatment Day: 1st and AM    4/17/2017  INPATIENT: Hospital Day: 9  Payor: [employer] [place] / Plan: [place] [employer] [place] / Product Type: Managed Care Medicaid /      NAME/AGE/GENDER: Dc Celeste is a 61 y.o. male         PRIMARY DIAGNOSIS:  Aundrea-rectal abscess Aundrea-rectal abscess Aundrea-rectal abscess        ICD-10: Treatment Diagnosis:        · Localized edema (R60.1)   Precautions/Allergies:         Heparin analogues       ASSESSMENT:      Mr. Connor Kwon is a 61year old male admitted with open and draining perirectal abscess and sepsis. Patient has history of colostomy bag and ESRD. Patient lives with his brother and reports that he is primarily wheelchair bound, although he has been unable to get out of bed for the past week. At baseline, he needs assistance with bathing, dressing, meal prep. He can usually wash his face, feed himself, and manage his own colostomy bag, per his report. No family present to confirm prior level of function and patient appears somewhat confused. 4/17/2017 Pt was presented up in the recliner chair upon arrival. Pt was transferred down to the therapy gym in his recliner chair to participate in group exercises to increase strength for mobility and ADL's. Pt required visual, verbal, and manual cues to increase participation and to use proper body mechanics.  Pt was returned to his room in his chair with posey activated, belongings in reach, and lunch and table tray in front of patient. Pt participated well and will continue to benefit from OT. Pt may also benefit from having more progressive goals per care plan. Will discuss with OTR/L. This section established at most recent assessment   PROBLEM LIST (Impairments causing functional limitations):  1. Decreased Strength  2. Decreased ADL/Functional Activities  3. Decreased Transfer Abilities  4. Decreased Ambulation Ability/Technique  5. Decreased Balance  6. Increased Pain  7. Decreased Activity Tolerance  8. Increased Fatigue  9. Decreased Skin Integrity/Hygeine    INTERVENTIONS PLANNED: (Benefits and precautions of occupational therapy have been discussed with the patient.)  1. Activities of daily living training  2. Adaptive equipment training  3. Theraputic activity  4. Theraputic exercise      TREATMENT PLAN: Frequency/Duration: Follow patient 3x/ week  to address above goals. Rehabilitation Potential For Stated Goals: FAIR       RECOMMENDED REHABILITATION/EQUIPMENT: (at time of discharge pending progress): Continue Skilled Therapy. OCCUPATIONAL PROFILE AND HISTORY:   History of Present Injury/Illness (Reason for Referral):  Per H&P, \"Patient is relatively immobile and has a wound on his buttock extensor draining about 3 days ago. It is very foul smelling. He has a history of perirectal abscess and a colostomy bag. He reports abdominal pain for 3 or 4 days. He's been going to dialysis Monday Wednesday and Friday. \"  Past Medical History/Comorbidities:   Mr. Renetta Naranjo  has a past medical history of Alcohol abuse, daily use (09/03/2016); Anemia (9/3/2016); Chronic kidney disease; HIT (heparin-induced thrombocytopenia) ([facility] [address] Hypertension; Hypokalemia (9/3/2016); and Tobacco abuse (9/3/2016).   Mr. Renetta Naranjo  has a past surgical history that includes debride necrotic skin/ tissue, abd wall (09/04/2016); abdomen surgery proc unlisted; and other surgical.  Social History/Living Environment:   Home Environment: Private residence  # Steps to Enter: 0  One/Two Story Residence: One story  Living Alone: No  Support Systems: Family member(s) (Brother)  Patient Expects to be Discharged to[de-identified] Unknown  Current DME Used/Available at Home: Wheelchair  Prior Level of Function/Work/Activity:  Patient lives with brother. Unable to bathe himself. Manages his own urinal and colostomy bag. Primarily wheelchair bound but has not been out of bed in the past week. Needs assistance with meals. R hand dominant. History provided by patient and no family present to confirm. Number of Personal Factors/Comorbidities that affect the Plan of Care: Expanded review of therapy/medical records (1-2):  MODERATE COMPLEXITY   ASSESSMENT OF OCCUPATIONAL PERFORMANCE[de-identified]   Activities of Daily Living:           Basic ADLs (From Assessment) Complex ADLs (From Assessment)   Basic ADL  Feeding: Maximum assistance  Oral Facial Hygiene/Grooming: Maximum assistance  Bathing: Total assistance  Upper Body Dressing: Maximum assistance  Lower Body Dressing: Total assistance  Toileting: Total assistance Instrumental ADL  Meal Preparation: Total assistance  Homemaking: Total assistance   Grooming/Bathing/Dressing Activities of Daily Living                                        Most Recent Physical Functioning:   Gross Assessment:                  Posture:  Posture (WDL): Exceptions to WDL  Posture Assessment:  Forward head, Rounded shoulders  Balance:    Bed Mobility:     Wheelchair Mobility:     Transfers:                       Patient Vitals for the past 6 hrs:   BP BP Patient Position SpO2 Pulse   04/17/17 0805 116/67 At rest;Head of bed elevated (Comment degrees) 100 % 65   04/17/17 1222 120/63 At rest 100 % 60        Mental Status  Neurologic State: Alert  Orientation Level: Oriented X4  Cognition: Follows commands, Appropriate for age attention/concentration, Appropriate safety awareness  Perception: Appears intact  Perseveration: No perseveration noted  Safety/Judgement: Fall prevention, Decreased insight into deficits                               Physical Skills Involved:  1. Range of Motion  2. Balance  3. Mobility  4. Strength  5. Endurance  6. Fine or Gross Motor Coordination Cognitive Skills Affected (resulting in the inability to perform in a timely and safe manner):  1. Attending  2. Understanding  3. Remembering Psychosocial Skills Affected:  1. Habits  2. Routines and Behaviors  3. Environmental Adaptations   Number of elements that affect the Plan of Care: 5+:  HIGH COMPLEXITY   CLINICAL DECISION MAKIN [facility] Mobility Inpatient Short Form  How much help from another person does the patient currently need. .. Total A Lot A Little None   1. Putting on and taking off regular lower body clothing? [X] 1   [ ] 2   [ ] 3   [ ] 4   2. Bathing (including washing, rinsing, drying)? [X] 1   [ ] 2   [ ] 3   [ ] 4   3. Toileting, which includes using toilet, bedpan or urinal?   [X] 1   [ ] 2   [ ] 3   [ ] 4   4. Putting on and taking off regular upper body clothing?   [ ] 1   [X] 2   [ ] 3   [ ] 4   5. Taking care of personal grooming such as brushing teeth? [ ] 1   [X] 2   [ ] 3   [ ] 4   6. Eating meals? [ ] 1   [X] 2   [ ] 3   [ ] 4   © , Trustees of [address] [place], [place] [id number], under license to [employer]. All rights reserved    Score:  Initial: 9 Most Recent: X (Date: -- )     Interpretation of Tool:  Represents activities that are increasingly more difficult (i.e. Bed mobility, Transfers, Gait).        Score 24 23 22-20 19-15 14-10 9-7 6       Modifier CH CI CJ CK CL CM CN         · Self Care:               - CURRENT STATUS:    CM - 80%-99% impaired, limited or restricted               - GOAL STATUS:           CL - 60%-79% impaired, limited or restricted               - D/C STATUS:                       ---------------To be determined---------------  Payor: [employer]ICA[employer] / Plan: [place] [employer] / Product Type: Managed Care Medicaid /       Medical Necessity:     · Patient demonstrates fair rehab potential due to higher previous functional level. Reason for Services/Other Comments:  · Patient continues to require present interventions due to patient's inability to care for self without assistance from others. Use of outcome tool(s) and clinical judgement create a POC that gives a: HIGH COMPLEXITY             TREATMENT:   (In addition to Assessment/Re-Assessment sessions the following treatments were rendered)      Pre-treatment Symptoms/Complaints:  Confused   Pain: Initial:   Pain Intensity 1: 0  Post Session:  Same       Group Therapeutic Exercise: (10 minutes):  Exercises per grid below to improve mobility and strength. Required minimal visual, verbal and manual cues to promote proper body mechanics. Progressed range as indicated. Date:  4/17/17 Date:   Date:     Activity/Exercise Parameters Parameters Parameters   Shoulder flexion/extension 15 reps with 1 # weighted dowel     Shoulder horizontal add/abb 15 reps with 1 # weighted dowel     Punches 15 reps with 1 # weighted dowel     Elbow flexion/extension 15 reps with 1 # weighted dowel     Tricep extension      Balloon tapping      Ball toss             Braces/Orthotics/Lines/Etc:   · IV  · drain    · colostomy  · O2 Device: Room air  Treatment/Session Assessment:    · Response to Treatment:  Tolerated well  · Interdisciplinary Collaboration:  · Physical Therapy Assistant, Certified Occupational Therapy Assistant and Registered Nurse  · After treatment position/precautions:  · Up in chair, Bed alarm/tab alert on, Bed/Chair-wheels locked, Call light within reach and RN notified  · Compliance with Program/Exercises: Will assess as treatment progresses. · Recommendations/Intent for next treatment session:   \"Next visit will focus on advancements to more challenging activities and reduction in assistance provided\".   Total Treatment Duration:  OT Patient Time In/Time Out  Time In: 1130  Time Out: [address] [place]

## 2017-04-17 NOTE — PROGRESS NOTES
Problem: Mobility Impaired (Adult and Pediatric)  Goal: *Acute Goals and Plan of Care (Insert Text)  _NEW GOALS (UPDATED 4/14/17)  1. Patient will perform bed mobility with independence within 7 day(s). 2. Patient will transfer with modified independence within 7 day(s). 3. Patient will ambulate 100+ ft with modified independence utilizing least restrictive assistive device within 7 day(s). 4. Patient will tolerate 25+ minutes of therapeutic activity/exercise while maintaining stable vitals to improve functional strength and mobility within 7 day(s). 5. Patient will demonstrate good dynamic standing balance throughout functional mobility within 7 day(s).    ----  Previously Met Goals:  (1.)Mr. Pozo will perform bed mobility with MODERATE ASSIST within 3 day(s). MET 4/14/17   (2.)Mr. Pozo will transfer from bed to chair and chair to bed with MAXIMAL ASSIST using the least restrictive device within 3 day(s). MET 4/14/17   (3.)Mr. Pozo will perform bed mobility with MINIMAL ASSIST within 7 day(s). MET 4/14/17   (4.)Mr. Pozo will transfer from bed to chair and chair to bed with MINIMAL ASSIST using the least restrictive device within 7 day(s). MET 4/14/17   ________________________________________________________________      PHYSICAL THERAPY: Daily Note, Treatment Day: 2nd and AM    4/17/2017  INPATIENT: Hospital Day: 9  Payor: [employer]ok Sang / [place]: [place] [employer] / Product Type: Managed Care Medicaid /      NAME/AGE/GENDER: John Cerna is a 61 y.o. male         PRIMARY DIAGNOSIS: Aundrea-rectal abscess Aundrea-rectal abscess Aundrea-rectal abscess        ICD-10: Treatment Diagnosis:       · Generalized Muscle Weakness (M62.81)  · Other abnormalities of gait and mobility (R26.89)   Precaution/Allergies:  Heparin analogues       ASSESSMENT:      Mr. Roseline Murray is a 61year old male admitted with aundrea-rectal absess.  Patient with recent admission secondary to abscess with sepsis, s/p debridement and recent colostomy. Patient seen this AM for physical therapy treatment session: present supine in bed, endorsess 0/10 pain although endorses tooth ache, and motivated to participate. Patient performed bed mobility with SBA and additional time, transfers with CGA, and ambulation x7ft with RW and CGA. Demonstrated a slow, shuffled, step-to gait pattern with fair dynamic balance throughout. Patient also participated in group B LE therapeutic exercises noted below to improve functional strength and mobility. Good progress today, overall requiring less assistance with mobility and tolerating a longer treatment session. Patient continues to demonstrate decreased functional activity tolerance and balance/gait status from baseline. Recommend continued skilled PT services. Continue with plan of care. This section established at most recent assessment   PROBLEM LIST (Impairments causing functional limitations):  1. Decreased Strength  2. Decreased ADL/Functional Activities  3. Decreased Transfer Abilities  4. Decreased Ambulation Ability/Technique  5. Decreased Balance  6. Increased Pain  7. Decreased Activity Tolerance  8. Decreased Flexibility/Joint Mobility  9. Decreased Knowledge of Precautions  10. Decreased [place] with Home Exercise Program    INTERVENTIONS PLANNED: (Benefits and precautions of physical therapy have been discussed with the patient.)  1. Balance Exercise  2. Bed Mobility  3. Family Education  4. Gait Training  5. Home Exercise Program (HEP)  6. Neuromuscular Re-education/Strengthening  7. Range of Motion (ROM)  8. Therapeutic Activites  9. Therapeutic Exercise/Strengthening  10. Transfer Training  11. Group Therapy      TREATMENT PLAN: Frequency/Duration: 3 times a week for duration of hospital stay  Rehabilitation Potential For Stated Goals: FAIR      RECOMMENDED REHABILITATION/EQUIPMENT: (at time of discharge pending progress): Continue Skilled Therapy and Rehab. HISTORY:   History of Present Injury/Illness (Reason for Referral):  Generalized weakness; Decreased mobility   Past Medical History/Comorbidities:   Mr. Gisselle Sharp  has a past medical history of Alcohol abuse, daily use (09/03/2016); Anemia (9/3/2016); Chronic kidney disease; HIT (heparin-induced thrombocytopenia) ([facility] [address] Hypertension; Hypokalemia (9/3/2016); and Tobacco abuse (9/3/2016). Mr. Gisselle Sharp  has a past surgical history that includes debride necrotic skin/ tissue, abd wall (09/04/2016); abdomen surgery proc unlisted; and other surgical.  Social History/Living Environment:   Home Environment: Private residence  # Steps to Enter: 0  One/Two Story Residence: One story  Living Alone: No  Support Systems: Family member(s) (Brother)  Patient Expects to be Discharged to[de-identified] Unknown  Current DME Used/Available at Home: Wheelchair  Prior Level of Function/Work/Activity:   At baseline, patient has has a decline in functional mobility xweeks primarily utilizing a WC for mobility but able to ambulate short distances last admission transfers with CGA. Lives with his brother in a single story residence and endorses difficulty with ADLs. Number of Personal Factors/Comorbidities that affect the Plan of Care: 1-2: MODERATE COMPLEXITY   EXAMINATION:   Most Recent Physical Functioning:   Gross Assessment:  AROM: Generally decreased, functional (B LE)  Strength: Grossly decreased, non-functional (B LE)  Sensation: Intact (Light touch L3-S1 B)               Posture:  Posture (WDL): Exceptions to WDL  Posture Assessment: Forward head, Rounded shoulders  Balance:  Sitting: Intact  Sitting - Static: Good (unsupported)  Sitting - Dynamic: Good (unsupported)  Standing: Impaired  Standing - Static: Fair  Standing - Dynamic : Fair Bed Mobility:  Supine to Sit: Stand-by asssistance; Additional time  Scooting: Stand-by asssistance; Additional time  Wheelchair Mobility:     Transfers:  Sit to Stand: Contact guard assistance  Stand to Sit: Contact guard assistance  Gait:     Base of Support: Narrowed; Center of gravity altered  Speed/Kell: Slow;Shuffled  Step Length: Right shortened;Left shortened  Gait Abnormalities: Decreased step clearance  Distance (ft): 7 Feet (ft)  Assistive Device: Walker, rolling  Ambulation - Level of Assistance: Contact guard assistance  Interventions: Verbal cues; Tactile cues; Safety awareness training       Body Structures Involved:  1. Muscles Body Functions Affected:  1. Cardio  2. Neuromusculoskeletal  3. Movement Related  4. Skin Related Activities and Participation Affected:  1. General Tasks and Demands  2. Mobility  3. Self Care  4. Domestic Life   Number of elements that affect the Plan of Care: 4+: HIGH COMPLEXITY   CLINICAL PRESENTATION:   Presentation: Evolving clinical presentation with changing clinical characteristics: MODERATE COMPLEXITY   CLINICAL DECISION MAKIN [facility] Inpatient Short Form  How much difficulty does the patient currently have. .. Unable A Lot A Little None   1. Turning over in bed (including adjusting bedclothes, sheets and blankets)? [ ] 1   [X] 2   [ ] 3   [ ] 4   2. Sitting down on and standing up from a chair with arms ( e.g., wheelchair, bedside commode, etc.)   [ ] 1   [X] 2   [ ] 3   [ ] 4   3. Moving from lying on back to sitting on the side of the bed? [ ] 1   [X] 2   [ ] 3   [ ] 4   How much help from another person does the patient currently need. .. Total A Lot A Little None   4. Moving to and from a bed to a chair (including a wheelchair)? [X] 1   [ ] 2   [ ] 3   [ ] 4   5. Need to walk in hospital room? [X] 1   [ ] 2   [ ] 3   [ ] 4   6. Climbing 3-5 steps with a railing? [X] 1   [ ] 2   [ ] 3   [ ] 4   © , Trustees of [address] [place] [address] [address] [address], under license to [employer].  All rights reserved    Score:  Initial: 9 Most Recent: 9 (Date: 17 )     Interpretation of Tool:  Represents activities that are increasingly more difficult (i.e. Bed mobility, Transfers, Gait). Score 24 23 22-20 19-15 14-10 9-7 6       Modifier CH CI CJ CK CL CM CN         · Mobility - Walking and Moving Around:               - CURRENT STATUS:    CM - 80%-99% impaired, limited or restricted               - GOAL STATUS:           CK - 40%-59% impaired, limited or restricted               - D/C STATUS:                       ---------------To be determined---------------  Payor: [employer] MEDICA[employer] / Plan[place] [employer] MEDICA[place] / Product Type: Managed Care Medicaid /       Medical Necessity:     · Patient demonstrates fair rehab potential due to higher previous functional level. Reason for Services/Other Comments:  · Patient continues to require skilled intervention due to decreased functional mobility and balance/gait status from baseline. .   Use of outcome tool(s) and clinical judgement create a POC that gives a: Questionable prediction of patient's progress: MODERATE COMPLEXITY                 TREATMENT:       Pre-treatment Symptoms/Complaints:    Pain: Initial:   Pain Intensity 1: 0 (c/o tooth ache; RN/MD aware per patient report)  Pain Location 1: Buttocks  Pain Intervention(s) 1: Position, Repositioned, Rest  Post Session:  0/10      Therapeutic Activity: (    15 minutes): Therapeutic activities including bed mobility, transfer training, balance training, safety awareness training, ambulation on level ground x7ft, and patient education on activity pacing and mobility stratigies to improve mobility, strength, balance and activity tolerance. Required minimal verbal, visual cues Verbal cues; Tactile cues; Safety awareness training to promote static and dynamic balance in standing    GROUP Therapeutic Exercise: (10 Minutes): Exercises per grid below to improve mobility, strength and balance.   Required minimal visual and verbal cues to promote proper body alignment, promote proper body posture and promote proper body mechanics. Progressed range and repetitions as indicated. Date:  4/14/17 Date:  4/17/17 Date:     ACTIVITY/EXERCISE AM PM AM PM AM PM   Ambulation:           Distance  Device  Duration         Seated Heel Raises x15B A  x20B      Seated Toe Raises x15B A  x20B      Seated Long Arc Quads x15B A  x15B      Seated Marching x15B A  x15B      Seated Hip Abduction   x15B      B = bilateral; AA = active assistive; A = active; P = passive       Braces/Orthotics/Lines/Etc:   · IV and LUISA Drain  Treatment/Session Assessment:    · Response to Treatment: See above. · Interdisciplinary Collaboration:  · Physical Therapist, Certified Occupational Therapy Assistant, Registered Nurse and Certified Nursing Assistant/Patient Care Technician  · After treatment position/precautions:  · Returned to room by [employer]; needs met. · Compliance with Program/Exercises: Will assess as treatment progresses. · Recommendations/Intent for next treatment session: \"Next visit will focus on advancements to more challenging activities and reduction in assistance provided\".   Total Treatment Duration:  PT Patient Time In/Time Out  Time In: 1100 (1140)  Time Out: 1115 (1150)     Preston Ellis DPT

## 2017-04-18 VITALS
SYSTOLIC BLOOD PRESSURE: 123 MMHG | HEART RATE: 64 BPM | RESPIRATION RATE: 19 BRPM | HEIGHT: 66 IN | TEMPERATURE: 97.7 F | WEIGHT: 170.8 LBS | OXYGEN SATURATION: 100 % | DIASTOLIC BLOOD PRESSURE: 73 MMHG | BODY MASS INDEX: 27.45 KG/M2

## 2017-04-18 LAB
BASOPHILS # BLD AUTO: 0 K/UL (ref 0–0.2)
BASOPHILS # BLD: 1 % (ref 0–2)
DIFFERENTIAL METHOD BLD: ABNORMAL
EOSINOPHIL # BLD: 0 K/UL (ref 0–0.8)
EOSINOPHIL NFR BLD: 2 % (ref 0.5–7.8)
ERYTHROCYTE [DISTWIDTH] IN BLOOD BY AUTOMATED COUNT: 18 % (ref 11.9–14.6)
HCT VFR BLD AUTO: 22.2 % (ref 41.1–50.3)
HGB BLD-MCNC: 7.5 G/DL (ref 13.6–17.2)
IMM GRANULOCYTES # BLD: 0 K/UL (ref 0–0.5)
IMM GRANULOCYTES NFR BLD AUTO: 0.5 % (ref 0–5)
LYMPHOCYTES # BLD AUTO: 45 % (ref 13–44)
LYMPHOCYTES # BLD: 0.9 K/UL (ref 0.5–4.6)
MCH RBC QN AUTO: 29.9 PG (ref 26.1–32.9)
MCHC RBC AUTO-ENTMCNC: 33.8 G/DL (ref 31.4–35)
MCV RBC AUTO: 88.4 FL (ref 79.6–97.8)
MONOCYTES # BLD: 0.2 K/UL (ref 0.1–1.3)
MONOCYTES NFR BLD AUTO: 8 % (ref 4–12)
NEUTS SEG # BLD: 0.9 K/UL (ref 1.7–8.2)
NEUTS SEG NFR BLD AUTO: 44 % (ref 43–78)
PLATELET # BLD AUTO: 124 K/UL (ref 150–450)
PMV BLD AUTO: 9.3 FL (ref 10.8–14.1)
RBC # BLD AUTO: 2.51 M/UL (ref 4.23–5.67)
WBC # BLD AUTO: 2 K/UL (ref 4.3–11.1)

## 2017-04-18 PROCEDURE — 74011000258 HC RX REV CODE- 258: Performed by: INTERNAL MEDICINE

## 2017-04-18 PROCEDURE — 90935 HEMODIALYSIS ONE EVALUATION: CPT

## 2017-04-18 PROCEDURE — 85025 COMPLETE CBC W/AUTO DIFF WBC: CPT | Performed by: SURGERY

## 2017-04-18 PROCEDURE — 74011250636 HC RX REV CODE- 250/636: Performed by: INTERNAL MEDICINE

## 2017-04-18 RX ORDER — ALBUTEROL SULFATE 0.83 MG/ML
2.5 SOLUTION RESPIRATORY (INHALATION)
Qty: 24 EACH | Refills: 0 | Status: SHIPPED | OUTPATIENT
Start: 2017-04-18

## 2017-04-18 RX ORDER — AMOXICILLIN AND CLAVULANATE POTASSIUM 875; 125 MG/1; MG/1
1 TABLET, FILM COATED ORAL EVERY 12 HOURS
Qty: 14 TAB | Refills: 0 | Status: SHIPPED | OUTPATIENT
Start: 2017-04-18 | End: 2017-04-25

## 2017-04-18 RX ADMIN — Medication 10 ML: at 06:11

## 2017-04-18 RX ADMIN — PIPERACILLIN SODIUM,TAZOBACTAM SODIUM 3.38 G: 3; .375 INJECTION, POWDER, FOR SOLUTION INTRAVENOUS at 06:11

## 2017-04-18 NOTE — PROGRESS NOTES
Pt going to [facility] and rehab today, pt aware, called spoke with brother Hedy Pino states he will notify other family members. Called to [address] for transport Denial code received  [id number], asked to setup transport via [address], to transport at 1300 today to room 408 at [facility], report number [phone], all information given to [address]

## 2017-04-18 NOTE — DISCHARGE SUMMARY
[address], [address] [facility]  [phone]     Discharge Summary     Christin Borja  MRN: [id number]     : 1950     Age: 61 y.o. Admit date: 2017     Discharge date:  17  Attending Physician: Dr. Iona Peraza, DO, FACS  Primary Discharge Diagnosis:   Principal Problem:    Aundrea-rectal abscess (2017)    Active Problems: Thrombocytopenia ([facility] [address] (2016)      CKD (chronic kidney disease) requiring chronic dialysis ([facility] [address] (10/11/2016)      Staphylococcus epidermidis bacteremia (4/15/2017)      Primary Operations or Procedures:  * No surgery found *     Brief History: Christin Borja was admitted with the following history of present illness. Patient well known to me from previous admit. Patient with history of perirectal abscess with sepsis leading to ESRD and multiple surgeries including a diverting colostomy. Patient has been follow as an OP doing well on HD. Perineal and rectal wound has completely healed but he preents to the ED last night with increasing pain and new abscess. Over the past few weeks he admits to decreasing appetite and 30 lb wt loss. CT showed new left sided perirectal abscess. There is drainage spontaneously in his bed. Foul odor noted. No cellulitis or skin redness. There is tenderness to palpation and a fullness consistent with abscess, but no typical fluctuance. IR has been consulted for drainage. Blood cultures pending. His Hgb was 5 on admit and he has been transfused 2 Units PRBC. Nephrology has held HD due to dehydration. Hospital Course:  He was admitted for IV antibiotics, IV hydration and IR evaluation. Nephrology was consulted for HD needs. The Hospitalist was consulted for management as well. He was taken to IR on 17 for CT-guided percutaneous drainage of left-sided perirectal abscess by Dr. Ludivina Noel. He was returned to the floor for routine management.  He was treated with IV Vanc for +BC 1/2 with Staph Epi. He was treated with IV Zosyn for abscess culture +Proteus and E. Coli (fluroquinolone resistant). His drain was removed by Dr. Elli Stoner on 4/17/17. He was ultimately felt to need STR for generalized deconditioning.  has arranged for dispo to STR today. Discharge Medications:   Current Discharge Medication List      START taking these medications    Details   amoxicillin-clavulanate (AUGMENTIN) 875-125 mg per tablet Take 1 Tab by mouth every twelve (12) hours for 7 days. Qty: 14 Tab, Refills: 0         CONTINUE these medications which have CHANGED    Details   albuterol (PROVENTIL VENTOLIN) 2.5 mg /3 mL (0.083 %) nebulizer solution 3 mL by Nebulization route every six (6) hours as needed for Wheezing. Qty: 24 Each, Refills: 0    Associated Diagnoses: Perirectal abscess         CONTINUE these medications which have NOT CHANGED    Details   acetaminophen (TYLENOL) 325 mg tablet Take 2 Tabs by mouth two (2) times daily as needed. Use only if necessary. Patient has history of alcoholic liver disease  Qty: 30 Tab, Refills: 0      epoetin miky (EPOGEN;PROCRIT) 10,000 unit/mL injection 1 mL by SubCUTAneous route every seven (7) days. Indications: RENAL DIALYSIS  Qty: 1 Vial, Refills: 0      pantoprazole (PROTONIX) 40 mg tablet Take 1 Tab by mouth Before breakfast and dinner. Qty: 60 Tab, Refills: 0      metoprolol tartrate (LOPRESSOR) 25 mg tablet Take 1 Tab by mouth every twelve (12) hours. Qty: 60 Tab, Refills: 1      Cholecalciferol, Vitamin D3, (VITAMIN D3) 2,000 unit cap capsule Take 2,000 Units by mouth once. OXYGEN-AIR DELIVERY SYSTEMS Take 2 L/min by inhalation nightly. via NC      magnesium oxide (MAG-OX) 400 mg tablet Take 400 mg by mouth daily. ferrous sulfate 325 mg (65 mg iron) tablet Take 1 Tab by mouth two (2) times daily (with meals). Qty: 60 Tab, Refills: 0               Discharge Instructions/Follow-up Plans:      MD Instructions:     Follow-up with Dr. Elli Stoner in 1 week. Keep drain site clean and dry. Change dressing daily and PRN. (Dry dressing)  Do not apply lotions, creams or ointments to drain site. Diet - as tolerated - Renal diet. Activity - ambulate - as tolerated - no heavy lifting >10lb. PT/OT per facility recommendations. May shower - no tub baths or soaking. No driving while taking narcotics. Do not drink alcohol while taking narcotics. Resume other home medications. Complete 7 days of antibiotics -- Rx provided. If problems or questions arise, please call our office at [phone]. Greater than 30 minutes were spent discharging the patient      Signed:  MARICHUY Jara  4/18/2017  8:53 AM    Patient was treated for sepsis with IV Zosyn. Hypotension, fever and abscess all resolved appropriately.       Tyler Apple,

## 2017-04-18 NOTE — DIALYSIS
HD treatment initiated via right perm cath without difficulty. CVC dressing changed per protocol, clean, dry, and intact, tego caps changed and intact, bilateral lumen aspirated and flushed with 9cc NS. VS stable, will continue to monitor during tx. No Heparin this tx. Machine tests passed and alarms intact. NAD.

## 2017-04-18 NOTE — DIALYSIS
HD treatment completed without complication. Total of 1.5 kgs removed. Flushed both ports with 0.9 mL of NS.  VS stable, NAD. CVC dressing clean, dry, and intact, tego caps intact, bilateral lumen flushed with 9 cc NS. Transport contacted for return to room.

## 2017-04-18 NOTE — PROGRESS NOTES
Physical Therapy Note:    Attempted to see patient this AM for physical therapy treatment session. Patient currently off of the floor at . Will follow and re-attempt as schedule permits/patient available.  Thank you,    Heydi Shelley, PT, DPT  4/18/17

## 2017-04-18 NOTE — PROGRESS NOTES
TRANSFER - OUT REPORT:    Verbal report given to Ayla(name) on Chuy Pozo  being transferred to [facility] room 408(unit) for routine progression of care       Report consisted of patients Situation, Background, Assessment and   Recommendations(SBAR). Information from the following report(s) SBAR, Intake/Output, MAR and Recent Results was reviewed with the receiving nurse. Lines:   Peripheral IV 04/15/17 Right Arm (Active)   Site Assessment Clean, dry, & intact 4/18/2017  7:30 AM   Phlebitis Assessment 0 4/18/2017  7:30 AM   Infiltration Assessment 0 4/18/2017  7:30 AM   Dressing Status Clean, dry, & intact 4/18/2017  7:30 AM   Dressing Type Tape;Transparent 4/18/2017  7:30 AM   Hub Color/Line Status Flushed 4/18/2017  7:30 AM   Action Taken Open ports on tubing capped 4/17/2017  7:13 PM   Alcohol Cap Used No 4/17/2017  7:13 PM        Opportunity for questions and clarification was provided.

## 2017-04-18 NOTE — DIALYSIS
Kidney clotting. Able to change out  With 50ml blood loss. Goal increased to make up for 400 ml of ns used in changing the kidney. Will continue to moniter.

## 2017-04-18 NOTE — PROGRESS NOTES
PLAN:  Currently running HD  Will plan to d/c to STR later today    Aundrea-rectal abscess cultures with Proteus and E Coli (resist to quinolones but sens to zosyn)  Previous blood culture with Staph Epi (resist to oxacillin)      ASSESSMENT:  Admit Date: 4/9/2017     Principal Problem:    Aundrea-rectal abscess (4/9/2017)    Active Problems: Thrombocytopenia ([facility] [address] (9/8/2016)      CKD (chronic kidney disease) requiring chronic dialysis ([facility] [address] (10/11/2016)      Staphylococcus epidermidis bacteremia (4/15/2017)         SUBJECTIVE:  Resting in bed. Seen on HD. STR approved. No N/V -- tolerating diet -- actually reports being hungry this AM. No fevers/chills/sweats. AF, VSS. OBJECTIVE:  Constitutional: Alert oriented cooperative patient in no acute distress; appears stated age   Visit Vitals    /72    Pulse (!) 56    Temp 98 °F (36.7 °C)    Resp 19    Ht 5' 6\" (1.676 m)    Wt 77.5 kg (170 lb 12.8 oz)    SpO2 98%    BMI 27.57 kg/m2     Eyes: Sclera are clear. ENMT: No external lesions gross hearing normal; no obvious neck masses, no ear or lip lesions  CV: RRR, S1S2. Normal perfusion  Resp: No JVD. Breathing is non-labored; no audible wheezing. BBS clear, on RA    GI: Soft and non-distended, non-tender, ostomy viable and functioning with stool in pouch     Musculoskeletal: Generalized weakness and deconditioning but with normal function. No embolic signs or cyanosis. Neuro: Alert, oriented; moves all 4; no focal deficits  Psychiatric: Normal affect and mood, no memory impairment  Skin: Gluteal drain site dressing intact; no visible buttock cellulitis or fluctuance.     Patient Vitals for the past 24 hrs:   BP Temp Pulse Resp SpO2 Weight   04/18/17 0825 121/72 - (!) 56 - - -   04/18/17 0756 135/76 - (!) 55 - - -   04/18/17 0743 132/76 - (!) 57 - - -   04/18/17 0726 134/67 98 °F (36.7 °C) (!) 57 19 98 % -   04/18/17 0424 124/64 97.7 °F (36.5 °C) (!) 58 20 98 % -   04/18/17 0114 - - - - - 77.5 kg (170 lb 12.8 oz)   04/17/17 1949 134/69 97.9 °F (36.6 °C) 62 20 94 % -   04/17/17 1610 122/69 97.3 °F (36.3 °C) 69 18 100 % -   04/17/17 1222 120/63 97 °F (36.1 °C) 60 18 100 % -     Labs:    Recent Labs      04/18/17   0519   WBC  2.0*   HGB  7.5*   PLT  124*     JULIENNE KempC

## 2017-04-26 NOTE — H&P
[address]   HISTORY AND PHYSICAL       Name:  Nicole Lopez   MR#:  [id number]   :  1950   Account #:  [de-identified]   Date of Adm:  2017       REASON FOR ADMISSION: Perirectal abscess. HISTORY OF PRESENT ILLNESS: The patient is a 72-year-old   gentleman with multiple medical problems who I was called to see   by the emergency department. He is a patient well-known to Dr. Rojo. The patient had a CT scan which showed an area of   inflammation in the perianal region, extending into the   perirectal region, stable splenomegaly. I have admitted the   patient for Dr. Rojo, as I was on call. PAST MEDICAL HISTORY: Alcohol abuse, anemia, chronic kidney   disease now on dialysis, history of heparin-induced   thrombocytopenia, hypokalemia, tobacco abuse. PAST SURGICAL HISTORY: He has had multiple abdominal surgeries,   perirectal abscess drainage multiple times. FAMILY HISTORY: His mother had a stroke. SOCIAL HISTORY: He smokes 2 packs of cigarettes per day, done it   over 40 years. He drinks a  cans of beer per week. MEDICATIONS   He is on:   1. Epogen. 2. Protonix. 3. Lopressor. 4. Oxygen. ALLERGIES: HEPARIN ANALOGS. HE IS HIT POSITIVE. PHYSICAL EXAMINATION   VITAL SIGNS: At the time of admission pulse was 63, /71,   temperature of 97. GENERAL APPEARANCE: A disheveled, obese, white male presently   awake and alert, able to answer questions. HEART: Sinus rhythm, rate approximately 68. LUNGS: Clear. ABDOMEN: Soft. Colostomy in place. Spleen enlarged. No localized   tenderness. Rectal: An area of tenderness with previous surgical   changes noted. I could not palpate a discrete abscess on rectal   exam.    LABORATORY WORK: The CT scan showed a possible discrete abscess. ASSESSMENT: Perirectal abscess. PLAN   1. Admit. 2. IV fluids. 3. IR for drainage of perirectal abscess. 4. Let Dr. Rojo know in the morning.    5. IV fluids. 6. Nephrology consult for dialysis.         Avelina Calhoun MD      [address] [facility] /    D:  04/26/2017   09:56   T:  04/26/2017   10:17   Job #:  [id number]

## 2017-05-22 ENCOUNTER — APPOINTMENT (OUTPATIENT)
Dept: CT IMAGING | Age: 67
DRG: 808 | End: 2017-05-22
Attending: INTERNAL MEDICINE
Payer: MEDICARE

## 2017-05-22 ENCOUNTER — APPOINTMENT (OUTPATIENT)
Dept: ULTRASOUND IMAGING | Age: 67
DRG: 808 | End: 2017-05-22
Attending: EMERGENCY MEDICINE
Payer: MEDICARE

## 2017-05-22 ENCOUNTER — HOSPITAL ENCOUNTER (INPATIENT)
Age: 67
LOS: 10 days | Discharge: NURSING, REHAB OR LONG-TERM CARE | DRG: 808 | End: 2017-06-01
Attending: EMERGENCY MEDICINE | Admitting: HOSPITALIST
Payer: MEDICARE

## 2017-05-22 DIAGNOSIS — E80.6 HYPERBILIRUBINEMIA: ICD-10-CM

## 2017-05-22 DIAGNOSIS — K80.20 CALCULUS OF GALLBLADDER WITHOUT CHOLECYSTITIS WITHOUT OBSTRUCTION: ICD-10-CM

## 2017-05-22 DIAGNOSIS — D61.818 PANCYTOPENIA (HCC): Primary | ICD-10-CM

## 2017-05-22 LAB
ALBUMIN SERPL BCP-MCNC: 3 G/DL (ref 3.2–4.6)
ALBUMIN/GLOB SERPL: 0.6 {RATIO} (ref 1.2–3.5)
ALP SERPL-CCNC: 70 U/L (ref 50–136)
ALT SERPL-CCNC: 10 U/L (ref 12–65)
AMMONIA PLAS-SCNC: 11 UMOL/L (ref 11–32)
ANION GAP BLD CALC-SCNC: 14 MMOL/L (ref 7–16)
AST SERPL W P-5'-P-CCNC: 15 U/L (ref 15–37)
BASOPHILS # BLD AUTO: 0 K/UL (ref 0–0.2)
BASOPHILS # BLD: 0 % (ref 0–2)
BILIRUB DIRECT SERPL-MCNC: 0.5 MG/DL
BILIRUB INDIRECT SERPL-MCNC: 2.4 MG/DL (ref 0–1.1)
BILIRUB SERPL-MCNC: 2.9 MG/DL (ref 0.2–1.1)
BILIRUB SERPL-MCNC: 3.5 MG/DL (ref 0.2–1.1)
BUN SERPL-MCNC: 29 MG/DL (ref 8–23)
CALCIUM SERPL-MCNC: 8.4 MG/DL (ref 8.3–10.4)
CHLORIDE SERPL-SCNC: 102 MMOL/L (ref 98–107)
CO2 SERPL-SCNC: 24 MMOL/L (ref 21–32)
CREAT SERPL-MCNC: 3.01 MG/DL (ref 0.8–1.5)
DIFFERENTIAL METHOD BLD: ABNORMAL
EOSINOPHIL # BLD: 0 K/UL (ref 0–0.8)
EOSINOPHIL NFR BLD: 2 % (ref 0.5–7.8)
ERYTHROCYTE [DISTWIDTH] IN BLOOD BY AUTOMATED COUNT: 17.1 % (ref 11.9–14.6)
GLOBULIN SER CALC-MCNC: 4.9 G/DL (ref 2.3–3.5)
GLUCOSE SERPL-MCNC: 96 MG/DL (ref 65–100)
HCT VFR BLD AUTO: 28.8 % (ref 41.1–50.3)
HGB BLD-MCNC: 10.1 G/DL (ref 13.6–17.2)
IMM GRANULOCYTES # BLD: 0 K/UL (ref 0–0.5)
IMM GRANULOCYTES NFR BLD AUTO: 0 % (ref 0–5)
LACTATE BLD-SCNC: 1.9 MMOL/L (ref 0.5–1.9)
LIPASE SERPL-CCNC: 49 U/L (ref 73–393)
LYMPHOCYTES # BLD AUTO: 64 % (ref 13–44)
LYMPHOCYTES # BLD: 0.5 K/UL (ref 0.5–4.6)
MAGNESIUM SERPL-MCNC: 1.4 MG/DL (ref 1.8–2.4)
MCH RBC QN AUTO: 32.4 PG (ref 26.1–32.9)
MCHC RBC AUTO-ENTMCNC: 35.1 G/DL (ref 31.4–35)
MCV RBC AUTO: 92.3 FL (ref 79.6–97.8)
MONOCYTES # BLD: 0.1 K/UL (ref 0.1–1.3)
MONOCYTES NFR BLD AUTO: 20 % (ref 4–12)
NEUTS SEG # BLD: 0.1 K/UL (ref 1.7–8.2)
NEUTS SEG NFR BLD AUTO: 14 % (ref 43–78)
PHOSPHATE SERPL-MCNC: 0.7 MG/DL (ref 2.3–3.7)
PLATELET # BLD AUTO: 106 K/UL (ref 150–450)
PLATELET COMMENTS,PCOM: ABNORMAL
PMV BLD AUTO: 8.8 FL (ref 10.8–14.1)
POTASSIUM SERPL-SCNC: 2.8 MMOL/L (ref 3.5–5.1)
PROT SERPL-MCNC: 7.9 G/DL (ref 6.3–8.2)
RBC # BLD AUTO: 3.12 M/UL (ref 4.23–5.67)
RBC MORPH BLD: ABNORMAL
SODIUM SERPL-SCNC: 140 MMOL/L (ref 136–145)
WBC # BLD AUTO: 0.7 K/UL (ref 4.3–11.1)
WBC MORPH BLD: ABNORMAL

## 2017-05-22 PROCEDURE — 86580 TB INTRADERMAL TEST: CPT | Performed by: NURSE PRACTITIONER

## 2017-05-22 PROCEDURE — 83690 ASSAY OF LIPASE: CPT | Performed by: NURSE PRACTITIONER

## 2017-05-22 PROCEDURE — 99285 EMERGENCY DEPT VISIT HI MDM: CPT | Performed by: EMERGENCY MEDICINE

## 2017-05-22 PROCEDURE — 83735 ASSAY OF MAGNESIUM: CPT | Performed by: NURSE PRACTITIONER

## 2017-05-22 PROCEDURE — 74011250637 HC RX REV CODE- 250/637: Performed by: EMERGENCY MEDICINE

## 2017-05-22 PROCEDURE — 87205 SMEAR GRAM STAIN: CPT | Performed by: NURSE PRACTITIONER

## 2017-05-22 PROCEDURE — 74011250636 HC RX REV CODE- 250/636: Performed by: NURSE PRACTITIONER

## 2017-05-22 PROCEDURE — 94761 N-INVAS EAR/PLS OXIMETRY MLT: CPT | Performed by: EMERGENCY MEDICINE

## 2017-05-22 PROCEDURE — 83605 ASSAY OF LACTIC ACID: CPT

## 2017-05-22 PROCEDURE — 36415 COLL VENOUS BLD VENIPUNCTURE: CPT | Performed by: NURSE PRACTITIONER

## 2017-05-22 PROCEDURE — 74011250636 HC RX REV CODE- 250/636: Performed by: EMERGENCY MEDICINE

## 2017-05-22 PROCEDURE — 87186 SC STD MICRODIL/AGAR DIL: CPT | Performed by: NURSE PRACTITIONER

## 2017-05-22 PROCEDURE — 74177 CT ABD & PELVIS W/CONTRAST: CPT

## 2017-05-22 PROCEDURE — 80053 COMPREHEN METABOLIC PANEL: CPT | Performed by: EMERGENCY MEDICINE

## 2017-05-22 PROCEDURE — 87040 BLOOD CULTURE FOR BACTERIA: CPT | Performed by: NURSE PRACTITIONER

## 2017-05-22 PROCEDURE — 74011000302 HC RX REV CODE- 302: Performed by: NURSE PRACTITIONER

## 2017-05-22 PROCEDURE — 82248 BILIRUBIN DIRECT: CPT | Performed by: NURSE PRACTITIONER

## 2017-05-22 PROCEDURE — 82728 ASSAY OF FERRITIN: CPT | Performed by: INTERNAL MEDICINE

## 2017-05-22 PROCEDURE — 65660000000 HC RM CCU STEPDOWN

## 2017-05-22 PROCEDURE — 87077 CULTURE AEROBIC IDENTIFY: CPT | Performed by: NURSE PRACTITIONER

## 2017-05-22 PROCEDURE — 74011636320 HC RX REV CODE- 636/320: Performed by: HOSPITALIST

## 2017-05-22 PROCEDURE — 65270000029 HC RM PRIVATE

## 2017-05-22 PROCEDURE — 87641 MR-STAPH DNA AMP PROBE: CPT | Performed by: NURSE PRACTITIONER

## 2017-05-22 PROCEDURE — 82140 ASSAY OF AMMONIA: CPT | Performed by: NURSE PRACTITIONER

## 2017-05-22 PROCEDURE — 74011000258 HC RX REV CODE- 258: Performed by: HOSPITALIST

## 2017-05-22 PROCEDURE — 96360 HYDRATION IV INFUSION INIT: CPT | Performed by: EMERGENCY MEDICINE

## 2017-05-22 PROCEDURE — 85610 PROTHROMBIN TIME: CPT | Performed by: NURSE PRACTITIONER

## 2017-05-22 PROCEDURE — 96361 HYDRATE IV INFUSION ADD-ON: CPT | Performed by: EMERGENCY MEDICINE

## 2017-05-22 PROCEDURE — 85025 COMPLETE CBC W/AUTO DIFF WBC: CPT | Performed by: EMERGENCY MEDICINE

## 2017-05-22 PROCEDURE — 76705 ECHO EXAM OF ABDOMEN: CPT

## 2017-05-22 PROCEDURE — 83540 ASSAY OF IRON: CPT | Performed by: INTERNAL MEDICINE

## 2017-05-22 PROCEDURE — 84100 ASSAY OF PHOSPHORUS: CPT | Performed by: NURSE PRACTITIONER

## 2017-05-22 RX ORDER — SODIUM CHLORIDE 0.9 % (FLUSH) 0.9 %
5-10 SYRINGE (ML) INJECTION EVERY 8 HOURS
Status: DISCONTINUED | OUTPATIENT
Start: 2017-05-22 | End: 2017-06-01 | Stop reason: HOSPADM

## 2017-05-22 RX ORDER — POTASSIUM CHLORIDE 14.9 MG/ML
20 INJECTION INTRAVENOUS ONCE
Status: COMPLETED | OUTPATIENT
Start: 2017-05-22 | End: 2017-05-22

## 2017-05-22 RX ORDER — ALBUTEROL SULFATE 0.83 MG/ML
2.5 SOLUTION RESPIRATORY (INHALATION)
Status: DISCONTINUED | OUTPATIENT
Start: 2017-05-22 | End: 2017-06-01 | Stop reason: HOSPADM

## 2017-05-22 RX ORDER — SODIUM CHLORIDE 0.9 % (FLUSH) 0.9 %
5-10 SYRINGE (ML) INJECTION AS NEEDED
Status: DISCONTINUED | OUTPATIENT
Start: 2017-05-22 | End: 2017-06-01 | Stop reason: HOSPADM

## 2017-05-22 RX ORDER — SODIUM CHLORIDE 9 MG/ML
50 INJECTION, SOLUTION INTRAVENOUS CONTINUOUS
Status: DISPENSED | OUTPATIENT
Start: 2017-05-22 | End: 2017-05-23

## 2017-05-22 RX ORDER — POTASSIUM CHLORIDE 20 MEQ/1
20 TABLET, EXTENDED RELEASE ORAL
Status: COMPLETED | OUTPATIENT
Start: 2017-05-22 | End: 2017-05-22

## 2017-05-22 RX ORDER — PANTOPRAZOLE SODIUM 40 MG/1
40 TABLET, DELAYED RELEASE ORAL
Status: DISCONTINUED | OUTPATIENT
Start: 2017-05-23 | End: 2017-06-01 | Stop reason: HOSPADM

## 2017-05-22 RX ORDER — ONDANSETRON 2 MG/ML
4 INJECTION INTRAMUSCULAR; INTRAVENOUS
Status: DISCONTINUED | OUTPATIENT
Start: 2017-05-22 | End: 2017-06-01 | Stop reason: HOSPADM

## 2017-05-22 RX ORDER — SODIUM CHLORIDE 0.9 % (FLUSH) 0.9 %
10 SYRINGE (ML) INJECTION
Status: COMPLETED | OUTPATIENT
Start: 2017-05-22 | End: 2017-05-22

## 2017-05-22 RX ORDER — METOPROLOL TARTRATE 25 MG/1
25 TABLET, FILM COATED ORAL EVERY 12 HOURS
Status: DISCONTINUED | OUTPATIENT
Start: 2017-05-22 | End: 2017-05-24

## 2017-05-22 RX ADMIN — POTASSIUM CHLORIDE 20 MEQ: 1500 TABLET, EXTENDED RELEASE ORAL at 13:10

## 2017-05-22 RX ADMIN — SODIUM CHLORIDE 1000 ML: 900 INJECTION, SOLUTION INTRAVENOUS at 12:04

## 2017-05-22 RX ADMIN — Medication 10 ML: at 21:15

## 2017-05-22 RX ADMIN — IOPAMIDOL 100 ML: 755 INJECTION, SOLUTION INTRAVENOUS at 18:26

## 2017-05-22 RX ADMIN — Medication 10 ML: at 18:26

## 2017-05-22 RX ADMIN — POTASSIUM CHLORIDE 20 MEQ: 14.9 INJECTION, SOLUTION INTRAVENOUS at 19:12

## 2017-05-22 RX ADMIN — SODIUM CHLORIDE 100 ML: 900 INJECTION, SOLUTION INTRAVENOUS at 18:26

## 2017-05-22 RX ADMIN — TUBERCULIN PURIFIED PROTEIN DERIVATIVE 5 UNITS: 5 INJECTION, SOLUTION INTRADERMAL at 22:14

## 2017-05-22 RX ADMIN — SODIUM CHLORIDE 50 ML/HR: 900 INJECTION, SOLUTION INTRAVENOUS at 21:00

## 2017-05-22 NOTE — ED PROVIDER NOTES
HPI Comments: Patient is a 73 yo male with fatigue and rectal drainage. States he has had continues drainage from his rectal area and states unable to care for himself. States mild abdominal pain around his colostomy bag. Denies chest pain or SOB. States severe fatigue and inability to even stand and care for himself. Appears disheveled. Patient is a 74 y.o. male presenting with other event. The history is provided by the patient. No  was used. Other   Associated symptoms include abdominal pain. Pertinent negatives include no chest pain, no headaches and no shortness of breath. Past Medical History:   Diagnosis Date    Alcohol abuse, daily use 09/03/2016    NONE X 7 DAYS, USUALLY DRINKS 10-15 BEERS DAILY. TAKES A WEEK OFF AT A TIME ONCE A MONTH    Anemia 9/3/2016    HGB 7.8 ON ADMISSION    Chronic kidney disease     end stage renal disease- dialysis on Tues, Thurs. , Saturday mornings.  HIT (heparin-induced thrombocytopenia) ([facility] [address]     Hypertension     Hypokalemia 9/3/2016    2.7 ON ADMISSION    Tobacco abuse 9/3/2016    QUIT 7 DAYS AGO       Past Surgical History:   Procedure Laterality Date    ABDOMEN SURGERY PROC UNLISTED      Sept. 2016.  DEBRIDE NECROTIC SKIN/ TISSUE, ABD WALL  09/04/2016    I&D rectal abscess with drain placement - Dr. Larios Antoine      rectal abcess surgery that also got infected-  Subsequent I & D of the rectal abcess. Family History:   Problem Relation Age of Onset    Stroke Mother        Social History     Social History    Marital status: SINGLE     Spouse name: N/A    Number of children: N/A    Years of education: N/A     Occupational History    Not on file.      Social History Main Topics    Smoking status: Former Smoker     Packs/day: 2.00     Years: 40.00     Types: Cigarettes    Smokeless tobacco: Never Used      Comment: STATES HE QUIT 7 DAYS AGO    Alcohol use 42.0 - 63.0 oz/week     70 - 105 Cans of beer per week      Comment: 10-15 CANS DAILY    Drug use: No    Sexual activity: Not on file     Other Topics Concern    Not on file     Social History Narrative    9/3/16:  PATIENT IS SINGLE, LIVES WITH BROTHERDEVENDRA         ALLERGIES: Heparin analogues    Review of Systems   Constitutional: Positive for fatigue. Negative for chills and fever. HENT: Negative for rhinorrhea and sore throat. Eyes: Negative for visual disturbance. Respiratory: Negative for cough and shortness of breath. Cardiovascular: Negative for chest pain and leg swelling. Gastrointestinal: Positive for abdominal pain and nausea. Negative for diarrhea and vomiting. Genitourinary: Negative for dysuria. Musculoskeletal: Negative for back pain and neck pain. Skin: Negative for rash. Neurological: Negative for weakness and headaches. Psychiatric/Behavioral: The patient is not nervous/anxious. Vitals:    05/22/17 1009   BP: 116/89   Pulse: (!) 120   Resp: 18   Temp: 97.8 °F (36.6 °C)   SpO2: 100%   Weight: 77.1 kg (170 lb)   Height: 5' 6\" (1.676 m)            Physical Exam   Constitutional: He is oriented to person, place, and time. He appears well-developed and well-nourished. HENT:   Head: Normocephalic. Right Ear: External ear normal.   Left Ear: External ear normal.   Eyes: Conjunctivae and EOM are normal. Pupils are equal, round, and reactive to light. Neck: Normal range of motion. Neck supple. No tracheal deviation present. Cardiovascular: Normal rate, regular rhythm, normal heart sounds and intact distal pulses. No murmur heard. Pulmonary/Chest: Effort normal and breath sounds normal. No respiratory distress. Abdominal: Soft. He exhibits no distension. There is no tenderness (no significant tenderness to palpation through-out entire abdomen. ). There is no rebound. Genitourinary:   Genitourinary Comments: Drainage from rectal fistula present. Musculoskeletal: Normal range of motion. Neurological: He is alert and oriented to person, place, and time. No cranial nerve deficit. Skin: No rash noted. Nursing note and vitals reviewed. MDM  Number of Diagnoses or Management Options  Calculus of gallbladder without cholecystitis without obstruction: new and requires workup  Hyperbilirubinemia: new and requires workup  Pancytopenia [facility]): new and requires workup     Amount and/or Complexity of Data Reviewed  Clinical lab tests: reviewed and ordered  Tests in the radiology section of CPT®: ordered and reviewed  Tests in the medicine section of CPT®: ordered and reviewed  Review and summarize past medical records: yes    Risk of Complications, Morbidity, and/or Mortality  Presenting problems: high  Diagnostic procedures: high  Management options: high    Patient Progress  Patient progress: stable    ED Course       Procedures  Recent Results (from the past 12 hour(s))   CBC WITH AUTOMATED DIFF    Collection Time: 05/22/17 11:25 AM   Result Value Ref Range    WBC 0.7 (LL) 4.3 - 11.1 K/uL    RBC 3.12 (L) 4.23 - 5.67 M/uL    HGB 10.1 (L) 13.6 - 17.2 g/dL    HCT 28.8 (L) 41.1 - 50.3 %    MCV 92.3 79.6 - 97.8 FL    MCH 32.4 26.1 - 32.9 PG    MCHC 35.1 (H) 31.4 - 35.0 g/dL    RDW 17.1 (H) 11.9 - 14.6 %    PLATELET 164 (L) 430 - 450 K/uL    MPV 8.8 (L) 10.8 - 14.1 FL    NEUTROPHILS 14 (L) 43 - 78 %    LYMPHOCYTES 64 (H) 13 - 44 %    MONOCYTES 20 (H) 4.0 - 12.0 %    EOSINOPHILS 2 0.5 - 7.8 %    BASOPHILS 0 0.0 - 2.0 %    IMMATURE GRANULOCYTES 0 0.0 - 5.0 %    ABS. NEUTROPHILS 0.1 (L) 1.7 - 8.2 K/UL    ABS. LYMPHOCYTES 0.5 0.5 - 4.6 K/UL    ABS. MONOCYTES 0.1 0.1 - 1.3 K/UL    ABS. EOSINOPHILS 0.0 0.0 - 0.8 K/UL    ABS. BASOPHILS 0.0 0.0 - 0.2 K/UL    ABS. IMM.  GRANS. 0.0 0.0 - 0.5 K/UL    RBC COMMENTS NORMOCYTIC/NORMOCHROMIC      WBC COMMENTS Result Confirmed By Smear      PLATELET COMMENTS DECREASED      DF AUTOMATED     METABOLIC PANEL, COMPREHENSIVE    Collection Time: 05/22/17 11:25 AM   Result Value Ref Range    Sodium 140 136 - 145 mmol/L    Potassium 2.8 (LL) 3.5 - 5.1 mmol/L    Chloride 102 98 - 107 mmol/L    CO2 24 21 - 32 mmol/L    Anion gap 14 7 - 16 mmol/L    Glucose 96 65 - 100 mg/dL    BUN 29 (H) 8 - 23 MG/DL    Creatinine 3.01 (H) 0.8 - 1.5 MG/DL    GFR est AA 27 (L) >60 ml/min/1.73m2    GFR est non-AA 22 (L) >60 ml/min/1.73m2    Calcium 8.4 8.3 - 10.4 MG/DL    Bilirubin, total 3.5 (H) 0.2 - 1.1 MG/DL    ALT (SGPT) 10 (L) 12 - 65 U/L    AST (SGOT) 15 15 - 37 U/L    Alk. phosphatase 70 50 - 136 U/L    Protein, total 7.9 6.3 - 8.2 g/dL    Albumin 3.0 (L) 3.2 - 4.6 g/dL    Globulin 4.9 (H) 2.3 - 3.5 g/dL    A-G Ratio 0.6 (L) 1.2 - 3.5     POC LACTIC ACID    Collection Time: 05/22/17 12:28 PM   Result Value Ref Range    Lactic Acid (POC) 1.9 0.5 - 1.9 mmol/L     [address]    Result Date: 5/22/2017  Right Upper Quadrant Ultrasound INDICATION:  Acute moderate right upper quadrant abdominal pain with elevated bilirubin; prior colostomy, gallstone, splenomegaly COMPARISON: CT 3/30/2017 TECHNIQUE:  Sonographic gray scale and Doppler images were obtained of the right upper quadrant of the abdomen. Technologist reports best possible imaging given patient difficulty with positioning and breathing maneuvers. FINDINGS: There are no discrete lesions in the visualized portions of the liver. Liver size is 16.3 cm, within normal limits. Main portal vein is patent with note of elevated velocity of unclear significance. Main portal vein is patent on Doppler imaging. There is no bile duct dilatation. The common bile duct diameter is 3 mm. The gallbladder is contracted limiting detail. Small gallstones are seen. There is nonspecific pre-4 mm no wall thickening. Sonographic Hood's sign is not seen. There are no discrete lesions in the limited visualized portions of the pancreas, not well seen due to overlying bowel. The right kidney measures 8 cm in length. There is no hydronephrosis.  There is no evidence of a solid renal mass. Color Doppler demonstrates expected right renal flow. There is no evidence of ascites. The aorta is not well seen due to overlying bowel. IVC appears patent on Doppler imaging. IMPRESSION: Gallstones. No biliary dilatation. 75 yo male with rectal drainage    Patient with gallstones in gallbladder with no significant biliary dilation. Patient also with pancytopenia. Unclear if bilirubin from gallstones vs. Pancytopenia. Discussed with hospitalist for admission and further management and workup, and specifically with Jessica Golden NP about need for further workup of gallstones with elevated bilirubin and likely need for HIDA scan vs. ERCP vs. GI recommendations.

## 2017-05-22 NOTE — IP AVS SNAPSHOT
Kayliesondrayasmine Jenni 
 
 
 [address] [facility] [address] [facility] 
[phone] Patient: Jeanne Burrows MRN: [id number] ELG:[id number] You are allergic to the following Allergen Reactions Heparin Analogues Other (comments) H. I.T positive Immunizations Administered for This Admission Name Date  
 TB Skin Test (PPD) Intradermal 5/22/2017 Recent Documentation Height Weight BMI Smoking Status 1.676 m 77.3 kg 27.5 kg/m2 Former Smoker Unresulted Labs Order Current Status PLEASE READ & DOCUMENT PPD TEST IN 24 HRS Preliminary result PLEASE READ & DOCUMENT PPD TEST IN 48 HRS Preliminary result Emergency Contacts Name Discharge Info Relation Home Work Mobile Page President  Brother [24] [phone] Godfrey Mendez  Sister [23] [phone] Yolanda Garvey  Sister [23] [phone] About your hospitalization You were admitted on:  May 22, 2017 You last received care in the:  [facility] 8 MED SURG You were discharged on:  June 1, 2017 Unit phone number:  [phone] Why you were hospitalized Your primary diagnosis was:  Pancytopenia (Hcc) Your diagnoses also included:  Aundrea-Rectal Abscess, Nocturnal Hypoxemia, Hyperbilirubinemia, Hypokalemia, Htn (Hypertension), Gerd (Gastroesophageal Reflux Disease), Esrd (End Stage Renal Disease) (Hcc), Alcohol Abuse, Daily Use  
  
  
 
  
  
Providers Seen During Your Hospitalizations Provider Role Specialty Primary office phone Nicolás Kyle MD Attending Provider Emergency Medicine [phone] Zoran Berg MD Attending Provider Internal Medicine [phone] Your Primary Care Physician (PCP) Primary Care Physician Office Phone Office Fax Aide Padgett [phone] Follow-up Information Follow up With Details Comments Contact Info  [address] ([place]) [address] 
 [place] [address] 
[phone] Kim Ojeda MD In 1 week follow up with Dr. Radha Marroquin or NP with CBC prior. Appointment to be arranged by [facility] and patient will be notified by their scheduling department. [id number] [facility] [address] 
[phone] Clarita Mendez MD On 6/6/2017 1:20pm [address] [facility] [address] 
[phone] Silvia Menard MD  GI office to call patient with appt. [address] [facility] [address] 
[phone] Your Appointments Tuesday June 06, 2017  1:20 PM EDT New Patient with Clarita Mendez MD  
[address] [place] ([address]) [address] [facility] [address]  
[phone] Current Discharge Medication List  
  
START taking these medications Dose & Instructions Dispensing Information Comments Morning Noon Evening Bedtime Lactobacillus Acidoph & Bulgar 1 million cell Tab tablet Commonly known as:  Pavan Salvia Your last dose was: Your next dose is:    
   
   
 Dose:  2 Tab Take 2 Tabs by mouth two (2) times a day. Quantity:  120 Tab Refills:  0  
     
   
   
   
  
 rifAXIMin 200 mg tablet Commonly known as:  Olive Jean Your last dose was: Your next dose is:    
   
   
 Dose:  200 mg Take 1 Tab by mouth three (3) times daily for 14 days. Indications: High ostomy output, suspected bacterial overgrowth Quantity:  42 Tab Refills:  0  
     
   
   
   
  
 sertraline 25 mg tablet Commonly known as:  ZOLOFT Your last dose was: Your next dose is:    
   
   
 Dose:  25 mg Take 1 Tab by mouth every evening. Indications: ANXIETY WITH DEPRESSION Quantity:  30 Tab Refills:  0 CONTINUE these medications which have NOT CHANGED Dose & Instructions Dispensing Information Comments Morning Noon Evening Bedtime  
 acetaminophen 325 mg tablet Commonly known as:  TYLENOL Your last dose was: Your next dose is:    
   
   
 Dose:  650 mg Take 2 Tabs by mouth two (2) times daily as needed. Use only if necessary. Patient has history of alcoholic liver disease Quantity:  30 Tab Refills:  0  
     
   
   
   
  
 albuterol 2.5 mg /3 mL (0.083 %) nebulizer solution Commonly known as:  PROVENTIL VENTOLIN Your last dose was: Your next dose is:    
   
   
 Dose:  2.5 mg  
3 mL by Nebulization route every six (6) hours as needed for Wheezing. Quantity:  24 Each Refills:  0  
     
   
   
   
  
 epoetin miky 10,000 unit/mL injection Commonly known as:  EPOGEN;PROCRIT Your last dose was: Your next dose is:    
   
   
 Dose:  94200 Units 1 mL by SubCUTAneous route every seven (7) days. Indications: RENAL DIALYSIS Quantity:  1 Vial  
Refills:  0  
     
   
   
   
  
 ferrous sulfate 325 mg (65 mg iron) tablet Your last dose was: Your next dose is:    
   
   
 Dose:  325 mg Take 1 Tab by mouth two (2) times daily (with meals). Quantity:  60 Tab Refills:  0  
     
   
   
   
  
 magnesium oxide 400 mg tablet Commonly known as:  MAG-OX Your last dose was: Your next dose is:    
   
   
 Dose:  400 mg Take 400 mg by mouth daily. Refills:  0 OXYGEN-AIR DELIVERY SYSTEMS Your last dose was: Your next dose is:    
   
   
 Dose:  2 L/min Take 2 L/min by inhalation nightly. via NC Refills:  0  
     
   
   
   
  
 pantoprazole 40 mg tablet Commonly known as:  PROTONIX Your last dose was: Your next dose is:    
   
   
 Dose:  40 mg Take 1 Tab by mouth Before breakfast and dinner. Quantity:  60 Tab Refills:  0  
     
   
   
   
  
 VITAMIN D3 2,000 unit Cap capsule Generic drug:  Cholecalciferol (Vitamin D3) Your last dose was: Your next dose is:    
   
   
 Dose:  2000 Units Take 2,000 Units by mouth once. Refills:  0 STOP taking these medications   
 metoprolol tartrate 25 mg tablet Commonly known as:  LOPRESSOR Where to Get Your Medications These medications were sent to [address] [place] [address] Phone:  [phone]  
  sertraline 25 mg tablet Information on where to get these meds will be given to you by the nurse or doctor. ! Ask your nurse or doctor about these medications Lactobacillus Acidoph & Bulgar 1 million cell Tab tablet  
 rifAXIMin 200 mg tablet Discharge Instructions None Discharge Orders None [employer] Announcement We are excited to announce that we are making your provider's discharge notes available to you in Naartjie. You will see these notes when they are completed and signed by the physician that discharged you from your recent hospital stay. If you have any questions or concerns about any information you see in Naartjie, please call the Health Information Department where you were seen or reach out to your Primary Care Provider for more information about your plan of care. Introducing [facility] SERVICES! Maribel Nagel introduces Naartjie patient portal. Now you can access parts of your medical record, email your doctor's office, and request medication refills online. 1. In your internet browser, go to [email or web address] 2. Click on the First Time User? Click Here link in the Sign In box. You will see the New Member Sign Up page. 3. Enter your Na[employer]jie Access Code exactly as it appears below. You will not need to use this code after youve completed the sign-up process.  If you do not sign up before the expiration date, you must request a new code. · [email or web address] Access Code: [email or web address] Expires: 6/21/2017 11:36 AM 
 
4. Enter the last four digits of your Social Security Number (xxxx) and Date of Birth (mm/dd/yyyy) as indicated and click Submit. You will be taken to the next sign-up page. 5. Create a [employer]el[employer]TV ID. This will be your [employer]el[employer]TV login ID and cannot be changed, so think of one that is secure and easy to remember. 6. Create a [employer] password. You can change your password at any time. 7. Enter your Password Reset Question and Answer. This can be used at a later time if you forget your password. 8. Enter your e-mail address. You will receive e-mail notification when new information is available in [address] 9. Click Sign Up. You can now view and download portions of your medical record. 10. Click the Download Summary menu link to download a portable copy of your medical information. If you have questions, please visit the Frequently Asked Questions section of the [email or web address] website. Remember, [employer]el[employer]TV is NOT to be used for urgent needs. For medical emergencies, dial 911. Now available from your iPhone and Android! General Information Please provide this summary of care documentation to your next provider. Patient Signature:  ____________________________________________________________ Date:  ____________________________________________________________  
  
Stiven Breath Provider Signature:  ____________________________________________________________ Date:  ____________________________________________________________

## 2017-05-22 NOTE — PROGRESS NOTES
Attending : Note    Patient seen in conjunction with Yusuf Barrera NP    Mr Franc Guevara was seen and examined, his complaints are due to more symptoms of rectal drainage. Patient has no abdominal pain no fever no chills no shortness of breath no abdominal pain. He is concerned about rectal drainage from known fistula being managed by Dr John Donato, and having his colostomy reversed. He denies any pain in his upper abdomen no difficulty, swallowing. Patient HAD A BONE MARROW BIOPSY DONE IN march of this year showed no evidence of pathology at that time. He is known to have neutropenia, and each admission has been getting granix injections to increase his WBC. Hematology thinks this is likely due to chronic infection. Please see H&P for further details    Plan   Consult Hematology for ? Need for repeat Bone marrow, granix injections    Agree with CT abd pelvis to see if there is re-accumulation of abscess that needs to be drained.  - if there is will consult surgery    Cholelithiasis - this seems to be chronic no abdominal pain, may consider hida scan for possible chronic  Cholecystitis,     Other plan as per H&P.

## 2017-05-22 NOTE — ED NOTES
TRANSFER - OUT REPORT:    Verbal report given to DEVAN Morin on Leslee Yuan  being transferred to [facility] for routine progression of care       Report consisted of patients Situation, Background, Assessment and   Recommendations(SBAR). Information from the following report(s) SBAR, ED Summary, [facility] and Recent Results was reviewed with the receiving nurse. Lines:   Peripheral IV 05/22/17 Right Forearm (Active)        Opportunity for questions and clarification was provided.       Patient transported with:   Salsify

## 2017-05-22 NOTE — ED NOTES
Patient awake, A&O x3. VSS. Patient denies chest pain, N/V. Patient states he is \"a little\" SOB. Patient has a colostomy bag, which appears to be main concern. Patient is very unfocused, needs to be asked same question several times as all he keeps saying is \"i cant go home like this. My colostomy bag is dirty\". This RN went to empty colostomy bag as requested by patient, bag full of air only-no stool.

## 2017-05-22 NOTE — ED TRIAGE NOTES
EMS reports that the patient does not want to take care of his colostomy bag. States insisted during his dialysis run that he be transferred to the ED for placement somewhere that can take care of his colostomy bag.

## 2017-05-23 ENCOUNTER — APPOINTMENT (OUTPATIENT)
Dept: ULTRASOUND IMAGING | Age: 67
DRG: 808 | End: 2017-05-23
Attending: NURSE PRACTITIONER
Payer: MEDICARE

## 2017-05-23 PROBLEM — E80.6 HYPERBILIRUBINEMIA: Chronic | Status: ACTIVE | Noted: 2017-03-29

## 2017-05-23 PROBLEM — N18.6 ESRD (END STAGE RENAL DISEASE) (HCC): Chronic | Status: ACTIVE | Noted: 2017-03-29

## 2017-05-23 PROBLEM — K61.1 PERI-RECTAL ABSCESS: Chronic | Status: ACTIVE | Noted: 2017-04-09

## 2017-05-23 LAB
ALBUMIN SERPL BCP-MCNC: 2.6 G/DL (ref 3.2–4.6)
ALBUMIN/GLOB SERPL: 0.6 {RATIO} (ref 1.2–3.5)
ALP SERPL-CCNC: 61 U/L (ref 50–136)
ALT SERPL-CCNC: 7 U/L (ref 12–65)
ANION GAP BLD CALC-SCNC: 11 MMOL/L (ref 7–16)
AST SERPL W P-5'-P-CCNC: 12 U/L (ref 15–37)
BACTERIA SPEC CULT: NEGATIVE
BACTERIA SPEC CULT: NORMAL
BACTERIA SPEC CULT: NORMAL
BASOPHILS # BLD AUTO: 0 K/UL (ref 0–0.2)
BASOPHILS # BLD: 0 % (ref 0–2)
BILIRUB DIRECT SERPL-MCNC: 0.4 MG/DL
BILIRUB SERPL-MCNC: 1.9 MG/DL (ref 0.2–1.1)
BUN SERPL-MCNC: 36 MG/DL (ref 8–23)
CALCIUM SERPL-MCNC: 8.1 MG/DL (ref 8.3–10.4)
CHLORIDE SERPL-SCNC: 107 MMOL/L (ref 98–107)
CO2 SERPL-SCNC: 24 MMOL/L (ref 21–32)
CREAT SERPL-MCNC: 3.63 MG/DL (ref 0.8–1.5)
DIFFERENTIAL METHOD BLD: ABNORMAL
EOSINOPHIL # BLD: 0 K/UL (ref 0–0.8)
EOSINOPHIL NFR BLD: 2 % (ref 0.5–7.8)
ERYTHROCYTE [DISTWIDTH] IN BLOOD BY AUTOMATED COUNT: 17.5 % (ref 11.9–14.6)
FERRITIN SERPL-MCNC: 844 NG/ML (ref 8–388)
GLOBULIN SER CALC-MCNC: 4.2 G/DL (ref 2.3–3.5)
GLUCOSE SERPL-MCNC: 80 MG/DL (ref 65–100)
HCT VFR BLD AUTO: 24.5 % (ref 41.1–50.3)
HEMOCCULT STL QL: NEGATIVE
HGB BLD-MCNC: 8.4 G/DL (ref 13.6–17.2)
IMM GRANULOCYTES # BLD: 0 K/UL (ref 0–0.5)
IMM GRANULOCYTES NFR BLD AUTO: 0 % (ref 0–5)
INR PPP: 1.2 (ref 0.9–1.2)
IRON SATN MFR SERPL: 33 %
IRON SERPL-MCNC: 45 UG/DL (ref 35–150)
LYMPHOCYTES # BLD AUTO: 59 % (ref 13–44)
LYMPHOCYTES # BLD: 0.4 K/UL (ref 0.5–4.6)
MAGNESIUM SERPL-MCNC: 1.7 MG/DL (ref 1.8–2.4)
MCH RBC QN AUTO: 32.2 PG (ref 26.1–32.9)
MCHC RBC AUTO-ENTMCNC: 34.3 G/DL (ref 31.4–35)
MCV RBC AUTO: 93.9 FL (ref 79.6–97.8)
MONOCYTES # BLD: 0.1 K/UL (ref 0.1–1.3)
MONOCYTES NFR BLD AUTO: 21 % (ref 4–12)
NEUTS SEG # BLD: 0.1 K/UL (ref 1.7–8.2)
NEUTS SEG NFR BLD AUTO: 18 % (ref 43–78)
PHOSPHATE SERPL-MCNC: 1.9 MG/DL (ref 2.3–3.7)
PLATELET # BLD AUTO: 92 K/UL (ref 150–450)
PLATELET COMMENTS,PCOM: SLIGHT
PMV BLD AUTO: 9 FL (ref 10.8–14.1)
POTASSIUM SERPL-SCNC: 3 MMOL/L (ref 3.5–5.1)
PROT SERPL-MCNC: 6.8 G/DL (ref 6.3–8.2)
PROTHROMBIN TIME: 12.6 SEC (ref 9.6–12)
RBC # BLD AUTO: 2.61 M/UL (ref 4.23–5.67)
RBC MORPH BLD: ABNORMAL
SERVICE CMNT-IMP: NORMAL
SERVICE CMNT-IMP: NORMAL
SODIUM SERPL-SCNC: 142 MMOL/L (ref 136–145)
TIBC SERPL-MCNC: 138 UG/DL (ref 250–450)
WBC # BLD AUTO: 0.6 K/UL (ref 4.3–11.1)
WBC #/AREA STL HPF: NORMAL /HPF (ref 0–4)
WBC MORPH BLD: ABNORMAL

## 2017-05-23 PROCEDURE — 74011250636 HC RX REV CODE- 250/636: Performed by: INTERNAL MEDICINE

## 2017-05-23 PROCEDURE — 87045 FECES CULTURE AEROBIC BACT: CPT | Performed by: NURSE PRACTITIONER

## 2017-05-23 PROCEDURE — 85025 COMPLETE CBC W/AUTO DIFF WBC: CPT | Performed by: NURSE PRACTITIONER

## 2017-05-23 PROCEDURE — 65270000029 HC RM PRIVATE

## 2017-05-23 PROCEDURE — 80076 HEPATIC FUNCTION PANEL: CPT | Performed by: NURSE PRACTITIONER

## 2017-05-23 PROCEDURE — 77030010522

## 2017-05-23 PROCEDURE — 74011000258 HC RX REV CODE- 258: Performed by: INTERNAL MEDICINE

## 2017-05-23 PROCEDURE — 97530 THERAPEUTIC ACTIVITIES: CPT

## 2017-05-23 PROCEDURE — 77030032490 HC SLV COMPR SCD KNE COVD -B

## 2017-05-23 PROCEDURE — 84146 ASSAY OF PROLACTIN: CPT | Performed by: INTERNAL MEDICINE

## 2017-05-23 PROCEDURE — 83735 ASSAY OF MAGNESIUM: CPT | Performed by: NURSE PRACTITIONER

## 2017-05-23 PROCEDURE — 89055 LEUKOCYTE ASSESSMENT FECAL: CPT | Performed by: NURSE PRACTITIONER

## 2017-05-23 PROCEDURE — 77030010541

## 2017-05-23 PROCEDURE — 87493 C DIFF AMPLIFIED PROBE: CPT | Performed by: NURSE PRACTITIONER

## 2017-05-23 PROCEDURE — 65660000000 HC RM CCU STEPDOWN

## 2017-05-23 PROCEDURE — 74011250636 HC RX REV CODE- 250/636: Performed by: HOSPITALIST

## 2017-05-23 PROCEDURE — 80048 BASIC METABOLIC PNL TOTAL CA: CPT | Performed by: NURSE PRACTITIONER

## 2017-05-23 PROCEDURE — 82272 OCCULT BLD FECES 1-3 TESTS: CPT | Performed by: NURSE PRACTITIONER

## 2017-05-23 PROCEDURE — 36415 COLL VENOUS BLD VENIPUNCTURE: CPT | Performed by: NURSE PRACTITIONER

## 2017-05-23 PROCEDURE — 74011250636 HC RX REV CODE- 250/636: Performed by: NURSE PRACTITIONER

## 2017-05-23 PROCEDURE — 84100 ASSAY OF PHOSPHORUS: CPT | Performed by: NURSE PRACTITIONER

## 2017-05-23 PROCEDURE — 97165 OT EVAL LOW COMPLEX 30 MIN: CPT

## 2017-05-23 PROCEDURE — 74011250637 HC RX REV CODE- 250/637: Performed by: NURSE PRACTITIONER

## 2017-05-23 PROCEDURE — 93990 DOPPLER FLOW TESTING: CPT

## 2017-05-23 PROCEDURE — 97161 PT EVAL LOW COMPLEX 20 MIN: CPT

## 2017-05-23 RX ORDER — VANCOMYCIN/0.9 % SOD CHLORIDE 1.5G/250ML
1500 PLASTIC BAG, INJECTION (ML) INTRAVENOUS ONCE
Status: COMPLETED | OUTPATIENT
Start: 2017-05-23 | End: 2017-05-23

## 2017-05-23 RX ORDER — VANCOMYCIN HYDROCHLORIDE
1250 SEE ADMIN INSTRUCTIONS
Status: DISCONTINUED | OUTPATIENT
Start: 2017-05-23 | End: 2017-05-28

## 2017-05-23 RX ADMIN — METOPROLOL TARTRATE 25 MG: 25 TABLET ORAL at 09:34

## 2017-05-23 RX ADMIN — PANTOPRAZOLE SODIUM 40 MG: 40 TABLET, DELAYED RELEASE ORAL at 17:21

## 2017-05-23 RX ADMIN — Medication 10 ML: at 05:41

## 2017-05-23 RX ADMIN — PANTOPRAZOLE SODIUM 40 MG: 40 TABLET, DELAYED RELEASE ORAL at 05:41

## 2017-05-23 RX ADMIN — Medication 5 ML: at 12:42

## 2017-05-23 RX ADMIN — VANCOMYCIN HYDROCHLORIDE 1500 MG: 10 INJECTION, POWDER, LYOPHILIZED, FOR SOLUTION INTRAVENOUS at 20:24

## 2017-05-23 RX ADMIN — Medication 10 ML: at 22:13

## 2017-05-23 RX ADMIN — TBO-FILGRASTIM 300 MCG: 300 INJECTION, SOLUTION SUBCUTANEOUS at 17:21

## 2017-05-23 RX ADMIN — CEFEPIME 1 G: 1 INJECTION, POWDER, FOR SOLUTION INTRAMUSCULAR; INTRAVENOUS at 19:48

## 2017-05-23 NOTE — CONSULTS
[facility] Consult    Subjective:     Narcisa Rhoades is a 74 y.o.  male who is being seen for ESRD. This is a pleasant gentleman who has ESRD on dialysis since August at [facility]. He dialyzes via permcath. He has a fistula which failed to mature. He comes in with drainage from his rectum. He has had an ostomy sice August. He was admitted because he was found to have pancytopenia. He is an alcoholic. Past Medical History:   Diagnosis Date    Alcohol abuse, daily use 09/03/2016    NONE X 7 DAYS, USUALLY DRINKS 10-15 BEERS DAILY. TAKES A WEEK OFF AT A TIME ONCE A MONTH    Anemia 9/3/2016    HGB 7.8 ON ADMISSION    Chronic kidney disease     end stage renal disease- dialysis on Tues, Thurs. , Saturday mornings.  HIT (heparin-induced thrombocytopenia) ([facility] [address]     Hypertension     Hypokalemia 9/3/2016    2.7 ON ADMISSION    Tobacco abuse 9/3/2016    QUIT 7 DAYS AGO      Past Surgical History:   Procedure Laterality Date    ABDOMEN SURGERY PROC UNLISTED      Sept. 2016.  DEBRIDE NECROTIC SKIN/ TISSUE, ABD WALL  09/04/2016    I&D rectal abscess with drain placement - Dr. Yani Cardona      rectal abcess surgery that also got infected-  Subsequent I & D of the rectal abcess.      Family History   Problem Relation Age of Onset    Stroke Mother       Social History   Substance Use Topics    Smoking status: Former Smoker     Packs/day: 2.00     Years: 40.00     Types: Cigarettes    Smokeless tobacco: Never Used      Comment: STATES HE QUIT 7 DAYS AGO    Alcohol use 42.0 - 63.0 oz/week     70 - 105 Cans of beer per week      Comment: 10-15 CANS DAILY       Current Facility-Administered Medications   Medication Dose Route Frequency Provider Last Rate Last Dose    sodium chloride (NS) flush 5-10 mL  5-10 mL IntraVENous Q8H Tiffany Asencio NP   10 mL at 05/22/17 2115    sodium chloride (NS) flush 5-10 mL  5-10 mL IntraVENous PRN BETTY Woods tuberculin injection 5 Units  5 Units IntraDERMal ONCE Jan Jacobo NP        0.9% sodium chloride infusion  50 mL/hr IntraVENous CONTINUOUS Jan Jacobo NP 50 mL/hr at 05/22/17 2100 50 mL/hr at 05/22/17 2100    ondansetron (ZOFRAN) injection 4 mg  4 mg IntraVENous Q4H PRN Jan Jacobo NP        metoprolol tartrate (LOPRESSOR) tablet 25 mg  25 mg Oral Q12H Tiffany Asencio NP   Stopped at 05/22/17 2100    albuterol (PROVENTIL VENTOLIN) nebulizer solution 2.5 mg  2.5 mg Nebulization Q4H PRN Jan Jacobo NP        [START ON 5/23/2017] pantoprazole (PROTONIX) tablet 40 mg  40 mg Oral ACB&D Jan Jacobo NP            Allergies   Allergen Reactions    Heparin Analogues Other (comments)     H. I.T positive        Review of Systems:  Pertinent items are noted in the History of Present Illness. Objective: Intake and Output:            Physical Exam:   General appearance: alert, cooperative, no distress, appears stated age  Neck: supple, symmetrical, trachea midline, no adenopathy, thyroid: not enlarged, symmetric, no tenderness/mass/nodules, no carotid bruit and no JVD  Lungs: clear to auscultation bilaterally  Heart: regular rate and rhythm, S1, S2 normal, no murmur, click, rub or gallop  Abdomen: soft, non-tender.  Bowel sounds normal. No masses,  no organomegaly  Extremities: extremities normal, atraumatic, no cyanosis or edema  AVF - immature        Data Review:   Recent Results (from the past 24 hour(s))   CBC WITH AUTOMATED DIFF    Collection Time: 05/22/17 11:25 AM   Result Value Ref Range    WBC 0.7 (LL) 4.3 - 11.1 K/uL    RBC 3.12 (L) 4.23 - 5.67 M/uL    HGB 10.1 (L) 13.6 - 17.2 g/dL    HCT 28.8 (L) 41.1 - 50.3 %    MCV 92.3 79.6 - 97.8 FL    MCH 32.4 26.1 - 32.9 PG    MCHC 35.1 (H) 31.4 - 35.0 g/dL    RDW 17.1 (H) 11.9 - 14.6 %    PLATELET 260 (L) 830 - 450 K/uL    MPV 8.8 (L) 10.8 - 14.1 FL    NEUTROPHILS 14 (L) 43 - 78 %    LYMPHOCYTES 64 (H) 13 - 44 % MONOCYTES 20 (H) 4.0 - 12.0 %    EOSINOPHILS 2 0.5 - 7.8 %    BASOPHILS 0 0.0 - 2.0 %    IMMATURE GRANULOCYTES 0 0.0 - 5.0 %    ABS. NEUTROPHILS 0.1 (L) 1.7 - 8.2 K/UL    ABS. LYMPHOCYTES 0.5 0.5 - 4.6 K/UL    ABS. MONOCYTES 0.1 0.1 - 1.3 K/UL    ABS. EOSINOPHILS 0.0 0.0 - 0.8 K/UL    ABS. BASOPHILS 0.0 0.0 - 0.2 K/UL    ABS. IMM. GRANS. 0.0 0.0 - 0.5 K/UL    RBC COMMENTS NORMOCYTIC/NORMOCHROMIC      WBC COMMENTS Result Confirmed By Smear      PLATELET COMMENTS DECREASED      DF AUTOMATED     METABOLIC PANEL, COMPREHENSIVE    Collection Time: 05/22/17 11:25 AM   Result Value Ref Range    Sodium 140 136 - 145 mmol/L    Potassium 2.8 (LL) 3.5 - 5.1 mmol/L    Chloride 102 98 - 107 mmol/L    CO2 24 21 - 32 mmol/L    Anion gap 14 7 - 16 mmol/L    Glucose 96 65 - 100 mg/dL    BUN 29 (H) 8 - 23 MG/DL    Creatinine 3.01 (H) 0.8 - 1.5 MG/DL    GFR est AA 27 (L) >60 ml/min/1.73m2    GFR est non-AA 22 (L) >60 ml/min/1.73m2    Calcium 8.4 8.3 - 10.4 MG/DL    Bilirubin, total 3.5 (H) 0.2 - 1.1 MG/DL    ALT (SGPT) 10 (L) 12 - 65 U/L    AST (SGOT) 15 15 - 37 U/L    Alk. phosphatase 70 50 - 136 U/L    Protein, total 7.9 6.3 - 8.2 g/dL    Albumin 3.0 (L) 3.2 - 4.6 g/dL    Globulin 4.9 (H) 2.3 - 3.5 g/dL    A-G Ratio 0.6 (L) 1.2 - 3.5     LIPASE    Collection Time: 05/22/17 11:25 AM   Result Value Ref Range    Lipase 49 (L) 73 - 393 U/L   PHOSPHORUS    Collection Time: 05/22/17 11:25 AM   Result Value Ref Range    Phosphorus 0.7 (LL) 2.3 - 3.7 MG/DL   MAGNESIUM    Collection Time: 05/22/17 11:25 AM   Result Value Ref Range    Magnesium 1.4 (LL) 1.8 - 2.4 mg/dL   POC LACTIC ACID    Collection Time: 05/22/17 12:28 PM   Result Value Ref Range    Lactic Acid (POC) 1.9 0.5 - 1.9 mmol/L           Assessment:     Active Problems:    Pancytopenia ([address] (9/3/2016)      Overview: HGB 7.8 ON ADMISSION    ESRD    Plan: This is a pleasant gentleman with ESRD on dialysis at [facility].  He comes in with pancytopenia and drainage form his rectum. He is hypokalemic (though drawn after dialysis) but has had liquid stool from his colostomy. Would limit potassium repletion until he equilibrates somewhat. Will arrange for dialysis liekly Wednesday and on an as needed basis. Check iron stores and continue Procrit. Thank you for the kind courtesy of this consultation. Will make further adjustments to the medical regimen as the clinical course dictates and follow very closely with you.       Signed By: Shaina Huggins MD     May 22, 2017

## 2017-05-23 NOTE — WOUND CARE
Patient has end colostomy September 2016 for diversion from perianal /rectal wound. Wound was large, followed inpatient and outpatient by wound care at some point had a wound vac. Right buttock is painful to touch, with erythema and induration, no visible wound, scaring. Over coccyx is 1.5x1.5x1+cm open area, circumfrentially undermining of 1 cm, surrounding erythema and scarring, unknown if this is part of the original large wound from buttock or not, if this a new wound would be an unstageable pressure wound. Noted results of CT on 4/9/17 and 5/22/17 with concerns for perirectal abscess. Will order Iodoform packing and ABD for coccyx wound daily. Recommend Dr. Stone Officer follow for perirectal abscess/ erythema and rule out that the abscess does not tunnel to this coccyx wound. Dr. Alexandro Canchola notified of above. Noted orders for MRI ABD and pelvis today. Will monitor.

## 2017-05-23 NOTE — CONSULTS
[facility]        Inpatient Hematology / Oncology Consult Note    Reason for Consult:  Pancytopenia [facility])  Referring Physician:  Jayro Snow MD    History of Present Illness:  Mr. Favio Haines is a 75 yo male that presented to ER from HD due to extreme fatigue and rectal drainage. He stated that he can no longer care for himself and does not want to take care of colostomy anymore. He is  a chronically ill, obese, noncompliant, alcoholic on hemodialysis. He had colostomy placed last August for many year history of chronically draining perirectal abscess and multiple surgeries. He was last admitted here from 4/9-18/17 for new re-occurrence of perirectal abscess containing staph epidermidis bacteremia, proteus, and E Coli. We were consulted then and now for pancytopenia. He had BMbx 3/31/17 on another admission which showed no definitive morphologic evidence of a marrow disorder. He had follow up scheduled with Dr. Diallo Herrera from previous admission and failed to show. No definitive morphologic evidence of a marrow disorder. Review of Systems:  Constitutional Denies fever, chills, weight loss, appetite changes, fatigue, night sweats. HEENT Denies trauma, blurry vision, hearing loss, ear pain, nosebleeds, sore throat, neck pain and ear discharge. Skin Denies lesions or rashes. Lungs Denies dyspnea, cough, sputum production or hemoptysis. Cardiovascular Denies chest pain, palpitations, or lower extremity edema. Gastrointestinal Denies nausea, vomiting, changes in bowel habits, bloody or black stools, abdominal pain.  Denies dysuria, frequency or hesitancy of urination. Neuro Denies headaches, visual changes or ataxia. Denies dizziness, tingling, tremors, sensory change, speech change, focal weakness or headaches. Hematology Denies easy bruising or bleeding, denies gingival bleeding or epistaxis.    Endo Denies heat/cold intolerance, denies diabetes or thyroid abnormalities. MSK Denies back pain, arthralgias, myalgias or frequent falls. Psychiatric/Behavioral Denies depression and substance abuse. The patient is not nervous/anxious. Allergies   Allergen Reactions    Heparin Analogues Other (comments)     H. I.T positive     Past Medical History:   Diagnosis Date    Alcohol abuse, daily use 09/03/2016    NONE X 7 DAYS, USUALLY DRINKS 10-15 BEERS DAILY. TAKES A WEEK OFF AT A TIME ONCE A MONTH    Anemia 9/3/2016    HGB 7.8 ON ADMISSION    Chronic kidney disease     end stage renal disease- dialysis on Tues, Thurs. , Saturday mornings.  HIT (heparin-induced thrombocytopenia) ([facility] [address] [facility]     Hypertension     Hypokalemia 9/3/2016    2.7 ON ADMISSION    Tobacco abuse 9/3/2016    QUIT 7 DAYS AGO     Past Surgical History:   Procedure Laterality Date    ABDOMEN SURGERY PROC UNLISTED      Sept. 2016.  DEBRIDE NECROTIC SKIN/ TISSUE, ABD WALL  09/04/2016    I&D rectal abscess with drain placement - Dr. Donna Cantrell      rectal abcess surgery that also got infected-  Subsequent I & D of the rectal abcess. Family History   Problem Relation Age of Onset    Stroke Mother      Social History     Social History    Marital status: SINGLE     Spouse name: N/A    Number of children: N/A    Years of education: N/A     Occupational History    Not on file. Social History Main Topics    Smoking status: Former Smoker     Packs/day: 2.00     Years: 40.00     Types: Cigarettes    Smokeless tobacco: Never Used      Comment: STATES HE QUIT 7 DAYS AGO    Alcohol use 42.0 - 63.0 oz/week     70 - 105 Cans of beer per week      Comment: 10-15 CANS DAILY    Drug use: No    Sexual activity: Not on file     Other Topics Concern    Not on file     Social History Narrative    9/3/16:  PATIENT IS SINGLE, LIVES WITH BROTHERDEVENDRA        5/22/17:  NO CHANGE TO ABOVE.   PATIENT STATES HE CAN NOT CARE FOR SELF AND HAS NO ONE TO CARE FOR HIM     Current Facility-Administered Medications   Medication Dose Route Frequency Provider Last Rate Last Dose    sodium chloride (NS) flush 5-10 mL  5-10 mL IntraVENous Q8H Tiffany Asencio NP   10 mL at 17 0541    sodium chloride (NS) flush 5-10 mL  5-10 mL IntraVENous PRN Ricardo Rob NP        0.9% sodium chloride infusion  50 mL/hr IntraVENous CONTINUOUS Ricardo , NP 50 mL/hr at 17 2100 50 mL/hr at 17 2100    ondansetron (ZOFRAN) injection 4 mg  4 mg IntraVENous Q4H PRN Ricardo , NP        metoprolol tartrate (LOPRESSOR) tablet 25 mg  25 mg Oral Q12H Tiffany Asencio NP   25 mg at 17 0934    albuterol (PROVENTIL VENTOLIN) nebulizer solution 2.5 mg  2.5 mg Nebulization Q4H PRN Ricardo , NP        pantoprazole (PROTONIX) tablet 40 mg  40 mg Oral ACB&D Ricarod Rob NP   40 mg at 17 0541    [START ON 2017] epoetin miky (EPOGEN;PROCRIT) injection 20,000 Units  20,000 Units SubCUTAneous Q7D Ulysses Anton, MD           OBJECTIVE:  Patient Vitals for the past 8 hrs:   BP Temp Pulse Resp SpO2 Weight   17 0715 117/67 98.6 °F (37 °C) 84 18 99 % -   17 0327 116/59 98.3 °F (36.8 °C) 99 18 96 % -   17 0238 - - - - - 163 lb 1.6 oz (74 kg)     Temp (24hrs), Av.2 °F (36.8 °C), Min:97.8 °F (36.6 °C), Max:98.6 °F (37 °C)     0701 -  1900  In: 935 [I.V.:732]  Out: -     Physical Exam:  Constitutional: Well developed, well nourished male in no acute distress, sitting comfortably in the hospital bed. HEENT: Normocephalic and atraumatic. Oropharynx is clear, mucous membranes are moist.  Pupils are equal, round, and reactive to light. Extraocular muscles are intact. Sclerae anicteric. Neck supple without JVD. No thyromegaly present. Lymph node   No palpable submandibular, cervical, supraclavicular, axillary or inguinal lymph nodes. Skin Warm and dry. No bruising and no rash noted. No erythema. No pallor. Respiratory Lungs are clear to auscultation bilaterally without wheezes, rales or rhonchi, normal air exchange without accessory muscle use. CVS Normal rate, regular rhythm and normal S1 and S2. No murmurs, gallops, or rubs. Abdomen Soft, nontender and nondistended, normoactive bowel sounds. No palpable mass. No hepatosplenomegaly. Neuro Grossly nonfocal with no obvious sensory or motor deficits. MSK Normal range of motion in general.  No edema and no tenderness. Psych Appropriate mood and affect. Labs:    Recent Results (from the past 24 hour(s))   CBC WITH AUTOMATED DIFF    Collection Time: 05/22/17 11:25 AM   Result Value Ref Range    WBC 0.7 (LL) 4.3 - 11.1 K/uL    RBC 3.12 (L) 4.23 - 5.67 M/uL    HGB 10.1 (L) 13.6 - 17.2 g/dL    HCT 28.8 (L) 41.1 - 50.3 %    MCV 92.3 79.6 - 97.8 FL    MCH 32.4 26.1 - 32.9 PG    MCHC 35.1 (H) 31.4 - 35.0 g/dL    RDW 17.1 (H) 11.9 - 14.6 %    PLATELET 718 (L) 616 - 450 K/uL    MPV 8.8 (L) 10.8 - 14.1 FL    NEUTROPHILS 14 (L) 43 - 78 %    LYMPHOCYTES 64 (H) 13 - 44 %    MONOCYTES 20 (H) 4.0 - 12.0 %    EOSINOPHILS 2 0.5 - 7.8 %    BASOPHILS 0 0.0 - 2.0 %    IMMATURE GRANULOCYTES 0 0.0 - 5.0 %    ABS. NEUTROPHILS 0.1 (L) 1.7 - 8.2 K/UL    ABS. LYMPHOCYTES 0.5 0.5 - 4.6 K/UL    ABS. MONOCYTES 0.1 0.1 - 1.3 K/UL    ABS. EOSINOPHILS 0.0 0.0 - 0.8 K/UL    ABS. BASOPHILS 0.0 0.0 - 0.2 K/UL    ABS. IMM.  GRANS. 0.0 0.0 - 0.5 K/UL    RBC COMMENTS NORMOCYTIC/NORMOCHROMIC      WBC COMMENTS Result Confirmed By Smear      PLATELET COMMENTS DECREASED      DF AUTOMATED     METABOLIC PANEL, COMPREHENSIVE    Collection Time: 05/22/17 11:25 AM   Result Value Ref Range    Sodium 140 136 - 145 mmol/L    Potassium 2.8 (LL) 3.5 - 5.1 mmol/L    Chloride 102 98 - 107 mmol/L    CO2 24 21 - 32 mmol/L    Anion gap 14 7 - 16 mmol/L    Glucose 96 65 - 100 mg/dL    BUN 29 (H) 8 - 23 MG/DL    Creatinine 3.01 (H) 0.8 - 1.5 MG/DL    GFR est AA 27 (L) >60 ml/min/1.73m2    GFR est non-AA 22 (L) >60 ml/min/1.73m2    Calcium 8.4 8.3 - 10.4 MG/DL    Bilirubin, total 3.5 (H) 0.2 - 1.1 MG/DL    ALT (SGPT) 10 (L) 12 - 65 U/L    AST (SGOT) 15 15 - 37 U/L    Alk. phosphatase 70 50 - 136 U/L    Protein, total 7.9 6.3 - 8.2 g/dL    Albumin 3.0 (L) 3.2 - 4.6 g/dL    Globulin 4.9 (H) 2.3 - 3.5 g/dL    A-G Ratio 0.6 (L) 1.2 - 3.5     LIPASE    Collection Time: 05/22/17 11:25 AM   Result Value Ref Range    Lipase 49 (L) 73 - 393 U/L   PHOSPHORUS    Collection Time: 05/22/17 11:25 AM   Result Value Ref Range    Phosphorus 0.7 (LL) 2.3 - 3.7 MG/DL   MAGNESIUM    Collection Time: 05/22/17 11:25 AM   Result Value Ref Range    Magnesium 1.4 (LL) 1.8 - 2.4 mg/dL   POC LACTIC ACID    Collection Time: 05/22/17 12:28 PM   Result Value Ref Range    Lactic Acid (POC) 1.9 0.5 - 1.9 mmol/L   CULTURE, WOUND W GRAM STAIN    Collection Time: 05/22/17  5:35 PM   Result Value Ref Range    Special Requests: ABSCESS  DRAINAGE        GRAM STAIN PENDING     Culture result:        NO GROWTH AFTER SHORT PERIOD OF INCUBATION. FURTHER RESULTS TO FOLLOW AFTER OVERNIGHT INCUBATION.    CULTURE, BLOOD    Collection Time: 05/22/17  7:18 PM   Result Value Ref Range    Special Requests: RFA     Culture result: NO GROWTH AFTER 10 HOURS     CULTURE, BLOOD    Collection Time: 05/22/17  9:10 PM   Result Value Ref Range    Special Requests: LEFT ARM      Culture result: NO GROWTH AFTER 8 HOURS     AMMONIA    Collection Time: 05/22/17  9:10 PM   Result Value Ref Range    Ammonia 11 11 - 32 UMOL/L   BILIRUBIN, FRACTIONATED    Collection Time: 05/22/17  9:10 PM   Result Value Ref Range    Bilirubin, total 2.9 (H) 0.2 - 1.1 MG/DL    Bilirubin, direct 0.5 (H) <0.4 MG/DL    Bilirubin, indirect 2.4 (H) 0.0 - 1.1 MG/DL   MRSA SCREEN - PCR (NASAL)    Collection Time: 05/22/17 10:15 PM   Result Value Ref Range    Special Requests: NO SPECIAL REQUESTS      Culture result:        MRSA target DNA is not detected (presumptive not colonized with MRSA)   PROTHROMBIN TIME + INR    Collection Time: 05/22/17 11:45 PM   Result Value Ref Range    Prothrombin time 12.6 (H) 9.6 - 12.0 sec    INR 1.2 0.9 - 1.2     FERRITIN    Collection Time: 05/22/17 11:45 PM   Result Value Ref Range    Ferritin 844 (H) 8 - 388 NG/ML   TRANSFERRIN SATURATION    Collection Time: 05/22/17 11:45 PM   Result Value Ref Range    Iron 45 35 - 150 ug/dL    TIBC 138 (L) 250 - 450 ug/dL    Transferrin Saturation 33 >20 %   HEPATIC FUNCTION PANEL    Collection Time: 05/23/17  7:10 AM   Result Value Ref Range    Protein, total 6.8 6.3 - 8.2 g/dL    Albumin 2.6 (L) 3.2 - 4.6 g/dL    Globulin 4.2 (H) 2.3 - 3.5 g/dL    A-G Ratio 0.6 (L) 1.2 - 3.5      Bilirubin, total 1.9 (H) 0.2 - 1.1 MG/DL    Bilirubin, direct 0.4 (H) <0.4 MG/DL    Alk. phosphatase 61 50 - 136 U/L    AST (SGOT) 12 (L) 15 - 37 U/L    ALT (SGPT) 7 (L) 12 - 65 U/L   METABOLIC PANEL, BASIC    Collection Time: 05/23/17  7:10 AM   Result Value Ref Range    Sodium 142 136 - 145 mmol/L    Potassium 3.0 (L) 3.5 - 5.1 mmol/L    Chloride 107 98 - 107 mmol/L    CO2 24 21 - 32 mmol/L    Anion gap 11 7 - 16 mmol/L    Glucose 80 65 - 100 mg/dL    BUN 36 (H) 8 - 23 MG/DL    Creatinine 3.63 (H) 0.8 - 1.5 MG/DL    GFR est AA 22 (L) >60 ml/min/1.73m2    GFR est non-AA 18 (L) >60 ml/min/1.73m2    Calcium 8.1 (L) 8.3 - 10.4 MG/DL   CBC WITH AUTOMATED DIFF    Collection Time: 05/23/17  7:10 AM   Result Value Ref Range    WBC 0.6 (LL) 4.3 - 11.1 K/uL    RBC 2.61 (L) 4.23 - 5.67 M/uL    HGB 8.4 (L) 13.6 - 17.2 g/dL    HCT 24.5 (L) 41.1 - 50.3 %    MCV 93.9 79.6 - 97.8 FL    MCH 32.2 26.1 - 32.9 PG    MCHC 34.3 31.4 - 35.0 g/dL    RDW 17.5 (H) 11.9 - 14.6 %    PLATELET 92 (L) 770 - 450 K/uL    MPV 9.0 (L) 10.8 - 14.1 FL    NEUTROPHILS 18 (L) 43 - 78 %    LYMPHOCYTES 59 (H) 13 - 44 %    MONOCYTES 21 (H) 4.0 - 12.0 %    EOSINOPHILS 2 0.5 - 7.8 %    BASOPHILS 0 0.0 - 2.0 %    IMMATURE GRANULOCYTES 0 0.0 - 5.0 %    ABS. NEUTROPHILS 0.1 (L) 1.7 - 8.2 K/UL    ABS.  LYMPHOCYTES 0.4 (L) 0.5 - 4.6 K/UL    ABS. MONOCYTES 0.1 0.1 - 1.3 K/UL    ABS. EOSINOPHILS 0.0 0.0 - 0.8 K/UL    ABS. BASOPHILS 0.0 0.0 - 0.2 K/UL    ABS. IMM.  GRANS. 0.0 0.0 - 0.5 K/UL    RBC COMMENTS NORMOCYTIC/NORMOCHROMIC      WBC COMMENTS Result Confirmed By Smear      PLATELET COMMENTS SLIGHT      DF MANUAL     MAGNESIUM    Collection Time: 05/23/17  7:10 AM   Result Value Ref Range    Magnesium 1.7 (L) 1.8 - 2.4 mg/dL   PHOSPHORUS    Collection Time: 05/23/17  7:10 AM   Result Value Ref Range    Phosphorus 1.9 (L) 2.3 - 3.7 MG/DL       Imaging:  [unfilled]    ASSESSMENT:  Problem List  Date Reviewed: 5/23/2017          Codes Class Noted    Staphylococcus epidermidis bacteremia ICD-10-CM: R78.81  ICD-9-CM: 790.7, 041.19  4/15/2017        Aundrea-rectal abscess (Chronic) ICD-10-CM: K61.1  ICD-9-CM: 566  4/9/2017        Symptomatic anemia ICD-10-CM: D64.9  ICD-9-CM: 285.9  3/29/2017        Elevated total protein ICD-10-CM: R77.8  ICD-9-CM: 790.99  3/29/2017        Hyperbilirubinemia (Chronic) ICD-10-CM: E80.6  ICD-9-CM: 782.4  3/29/2017        Dizziness ICD-10-CM: R42  ICD-9-CM: 780.4  3/29/2017        ESRD (end stage renal disease) ([facility] (Chronic) ICD-10-CM: N18.6  ICD-9-CM: 585.6  3/29/2017    Overview Signed 5/23/2017  6:23 AM by Charmayne Pilon, NP     DIALYSIS               HIT (heparin-induced thrombocytopenia) ([facility] ICD-10-CM: K44.05  ICD-9-CM: 289.84  10/11/2016        CKD (chronic kidney disease) requiring chronic dialysis ([facility] ICD-10-CM: N18.6, Z99.2  ICD-9-CM: 585.6, V45.11  10/11/2016        Nocturnal hypoxemia (Chronic) ICD-10-CM: G47.34  ICD-9-CM: 327.24  10/4/2016        Suspected sleep apnea ICD-10-CM: G47.30  ICD-9-CM: 780.57  10/2/2016        Acute blood loss anemia ICD-10-CM: D62  ICD-9-CM: 285.1  9/29/2016        Paroxysmal atrial fibrillation (HCC) ICD-10-CM: I48.0  ICD-9-CM: 427.31  9/17/2016        Pleural effusion on left ICD-10-CM: J90  ICD-9-CM: 511.9  9/12/2016        Acute hypoactive delirium due to multiple etiologies ICD-10-CM: F05  ICD-9-CM: 293.0  9/11/2016        Thrombocytopenia ([facility] ICD-10-CM: D69.6  ICD-9-CM: 287.5  9/8/2016        HTN (hypertension) (Chronic) ICD-10-CM: I10  ICD-9-CM: 401.9  9/4/2016        GERD (gastroesophageal reflux disease) (Chronic) ICD-10-CM: K21.9  ICD-9-CM: 530.81  9/4/2016        Alcohol abuse, daily use (Chronic) ICD-10-CM: F10.10  ICD-9-CM: 305.01  9/3/2016        Hypokalemia (Chronic) ICD-10-CM: E87.6  ICD-9-CM: 276.8  9/3/2016    Overview Signed 9/3/2016  8:22 PM by Lyn Cowart NP     2.7 ON ADMISSION             VITO (acute kidney injury) ([facility] ICD-10-CM: N17.9  ICD-9-CM: 584.9  9/3/2016        * (Principal)Pancytopenia ([facility] ICD-10-CM: N29.689  ICD-9-CM: 284.19  9/3/2016    Overview Signed 9/3/2016  8:22 PM by Lyn Cowart NP     HGB 7.8 ON ADMISSION                     RECOMMENDATIONS:    Mr. Kody Rivas is known to Dr. Miriam Lora however, he has failed to follow up in the past as planned. He has history of recurrent pancytopenia possibly from chronic infections. His BMbx in March 2017 showed no evidence of bone marrow disorder. We will give granix for WBC of 0.6 and follow along for labs. Pancytopenia  5/23 Give granix. Transfuse for hemoglobin < 7 and for platelets < 64,964. Follow up with Dr. Miriam Lora once discharged.     ESRD  per nephology.      Aundrea-rectal abscess per surgical      Lab studies and imaging studies were personally reviewed. Pertinent old records were reviewed.     Thank you for allowing us to participate in the care of Mr. Kody Rivas.           Theodis Meadow, NP [facility]  [address] [address]  Office : [phone]  Fax : [phone]

## 2017-05-23 NOTE — PROGRESS NOTES
Pharmacokinetic Consult to Pharmacist    Carri Margi is a 74 y.o. male being treated for bone and joint infection with vanc/cefepime. Height: 5' 6\" (167.6 cm)  Weight: 74 kg (163 lb 1.6 oz)  Lab Results   Component Value Date/Time    BUN 36 05/23/2017 07:10 AM    Creatinine 3.63 05/23/2017 07:10 AM    WBC 0.6 05/23/2017 07:10 AM    Procalcitonin 4.5 04/09/2017 06:40 PM      Estimated Creatinine Clearance: 18.3 mL/min (based on Cr of 3.63). CULTURES:  5/22:  blood cx: NGTD, pending       Wound: GPC    Day 1 of vancomycin. Goal trough is ~20. X- ray shows no osseous erosion- MRI pending. Vancomycin dose initiated at 1250 mg x 1, then based off random levels post HD. Will continue to follow patient.       Thank you,  Tong Feliciano, PharmD  Clinical Pharmacist  [phone]

## 2017-05-23 NOTE — PROGRESS NOTES
Hospitalist Progress Note    2017  Admit Date: 2017 10:46 AM   NAME: Ora Corona   :  1950   MRN:  [id number]   Attending: Christian Garrison MD  PCP:  Mai Cervantes MD      Admitted for: abscess with concern for sepsis- pancytopenia    SUBJECTIVE:   Patient seen- no new complaints- not focused        Hospital Course  As previously documented:\"  Patient is a chronically ill, obese, noncompliant, alcoholic 74 y.o.  male who presented here from dialysis (all but 2 hours finished) for extreme concern for perception of excessive gas from colostomy placed last August for many year history of chronically draining perirectal abscess and multiple surgeries. He denies any new symptoms, fever, signs of infection, new symptoms. Vague SOB with exertion. Denies abdominal pain, reports bloating only. Anorexia. No nausea or vomiting. He is non compliant in general, is repetatively reporting he has no one to care for him. Apparently lives with brother who works outside the home. He has repeated hospitalizations. He was last admitted here from - for new re-occurrence of perirectal abscess containing staph epidermidis bacteremia, proteus, and E Coli. Same drained by IR. hgb was 5.0 on that admission with subsequent transfusion. He was treated with IV vancomycin and zosyn, and discharged to rehab on augmentin. Has been home a brief time only. Recently had dialysis graft placed, reports indicate it is still premature. Prior to that, he was admitted from 3/29-17 for symptomatic anemia. Hgb this admission is 10.1. Chronic pancytopenia followed by Hematology: He has also lost extensive weight since last year. Unknown how much, estimates 50#. Has no idea of what medications he takes. Additional history as noted below. Patient extremely poor historian, unable to focus on any task a hand.  \"      Review of Systems negative with exception of pertinent positives noted above  Past medical history unchanged from H&P    PHYSICAL EXAM     Visit Vitals    /68 (BP 1 Location: Right arm, BP Patient Position: Sitting)    Pulse 77    Temp 98.5 °F (36.9 °C)    Resp 18    Ht 5' 6\" (1.676 m)    Wt 74 kg (163 lb 1.6 oz)    SpO2 91%    BMI 26.33 kg/m2      Temp (24hrs), Av.4 °F (36.9 °C), Min:98.2 °F (36.8 °C), Max:98.6 °F (37 °C)    Oxygen Therapy  O2 Sat (%): 91 % (17 1153)  Pulse via Oximetry: 96 beats per minute (17 1908)  O2 Device: Room air (17 1017)    Intake/Output Summary (Last 24 hours) at 17 2933  Last data filed at 17 0730   Gross per 24 hour   Intake              732 ml   Output              300 ml   Net              432 ml        General: No acute distress  mucous membranes pink and moist acyanotic anicteric  Neck:  Supple full range of motion  Lungs:  Air entry equal bilaterally, no crepitations rales rhonchi   Heart:  Regular rate and rhythm,  No murmur, rub, or gallop  Abdomen: Soft, Non distended, Non tender, no rebound guarding Positive bowel sounds  Extremities: No cyanosis, clubbing or edema  Neurologic:  No focal deficits  Musculoskeletal: no   Joint swelling tenderness erythema  Skin:  No erythema, rashes noted          LAB  No results for input(s): GLUCPOC in the last 72 hours.     No lab exists for component: [place]   Recent Labs      17   0710  17   2345   WBC  0.6*   --    HGB  8.4*   --    HCT  24.5*   --    PLT  92*   --    INR   --   1.2     Recent Labs      17   0710   NA  142   K  3.0*   CL  107   CO2  24   GLU  80   BUN  36*   CREA  3.63*   MG  1.7*   CA  8.1*   PHOS  1.9*   ALB  2.6*   TBILI  1.9*   ALT  7*   SGOT  12*       EKG and imaging reviewed personally by me  Ct Abd Pelv W Cont    Result Date: 2017  CT of the Abdomen and Pelvis with contrast CLINICAL INDICATION:  Perirectal abscess follow up with persisting drainage, mild pelvic pain; history also including splenomegaly and gallstones COMPARISON: 2017 CT, also abdomen ultrasound earlier today TECHNIQUE: Automated exposure Control was used. Multiple axial images were obtained through the abdomen and pelvis after intravenous injection of 125cc of isovue 370. Oral contrast not given per request, limiting evaluation of GI tract. Coronal reformatted images obtained for further evaluation of organs. FINDINGS: The partially seen lung bases are clear. Small unchanged hiatal hernia noted. Abdomen: There are no suspicious lesions in the liver or spleen. Splenomegaly and gallstones again noted. Mild bilateral renal atrophy and cortical thinning are unchanged. The adrenal glands and pancreas appear unremarkable. There is normal enhancement of the kidneys, no hydronephrosis. No lymphadenopathy, free air, focal inflammatory changes or fluid collections in the abdomen. Aorta normal caliber with scattered atherosclerotic change. Patent major vessels. There is no evidence of bowel obstruction. Unremarkable appendix. Left lower quadrant colostomy remains. There is mild linear scarring in the anterior abdominal wall with mild diastases recti. Several bowel loops in the midabdomen extending to abut the anterior abdominal wall and may be partially herniated, similar to prior, without obvious obstruction. There are surgical sutures in left lower quadrant bowel with evidence of prior partial resection. Lack of oral contrast limits GI evaluation throughout. Pelvis: There is a crescentic rim-enhancing fluid collection abutting the lateral left rectum measuring approximately 5.2 x 2.3 cm (axial image 81, previously 4.4 x 1.8 cm). This demonstrates interval resolution of previously visualized gas. There is persisting moderate wall thickening of the rectum. Oral contrast has not been administered, limiting detail. Persistent overlying skin thickening as well. Mild inflammatory stranding throughout the pelvis. No overt lymphadenopathy.  Nonspecific wall thickening of the urinary bladder is similar to prior. Bones: No evidence of acute fracture or osseous erosion. The right hip hardware is partially seen. IMPRESSION: 1. Persistent perirectal abscess. 2. Splenomegaly, colostomy, gallstones again noted. Lack of oral contrast limits GI evaluation somewhat. Duplex Hemodialysis Access Left    Result Date: 5/23/2017  History: Arterial venous fistula with possible malfunction. Comments: Ultrasound evaluation of the left arm arteriovenous fistula was performed. FINDINGS: The fistula is patent however significant velocities of 570 cm/s are noted within the median cephalic vein. No definite evidence of fluid collections surrounding the graft. IMPRESSION: Patent graft with high-grade stenosis within the median cephalic vein near the anastomosis    Results for orders placed or performed during the hospital encounter of 04/09/17   EKG, 12 LEAD, INITIAL   Result Value Ref Range    Ventricular Rate 88 BPM    Atrial Rate 88 BPM    P-R Interval 184 ms    QRS Duration 150 ms    Q-T Interval 410 ms    QTC Calculation (Bezet) 496 ms    Calculated P Axis 41 degrees    Calculated R Axis -70 degrees    Calculated T Axis 59 degrees    Diagnosis       !! AGE AND GENDER SPECIFIC ECG ANALYSIS !! Normal sinus rhythm  Right bundle branch block  Left anterior fascicular block  !!! Bifascicular block !!! Possible Lateral infarct , age undetermined  Abnormal ECG  When compared with ECG of 29-MAR-2017 10:29,  Borderline criteria for Lateral infarct are now Present  Confirmed by Basim Smith ([id number]) on 4/10/2017 7:31:14 AM       XR Results (most recent):    Results from Hospital Encounter encounter on 04/09/17   XR CHEST PORT   Narrative Chest X-ray    INDICATION:   Sepsis, fever    A portable AP view of the chest was obtained. FINDINGS: The lungs are clear. There are no infiltrates or effusions. There is  mild cardia megaly. Central line remains in place.            Impression IMPRESSION: No acute findings in the chest          Active problems  Active Hospital Problems    Diagnosis Date Noted    Aundrea-rectal abscess 04/09/2017    Hyperbilirubinemia 03/29/2017    ESRD (end stage renal disease) ([facility] [address] 03/29/2017     DIALYSIS        Nocturnal hypoxemia 10/04/2016    HTN (hypertension) 09/04/2016    GERD (gastroesophageal reflux disease) 09/04/2016    Pancytopenia ([facility] [address] 09/03/2016     HGB 7.8 ON ADMISSION      Hypokalemia 09/03/2016     2.7 ON ADMISSION      Alcohol abuse, daily use 09/03/2016       ASSESSMENT  AND PLAN      · Abscess- d/w gen surgery- better to ask IR to drain- if cant be drained can re-consult them- wound care input appreciated  · Concern for osteo- will get mri start empiric abx- may need id  · Alcohol abuse- monitor for withdrawal  · Pancytopenia- hem- input appreciated  · Hd graft- vascular input appreciated     DVT Prophylaxis:   Patient remains high risk for decompensation, given     Signed By: Dom Francis MD     May 23, 2017

## 2017-05-23 NOTE — PROGRESS NOTES
[facility]        Subjective: ESRD  Resting comfortably. Review of Systems -   General ROS: negative for - chills, fatigue or fever  Respiratory ROS: no cough, shortness of breath, or wheezing  Cardiovascular ROS: no chest pain or dyspnea on exertion  Gastrointestinal ROS: no abdominal pain, change in bowel habits, or black or bloody stools  Genito-Urinary ROS: no dysuria, trouble voiding, or hematuria  Neurological ROS: no TIA or stroke symptoms        Objective:    Vitals:    05/22/17 2305 05/23/17 0238 05/23/17 0327 05/23/17 0715   BP: 110/70  116/59 117/67   Pulse: 96  99 84   Resp: 18  18 18   Temp: 98.2 °F (36.8 °C)  98.3 °F (36.8 °C) 98.6 °F (37 °C)   SpO2: 100%  96% 99%   Weight:  74 kg (163 lb 1.6 oz)     Height:           PE  Gen: in no acute distress  CV: reg rate  Chest: clear  Abd: soft  Ext/Access: rt IJ tunneled dialysis cath ok,  Immature left arm av fistula. [facility]  Recent Labs      05/23/17   0710  05/22/17   2345  05/22/17   1125   WBC  0.6*   --   0.7*   HGB  8.4*   --   10.1*   HCT  24.5*   --   28.8*   PLT  92*   --   106*   INR   --   1.2   --      Recent Labs      05/23/17   0710  05/22/17   2110  05/22/17   1125   NA  142   --   140   K  3.0*   --   2.8*   CL  107   --   102   CO2  24   --   24   GLU  80   --   96   BUN  36*   --   29*   CREA  3.63*   --   3.01*   MG   --    --   1.4*   PHOS   --    --   0.7*   CA  8.1*   --   8.4   ALB  2.6*   --   3.0*   TBILI  1.9*  2.9*  3.5*   ALT  7*   --   10*   SGOT  12*   --   15           Radiology    A/P:   Patient Active Problem List   Diagnosis Code    Alcohol abuse, daily use F10.10    Hypokalemia E87.6    VITO (acute kidney injury) ([address] N17.9    Pancytopenia (HCC) D61.818    HTN (hypertension) I10    GERD (gastroesophageal reflux disease) K21.9    Thrombocytopenia (HCC) D69.6    Acute hypoactive delirium due to multiple etiologies F05    Pleural effusion on left J90    Paroxysmal atrial fibrillation (HCC) I48.0    Acute blood loss anemia D62    Suspected sleep apnea G47.30    Nocturnal hypoxemia G47.34    HIT (heparin-induced thrombocytopenia) ([facility] D75.82    CKD (chronic kidney disease) requiring chronic dialysis ([facility]) N18.6, Z99.2    Symptomatic anemia D64.9    Elevated total protein R77.8    Hyperbilirubinemia E80.6    Dizziness R42    ESRD (end stage renal disease) ([facility]) N18.6    Aundrea-rectal abscess K61.1    Staphylococcus epidermidis bacteremia R78.81         For dialysis tomorrow.      Linda Whittington MD

## 2017-05-23 NOTE — CONSULTS
Christopher Correa   [address] [place] [address], [address] [place] [address]. . [facility] 125 FAX: [phone]    Vascular Surgery Consult    Subjective:      Chelo Martinez is a 74 y.o. male who we are asked to evaluate for an immature left AVF. The patient is a poor historian but states that he has his AVF placed 4-5 months ago by Dr. Teagan Potts at [facility]. He currently dialyzes through a TCC on MWF. He reports he has not followed up with Dr. Teagan Potts as he has no way to get there. He does not stay on task very well and wants to keep talking about the perirectal abscess he had drained that required a colostomy. He is concerned that his colostomy bag may burst and wants to know why the hospital staff has to keep wearing the \"blue\" gowns. Past Medical History:   Diagnosis Date    Alcohol abuse, daily use 09/03/2016    NONE X 7 DAYS, USUALLY DRINKS 10-15 BEERS DAILY. TAKES A WEEK OFF AT A TIME ONCE A MONTH    Anemia 9/3/2016    HGB 7.8 ON ADMISSION    Chronic kidney disease     end stage renal disease- dialysis on Tues, Thurs. , Saturday mornings.  HIT (heparin-induced thrombocytopenia) ([facility] [address]     Hypertension     Hypokalemia 9/3/2016    2.7 ON ADMISSION    Tobacco abuse 9/3/2016    QUIT 7 DAYS AGO     Past Surgical History:   Procedure Laterality Date    ABDOMEN SURGERY PROC UNLISTED      Sept. 2016.  DEBRIDE NECROTIC SKIN/ TISSUE, ABD WALL  09/04/2016    I&D rectal abscess with drain placement - Dr. Nunu Granda      rectal abcess surgery that also got infected-  Subsequent I & D of the rectal abcess.       Family History   Problem Relation Age of Onset    Stroke Mother      Social History     Social History    Marital status: SINGLE     Spouse name: N/A    Number of children: N/A    Years of education: N/A     Social History Main Topics    Smoking status: Former Smoker     Packs/day: 2.00     Years: 40.00     Types: Cigarettes    Smokeless tobacco: Never Used      Comment: STATES HE QUIT 7 DAYS AGO   [place] Alcohol use 42.0 - 63.0 oz/week     70 - 105 Cans of beer per week      Comment: 10-15 CANS DAILY    Drug use: No    Sexual activity: Not Asked     Other Topics Concern    None     Social History Narrative    9/3/16:  PATIENT IS SINGLE, LIVES WITH BROTHERDEVENDRA        17:  NO CHANGE TO ABOVE. PATIENT STATES HE CAN NOT CARE FOR SELF AND HAS NO ONE TO CARE FOR HIM      Current Facility-Administered Medications   Medication Dose Route Frequency    sodium chloride (NS) flush 5-10 mL  5-10 mL IntraVENous Q8H    sodium chloride (NS) flush 5-10 mL  5-10 mL IntraVENous PRN    0.9% sodium chloride infusion  50 mL/hr IntraVENous CONTINUOUS    ondansetron (ZOFRAN) injection 4 mg  4 mg IntraVENous Q4H PRN    metoprolol tartrate (LOPRESSOR) tablet 25 mg  25 mg Oral Q12H    albuterol (PROVENTIL VENTOLIN) nebulizer solution 2.5 mg  2.5 mg Nebulization Q4H PRN    pantoprazole (PROTONIX) tablet 40 mg  40 mg Oral ACB&D    [START ON 2017] epoetin miky (EPOGEN;PROCRIT) injection 20,000 Units  20,000 Units SubCUTAneous Q7D      Allergies   Allergen Reactions    Heparin Analogues Other (comments)     H. I.T positive       Review of Systems:  A comprehensive review of systems was negative except for that written in the History of Present Illness. Objective:     Patient Vitals for the past 8 hrs:   BP Temp Pulse Resp SpO2   17 1153 131/68 98.5 °F (36.9 °C) 77 18 91 %   17 0715 117/67 98.6 °F (37 °C) 84 18 99 %     Temp (24hrs), Av.4 °F (36.9 °C), Min:98.2 °F (36.8 °C), Max:98.6 °F (37 °C)      Physical Exam:  GENERAL: alert, cooperative, no distress, appears stated age, LUNG: clear to auscultation bilaterally, HEART: regular rate and rhythm, S1, S2 normal, no murmur, click, rub or gallop, EXTREMITIES:  extremities normal, atraumatic, no cyanosis or edema. Thrill and bruit to left AVF.      Assessment/Plan:   -Ultrasound ordered to assess the maturity of the left AVF, continue using TCC for HD at this time.    Thank you for allowing us to participate in the care of Mr. Renetta Naranjo. We will follow along with you. Signed By: Gaudencio King NP     May 23, 2017       Elements of this note have been dictated using speech recognition software. As a result, errors of speech recognition may have occurred.

## 2017-05-23 NOTE — PROGRESS NOTES
Problem: Mobility Impaired (Adult and Pediatric)  Goal: *Acute Goals and Plan of Care (Insert Text)  LTG:  (1.)Mr. Pozo will move from supine to sit and sit to supine , scoot up and down and roll side to side with INDEPENDENT within 7 day(s). (2.)Mr. Pozo will transfer from bed to chair and chair to bed with MODIFIED INDEPENDENCE using the least restrictive device within 7 day(s). (3.)Mr. Pozo will ambulate with MODIFIED INDEPENDENCE for 150 feet with the least restrictive device within 7 day(s). ___________________________________________________________________________________________      PHYSICAL THERAPY: INITIAL ASSESSMENT, AM 5/23/2017  INPATIENT: Hospital Day: 2  [place] / Product Type: Medicare /      NAME/AGE/GENDER: Rebekah Hutson is a 74 y.o. male         PRIMARY DIAGNOSIS: Pancytopenia ([address] Pancytopenia ([facility] [address] Pancytopenia ([facility] [address]        ICD-10: Treatment Diagnosis:       · Other abnormalities of gait and mobility (R26.89)   Precaution/Allergies:  Heparin analogues       ASSESSMENT:      Mr. Alberto Rizzo is a 74year old male presenting with decreased transfers, ambulation, activity tolerance and overall general functional mobility. Pt to ED 5/22/17 relating he is unable to care for himself at home with colostomy, has drainage from rectum. Pt on dialysis at home M, W, F with Santiago Cary RN following. Pt lives with brother who works during the day, has a history of ETOH, smoker. Pt alert, on room air. Pt very concerned regarding ability to take care of himself at home. Pt states uses w/c for mobility, limited ambulation into bathroom, independent in ADLs. Not sure why pt not ambulatory from a strength and LE AROM standpoint. Pt strength B LE grossly 4+/5, sensation intact to light touch. Through conversation, seems more like pt does not wish to perform tasks needed to take care of himself.  Pt has had colostomy for 7 years, required encouragement to empty bag himself today during assessment. Colostomy bag full, pt refusing to sit to EOB until emptied. Pt required convincing to perform task himself, eventually did without difficulty. Pt expressed concern regarding being able to clean up after himself at home. Pt this date required SBA to [facility] for bed mobility, supervision for sit to stand transfer, SBA to [facility] for steps from bed to chair. Pt performed all tasks well without much assist; however, complained throughout that we were now going to send him home because he was performing mobility. PT unsure if pt will participate in therapy due to his extreme desire to be placed somewhere and be cared for. PT to follow for trial basis x 1 week,  3-5 visits to continue to assess overall mobility, transfers, ambulation; as well as pt willingness to participate. Pt discharge needs to be determined pending progress. This section established at most recent assessment   PROBLEM LIST (Impairments causing functional limitations):  1. Decreased ADL/Functional Activities  2. Decreased Transfer Abilities  3. Decreased Ambulation Ability/Technique  4. Decreased Balance  5. Decreased Activity Tolerance    INTERVENTIONS PLANNED: (Benefits and precautions of physical therapy have been discussed with the patient.)  1. Balance Exercise  2. Bed Mobility  3. Family Education  4. Gait Training  5. Home Exercise Program (HEP)  6. Therapeutic Activites  7. Therapeutic Exercise/Strengthening  8. Transfer Training  9. Group Therapy      TREATMENT PLAN: Frequency/Duration: 3 times a week for trial basis x 1 week, 3-5 visits  Rehabilitation Potential For Stated Goals: GOOD      RECOMMENDED REHABILITATION/EQUIPMENT: (at time of discharge pending progress): Continue Skilled Therapy and Discussed with Case Management. HISTORY:   History of Present Injury/Illness (Reason for Referral):  Patient is a chronically ill, obese, noncompliant, alcoholic 74 y.o.   male who presented here from dialysis (all but 2 hours finished) for extreme concern for perception of excessive gas from colostomy placed last August for many year history of chronically draining perirectal abscess and multiple surgeries. He denies any new symptoms, fever, signs of infection, new symptoms. Vague SOB with exertion. Denies abdominal pain, reports bloating only. Anorexia. No nausea or vomiting. He is non compliant in general, is repetatively reporting he has no one to care for him. Apparently lives with brother who works outside the home. He has repeated hospitalizations. He was last admitted here from 4/9-18/17 for new re-occurrence of perirectal abscess containing staph epidermidis bacteremia, proteus, and E Coli. Same drained by IR. hgb was 5.0 on that admission with subsequent transfusion. He was treated with IV vancomycin and zosyn, and discharged to rehab on augmentin. Has been home a brief time only. Recently had dialysis graft placed, reports indicate it is still premature. Prior to that, he was admitted from 3/29-4/2/17 for symptomatic anemia. Hgb this admission is 10.1. Chronic pancytopenia followed by Hematology: He has also lost extensive weight since last year. Unknown how much, estimates 50#. Has no idea of what medications he takes. Additional history as noted below. Patient extremely poor historian, unable to focus on any task a hand. Past Medical History/Comorbidities:   Mr. Shemar Grover  has a past medical history of Alcohol abuse, daily use (09/03/2016); Anemia (9/3/2016); Chronic kidney disease; HIT (heparin-induced thrombocytopenia) ([facility] [address] Hypertension; Hypokalemia (9/3/2016); and Tobacco abuse (9/3/2016).   Mr. Shemar Grover  has a past surgical history that includes debride necrotic skin/ tissue, abd wall (09/04/2016); abdomen surgery proc unlisted; and other surgical.  Social History/Living Environment:   Home Environment: Private residence  # Steps to Enter: 0  Wheelchair Ramp: Yes  One/Two Story Residence: One story  Living Alone: No  Support Systems: Family member(s)  Patient Expects to be Discharged to[de-identified] Skilled nursing facility  Current DME Used/Available at Home: Walker, rolling, Wheelchair  Tub or Shower Type: Tub/Shower combination  Prior Level of Function/Work/Activity:  Lives with brother; limited self with ambulation to using w/c; ambulates short distances in home; independent in ADLs  Personal Factors:          Sex:  male        Age:  74 y.o. Overall Behavior:  Perseverates with conversation   Number of Personal Factors/Comorbidities that affect the Plan of Care:  ETOH, HTN 1-2: MODERATE COMPLEXITY   EXAMINATION:   Most Recent Physical Functioning:   Gross Assessment:  AROM: Generally decreased, functional (B LE)  Strength: Generally decreased, functional (B LE grossly 4+/5)  Coordination: Generally decreased, functional  Sensation: Intact (to light touch B LE)               Posture:  Posture (WDL): Exceptions to WDL  Posture Assessment: Forward head, Rounded shoulders  Balance:  Sitting: Intact  Standing: Impaired  Standing - Static: Good  Standing - Dynamic : Fair Bed Mobility:  Rolling: Modified independent  Supine to Sit: Stand-by asssistance;Contact guard assistance  Sit to Supine:  (left up in chair)  Scooting: Independent (to EOB)  Wheelchair Mobility:     Transfers:  Sit to Stand: Stand-by asssistance  Stand to Sit: Stand-by asssistance  Bed to Chair: Contact guard assistance  Gait:     Base of Support: Widened  Speed/Kell: Shuffled  Step Length: Left shortened;Right shortened  Swing Pattern: Left symmetrical;Right symmetrical  Gait Abnormalities: Decreased step clearance; Steppage gait  Distance (ft): 3 Feet (ft) (from bed to chair)  Assistive Device:  (close contact)  Ambulation - Level of Assistance: Stand-by asssistance;Contact guard assistance  Interventions: Verbal cues       Body Structures Involved:  1. Muscles Body Functions Affected:  1.  Movement Related Activities and Participation Affected:  1. General Tasks and Demands  2. Mobility  3. Self Care   Number of elements that affect the Plan of Care: 4+: HIGH COMPLEXITY   CLINICAL PRESENTATION:   Presentation: Evolving clinical presentation with changing clinical characteristics: MODERATE COMPLEXITY   CLINICAL DECISION MAKIN [facility] Inpatient Short Form  How much difficulty does the patient currently have. .. Unable A Lot A Little None   1. Turning over in bed (including adjusting bedclothes, sheets and blankets)? [ ] 1   [ ] 2   [ ] 3   [X] 4   2. Sitting down on and standing up from a chair with arms ( e.g., wheelchair, bedside commode, etc.)   [ ] 1   [ ] 2   [ ] 3   [X] 4   3. Moving from lying on back to sitting on the side of the bed? [ ] 1   [ ] 2   [X] 3   [ ] 4   How much help from another person does the patient currently need. .. Total A Lot A Little None   4. Moving to and from a bed to a chair (including a wheelchair)? [ ] 1   [ ] 2   [X] 3   [ ] 4   5. Need to walk in hospital room? [ ] 1   [ ] 2   [X] 3   [ ] 4   6. Climbing 3-5 steps with a railing? [ ] 1   [ ] 2   [X] 3   [ ] 4   © , Trustees of [address] [place] [address] [address] [address], under license to [employer]. All rights reserved    Score:  Initial: 20 Most Recent: X (Date: -- )     Interpretation of Tool:  Represents activities that are increasingly more difficult (i.e. Bed mobility, Transfers, Gait).        Score 24 23 22-20 19-15 14-10 9-7 6       Modifier CH CI CJ CK CL CM CN         · Mobility - Walking and Moving Around:               - CURRENT STATUS:    CJ - 20%-39% impaired, limited or restricted               - GOAL STATUS:           CI - 1%-19% impaired, limited or restricted               - D/C STATUS:                       ---------------To be determined---------------  [place] / Product Type: Medicare /       Medical Necessity:     · Patient is expected to demonstrate progress in strength, range of motion, balance and coordination to decrease assistance required with overall functional mobility, transfers, ambulation. · Patient demonstrates good rehab potential due to higher previous functional level. Reason for Services/Other Comments:  · Patient continues to require present interventions due to patient's inability to perform bed mobility, transfers, ambulation safely and effectively. Use of outcome tool(s) and clinical judgement create a POC that gives a: Clear prediction of patient's progress: LOW COMPLEXITY                 TREATMENT:   (In addition to Assessment/Re-Assessment sessions the following treatments were rendered)   Pre-treatment Symptoms/Complaints:  \"I can't take care of myself at home. \"  Pain: Initial:   Pain Intensity 1: 0  Post Session:  0/10 post assessment, in chair      Therapeutic Activity: (    15 min): Therapeutic activities including Bed transfers, Chair transfers, Ambulation on level ground, weight shifting to improve mobility, strength, balance and coordination. Required minimal Verbal cues to promote static and dynamic balance in standing and promote coordination of bilateral, lower extremity(s). Braces/Orthotics/Lines/Etc:   · IV  · O2 Device: Room air  Treatment/Session Assessment:    · Response to Treatment:  Does not wish to perform tasks presented; requires encouragement  · Interdisciplinary Collaboration:  · Physical Therapist  · Registered Nurse  ·   · After treatment position/precautions:  · Up in chair  · Bed/Chair-wheels locked  · Bed in low position  · Call light within reach  · RN notified  · PCT at bedside  · Compliance with Program/Exercises: Will assess as treatment progresses. · Recommendations/Intent for next treatment session: \"Next visit will focus on advancements to more challenging activities\".   Total Treatment Duration:  PT Patient Time In/Time Out  Time In: 1017  Time Out: 1100 Smitha Tony, PT

## 2017-05-23 NOTE — PROGRESS NOTES
Problem: Self Care Deficits Care Plan (Adult)  Goal: *Acute Goals and Plan of Care (Insert Text)      OCCUPATIONAL THERAPY: Initial Assessment and Discharge 5/23/2017  INPATIENT: Hospital Day: 2  [place] / Product Type: Medicare /      NAME/AGE/GENDER: Chelo Martinez is a 74 y.o. male         PRIMARY DIAGNOSIS:  Pancytopenia ([address] Pancytopenia ([address] Pancytopenia (HCC)        ICD-10: Treatment Diagnosis:        · Generalized Muscle Weakness (M62.81)   Precautions/Allergies:         Heparin analogues       ASSESSMENT:      Mr. Renetta Naranjo presents sitting in chair, agreeable to therapy. Pt is oriented x4 with many complaints including not being able to care for colostomy bag and walk. Pt has been independent with ADLs and functional mobility for the 7 years he has had to deal with the colostomy. Pt appears very anxious, manipulative, and demanding. Pt provided all necessary equipment to complete task, with encouragement pt continues to be independent in both ADLs and functional mobility. Further OT is not indicated at this time. Pt returned tochair with all needs in place; RN aware. This section established at most recent assessment    1. RECOMMENDED REHABILITATION/EQUIPMENT: (at time of discharge pending progress): None. OCCUPATIONAL PROFILE AND HISTORY:   History of Present Injury/Illness (Reason for Referral):  See H&P  Past Medical History/Comorbidities:   Mr. Renetta Naranjo  has a past medical history of Alcohol abuse, daily use (09/03/2016); Anemia (9/3/2016); Chronic kidney disease; HIT (heparin-induced thrombocytopenia) ([facility] [address] Hypertension; Hypokalemia (9/3/2016); and Tobacco abuse (9/3/2016).   Mr. Renetta Naranjo  has a past surgical history that includes debride necrotic skin/ tissue, abd wall (09/04/2016); abdomen surgery proc unlisted; and other surgical.  Social History/Living Environment:   Home Environment: Private residence  # Steps to Enter: 0  Wheelchair Ramp: Yes  One/Two Story Residence: One story  Living Alone: No  Support Systems: Family member(s)  Patient Expects to be Discharged to[de-identified] Skilled nursing facility  Current DME Used/Available at Home: Walker, rolling, Wheelchair  Tub or Shower Type: Tub/Shower combination  Prior Level of Function/Work/Activity:  Modified independent     Number of Personal Factors/Comorbidities that affect the Plan of Care: Expanded review of therapy/medical records (1-2):  MODERATE COMPLEXITY   ASSESSMENT OF OCCUPATIONAL PERFORMANCE[de-identified]   Activities of Daily Living:           Basic ADLs (From Assessment) Complex ADLs (From Assessment)         Grooming/Bathing/Dressing Activities of Daily Living     Cognitive Retraining  Safety/Judgement: Awareness of environment                       Bed/Mat Mobility  Rolling: Modified independent  Supine to Sit: Stand-by asssistance;Contact guard assistance  Sit to Supine:  (left up in chair)  Sit to Stand: Stand-by asssistance  Bed to Chair: Contact guard assistance  Scooting: Independent (to EOB)          Most Recent Physical Functioning:   Gross Assessment:                  Posture:  Posture (WDL): Exceptions to WDL  Posture Assessment:  Forward head, Rounded shoulders  Balance:  Sitting: Intact  Standing: Impaired  Standing - Static: Good  Standing - Dynamic : Fair Bed Mobility:  Rolling: Modified independent  Supine to Sit: Stand-by asssistance;Contact guard assistance  Sit to Supine:  (left up in chair)  Scooting: Independent (to EOB)  Wheelchair Mobility:     Transfers:  Sit to Stand: Stand-by asssistance  Stand to Sit: Stand-by asssistance  Bed to Chair: Contact guard assistance                   Patient Vitals for the past 6 hrs:       BP BP Patient Position SpO2 Pulse   05/23/17 1153 131/68 Sitting 91 % 77        Mental Status  Neurologic State: Alert  Orientation Level: Oriented to person, Oriented to place  Cognition: Follows commands  Perception: Appears intact  Perseveration: Perseverates during conversation  Safety/Judgement: Awareness of environment                               Physical Skills Involved:  1. Mobility  2. Endurance Cognitive Skills Affected (resulting in the inability to perform in a timely and safe manner):  1. Problem Solving Psychosocial Skills Affected:  1. Interpersonal Interactions  2. Habits  3. Routines and Behaviors  4. Active Use of Coping Strategies  5. Environmental Adaptations   Number of elements that affect the Plan of Care: 3-5:  MODERATE COMPLEXITY   CLINICAL DECISION MAKIN[address] [place] [address] AM-PAC 6 Clicks   Basic Mobility Inpatient Short Form  How much help from another person does the patient currently need. .. Total A Lot A Little None   1. Putting on and taking off regular lower body clothing?   [ ] 1   [ ] 2   [ ] 3   [x ] 4   2. Bathing (including washing, rinsing, drying)? [ ] 1   [ ] 2   [x ] 3   [ ] 4   3. Toileting, which includes using toilet, bedpan or urinal?   [ ] 1   [ ] 2   [ ] 3   [x ] 4   4. Putting on and taking off regular upper body clothing?   [ ] 1   [ ] 2   [ ] 3   [x ] 4   5. Taking care of personal grooming such as brushing teeth? [ ] 1   [ ] 2   [ ] 3   [x ] 4   6. Eating meals? [ ] 1   [ ] 2   [ ] 3   [x ] 4   © , Trustees of [address] [place] [address], under license to [employer]. All rights reserved    Score:  Initial: 23 Most Recent: X (Date: -- )     Interpretation of Tool:  Represents activities that are increasingly more difficult (i.e. Bed mobility, Transfers, Gait). Score 24 23 22-20 19-15 14-10 9-7 6       Modifier CH CI CJ CK CL CM CN        ?  Self Care:     - CURRENT STATUS: CI - 1%-19% impaired, limited or restricted    - GOAL STATUS: CI - 1%-19% impaired, limited or restricted    - D/C STATUS:  CI - 1%-19% impaired, limited or restricted  [place] / Product Type: Medicare /          Use of outcome tool(s) and clinical judgement create a POC that gives a: LOW COMPLEXITY             TREATMENT:   (In addition to Assessment/Re-Assessment sessions the following treatments were rendered)      Pre-treatment Symptoms/Complaints:    Pain: Initial:   Pain Intensity 1: 0  Post Session:  0      Assessment/Reassessment only, no treatment provided today     Braces/Orthotics/Lines/Etc:   · O2 Device: Room air  Treatment/Session Assessment:    · Response to Treatment:  D/C  · Interdisciplinary Collaboration:  · Physical Therapist  · Occupational Therapist  · Registered Nurse  ·   · After treatment position/precautions:  · Up in chair  · Bed/Chair-wheels locked  · Call light within reach  · RN notified    Total Treatment Duration: 19 minutes  OT Patient Time In/Time Out  Time In: 3616  Time Out: [address], OTR/L

## 2017-05-23 NOTE — H&P
Hospitalist Admission History and Physical       Chief Complaint   Patient presents with    Other       Subjective:   Patient is a chronically ill, obese, noncompliant, alcoholic  74 y.o.  male who presented here from dialysis (all but 2 hours finished) for extreme concern for perception of excessive gas from colostomy placed last August for many year history of chronically draining perirectal abscess and multiple surgeries. He denies any new symptoms, fever, signs of infection, new symptoms. Vague SOB with exertion. Denies abdominal pain, reports bloating only. Anorexia. No nausea or vomiting. He is non compliant in general, is repetatively reporting he has no one to care for him. Apparently lives with brother who works outside the home. He has repeated hospitalizations. He was last admitted here from 4/9-18/17 for new re-occurrence of perirectal abscess containing staph epidermidis bacteremia, proteus, and E Coli. Same drained by IR.  hgb was 5.0 on that admission with subsequent transfusion. He was treated with IV vancomycin and zosyn, and discharged to rehab on augmentin. Has been home a brief time only. Recently had dialysis graft placed, reports indicate it is still premature. Prior to that, he was admitted from 3/29-4/2/17 for symptomatic anemia. Hgb this admission is 10.1. Chronic pancytopenia followed by Hematology:  He has also lost extensive weight since last year. Unknown how much, estimates 50#. Has no idea of what medications he takes. Additional history as noted below. Patient extremely poor historian, unable to focus on any task a hand. Past Medical History:   Diagnosis Date    Alcohol abuse, daily use 09/03/2016    NONE X 7 DAYS, USUALLY DRINKS 10-15 BEERS DAILY. TAKES A WEEK OFF AT A TIME ONCE A MONTH    Anemia 9/3/2016    HGB 7.8 ON ADMISSION    Chronic kidney disease     end stage renal disease- dialysis on Tues, Thurs. , Saturday mornings.     HIT (heparin-induced thrombocytopenia) ([facility] [address]     Hypertension     Hypokalemia 9/3/2016    2.7 ON ADMISSION    Tobacco abuse 9/3/2016    QUIT 7 DAYS AGO        Past Surgical History:   Procedure Laterality Date    ABDOMEN SURGERY PROC UNLISTED      Sept. 2016.  DEBRIDE NECROTIC SKIN/ TISSUE, ABD WALL  09/04/2016    I&D rectal abscess with drain placement - Dr. Jason Vallejo      rectal abcess surgery that also got infected-  Subsequent I & D of the rectal abcess. Family History   Problem Relation Age of Onset    Stroke Mother        Social History     Social History Narrative    9/3/16:  PATIENT IS SINGLE, LIVES WITH BROTHERDEVENDRA      Social History   Substance Use Topics    Smoking status: Former Smoker     Packs/day: 2.00     Years: 40.00     Types: Cigarettes    Smokeless tobacco: Never Used      Comment: STATES HE QUIT 7 DAYS AGO    Alcohol use 42.0 - 63.0 oz/week     70 - 105 Cans of beer per week      Comment: 10-15 CANS DAILY        History   Drug Use No     Allergies   Allergen Reactions    Heparin Analogues Other (comments)     H. I.T positive       Prior to Admission medications    Medication Sig Start Date End Date Taking? Authorizing Provider   albuterol (PROVENTIL VENTOLIN) 2.5 mg /3 mL (0.083 %) nebulizer solution 3 mL by Nebulization route every six (6) hours as needed for Wheezing. 4/18/17   Tanya Sepulveda NP   metoprolol tartrate (LOPRESSOR) 25 mg tablet Take 1 Tab by mouth every twelve (12) hours. 4/2/17   Colletta Pelton, DO   Cholecalciferol, Vitamin D3, (VITAMIN D3) 2,000 unit cap capsule Take 2,000 Units by mouth once. Tori Messina MD   OXYGEN-AIR DELIVERY SYSTEMS Take 2 L/min by inhalation nightly. via NC    Historical Provider   magnesium oxide (MAG-OX) 400 mg tablet Take 400 mg by mouth daily. Historical Provider   ferrous sulfate 325 mg (65 mg iron) tablet Take 1 Tab by mouth two (2) times daily (with meals).  10/23/16   Tom Pierce MD   acetaminophen (TYLENOL) 325 mg tablet Take 2 Tabs by mouth two (2) times daily as needed. Use only if necessary. Patient has history of alcoholic liver disease 22/64/55   Homer Livingston MD   epoetin miky (EPOGEN;PROCRIT) 10,000 unit/mL injection 1 mL by SubCUTAneous route every seven (7) days. Indications: RENAL DIALYSIS 10/23/16   Homer Livingston MD   pantoprazole (PROTONIX) 40 mg tablet Take 1 Tab by mouth Before breakfast and dinner. 10/23/16   Homer Livingston MD     PHP:  Leonard Monroe MD  Oncology:  [facility]  Surgery:  [facility]  DIalysis:  Kassi Trotter on [facility], does not know who Nephrologist is    Review of Systems  A comprehensive review of systems was negative except for that written in the HPI. Patient is an extremely poor historian    Objective:     Visit Vitals    /70    Pulse 96    Temp 98.2 °F (36.8 °C)    Resp 18    Ht 5' 6\" (1.676 m)    Wt 77.1 kg (170 lb)    SpO2 100%    BMI 27.44 kg/m2      Data Review:   Recent Results (from the past 24 hour(s))   CBC WITH AUTOMATED DIFF    Collection Time: 05/22/17 11:25 AM   Result Value Ref Range    WBC 0.7 (LL) 4.3 - 11.1 K/uL    RBC 3.12 (L) 4.23 - 5.67 M/uL    HGB 10.1 (L) 13.6 - 17.2 g/dL    HCT 28.8 (L) 41.1 - 50.3 %    MCV 92.3 79.6 - 97.8 FL    MCH 32.4 26.1 - 32.9 PG    MCHC 35.1 (H) 31.4 - 35.0 g/dL    RDW 17.1 (H) 11.9 - 14.6 %    PLATELET 325 (L) 174 - 450 K/uL    MPV 8.8 (L) 10.8 - 14.1 FL    NEUTROPHILS 14 (L) 43 - 78 %    LYMPHOCYTES 64 (H) 13 - 44 %    MONOCYTES 20 (H) 4.0 - 12.0 %    EOSINOPHILS 2 0.5 - 7.8 %    BASOPHILS 0 0.0 - 2.0 %    IMMATURE GRANULOCYTES 0 0.0 - 5.0 %    ABS. NEUTROPHILS 0.1 (L) 1.7 - 8.2 K/UL    ABS. LYMPHOCYTES 0.5 0.5 - 4.6 K/UL    ABS. MONOCYTES 0.1 0.1 - 1.3 K/UL    ABS. EOSINOPHILS 0.0 0.0 - 0.8 K/UL    ABS. BASOPHILS 0.0 0.0 - 0.2 K/UL    ABS. IMM.  GRANS. 0.0 0.0 - 0.5 K/UL    RBC COMMENTS NORMOCYTIC/NORMOCHROMIC      WBC COMMENTS Result Confirmed By Smear PLATELET COMMENTS DECREASED      DF AUTOMATED     METABOLIC PANEL, COMPREHENSIVE    Collection Time: 05/22/17 11:25 AM   Result Value Ref Range    Sodium 140 136 - 145 mmol/L    Potassium 2.8 (LL) 3.5 - 5.1 mmol/L    Chloride 102 98 - 107 mmol/L    CO2 24 21 - 32 mmol/L    Anion gap 14 7 - 16 mmol/L    Glucose 96 65 - 100 mg/dL    BUN 29 (H) 8 - 23 MG/DL    Creatinine 3.01 (H) 0.8 - 1.5 MG/DL    GFR est AA 27 (L) >60 ml/min/1.73m2    GFR est non-AA 22 (L) >60 ml/min/1.73m2    Calcium 8.4 8.3 - 10.4 MG/DL    Bilirubin, total 3.5 (H) 0.2 - 1.1 MG/DL    ALT (SGPT) 10 (L) 12 - 65 U/L    AST (SGOT) 15 15 - 37 U/L    Alk. phosphatase 70 50 - 136 U/L    Protein, total 7.9 6.3 - 8.2 g/dL    Albumin 3.0 (L) 3.2 - 4.6 g/dL    Globulin 4.9 (H) 2.3 - 3.5 g/dL    A-G Ratio 0.6 (L) 1.2 - 3.5     LIPASE    Collection Time: 05/22/17 11:25 AM   Result Value Ref Range    Lipase 49 (L) 73 - 393 U/L   PHOSPHORUS    Collection Time: 05/22/17 11:25 AM   Result Value Ref Range    Phosphorus 0.7 (LL) 2.3 - 3.7 MG/DL   MAGNESIUM    Collection Time: 05/22/17 11:25 AM   Result Value Ref Range    Magnesium 1.4 (LL) 1.8 - 2.4 mg/dL   POC LACTIC ACID    Collection Time: 05/22/17 12:28 PM   Result Value Ref Range    Lactic Acid (POC) 1.9 0.5 - 1.9 mmol/L   AMMONIA    Collection Time: 05/22/17  9:10 PM   Result Value Ref Range    Ammonia 11 11 - 32 UMOL/L   BILIRUBIN, FRACTIONATED    Collection Time: 05/22/17  9:10 PM   Result Value Ref Range    Bilirubin, total 2.9 (H) 0.2 - 1.1 MG/DL    Bilirubin, direct 0.5 (H) <0.4 MG/DL    Bilirubin, indirect 2.4 (H) 0.0 - 1.1 MG/DL     ABDOMINAL ULTRASOUND:  There is no bile duct dilatation. The common bile duct diameter is 3 mm. The gallbladder is contracted limiting detail. Small gallstones are seen. There is nonspecific pre-4 mm no wall thickening. Sonographic Hood's sign is not seen.     There are no discrete lesions in the limited visualized portions of the  pancreas, not well seen due to overlying bowel. The right kidney measures 8 cm in length. There is no hydronephrosis. There is  no evidence of a solid renal mass. Color Doppler demonstrates expected right renal flow. There is no evidence of ascites. The aorta is not well seen due to overlying bowel. IVC appears patent on Doppler imaging. IMPRESSION: Gallstones. No biliary dilatation.     PHYSICAL EXAM:  General appearance: Extremely anxious, unable to focus to give history. Oriented to place and person. Fixated on colostomy and who will change it. Demanding. Much older appearing than chronological age. Obese, but apparently has lost >50# in last year. Dry appearing. Does not appear septic. Chronically ill and unkempt with poor body hygiene. Head: Normocephalic, without obvious abnormality, atraumatic  Eyes: conjunctivae/corneas clear. PERRL  Neck: supple, symmetrical, trachea midline, no adenopathy, thyroid: not enlarged, symmetric, no tenderness/mass/nodules, no carotid bruit and no JVD  Lungs: clear to auscultation bilaterally. Port site clean without signs of infection. Heart: regular rate and rhythm, S1, S2 normal, no murmur, click, rub or gallop  Abdomen: soft, non-tender, slightly tense. Bowel sounds normal. No masses,  no organomegaly. Colostomy draining gas only at present. Aundrea-rectal abscess site with approx 1 cm x 1 cm deep open area with dried green drainage on dressing. Wound culture revealed minimal drainage. Patient reports urinating a little once per day. Extremities: extremities normal for patient. Dependent edema, poor circulation. Atraumatic, no cyanosis. Skin: Skin color, texture, turgor dull, jaundiced of chronic liver disease and anemia.   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  ESRD on dialysis M-W-F  Aundrea-rectal abscess with recent I+D      Plan:   Admit to med bed for Dr Hortensia Saez  Seen and examined by Dr Hortensia Saez also  Aundrea rectal wound culture, blood cultures pending  CT abdomen with contrast to check for re-accumulation of abscess  Consult Nephrology for dialysis  PT, OT, SW, Case management, RT  consults  Stool for C dif, WBC, heme, culture  Urinalysis and urine culture  Limit K+, mag, phos replacement   Lipase, mag and phos pending  PPI  Home meds reviewed and reconciled by me  500 ml normal saline  SCDs for DVT prophy  GI consult, consider HIDA  Oncology consult  Vascular consult  Consult Nephrology for dialysis and unfinished Monday dialysis  Full code                                                                                                                                                                                                                                                                                                                                                                                                                                                                                                                                                                                                                      Assessment:

## 2017-05-23 NOTE — PROGRESS NOTES
Speech Pathology Note:    Screen received via Best Practice Alert from Nursing Assessment. Screen completed and Chart reviewed. Nursing Functional assessment revealed history of dysphagia. GI on board addressing GERD among other issues. No oropharyngeal dysphagia noted in documentation. No speech therapy services indicated per screening. Thank you,  Fredi Dowling MS, CCC-SLP

## 2017-05-23 NOTE — PROGRESS NOTES
Patient unable to consent for MRI at this time due to poor historian, unable to stay focused on questions, and alternating periods of confusion. No family present. Updated MRI department.

## 2017-05-23 NOTE — CONSULTS
[facility] Consult Note       Consulting GI Physician: Dr. Justin Ray    Referring Physician:  Jan Jacobo NP    Consult Date:  5/23/2017    Admit Date:  5/22/2017    Chief Complaint:  ELEVATED LFTS, HISTORY OF ALCOHOLIC LIVER DISEASE,     Subjective:     History of Present Illness:  Patient is a 74 y.o. male with PMH of Hx Perirectal abscess with prior hx associated gangrene and Sepsis s/p laparoscopic diverting end colostomy 9/13/16, HTN, CKD on dialysis, chronic anemia, ETOH abuse, who presented 5/22/17 with concern for excessive gas from his colostomy and rectal drainage, currently being seen in consultation at the request of Jan Jacobo NP for evaluation of elevated LFTs, Hx ETOH abuse. On admission LFTs revealed Tbili 3.5 with indirect bili of 2.4, direct bili of 0.5, and all other LFTs WNL, Lipase WNL. IRN 1.2. Hepatitis panel 3/30/17 was negative. Since Tbili remains elevated though decreased at 1.9. RUQ US revealed gallstones with no biliary dilatation. CT abd/pelvis without oral contrast reported gallstones, splenomegaly, colostomy, persisting rectal abscess. He appears to have chronic anemia. Hgb on admit was 10.1, since decreased to 8.4 with normal MCV (this seems improved from most recent admission 4/2017 when HGb was in the 7 range. Iron studies revealed normal sue, transferrin sat with elevated ferritin of 844. He has been followed by heme/onc for pancytopenia. Pt is a poor historian- he seems to have difficulty providing direct responses. He admits to frequent belching and regurgitation. He reports use of \"something for acid reflux at home though he cannot recall what he takes. \"  He was seen for gas/bloating, weight loss on 5/18/17 and then stool studies were ordered though no results returned to our office. He was started on a trial of Orval Serve and he was to have a follow up in 6-8wks. No known prior EGD/Colonoscopy.   He denies any true N/V or abdominal pain. He reports difficulty eating secondary to frequent belching and regurgitation and states that as a result he has lost an approx 100lbs within the past year. He denies change in his stools, currently passing yellow stools with no overt GI bleeding. He reports frequent rectal drainage. No known fevers. He reports chronic ETOH use- reporting that he typically starts drinking beer around noon daily and can drink as many as 7 or 8 beers daily. PMH:  Past Medical History:   Diagnosis Date    Alcohol abuse, daily use 09/03/2016    NONE X 7 DAYS, USUALLY DRINKS 10-15 BEERS DAILY. TAKES A WEEK OFF AT A TIME ONCE A MONTH    Anemia 9/3/2016    HGB 7.8 ON ADMISSION    Chronic kidney disease     end stage renal disease- dialysis on Tues, Thurs. , Saturday mornings.  HIT (heparin-induced thrombocytopenia) ([facility] [address]     Hypertension     Hypokalemia 9/3/2016    2.7 ON ADMISSION    Tobacco abuse 9/3/2016    QUIT 7 DAYS AGO       PSH:  Past Surgical History:   Procedure Laterality Date    ABDOMEN SURGERY PROC UNLISTED      Sept. 2016.  DEBRIDE NECROTIC SKIN/ TISSUE, ABD WALL  09/04/2016    I&D rectal abscess with drain placement - Dr. Larios [place]      rectal abcess surgery that also got infected-  Subsequent I & D of the rectal abcess. Allergies: Allergies   Allergen Reactions    Heparin Analogues Other (comments)     H. I.T positive       Home Medications:  Prior to Admission medications    Medication Sig Start Date End Date Taking? Authorizing Provider   albuterol (PROVENTIL VENTOLIN) 2.5 mg /3 mL (0.083 %) nebulizer solution 3 mL by Nebulization route every six (6) hours as needed for Wheezing. 4/18/17   Kat Needs, NP   metoprolol tartrate (LOPRESSOR) 25 mg tablet Take 1 Tab by mouth every twelve (12) hours. 4/2/17   Idamae Ace, DO   Cholecalciferol, Vitamin D3, (VITAMIN D3) 2,000 unit cap capsule Take 2,000 Units by mouth once.     Skylar Jimenez MD OXYGEN-AIR DELIVERY SYSTEMS Take 2 L/min by inhalation nightly. via NC    Historical Provider   magnesium oxide (MAG-OX) 400 mg tablet Take 400 mg by mouth daily. Historical Provider   ferrous sulfate 325 mg (65 mg iron) tablet Take 1 Tab by mouth two (2) times daily (with meals). 10/23/16   Kendall Lares MD   acetaminophen (TYLENOL) 325 mg tablet Take 2 Tabs by mouth two (2) times daily as needed. Use only if necessary. Patient has history of alcoholic liver disease 17/10/75   Nadya Medina MD   epoetin miky (EPOGEN;PROCRIT) 10,000 unit/mL injection 1 mL by SubCUTAneous route every seven (7) days. Indications: RENAL DIALYSIS 10/23/16   Nadya Medina MD   pantoprazole (PROTONIX) 40 mg tablet Take 1 Tab by mouth Before breakfast and dinner.  10/23/16   Kendall Lares MD       Hospital Medications:  Current Facility-Administered Medications   Medication Dose Route Frequency    sodium chloride (NS) flush 5-10 mL  5-10 mL IntraVENous Q8H    sodium chloride (NS) flush 5-10 mL  5-10 mL IntraVENous PRN    0.9% sodium chloride infusion  50 mL/hr IntraVENous CONTINUOUS    ondansetron (ZOFRAN) injection 4 mg  4 mg IntraVENous Q4H PRN    metoprolol tartrate (LOPRESSOR) tablet 25 mg  25 mg Oral Q12H    albuterol (PROVENTIL VENTOLIN) nebulizer solution 2.5 mg  2.5 mg Nebulization Q4H PRN    pantoprazole (PROTONIX) tablet 40 mg  40 mg Oral ACB&D    [START ON 5/29/2017] epoetin miky (EPOGEN;PROCRIT) injection 20,000 Units  20,000 Units SubCUTAneous Q7D       Social History:  Social History   Substance Use Topics    Smoking status: Former Smoker     Packs/day: 2.00     Years: 40.00     Types: Cigarettes    Smokeless tobacco: Never Used      Comment: STATES HE QUIT 7 DAYS AGO    Alcohol use 42.0 - 63.0 oz/week     70 - 105 Cans of beer per week      Comment: 10-15 CANS DAILY       Family History:  Family History   Problem Relation Age of Onset    Stroke Mother        Review of Systems:  A detailed 10 system ROS is obtained, with pertinent positives as listed above. All others are negative. Objective:     Physical Exam:  Vitals:  Visit Vitals    /67 (BP 1 Location: Right arm, BP Patient Position: Supine)    Pulse 84    Temp 98.6 °F (37 °C)    Resp 18    Ht 5' 6\" (1.676 m)    Wt 74 kg (163 lb 1.6 oz)    SpO2 99%    BMI 26.33 kg/m2     Gen:  Pt is alert, cooperative, no acute distress  Skin:  Face reveal no rashes. HEENT: Sclerae anicteric. .  Cardiovascular: Regular rate and rhythm. Respiratory:  Comfortable breathing with no accessory muscle use. Clear breath sounds anteriorly with no wheezes, rales, or rhonchi. GI:  Abdomen nondistended, soft, and Nontender. Normal active bowel sounds. +Colostomy. No masses palpable. Rectal:  Deferred  Neurological:  Gross memory appears intact.   Patient is alert    Laboratory:    Recent Labs      05/23/17   0710  05/22/17   2345  05/22/17   2110  05/22/17   1125   WBC  0.6*   --    --   0.7*   HGB  8.4*   --    --   10.1*   HCT  24.5*   --    --   28.8*   PLT  92*   --    --   106*   MCV  93.9   --    --   92.3   NA  142   --    --   140   K  3.0*   --    --   2.8*   CL  107   --    --   102   CO2  24   --    --   24   BUN  36*   --    --   29*   CREA  3.63*   --    --   3.01*   CA  8.1*   --    --   8.4   MG  1.7*   --    --   1.4*   GLU  80   --    --   96   AP  61   --    --   70   SGOT  12*   --    --   15   ALT  7*   --    --   10*   TBILI  1.9*   --   2.9*  3.5*   CBIL  0.4*   --   0.5*   --    ALB  2.6*   --    --   3.0*   TP  6.8   --    --   7.9   LPSE   --    --    --   49*   PTP   --   12.6*   --    --    INR   --   1.2   --    --           Assessment:     Principal Problem:    Pancytopenia (HCC) (9/3/2016)      Overview: HGB 7.8 ON ADMISSION    Active Problems:    Alcohol abuse, daily use (9/3/2016)      Hypokalemia (9/3/2016)      Overview: 2.7 ON ADMISSION      HTN (hypertension) (9/4/2016)      GERD (gastroesophageal reflux disease) (9/4/2016)      Nocturnal hypoxemia (10/4/2016)      Hyperbilirubinemia (3/29/2017)      ESRD (end stage renal disease) ([facility] [place] [address] (3/29/2017)      Overview: DIALYSIS            Aundrea-rectal abscess (4/9/2017)      74 y.o. male with PMH of Hx Perirectal abscess with prior hx associated gangrene and Sepsis s/p laparoscopic diverting end colostomy 9/13/16, HTN, CKD on dialysis, chronic anemia, ETOH abuse, who presented 5/22/17 with concern for excessive gas from his colostomy and rectal drainage, currently being seen in consultation at the request of Hannah Castañeda NP for evaluation of elevated LFTs, Hx ETOH abuse. On admission LFTs revealed Tbili 3.5 with indirect bili of 2.4, direct bili of 0.5, and all other LFTs WNL, Lipase WNL. INR 1.2. Albumin low at 2.6. Hepatitis panel 3/30/17 was negative. Since Tbili remains elevated though decreased at 1.9. RUQ US revealed gallstones with no biliary dilatation. CT abd/pelvis without oral contrast reported gallstones, splenomegaly, colostomy, persisting rectal abscess. He appears to have chronic anemia. Hgb on admit was 10.1, since decreased to 8.4 with normal MCV (this seems improved from most recent admission 4/2017 when HGb was in the 7 range). Iron studies revealed normal iron, transferrin sat with elevated ferritin of 844. He has been followed by heme/onc for pancytopenia. No known prior EGD. Pt does not think he has had a Colonoscopy. Plan:     - No clear evidence of this on US however, based on his low platelets, low albumin, splenomegaly and heavy ETOH consumption, he may have ETOH cirrhosis currently well-compensated. Would benefit from complete ETOH cessation.    - He is currently asymptomatic with no c/o abdominal pain, N/V. He seems mostly concerned regarding gas/bloating/regurgitation.   Recommend continuation of daily PPI- Protonix 40mg po b.i.d.    - Would benefit from non urgent EGD and colonoscopy at some point for colon cancer screening, r/o IBD especially in setting of perirectal abscess, and also r/o evidence of PUD, varices, etc.  Nora Salas PA-C    Patient is seen and examined in collaboration with Dr. Ed Goode. Assessment and plan as per Dr. Ed Goode.

## 2017-05-23 NOTE — PROGRESS NOTES
Patient recently discharged from [facility]. He is adamant that he wants to go back to rehab. Since last admission at [facility], patient has converted insurance from [facility] to [facility]. Bed request to [facility]. Case Management will continue to follow for discharge planning.     Care Management Interventions  Transition of Care Consult (CM Consult): Discharge Planning  Discharge Durable Medical Equipment: No  Physical Therapy Consult: Yes  Occupational Therapy Consult: Yes  Speech Therapy Consult: No  Current Support Network: Lives Alone, Own Home  Confirm Follow Up Transport: Family  Plan discussed with Pt/Family/Caregiver: Yes  Freedom of Choice Offered: Yes  Discharge Location  Discharge Placement: Rehab Unit Subacute

## 2017-05-23 NOTE — PROGRESS NOTES
Problem: Interdisciplinary Rounds  Goal: Interdisciplinary Rounds  Outcome: Progressing Towards Goal  Interdisciplinary team rounds were held 5/23/2017 with the following team members:Care Management, Nursing and Clinical Coordinator and the patient. Plan of care discussed. See clinical pathway and/or care plan for interventions and desired outcomes.

## 2017-05-23 NOTE — PROGRESS NOTES
TRANSFER - IN REPORT:    Verbal report received from [facility] (name) on Herbie Baker  being received from ER (unit) for routine progression of care      Report consisted of patients Situation, Background, Assessment and   Recommendations(SBAR). Information from the following report(s) SBAR, Kardex, ED Summary, Intake/Output and MAR was reviewed with the receiving nurse. Opportunity for questions and clarification was provided. Assessment completed upon patients arrival to unit and care assumed.

## 2017-05-24 ENCOUNTER — APPOINTMENT (OUTPATIENT)
Dept: MRI IMAGING | Age: 67
DRG: 808 | End: 2017-05-24
Attending: INTERNAL MEDICINE
Payer: MEDICARE

## 2017-05-24 ENCOUNTER — APPOINTMENT (OUTPATIENT)
Dept: CT IMAGING | Age: 67
DRG: 808 | End: 2017-05-24
Attending: INTERNAL MEDICINE
Payer: MEDICARE

## 2017-05-24 LAB
ALBUMIN SERPL BCP-MCNC: 2.2 G/DL (ref 3.2–4.6)
AMORPH CRY URNS QL MICRO: ABNORMAL
ANION GAP BLD CALC-SCNC: 13 MMOL/L (ref 7–16)
APPEARANCE UR: ABNORMAL
BACTERIA URNS QL MICRO: ABNORMAL /HPF
BASOPHILS # BLD AUTO: 0 K/UL (ref 0–0.2)
BASOPHILS # BLD: 0 % (ref 0–2)
BILIRUB UR QL: ABNORMAL
BUN SERPL-MCNC: 39 MG/DL (ref 8–23)
CALCIUM SERPL-MCNC: 7.9 MG/DL (ref 8.3–10.4)
CASTS URNS QL MICRO: ABNORMAL /LPF
CHLORIDE SERPL-SCNC: 109 MMOL/L (ref 98–107)
CO2 SERPL-SCNC: 21 MMOL/L (ref 21–32)
COLOR UR: ABNORMAL
CREAT SERPL-MCNC: 4.06 MG/DL (ref 0.8–1.5)
CRP SERPL-MCNC: 13.4 MG/DL (ref 0–0.9)
DIFFERENTIAL METHOD BLD: ABNORMAL
EOSINOPHIL # BLD: 0 K/UL (ref 0–0.8)
EOSINOPHIL NFR BLD: 2 % (ref 0.5–7.8)
EPI CELLS #/AREA URNS HPF: ABNORMAL /HPF
ERYTHROCYTE [DISTWIDTH] IN BLOOD BY AUTOMATED COUNT: 17.6 % (ref 11.9–14.6)
ERYTHROCYTE [SEDIMENTATION RATE] IN BLOOD: 66 MM/HR (ref 0–20)
GLUCOSE BLD STRIP.AUTO-MCNC: 110 MG/DL (ref 65–100)
GLUCOSE SERPL-MCNC: 82 MG/DL (ref 65–100)
GLUCOSE UR STRIP.AUTO-MCNC: NEGATIVE MG/DL
HCT VFR BLD AUTO: 24.2 % (ref 41.1–50.3)
HGB BLD-MCNC: 8.2 G/DL (ref 13.6–17.2)
HGB UR QL STRIP: ABNORMAL
IMM GRANULOCYTES # BLD: 0 K/UL (ref 0–0.5)
IMM GRANULOCYTES NFR BLD AUTO: 0 % (ref 0–5)
KETONES UR QL STRIP.AUTO: 15 MG/DL
LEUKOCYTE ESTERASE UR QL STRIP.AUTO: ABNORMAL
LYMPHOCYTES # BLD AUTO: 49 % (ref 13–44)
LYMPHOCYTES # BLD: 0.3 K/UL (ref 0.5–4.6)
MCH RBC QN AUTO: 31.9 PG (ref 26.1–32.9)
MCHC RBC AUTO-ENTMCNC: 33.9 G/DL (ref 31.4–35)
MCV RBC AUTO: 94.2 FL (ref 79.6–97.8)
MM INDURATION POC: 0 MM (ref 0–5)
MM INDURATION POC: 0 MM (ref 0–5)
MONOCYTES # BLD: 0.2 K/UL (ref 0.1–1.3)
MONOCYTES NFR BLD AUTO: 22 % (ref 4–12)
NEUTS SEG # BLD: 0.2 K/UL (ref 1.7–8.2)
NEUTS SEG NFR BLD AUTO: 27 % (ref 43–78)
NITRITE UR QL STRIP.AUTO: NEGATIVE
PH UR STRIP: 8.5 [PH] (ref 5–9)
PHOSPHATE SERPL-MCNC: 2.5 MG/DL (ref 2.3–3.7)
PLATELET # BLD AUTO: 78 K/UL (ref 150–450)
PLATELET COMMENTS,PCOM: ABNORMAL
PMV BLD AUTO: 8.7 FL (ref 10.8–14.1)
POTASSIUM SERPL-SCNC: 3 MMOL/L (ref 3.5–5.1)
PPD POC: NEGATIVE NEGATIVE
PPD POC: NEGATIVE NEGATIVE
PROT UR STRIP-MCNC: 300 MG/DL
RBC # BLD AUTO: 2.57 M/UL (ref 4.23–5.67)
RBC #/AREA URNS HPF: ABNORMAL /HPF
RBC MORPH BLD: ABNORMAL
RBC MORPH BLD: ABNORMAL
SODIUM SERPL-SCNC: 143 MMOL/L (ref 136–145)
SP GR UR REFRACTOMETRY: 1.03 (ref 1–1.02)
UROBILINOGEN UR QL STRIP.AUTO: 0.2 EU/DL (ref 0.2–1)
VANCOMYCIN SERPL-MCNC: 14.2 UG/ML
WBC # BLD AUTO: 0.7 K/UL (ref 4.3–11.1)
WBC MORPH BLD: ABNORMAL
WBC URNS QL MICRO: ABNORMAL /HPF

## 2017-05-24 PROCEDURE — 74011250636 HC RX REV CODE- 250/636

## 2017-05-24 PROCEDURE — 99231 SBSQ HOSP IP/OBS SF/LOW 25: CPT | Performed by: SURGERY

## 2017-05-24 PROCEDURE — 74011250637 HC RX REV CODE- 250/637: Performed by: NURSE PRACTITIONER

## 2017-05-24 PROCEDURE — 74011000258 HC RX REV CODE- 258: Performed by: INTERNAL MEDICINE

## 2017-05-24 PROCEDURE — 87086 URINE CULTURE/COLONY COUNT: CPT | Performed by: NURSE PRACTITIONER

## 2017-05-24 PROCEDURE — 80069 RENAL FUNCTION PANEL: CPT | Performed by: INTERNAL MEDICINE

## 2017-05-24 PROCEDURE — 36415 COLL VENOUS BLD VENIPUNCTURE: CPT | Performed by: INTERNAL MEDICINE

## 2017-05-24 PROCEDURE — 65270000029 HC RM PRIVATE

## 2017-05-24 PROCEDURE — 85025 COMPLETE CBC W/AUTO DIFF WBC: CPT | Performed by: INTERNAL MEDICINE

## 2017-05-24 PROCEDURE — 74011250636 HC RX REV CODE- 250/636: Performed by: INTERNAL MEDICINE

## 2017-05-24 PROCEDURE — 74011250636 HC RX REV CODE- 250/636: Performed by: NURSE PRACTITIONER

## 2017-05-24 PROCEDURE — 74011250637 HC RX REV CODE- 250/637: Performed by: INTERNAL MEDICINE

## 2017-05-24 PROCEDURE — 81001 URINALYSIS AUTO W/SCOPE: CPT | Performed by: NURSE PRACTITIONER

## 2017-05-24 PROCEDURE — 65660000000 HC RM CCU STEPDOWN

## 2017-05-24 PROCEDURE — 80202 ASSAY OF VANCOMYCIN: CPT | Performed by: HOSPITALIST

## 2017-05-24 PROCEDURE — 74011250636 HC RX REV CODE- 250/636: Performed by: HOSPITALIST

## 2017-05-24 PROCEDURE — 86140 C-REACTIVE PROTEIN: CPT | Performed by: INTERNAL MEDICINE

## 2017-05-24 PROCEDURE — 90935 HEMODIALYSIS ONE EVALUATION: CPT

## 2017-05-24 PROCEDURE — 82962 GLUCOSE BLOOD TEST: CPT

## 2017-05-24 PROCEDURE — 5A1D60Z PERFORMANCE OF URINARY FILTRATION, MULTIPLE: ICD-10-PCS | Performed by: INTERNAL MEDICINE

## 2017-05-24 PROCEDURE — 85652 RBC SED RATE AUTOMATED: CPT | Performed by: INTERNAL MEDICINE

## 2017-05-24 RX ORDER — POTASSIUM CHLORIDE 20MEQ/15ML
40 LIQUID (ML) ORAL EVERY 4 HOURS
Status: DISCONTINUED | OUTPATIENT
Start: 2017-05-24 | End: 2017-05-24

## 2017-05-24 RX ORDER — MAGNESIUM SULFATE HEPTAHYDRATE 40 MG/ML
2 INJECTION, SOLUTION INTRAVENOUS ONCE
Status: COMPLETED | OUTPATIENT
Start: 2017-05-24 | End: 2017-05-24

## 2017-05-24 RX ORDER — SODIUM CHLORIDE 9 MG/ML
25 INJECTION, SOLUTION INTRAVENOUS
Status: ACTIVE | OUTPATIENT
Start: 2017-05-24 | End: 2017-05-25

## 2017-05-24 RX ORDER — LIDOCAINE HYDROCHLORIDE 20 MG/ML
2-10 INJECTION, SOLUTION INFILTRATION; PERINEURAL
Status: ACTIVE | OUTPATIENT
Start: 2017-05-24 | End: 2017-05-25

## 2017-05-24 RX ORDER — MIDAZOLAM HYDROCHLORIDE 1 MG/ML
.5-2 INJECTION, SOLUTION INTRAMUSCULAR; INTRAVENOUS
Status: DISCONTINUED | OUTPATIENT
Start: 2017-05-24 | End: 2017-06-01 | Stop reason: HOSPADM

## 2017-05-24 RX ORDER — FENTANYL CITRATE 50 UG/ML
12.5-1 INJECTION, SOLUTION INTRAMUSCULAR; INTRAVENOUS
Status: DISCONTINUED | OUTPATIENT
Start: 2017-05-24 | End: 2017-06-01 | Stop reason: HOSPADM

## 2017-05-24 RX ADMIN — VANCOMYCIN HYDROCHLORIDE 1000 MG: 1 INJECTION, POWDER, LYOPHILIZED, FOR SOLUTION INTRAVENOUS at 21:45

## 2017-05-24 RX ADMIN — METOPROLOL TARTRATE 25 MG: 25 TABLET ORAL at 08:51

## 2017-05-24 RX ADMIN — Medication 10 ML: at 21:48

## 2017-05-24 RX ADMIN — Medication 10 ML: at 05:30

## 2017-05-24 RX ADMIN — MAGNESIUM SULFATE HEPTAHYDRATE 2 G: 40 INJECTION, SOLUTION INTRAVENOUS at 17:06

## 2017-05-24 RX ADMIN — PANTOPRAZOLE SODIUM 40 MG: 40 TABLET, DELAYED RELEASE ORAL at 16:55

## 2017-05-24 RX ADMIN — POTASSIUM CHLORIDE 40 MEQ: 20 SOLUTION ORAL at 08:57

## 2017-05-24 RX ADMIN — TBO-FILGRASTIM 300 MCG: 300 INJECTION, SOLUTION SUBCUTANEOUS at 08:51

## 2017-05-24 RX ADMIN — CEFEPIME 1 G: 1 INJECTION, POWDER, FOR SOLUTION INTRAMUSCULAR; INTRAVENOUS at 18:09

## 2017-05-24 RX ADMIN — PANTOPRAZOLE SODIUM 40 MG: 40 TABLET, DELAYED RELEASE ORAL at 05:30

## 2017-05-24 NOTE — PROGRESS NOTES
GI DAILY PROGRESS NOTE    Admit Date:  5/22/2017    Today's Date:  5/24/2017    CC:  ELEVATED LFTS, HISTORY OF ALCOHOLIC LIVER DISEASE    Subjective:     Patient concerned about increased gas from ostomy. He denies increased need to empty ostomy bag. Stool brown with trace of red blood? Difficult to see through ostomy bag. Hgb appears stable. He denies any abdominal pain, N/V. He does report some continued belching and now reports that at times after taking pills it may feel that they are sitting and he will have to take sips of liquids or swallow a few times to get them to pass. Medications:   Current Facility-Administered Medications   Medication Dose Route Frequency    magnesium sulfate 2 g/50 ml IVPB (premix or compounded)  2 g IntraVENous ONCE    tbo-filgrastim (GRANIX) injection 300 mcg  300 mcg SubCUTAneous DAILY    cefepime (MAXIPIME) 1 g in 0.9% sodium chloride (MBP/ADV) 50 mL  1 g IntraVENous Q24H    vancomycin (VANCOCIN) 1250 mg in  ml infusion  1,250 mg IntraVENous See Admin Instructions    sodium chloride (NS) flush 5-10 mL  5-10 mL IntraVENous Q8H    sodium chloride (NS) flush 5-10 mL  5-10 mL IntraVENous PRN    ondansetron (ZOFRAN) injection 4 mg  4 mg IntraVENous Q4H PRN    metoprolol tartrate (LOPRESSOR) tablet 25 mg  25 mg Oral Q12H    albuterol (PROVENTIL VENTOLIN) nebulizer solution 2.5 mg  2.5 mg Nebulization Q4H PRN    pantoprazole (PROTONIX) tablet 40 mg  40 mg Oral ACB&D    [START ON 5/29/2017] epoetin imky (EPOGEN;PROCRIT) injection 20,000 Units  20,000 Units SubCUTAneous Q7D       Review of Systems:  ROS was obtained, with pertinent positives as listed above. No chest pain or SOB.     Diet:  Renal regular    Objective:   Vitals:  Visit Vitals    BP 99/58    Pulse 62    Temp 97.8 °F (36.6 °C)    Resp 18    Ht 5' 6\" (1.676 m)    Wt 71.6 kg (157 lb 14.4 oz)    SpO2 96%    BMI 25.49 kg/m2     Intake/Output:  05/24 0701 - 05/24 1900  In: 4441 [P.O.:240; I.V.:1374]  Out: 250 [Urine:250]  05/22 1901 - 05/24 0700  In: 1692 [P.O.:960; I.V.:732]  Out: 1175 [Urine:500]  Exam:  General appearance: alert, cooperative, no distress  Lungs: clear to auscultation bilaterally anteriorly  Heart: regular rate and rhythm  Abdomen: soft, Non-tender. Bowel sounds normal. +Ostomy with brown stool, with ?trace of blood.   No masses, no organomegaly  Neuro:  alert and oriented    Data Review (Labs):    Recent Labs      05/24/17   0633  05/23/17   0710  05/22/17   2345  05/22/17   2110  05/22/17   1125   WBC  0.7*  0.6*   --    --   0.7*   HGB  8.2*  8.4*   --    --   10.1*   HCT  24.2*  24.5*   --    --   28.8*   PLT  78*  92*   --    --   106*   MCV  94.2  93.9   --    --   92.3   NA  143  142   --    --   140   K  3.0*  3.0*   --    --   2.8*   CL  109*  107   --    --   102   CO2  21  24   --    --   24   BUN  39*  36*   --    --   29*   CREA  4.06*  3.63*   --    --   3.01*   CA  7.9*  8.1*   --    --   8.4   MG   --   1.7*   --    --   1.4*   GLU  82  80   --    --   96   AP   --   61   --    --   70   SGOT   --   12*   --    --   15   ALT   --   7*   --    --   10*   TBILI   --   1.9*   --   2.9*  3.5*   CBIL   --   0.4*   --   0.5*   --    ALB  2.2*  2.6*   --    --   3.0*   TP   --   6.8   --    --   7.9   LPSE   --    --    --    --   49*   PTP   --    --   12.6*   --    --    INR   --    --   1.2   --    --        Assessment:     Principal Problem:    Pancytopenia (HCC) (9/3/2016)      Overview: HGB 7.8 ON ADMISSION    Active Problems:    Alcohol abuse, daily use (9/3/2016)      Hypokalemia (9/3/2016)      Overview: 2.7 ON ADMISSION      HTN (hypertension) (9/4/2016)      GERD (gastroesophageal reflux disease) (9/4/2016)      Nocturnal hypoxemia (10/4/2016)      Hyperbilirubinemia (3/29/2017)      [facility] (3/29/2017)      Overview: DIALYSIS            Aundrea-rectal abscess (4/9/2017)    74 y.o. male with PMH of Hx Perirectal abscess with prior hx associated gangrene and Sepsis s/p laparoscopic diverting end colostomy 9/13/16, HTN, CKD on dialysis, chronic anemia, ETOH abuse, who presented 5/22/17 with concern for excessive gas from his colostomy and rectal drainage, currently being seen in consultation at the request of Angelina Anguiano NP for evaluation of elevated LFTs, Hx ETOH abuse. On admission LFTs revealed Tbili 3.5 with indirect bili of 2.4, direct bili of 0.5, and all other LFTs WNL, Lipase WNL. INR 1.2. Albumin low at 2.6. Hepatitis panel 3/30/17 was negative. Since Tbili remains elevated though decreased at 1.9. RUQ US revealed gallstones with no biliary dilatation. CT abd/pelvis without oral contrast reported gallstones, splenomegaly, colostomy, persisting rectal abscess. He appears to have chronic anemia. Hgb on admit was 10.1, since decreased to 8.4 with normal MCV (this seems improved from most recent admission 4/2017 when HGb was in the 7 range). Iron studies revealed normal iron, transferrin sat with elevated ferritin of 844. He has been followed by heme/onc for pancytopenia. No known prior EGD. Pt does not think he has had a Colonoscopy. Plan:   - No clear evidence of this on US however, based on his low platelets, low albumin, splenomegaly and heavy ETOH consumption, he may likely have ETOH cirrhosis currently well-compensated. Would benefit from complete ETOH cessation. Initially he reported to me 7-8beers daily for years however now he reports no ETOH use since at least 9/2016. Advised continued ETOH cessation.  - Follow up AM labs. - He is currently asymptomatic with no c/o abdominal pain, N/V. He seems mostly concerned regarding gas/bloating/regurgitation/pill dysphagia. Recommend continuation of daily PPI- Protonix 40mg po b.i.d. If ongoing complaints may benefit from further evaluation through BS/UGI series.   - Would benefit from non urgent EGD and colonoscopy at some point for colon cancer screening, r/o IBD especially in setting of perirectal abscess, and also r/o evidence of PUD, varices, etc.  Luis Antonio Vinson PA-C

## 2017-05-24 NOTE — PROGRESS NOTES
TRANSFER - OUT REPORT:    Verbal report given to [facility], RN (name) on Bhumi Colindres  being transferred to  (unit) for routine progression of care       Report consisted of patients Situation, Background, Assessment and   Recommendations(SBAR). Information from the following report(s) Procedure Summary was reviewed with the receiving nurse. Lines:   Peripheral IV 05/22/17 Right Forearm (Active)   Site Assessment Clean, dry, & intact 5/24/2017  7:30 AM   Phlebitis Assessment 0 5/24/2017  7:30 AM   Infiltration Assessment 0 5/24/2017  7:30 AM   Dressing Status Clean, dry, & intact 5/24/2017  7:30 AM   Dressing Type Transparent 5/24/2017  7:30 AM   Hub Color/Line Status Infusing;Patent 5/24/2017  7:30 AM   Alcohol Cap Used No 5/24/2017  7:30 AM        Opportunity for questions and clarification was provided. Patient transported with:   Registered Nurse   Patient refused procedure.

## 2017-05-24 NOTE — PROGRESS NOTES
PT note: Patient off floor in dialysis. Will attempt PT treatment later as time and schedule permit. Thank you.   Mk Tavares, PT  5/24/2017

## 2017-05-24 NOTE — DIALYSIS
Hemodialysis treatment initiated using right perm catheter by ISMAEL Ramirez RN. Aspirated and flushed both ports without problem. Dressing clean, dry and intact. Pt alert and VS stable. Machine settings per MD order. Will monitor during treatment.

## 2017-05-24 NOTE — PROGRESS NOTES
[facility]        Subjective: ESRD  Pt seen on dialysis. Review of Systems -   General ROS: negative for - chills, fatigue or fever  Respiratory ROS: no cough, shortness of breath, or wheezing  Cardiovascular ROS: no chest pain or dyspnea on exertion  Gastrointestinal ROS: no abdominal pain, change in bowel habits, or black or bloody stools  Genito-Urinary ROS: no dysuria, trouble voiding, or hematuria  Neurological ROS: no TIA or stroke symptoms        Objective:    Vitals:    05/24/17 1057 05/24/17 1133 05/24/17 1200 05/24/17 1233   BP: 93/56 98/54 99/58 104/60   Pulse: 63 61 62 64   Resp:       Temp:       SpO2:       Weight:       Height:           PE  Gen: in no acute distress  CV: reg rate  Chest: clear  Abd: soft  Ext/Access: rt IJ tunneled dialysis cath ok,  Immature left arm av fistula. [facility]ie Nirm[facility]  Recent Labs      05/24/17   0633  05/23/17   0710  05/22/17   2345  05/22/17   1125   WBC  0.7*  0.6*   --   0.7*   HGB  8.2*  8.4*   --   10.1*   HCT  24.2*  24.5*   --   28.8*   PLT  78*  92*   --   106*   INR   --    --   1.2   --      Recent Labs      05/24/17   0633  05/23/17   0710  05/22/17   2110  05/22/17   1125   NA  143  142   --   140   K  3.0*  3.0*   --   2.8*   CL  109*  107   --   102   CO2  21  24   --   24   GLU  82  80   --   96   BUN  39*  36*   --   29*   CREA  4.06*  3.63*   --   3.01*   MG   --   1.7*   --   1.4*   PHOS  2.5  1.9*   --   0.7*   CA  7.9*  8.1*   --   8.4   ALB  2.2*  2.6*   --   3.0*   TBILI   --   1.9*  2.9*  3.5*   ALT   --   7*   --   10*   SGOT   --   12*   --   15           Radiology    A/P:   Patient Active Problem List   Diagnosis Code    Alcohol abuse, daily use F10.10    Hypokalemia E87.6    VITO (acute kidney injury) (HCC) N17.9    Pancytopenia (HCC) D61.818    HTN (hypertension) I10    GERD (gastroesophageal reflux disease) K21.9    Thrombocytopenia (HCC) D69.6    Acute hypoactive delirium due to multiple etiologies F05    Pleural effusion on left J90    Paroxysmal atrial fibrillation (HCC) I48.0    Acute blood loss anemia D62    Suspected sleep apnea G47.30    Nocturnal hypoxemia G47.34    HIT (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 I got a call from Dr Romain Friend who sees him at [facility]. She feels he is totally incapable of taking care of himself and needs NH placement long term, not short term rehab.      Kaity Stanford MD

## 2017-05-24 NOTE — PROGRESS NOTES
TRANSFER - IN REPORT:    Verbal report received from DEVAN Arvizu on [address]  being received from  for routine progression of care      Report consisted of patients Situation, Background, Assessment and   Recommendations(SBAR). Information from the following report(s) Procedure Summary was reviewed with the receiving nurse. Opportunity for questions and clarification was provided. Assessment completed upon patients arrival to unit and care assumed.

## 2017-05-24 NOTE — PROGRESS NOTES
Hospitalist Progress Note    2017  Admit Date: 2017 10:46 AM   NAME: Elieser Edward   :  1950   MRN:  [id number]   Attending: Jayro Snow MD  PCP:  Silvia Guillen MD      Admitted for: abscess with concern for sepsis- pancytopenia    SUBJECTIVE:   Patient seen- no new complaints- still not focused        Hospital Course  As previously documented:\"  Patient is a chronically ill, obese, noncompliant, alcoholic 74 y.o.  male who presented here from dialysis (all but 2 hours finished) for extreme concern for perception of excessive gas from colostomy placed last August for many year history of chronically draining perirectal abscess and multiple surgeries. He denies any new symptoms, fever, signs of infection, new symptoms. Vague SOB with exertion. Denies abdominal pain, reports bloating only. Anorexia. No nausea or vomiting. He is non compliant in general, is repetatively reporting he has no one to care for him. Apparently lives with brother who works outside the home. He has repeated hospitalizations. He was last admitted here from - for new re-occurrence of perirectal abscess containing staph epidermidis bacteremia, proteus, and E Coli. Same drained by IR. hgb was 5.0 on that admission with subsequent transfusion. He was treated with IV vancomycin and zosyn, and discharged to rehab on augmentin. Has been home a brief time only. Recently had dialysis graft placed, reports indicate it is still premature. Prior to that, he was admitted from 3/29-17 for symptomatic anemia. Hgb this admission is 10.1. Chronic pancytopenia followed by Hematology: He has also lost extensive weight since last year. Unknown how much, estimates 50#. Has no idea of what medications he takes. Additional history as noted below. Patient extremely poor historian, unable to focus on any task a hand.  \"      Review of Systems negative with exception of pertinent positives noted above  Past medical history unchanged from H&P    PHYSICAL EXAM     Visit Vitals    /59 (BP 1 Location: Right arm)    Pulse 74    Temp 98.4 °F (36.9 °C)    Resp 17    Ht 5' 6\" (1.676 m)    Wt 71.6 kg (157 lb 14.4 oz)    SpO2 100%    BMI 25.49 kg/m2      Temp (24hrs), Av.2 °F (36.8 °C), Min:97.6 °F (36.4 °C), Max:98.6 °F (37 °C)    Oxygen Therapy  O2 Sat (%): 100 % (17 1604)  Pulse via Oximetry: 96 beats per minute (17 1908)  O2 Device: Room air (17 1604)    Intake/Output Summary (Last 24 hours) at 17 7900  Last data filed at 17 1421   Gross per 24 hour   Intake             1854 ml   Output             3125 ml   Net            -1271 ml        General: No acute distress  mucous membranes pink and moist acyanotic anicteric  Neck:  Supple full range of motion  Lungs:  Air entry equal bilaterally, no crepitations rales rhonchi   Heart:  Regular rate and rhythm,  No murmur, rub, or gallop  Abdomen: Soft, Non distended, Non tender, no rebound guarding Positive bowel sounds  Extremities: No cyanosis, clubbing or edema  Neurologic:  No focal deficits  Musculoskeletal: no   Joint swelling tenderness erythema  Skin:  No erythema, rashes noted          LAB  Recent Labs      17   1042   GLUCPOC  110*      Recent Labs      17   0633   17   2345   WBC  0.7*   < >   --    HGB  8.2*   < >   --    HCT  24.2*   < >   --    PLT  78*   < >   --    INR   --    --   1.2    < > = values in this interval not displayed. Recent Labs      17   0633  17   0710   NA  143  142   K  3.0*  3.0*   CL  109*  107   CO2  21  24   GLU  82  80   BUN  39*  36*   CREA  4.06*  3.63*   MG   --   1.7*   CA  7.9*  8.1*   PHOS  2.5  1.9*   ALB  2.2*  2.6*   TBILI   --   1.9*   ALT   --   7*   SGOT   --   12*       EKG and imaging reviewed personally by me  No results found.   Results for orders placed or performed during the hospital encounter of 17   EKG, 12 LEAD, INITIAL   Result Value Ref Range    Ventricular Rate 88 BPM    Atrial Rate 88 BPM    P-R Interval 184 ms    QRS Duration 150 ms    Q-T Interval 410 ms    QTC Calculation (Bezet) 496 ms    Calculated P Axis 41 degrees    Calculated R Axis -70 degrees    Calculated T Axis 59 degrees    Diagnosis       !! AGE AND GENDER SPECIFIC ECG ANALYSIS !! Normal sinus rhythm  Right bundle branch block  Left anterior fascicular block  !!! Bifascicular block !!! Possible Lateral infarct , age undetermined  Abnormal ECG  When compared with ECG of 29-MAR-2017 10:29,  Borderline criteria for Lateral infarct are now Present  Confirmed by Miko Huggins ([id number]) on 4/10/2017 7:31:14 AM       XR Results (most recent):    Results from Hospital Encounter encounter on 04/09/17   XR CHEST PORT   Narrative Chest X-ray    INDICATION:   Sepsis, fever    A portable AP view of the chest was obtained. FINDINGS: The lungs are clear. There are no infiltrates or effusions. There is  mild cardia megaly. Central line remains in place.            Impression IMPRESSION: No acute findings in the chest          Active problems  Active Hospital Problems    Diagnosis Date Noted    Aundrea-rectal abscess 04/09/2017    Hyperbilirubinemia 03/29/2017    [facility] disease) [facility] 03/29/2017     DIALYSIS        Nocturnal hypoxemia 10/04/2016    HTN (hypertension) 09/04/2016    GERD (gastroesophageal reflux disease) 09/04/2016    Pancytopenia ([facility] 09/03/2016     HGB 7.8 ON ADMISSION      Hypokalemia 09/03/2016     2.7 ON ADMISSION      Alcohol abuse, daily use 09/03/2016       ASSESSMENT  AND PLAN      · Abscess- d/w gen surgery- consulted IR to drain- pt refusing follow up needed for drain placement so IR reluctant to place- the drain will need to be in for several months- will d/w case mgt  · Concern for osteo- unable to get mri continue empiric abx  · Alcohol abuse- monitor for withdrawal  · Pancytopenia- hem- input appreciated  · Hd graft- vascular input appreciated     DVT Prophylaxis: scds  Patient remains high risk for decompensation, given abscess- recurrent-  And refusal of plans for definitive treatment.      Signed By: Martha Luu MD     May 24, 2017

## 2017-05-24 NOTE — PROGRESS NOTES
HD treatment completed without complication. Total of 2.0 kgs removed. Flushed both ports with 0.9 mL of NS.  VS stable, NAD. CVC dressing clean, dry, and intact, tego caps intact, bilateral lumen flushed with 9 cc NS. Pt to IR after dialysis.

## 2017-05-24 NOTE — PROGRESS NOTES
Bedside report received from [facility]. Pt. Alert respirations are even and unlabored with no signs of distress, pt has no complaints of pain at this time.

## 2017-05-24 NOTE — CONSULTS
Department of Interventional Radiology  [phone]        Consult Note     Patient: Ora Corona MRN: [id number]  SSN: [id number]    YOB: 1950  Age: 74 y.o. Sex: male      Referring Physician: Dr. 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 History of Present Illness: Ora Corona is a 74 y.o. male with a chronic, recurrent marisa-rectal abscess in spite of previous surgical drainage, colon diversion, and percutaneous drain placement. I had a long conversation w Mr. Pastor Rhoades concerning the abscess, treatment with surgical debridement and/or percutaneous drain placement, and the necessary maintenance of any type of drain (including emptying the receptacle, changing the bandage, and returning for abscessogram/drain exchange/manipulation). He adamantly refused to agree to any sort of drain maintenance and said that he cannot and will not take care of the drain. I explained that it was inappropriate to place a percutaneous drain with no hope of caring for the drain. I explained that the drain would likely need to remain in place for several months, requiring repeat visits to the IR department. He repeatedly stated that he \"had no money\" and \"can't take care of\" the drain, repeating \"I'm in the street\" and \"I ain't got pants\". Therefore, I have cancelled the drain placement procedure for today. I have asked him to reconsider his decision and agree to care for the drain at home and to return for abscessograms, as necessary. He understands that if he changes his mind and agrees to be compliant with these requests, that we would then place a drain. I have relayed this information to Dr. Robi Vila. Perhaps there is a role for assistance thru [facility] to help with drain care and transportation?         Le Valladares MD       Past Medical History:   Diagnosis Date    Alcohol abuse, daily use 09/03/2016    NONE X 7 DAYS, USUALLY DRINKS 10-15 BEERS DAILY. TAKES A WEEK OFF AT A TIME ONCE A MONTH    Anemia 9/3/2016    HGB 7.8 ON ADMISSION    Chronic kidney disease     end stage renal disease- dialysis on Tues, Thurs. , Saturday mornings.  HIT [facility]parin[facility]induced thrombocytopenia) [facility]     Hypertension     Hypokalemia 9/3/2016    2.7 ON ADMISSION    Tobacco abuse 9/3/2016    QUIT 7 DAYS AGO     Past Surgical History:   Procedure Laterality Date    ABDOMEN SURGERY PROC UNLISTED      Sept. 2016.  DEBRIDE NECROTIC SKIN/ TISSUE, ABD WALL  09/04/2016    I&D rectal abscess with drain placement - Dr. Linda Yee      rectal abcess surgery that also got infected-  Subsequent I & D of the rectal abcess. Family History   Problem Relation Age of Onset    Stroke Mother      Social History   Substance Use Topics    Smoking status: Former Smoker     Packs/day: 2.00     Years: 40.00     Types: Cigarettes    Smokeless tobacco: Never Used      Comment: STATES HE QUIT 7 DAYS AGO    Alcohol use 42.0 - 63.0 oz/week     70 - 105 Cans of beer per week      Comment: 10-15 CANS DAILY      Allergies   Allergen Reactions    Heparin Analogues Other (comments)     H. I.T positive     Current Facility-Administered Medications   Medication Dose Route Frequency    magnesium sulfate 2 g/50 ml IVPB (premix or compounded)  2 g IntraVENous ONCE    sodium bicarbonate (NEUT) injection 2 mL  2 mL SubCUTAneous RAD ONCE    lidocaine (XYLOCAINE) 20 mg/mL (2 %) injection  mg  2-10 mL SubCUTAneous RAD ONCE    midazolam (VERSED) injection 0.5-2 mg  0.5-2 mg IntraVENous Multiple    fentaNYL citrate (PF) injection 12.5-100 mcg  12.5-100 mcg IntraVENous Multiple    0.9% sodium chloride infusion  25 mL/hr IntraVENous RAD ONCE    tbo-filgrastim (GRANIX) injection 300 mcg  300 mcg SubCUTAneous DAILY    cefepime (MAXIPIME) 1 g in 0.9% sodium chloride (MBP/ADV) 50 mL  1 g IntraVENous Q24H  vancomycin (VANCOCIN) 1250 mg in  ml infusion  1,250 mg IntraVENous See Admin Instructions    sodium chloride (NS) flush 5-10 mL  5-10 mL IntraVENous Q8H    sodium chloride (NS) flush 5-10 mL  5-10 mL IntraVENous PRN    ondansetron (ZOFRAN) injection 4 mg  4 mg IntraVENous Q4H PRN    metoprolol tartrate (LOPRESSOR) tablet 25 mg  25 mg Oral Q12H    albuterol (PROVENTIL VENTOLIN) nebulizer solution 2.5 mg  2.5 mg Nebulization Q4H PRN    pantoprazole (PROTONIX) tablet 40 mg  40 mg Oral ACB&D    [START ON 5/29/2017] epoetin miky (EPOGEN;PROCRIT) injection 20,000 Units  20,000 Units SubCUTAneous Q7D        Objective:     Physical Exam:  Visit Vitals    /59 (BP 1 Location: Right arm)    Pulse 74    Temp 98.4 °F (36.9 °C)    Resp 17    Ht 5' 6\" (1.676 m)    Wt 71.6 kg (157 lb 14.4 oz)    SpO2 100%    BMI 25.49 kg/m2        Assessment:     Principal Problem:    [facility] (9/3/2016)      Overview: HGB 7.8 ON ADMISSION    Active Problems:    Alcohol abuse, daily use (9/3/2016)      Hypokalemia (9/3/2016)      Overview: 2.7 ON ADMISSION      HTN (hypertension) (9/4/2016)      GERD (gastroesophageal reflux disease) (9/4/2016)      Nocturnal hypoxemia (10/4/2016)      Hyperbilirubinemia (3/29/2017)      ESRD (end stage renal disease) ([facility] (3/29/2017)      Overview: DIALYSIS            Aundrea-rectal abscess (4/9/2017)

## 2017-05-25 ENCOUNTER — APPOINTMENT (OUTPATIENT)
Dept: MRI IMAGING | Age: 67
DRG: 808 | End: 2017-05-25
Attending: INTERNAL MEDICINE
Payer: MEDICARE

## 2017-05-25 ENCOUNTER — APPOINTMENT (OUTPATIENT)
Dept: ULTRASOUND IMAGING | Age: 67
DRG: 808 | End: 2017-05-25
Attending: SURGERY
Payer: MEDICARE

## 2017-05-25 LAB
ANION GAP BLD CALC-SCNC: 13 MMOL/L (ref 7–16)
BACTERIA SPEC CULT: NORMAL
BUN SERPL-MCNC: 30 MG/DL (ref 8–23)
CALCIUM SERPL-MCNC: 8 MG/DL (ref 8.3–10.4)
CHLORIDE SERPL-SCNC: 107 MMOL/L (ref 98–107)
CO2 SERPL-SCNC: 24 MMOL/L (ref 21–32)
CREAT SERPL-MCNC: 3.06 MG/DL (ref 0.8–1.5)
DIFFERENTIAL METHOD BLD: ABNORMAL
ERYTHROCYTE [DISTWIDTH] IN BLOOD BY AUTOMATED COUNT: 17.1 % (ref 11.9–14.6)
GLUCOSE SERPL-MCNC: 84 MG/DL (ref 65–100)
HCT VFR BLD AUTO: 21.2 % (ref 41.1–50.3)
HCT VFR BLD AUTO: 22.8 % (ref 41.1–50.3)
HGB BLD-MCNC: 7.4 G/DL (ref 13.6–17.2)
HGB BLD-MCNC: 7.9 G/DL (ref 13.6–17.2)
MCH RBC QN AUTO: 32.5 PG (ref 26.1–32.9)
MCHC RBC AUTO-ENTMCNC: 34.6 G/DL (ref 31.4–35)
MCV RBC AUTO: 93.8 FL (ref 79.6–97.8)
PLATELET # BLD AUTO: 82 K/UL (ref 150–450)
PMV BLD AUTO: 9.4 FL (ref 10.8–14.1)
POTASSIUM SERPL-SCNC: 3.6 MMOL/L (ref 3.5–5.1)
RBC # BLD AUTO: 2.43 M/UL (ref 4.23–5.67)
SERVICE CMNT-IMP: NORMAL
SODIUM SERPL-SCNC: 144 MMOL/L (ref 136–145)
WBC # BLD AUTO: 0.7 K/UL (ref 4.3–11.1)

## 2017-05-25 PROCEDURE — 80048 BASIC METABOLIC PNL TOTAL CA: CPT | Performed by: INTERNAL MEDICINE

## 2017-05-25 PROCEDURE — 85025 COMPLETE CBC W/AUTO DIFF WBC: CPT | Performed by: INTERNAL MEDICINE

## 2017-05-25 PROCEDURE — 74011250637 HC RX REV CODE- 250/637: Performed by: NURSE PRACTITIONER

## 2017-05-25 PROCEDURE — 74011250636 HC RX REV CODE- 250/636: Performed by: NURSE PRACTITIONER

## 2017-05-25 PROCEDURE — 74011000258 HC RX REV CODE- 258: Performed by: INTERNAL MEDICINE

## 2017-05-25 PROCEDURE — 74011250636 HC RX REV CODE- 250/636: Performed by: INTERNAL MEDICINE

## 2017-05-25 PROCEDURE — 93990 DOPPLER FLOW TESTING: CPT

## 2017-05-25 PROCEDURE — 65660000000 HC RM CCU STEPDOWN

## 2017-05-25 PROCEDURE — 65270000029 HC RM PRIVATE

## 2017-05-25 PROCEDURE — 72195 MRI PELVIS W/O DYE: CPT

## 2017-05-25 PROCEDURE — 74011250636 HC RX REV CODE- 250/636: Performed by: HOSPITALIST

## 2017-05-25 PROCEDURE — 36415 COLL VENOUS BLD VENIPUNCTURE: CPT | Performed by: INTERNAL MEDICINE

## 2017-05-25 PROCEDURE — 85018 HEMOGLOBIN: CPT | Performed by: INTERNAL MEDICINE

## 2017-05-25 RX ORDER — HALOPERIDOL 5 MG/ML
5 INJECTION INTRAMUSCULAR ONCE
Status: COMPLETED | OUTPATIENT
Start: 2017-05-25 | End: 2017-05-25

## 2017-05-25 RX ORDER — LORAZEPAM 2 MG/ML
1 INJECTION INTRAMUSCULAR ONCE
Status: ACTIVE | OUTPATIENT
Start: 2017-05-25 | End: 2017-05-26

## 2017-05-25 RX ORDER — SERTRALINE HYDROCHLORIDE 50 MG/1
25 TABLET, FILM COATED ORAL EVERY EVENING
Status: DISCONTINUED | OUTPATIENT
Start: 2017-05-25 | End: 2017-06-01 | Stop reason: HOSPADM

## 2017-05-25 RX ADMIN — Medication 5 ML: at 18:28

## 2017-05-25 RX ADMIN — PANTOPRAZOLE SODIUM 40 MG: 40 TABLET, DELAYED RELEASE ORAL at 05:29

## 2017-05-25 RX ADMIN — HALOPERIDOL LACTATE 5 MG: 5 INJECTION, SOLUTION INTRAMUSCULAR at 18:22

## 2017-05-25 RX ADMIN — Medication 10 ML: at 05:29

## 2017-05-25 RX ADMIN — CEFEPIME 1 G: 1 INJECTION, POWDER, FOR SOLUTION INTRAMUSCULAR; INTRAVENOUS at 23:00

## 2017-05-25 RX ADMIN — PANTOPRAZOLE SODIUM 40 MG: 40 TABLET, DELAYED RELEASE ORAL at 17:58

## 2017-05-25 RX ADMIN — HALOPERIDOL LACTATE 5 MG: 5 INJECTION, SOLUTION INTRAMUSCULAR at 19:42

## 2017-05-25 RX ADMIN — TBO-FILGRASTIM 300 MCG: 300 INJECTION, SOLUTION SUBCUTANEOUS at 11:27

## 2017-05-25 RX ADMIN — Medication 10 ML: at 23:02

## 2017-05-25 NOTE — PROGRESS NOTES
Orders placed to premedicate patient prior to MRI today per Dr. Brian Spain. See MAR. April in MRI states she will notify me prior to calling for patient transport so that he can be premedicated.

## 2017-05-25 NOTE — PROGRESS NOTES
Mr. Medline to MRI at this time. Nurse to bring Ativan down in one hour if patient cannot tolerate scan.

## 2017-05-25 NOTE — PROGRESS NOTES
Hospitalist Progress Note    2017  Admit Date: 2017 10:46 AM   NAME: Bhumi Colindres   :  1950   MRN:  [id number]   Attending: Vasile Jose MD  PCP:  Miguel Angel Bello MD      Admitted for: abscess with concern for sepsis- pancytopenia    SUBJECTIVE:   Patient seen- after he just refused to work with PT. He states that he wants help but we do not understand that he has no help. unable to care for himself at home. I explained to pt thatt we understand but he refuses all the help we offer him. We cannot help if he wont let us help. Explained to pt that unless he gets abscess treated he will likely eventually succumb to this process. Only hope he has of no longer having the ostomy and getting back to baseline is having the drain placed and treating the abscess over several months with follow up with IR as indicated and then maybe after a very long time if he heals the ostomy can be reversed. He voiced understanding but still offers up a number of excuses. Hospital Course  As previously documented:\"  Patient is a chronically ill, obese, noncompliant, alcoholic 74 y.o.  male who presented here from dialysis (all but 2 hours finished) for extreme concern for perception of excessive gas from colostomy placed last August for many year history of chronically draining perirectal abscess and multiple surgeries. He denies any new symptoms, fever, signs of infection, new symptoms. Vague SOB with exertion. Denies abdominal pain, reports bloating only. Anorexia. No nausea or vomiting. He is non compliant in general, is repetatively reporting he has no one to care for him. Apparently lives with brother who works outside the home. He has repeated hospitalizations. He was last admitted here from - for new re-occurrence of perirectal abscess containing staph epidermidis bacteremia, proteus, and E Coli. Same drained by IR. hgb was 5.0 on that admission with subsequent transfusion.  He was treated with IV vancomycin and zosyn, and discharged to rehab on augmentin. Has been home a brief time only. Recently had dialysis graft placed, reports indicate it is still premature. Prior to that, he was admitted from 3/29-17 for symptomatic anemia. Hgb this admission is 10.1. Chronic pancytopenia followed by Hematology: He has also lost extensive weight since last year. Unknown how much, estimates 50#. Has no idea of what medications he takes. Additional history as noted below. Patient extremely poor historian, unable to focus on any task a hand. \"    We originally  consulted surgery for the marisa-rectal abscess management- they recommended IR drainage and management. The patient was not willing to agree to the IR prolonged treatment and management recommendations. He is now agreeable. D/w  sw and they can arrange rehab placement.      Review of Systems negative with exception of pertinent positives noted above  Past medical history unchanged from H&P    PHYSICAL EXAM     Visit Vitals    BP 95/52    Pulse 73    Temp 98.1 °F (36.7 °C)    Resp 18    Ht 5' 6\" (1.676 m)    Wt 72.3 kg (159 lb 4.8 oz)    SpO2 98%    BMI 25.71 kg/m2      Temp (24hrs), Av.9 °F (36.6 °C), Min:97 °F (36.1 °C), Max:98.4 °F (36.9 °C)    Oxygen Therapy  O2 Sat (%): 98 % (17 1114)  Pulse via Oximetry: 96 beats per minute (17 1908)  O2 Device: Room air (17 190)    Intake/Output Summary (Last 24 hours) at 17 1521  Last data filed at 17 0012   Gross per 24 hour   Intake              120 ml   Output              100 ml   Net               20 ml        General: No acute distress  mucous membranes pink and moist acyanotic anicteric  Neck:  Supple full range of motion  Lungs:  Air entry equal bilaterally, no crepitations rales rhonchi   Heart:  Regular rate and rhythm,  No murmur, rub, or gallop  Abdomen: Soft, Non distended, Non tender, no rebound guarding Positive bowel sounds  Extremities: No cyanosis, clubbing or edema  Neurologic:  No focal deficits  Musculoskeletal: no   Joint swelling tenderness erythema  Skin:  No erythema, rashes noted          LAB  Recent Labs      05/24/17   1042   GLUCPOC  110*      Recent Labs      05/25/17   0741   05/22/17   2345   WBC  0.7*   < >   --    HGB  7.9*   < >   --    HCT  22.8*   < >   --    PLT  82*   < >   --    INR   --    --   1.2    < > = values in this interval not displayed. Recent Labs      05/24/17   0633  05/23/17   0710   NA  143  142   K  3.0*  3.0*   CL  109*  107   CO2  21  24   GLU  82  80   BUN  39*  36*   CREA  4.06*  3.63*   MG   --   1.7*   CA  7.9*  8.1*   PHOS  2.5  1.9*   ALB  2.2*  2.6*   TBILI   --   1.9*   ALT   --   7*   SGOT   --   12*       EKG and imaging reviewed personally by me  Duplex Hemodialysis Access Left    Result Date: 5/25/2017  Left upper extremity dialysis fistula assessment 5/25/2017.: INDICATION: Left upper extremity dialysis fistula. Previous study of 5/23/2017 was considered inadequate. FINDINGS: The size and depth of the outflow vein are as noted on the PACS system. The left brachial artery to cephalic vein fistula is patent. Near the arterial venous anastomosis just upstream of this there is an elevated velocity of 630 cm/s. This is the highest velocity noted in the outflow vein. IMPRESSION: Moderate to high-grade Outflow flow stenosis near arterial venous anastomosis. Results for orders placed or performed during the hospital encounter of 04/09/17   EKG, 12 LEAD, INITIAL   Result Value Ref Range    Ventricular Rate 88 BPM    Atrial Rate 88 BPM    P-R Interval 184 ms    QRS Duration 150 ms    Q-T Interval 410 ms    QTC Calculation (Bezet) 496 ms    Calculated P Axis 41 degrees    Calculated R Axis -70 degrees    Calculated T Axis 59 degrees    Diagnosis       !! AGE AND GENDER SPECIFIC ECG ANALYSIS !! Normal sinus rhythm  Right bundle branch block  Left anterior fascicular block  !!!  Bifascicular block !!!  Possible Lateral infarct , age undetermined  Abnormal ECG  When compared with ECG of 29-MAR-2017 10:29,  Borderline criteria for Lateral infarct are now Present  Confirmed by Jesu Moore ([id number]) on 4/10/2017 7:31:14 AM       XR Results (most recent):    Results from Hospital Encounter encounter on 04/09/17   XR CHEST PORT   Narrative Chest X-ray    INDICATION:   Sepsis, fever    A portable AP view of the chest was obtained. FINDINGS: The lungs are clear. There are no infiltrates or effusions. There is  mild cardia megaly. Central line remains in place. Impression IMPRESSION: No acute findings in the chest          Active problems  Active Hospital Problems    Diagnosis Date Noted    Aundrea-rectal abscess 04/09/2017    Hyperbilirubinemia 03/29/2017    ESRD (end stage renal disease) ([facility] [address] 03/29/2017     DIALYSIS        Nocturnal hypoxemia 10/04/2016    HTN (hypertension) 09/04/2016    GERD (gastroesophageal reflux disease) 09/04/2016    Pancytopenia ([facility] [address] 09/03/2016     HGB 7.8 ON ADMISSION      Hypokalemia 09/03/2016     2.7 ON ADMISSION      Alcohol abuse, daily use 09/03/2016       ASSESSMENT  AND PLAN      · Abscess- d/w gen surgery- consulted IR to drain- pt refused follow up needed for drain placement so IR reluctant to place- HE IS NOW AGREEABLE so I will re-consult IR for drain placement tomorrow   ·  D/W case mgt- can go to short term rehab- if longer needed at that time can progress from there- He unfortunately is not a candidate for [facility]ency.    · Concern for osteo- pt now agreeable- d/w radiologist Dr. Narciso Lopez - can get pelvic mri as renal function won't support ct with contrast  · Ostomy- pt very concerned about the ags in his ostomy- worried that bag will burst when he downstairs having any procedures- thbis is one of his reasons for refusing studies- explained cannot ensure that the bag wont burst - but we can clean him up afterwards if that happens- the possibility of the bag bursting is not a good reason to refuse care. - GI following with recommendations for decreasing his gas. · Alcohol abuse- monitor for withdrawal  · Pancytopenia- hem- following started granix- input appreciated  · Hd graft- vascular input appreciated  · Depression- low dose zoloft     DVT Prophylaxis: scds  Patient remains high risk for decompensation, given abscess- recurrent, depression, lack of motivation.     Signed By: Morena Diehl MD     May 25, 2017

## 2017-05-25 NOTE — PROGRESS NOTES
GI DAILY PROGRESS NOTE    Admit Date:  5/22/2017    Today's Date:  5/25/2017    CC: elevated LFTS, h/o etoh liver dz    Subjective:     Patient has no complaints today. Red/orange loose stool in ostomy bag. Medications:   Current Facility-Administered Medications   Medication Dose Route Frequency    midazolam (VERSED) injection 0.5-2 mg  0.5-2 mg IntraVENous Multiple    fentaNYL citrate (PF) injection 12.5-100 mcg  12.5-100 mcg IntraVENous Multiple    tbo-filgrastim (GRANIX) injection 300 mcg  300 mcg SubCUTAneous DAILY    cefepime (MAXIPIME) 1 g in 0.9% sodium chloride (MBP/ADV) 50 mL  1 g IntraVENous Q24H    vancomycin (VANCOCIN) 1250 mg in  ml infusion  1,250 mg IntraVENous See Admin Instructions    sodium chloride (NS) flush 5-10 mL  5-10 mL IntraVENous Q8H    sodium chloride (NS) flush 5-10 mL  5-10 mL IntraVENous PRN    ondansetron (ZOFRAN) injection 4 mg  4 mg IntraVENous Q4H PRN    albuterol (PROVENTIL VENTOLIN) nebulizer solution 2.5 mg  2.5 mg Nebulization Q4H PRN    pantoprazole (PROTONIX) tablet 40 mg  40 mg Oral ACB&D    [START ON 5/29/2017] epoetin miky (EPOGEN;PROCRIT) injection 20,000 Units  20,000 Units SubCUTAneous Q7D       Review of Systems:  ROS was obtained, with pertinent positives as listed above. No chest pain or SOB. Diet:  Renal diet    Objective:   Vitals:  Visit Vitals    BP 95/52    Pulse 73    Temp 98.1 °F (36.7 °C)    Resp 18    Ht 5' 6\" (1.676 m)    Wt 72.3 kg (159 lb 4.8 oz)    SpO2 98%    BMI 25.71 kg/m2     Intake/Output:     05/23 1901 - 05/25 0700  In: 1974 [P. O.:600; I.V.:1374]  Out: 2775 [Urine:350]  Exam:  General appearance: alert, cooperative, no distress  Lungs: clear to auscultation bilaterally anteriorly  Heart: regular rate and rhythm  Abdomen: soft, non-tender. Bowel sounds normal. Ostomy with red/orange stool.  No masses, no organomegaly  Neuro:  alert and oriented    Data Review (Labs):    Recent Labs      05/25/17   0741  05/24/17 2779  05/23/17   0710  05/22/17   2345  05/22/17   2110   WBC  0.7*  0.7*  0.6*   --    --    HGB  7.9*  8.2*  8.4*   --    --    HCT  22.8*  24.2*  24.5*   --    --    PLT  82*  78*  92*   --    --    MCV  93.8  94.2  93.9   --    --    NA   --   143  142   --    --    K   --   3.0*  3.0*   --    --    CL   --   109*  107   --    --    CO2   --   21  24   --    --    BUN   --   39*  36*   --    --    CREA   --   4.06*  3.63*   --    --    CA   --   7.9*  8.1*   --    --    MG   --    --   1.7*   --    --    GLU   --   82  80   --    --    AP   --    --   61   --    --    SGOT   --    --   12*   --    --    ALT   --    --   7*   --    --    TBILI   --    --   1.9*   --   2.9*   CBIL   --    --   0.4*   --   0.5*   ALB   --   2.2*  2.6*   --    --    TP   --    --   6.8   --    --    PTP   --    --    --   12.6*   --    INR   --    --    --   1.2   --        Assessment:     Principal Problem:    Pancytopenia (HCC) (9/3/2016)      Overview: HGB 7.8 ON ADMISSION    Active Problems:    Alcohol abuse, daily use (9/3/2016)      Hypokalemia (9/3/2016)      Overview: 2.7 ON ADMISSION      HTN (hypertension) (9/4/2016)      GERD (gastroesophageal reflux disease) (9/4/2016)      Nocturnal hypoxemia (10/4/2016)      Hyperbilirubinemia (3/29/2017)      [facility] (3/29/2017)      Overview: DIALYSIS            Aundrea-rectal abscess (4/9/2017)    74 y.o. male with PMH of Hx Perirectal abscess with prior hx associated gangrene and Sepsis s/p laparoscopic diverting end colostomy 9/13/16, HTN, CKD on dialysis, chronic anemia, ETOH abuse, who presented 5/22/17 with concern for excessive gas from his colostomy and rectal drainage, currently being seen in consultation at the request of Pham Chacon NP for evaluation of elevated LFTs, Hx ETOH abuse. On admission LFTs revealed Tbili 3.5 with indirect bili of 2.4, direct bili of 0.5, and all other LFTs WNL, Lipase WNL. INR 1.2. Albumin low at 2.6.  Hepatitis panel 3/30/17 was negative. Since Tbili remains elevated though decreased at 1.9. RUQ US revealed gallstones with no biliary dilatation. CT abd/pelvis without oral contrast reported gallstones, splenomegaly, colostomy, persisting rectal abscess. He appears to have chronic anemia. Hgb on admit was 10.1, since decreased to 8.4 with normal MCV (this seems improved from most recent admission 4/2017 when HGb was in the 7 range). Iron studies revealed normal iron, transferrin sat with elevated ferritin of 844. He has been followed by heme/onc for pancytopenia. No known prior EGD. Pt does not think he has had a Colonoscopy. Plan:     -Pt with no complaints today  -Getting MRI today of abdomen w/o contrast to r/o osteomyelitis  -Severe pancytopenia - hematology following  -Hgb 7.9 today - if it continues to drop would transfuse  -Continue to c/o dysphagia. Would recommend EGD once labs WNL  -I personally saw patient last week for gas in his ostomy bag. Considered malabsorption. Started him on Gladys Sear but pt most likely did not follow up with this. Asked for sample for stool study and pt refused  -Per hospitalist note, pt refusing drain for abscess bc he states he will not follow up (a very big issue in his life is follow up visits since he states he has no means for transportation) - to discuss with case management  -Will follow      Kimmie Pierre PA-C  Patient is seen and examined in collaboration with Dr. Doreen Suh. Assessment and plan as per Dr. Doreen Suh.

## 2017-05-25 NOTE — PROGRESS NOTES
Attempted to see patient but off floor. Chart reviewed. CBC stable. Continue granix as WBC still only 700.  Will follow up tomorrow

## 2017-05-25 NOTE — PROGRESS NOTES
Mr. Rachel Marin resting in bed with HOB elevated. Alert, oriented to person and place, however, with no understanding of current situation or treatment plan. States he believes he is \"dying. \" States \"they need to do surgery. They say I can't walk or wear clothes or even eat. I can't even use my hands. \" Unable to reorient; continues to argue and states repeatedly \"I can't, I can't. \" Lung sounds diminished on room air. Ostomy \"burped\" at this time. Patient  states \"I can't even do that. \" Without needs; appears comfortable. Will monitor with posey alarm on for safety and door open.

## 2017-05-25 NOTE — PROGRESS NOTES
Mr. Jacoby Porras resting in bed with eyes closed. States he is still agreeable to MRI. Ostomy burped at this time. Minimal output; not enough to warrant emptying of bag. Only ate one meal today. NPO at this time per MRI request. Haldol 5 mg IVP to be given at this time as April, MRI, states that patient is to be picked up by transport soon. Without further needs. Posey alarm remains in place for safety; door open.

## 2017-05-25 NOTE — PROGRESS NOTES
Met with patient to discuss discharge planning. Patient voices, \"I can't\" to nearly every question initially. Expresses a desire to be taken care of, stating that he is overwhelmed with all of his medical issues. We discussed his refusal of drain placement yesterday when he told Dr. Balbina Samaniego that he can't take care of it. We discussed that he will be at short-term rehab after discharge and they will assist with drain care. He also mentioned that surgery for placement of an AV graft has been discussed. Patient is under the impression that he can't wear clothes if he has these things. Reassured him that he can wear clothes with all of these procedures and that the intent is to get him to a point where he is more independent once again. Patient states, \"I want to be like I was. \"    Patient is clearly overwhelmed with his medical issues and expresses relief that he will be cared for at [facility] at discharge. We also discussed that he can progress to long-term there or at another facility if he is still not able to care for himself once he has completed rehab. Patient voices understanding and agreement with the discharge plan.     Care Management Interventions  Transition of Care Consult (CM Consult): Discharge Planning  Discharge Durable Medical Equipment: No  Physical Therapy Consult: Yes  Occupational Therapy Consult: Yes  Speech Therapy Consult: No  Current Support Network: Lives Alone, Own Home  Confirm Follow Up Transport: Family  Plan discussed with Pt/Family/Caregiver: Yes  Freedom of Choice Offered: Yes  Discharge Location  Discharge Placement: Rehab Unit Subacute

## 2017-05-25 NOTE — PROGRESS NOTES
Problem: Mobility Impaired (Adult and Pediatric)  Goal: *Acute Goals and Plan of Care (Insert Text)  LTG:  (1.)Mr. Pozo will move from supine to sit and sit to supine , scoot up and down and roll side to side with INDEPENDENT within 7 day(s). (2.)Mr. Pozo will transfer from bed to chair and chair to bed with MODIFIED INDEPENDENCE using the least restrictive device within 7 day(s). (3.)Mr. Pozo will ambulate with MODIFIED INDEPENDENCE for 150 feet with the least restrictive device within 7 day(s). ___________________________________________________________________________________________   Patient basically refused therapy although without a straight refusal.  He had many excuses all of which I had an answer to but continued the word games. I decided I did not have time to play his game. No treatment given.      Amairani Lopez PTA

## 2017-05-25 NOTE — PROGRESS NOTES
Rectal abscess wound care provided at this time. Smaller than dime size open wound that is tunneled. Cleaned and packed with iodoform and covered with gauze and Tegaderm. Patient tolerated well. Turned and propped with pillow to right side at this time. Sacrum/coccyx is red but blanchable and without breakdown. Will monitor.

## 2017-05-25 NOTE — PROGRESS NOTES
Subjective:   Daily Progress Note: 2017 12:46 PM    No complaints    Current Facility-Administered Medications   Medication Dose Route Frequency    midazolam (VERSED) injection 0.5-2 mg  0.5-2 mg IntraVENous Multiple    fentaNYL citrate (PF) injection 12.5-100 mcg  12.5-100 mcg IntraVENous Multiple    tbo-filgrastim (GRANIX) injection 300 mcg  300 mcg SubCUTAneous DAILY    cefepime (MAXIPIME) 1 g in 0.9% sodium chloride (MBP/ADV) 50 mL  1 g IntraVENous Q24H    vancomycin (VANCOCIN) 1250 mg in  ml infusion  1,250 mg IntraVENous See Admin Instructions    sodium chloride (NS) flush 5-10 mL  5-10 mL IntraVENous Q8H    sodium chloride (NS) flush 5-10 mL  5-10 mL IntraVENous PRN    ondansetron (ZOFRAN) injection 4 mg  4 mg IntraVENous Q4H PRN    albuterol (PROVENTIL VENTOLIN) nebulizer solution 2.5 mg  2.5 mg Nebulization Q4H PRN    pantoprazole (PROTONIX) tablet 40 mg  40 mg Oral ACB&D    [START ON 2017] epoetin miky (EPOGEN;PROCRIT) injection 20,000 Units  20,000 Units SubCUTAneous Q7D               Objective:     Visit Vitals    BP 95/52    Pulse 73    Temp 98.1 °F (36.7 °C)    Resp 18    Ht 5' 6\" (1.676 m)    Wt 72.3 kg (159 lb 4.8 oz)    SpO2 98%    BMI 25.71 kg/m2      O2 Device: Room air    Temp (24hrs), Av.9 °F (36.6 °C), Min:97 °F (36.1 °C), Max:98.4 °F (36.9 °C)          1901 -  0700  In: 1974 [P. O.:600; I.V.:1374]  Out: 2775 [Urine:350]      Visit Vitals    BP 95/52    Pulse 73    Temp 98.1 °F (36.7 °C)    Resp 18    Ht 5' 6\" (1.676 m)    Wt 72.3 kg (159 lb 4.8 oz)    SpO2 98%    BMI 25.71 kg/m2     Head: Normocephalic, without obvious abnormality  Neck: no JVD  Lungs: clear to auscultation bilaterally  Heart: regular rate and rhythm  Abdomen: soft, non-tender.   Extremities: no edema          Data Review    Recent Results (from the past 48 hour(s))   PROLACTIN    Collection Time: 17  4:49 PM   Result Value Ref Range    Prolactin PENDING ng/mL PLEASE READ & DOCUMENT PPD TEST IN 24 HRS    Collection Time: 05/23/17 10:00 PM   Result Value Ref Range    PPD negative Negative    mm Induration 0 mm   CBC WITH AUTOMATED DIFF    Collection Time: 05/24/17  6:33 AM   Result Value Ref Range    WBC 0.7 (LL) 4.3 - 11.1 K/uL    RBC 2.57 (L) 4.23 - 5.67 M/uL    HGB 8.2 (L) 13.6 - 17.2 g/dL    HCT 24.2 (L) 41.1 - 50.3 %    MCV 94.2 79.6 - 97.8 FL    MCH 31.9 26.1 - 32.9 PG    MCHC 33.9 31.4 - 35.0 g/dL    RDW 17.6 (H) 11.9 - 14.6 %    PLATELET 78 (L) 974 - 450 K/uL    MPV 8.7 (L) 10.8 - 14.1 FL    NEUTROPHILS 27 (L) 43 - 78 %    LYMPHOCYTES 49 (H) 13 - 44 %    MONOCYTES 22 (H) 4.0 - 12.0 %    EOSINOPHILS 2 0.5 - 7.8 %    BASOPHILS 0 0.0 - 2.0 %    IMMATURE GRANULOCYTES 0 0.0 - 5.0 %    ABS. NEUTROPHILS 0.2 (L) 1.7 - 8.2 K/UL    ABS. LYMPHOCYTES 0.3 (L) 0.5 - 4.6 K/UL    ABS. MONOCYTES 0.2 0.1 - 1.3 K/UL    ABS. EOSINOPHILS 0.0 0.0 - 0.8 K/UL    ABS. BASOPHILS 0.0 0.0 - 0.2 K/UL    ABS. IMM.  GRANS. 0.0 0.0 - 0.5 K/UL    RBC COMMENTS MICROCYTOSIS      RBC COMMENTS SLIGHT  ANISOCYTOSIS + POIKILOCYTOSIS        WBC COMMENTS Result Confirmed By Smear      PLATELET COMMENTS DECREASED      DF AUTOMATED     RENAL FUNCTION PANEL    Collection Time: 05/24/17  6:33 AM   Result Value Ref Range    Sodium 143 136 - 145 mmol/L    Potassium 3.0 (L) 3.5 - 5.1 mmol/L    Chloride 109 (H) 98 - 107 mmol/L    CO2 21 21 - 32 mmol/L    Anion gap 13 7 - 16 mmol/L    Glucose 82 65 - 100 mg/dL    BUN 39 (H) 8 - 23 MG/DL    Creatinine 4.06 (H) 0.8 - 1.5 MG/DL    GFR est AA 19 (L) >60 ml/min/1.73m2    GFR est non-AA 16 (L) >60 ml/min/1.73m2    Calcium 7.9 (L) 8.3 - 10.4 MG/DL    Phosphorus 2.5 2.3 - 3.7 MG/DL    Albumin 2.2 (L) 3.2 - 4.6 g/dL   SED RATE, AUTOMATED    Collection Time: 05/24/17  6:33 AM   Result Value Ref Range    Sed rate, automated 66 (H) 0 - 20 mm/hr   C REACTIVE PROTEIN, QT    Collection Time: 05/24/17  6:33 AM   Result Value Ref Range    C-Reactive protein 13.4 (H) 0.0 - 0.9 mg/dL   GLUCOSE, POC    Collection Time: 05/24/17 10:42 AM   Result Value Ref Range    Glucose (POC) 110 (H) 65 - 100 mg/dL   VANCOMYCIN, RANDOM    Collection Time: 05/24/17  6:28 PM   Result Value Ref Range    VANCOMYCIN,RANDOM 14.2 UG/ML   URINALYSIS W/ RFLX MICROSCOPIC    Collection Time: 05/24/17  6:40 PM   Result Value Ref Range    Color ABRIL      Appearance TURBID      Specific gravity 1.028 (H) 1.001 - 1.023      pH (UA) 8.5 5.0 - 9.0      Protein 300 (A) NEG mg/dL    Glucose NEGATIVE  mg/dL    Ketone 15 (A) NEG mg/dL    Bilirubin SMALL (A) NEG      Blood SMALL (A) NEG      Urobilinogen 0.2 0.2 - 1.0 EU/dL    Nitrites NEGATIVE  NEG      Leukocyte Esterase TRACE (A) NEG      WBC 10-20 0 /hpf    RBC 3-5 0 /hpf    Epithelial cells 5-10 0 /hpf    Bacteria 2+ (H) 0 /hpf    Casts 10-20 0 /lpf    Amorphous Crystals 3+ (H) 0   CULTURE, URINE    Collection Time: 05/24/17  6:40 PM   Result Value Ref Range    Special Requests: NO SPECIAL REQUESTS      Culture result:        NO GROWTH AFTER SHORT PERIOD OF INCUBATION. FURTHER RESULTS TO FOLLOW AFTER OVERNIGHT INCUBATION.    PLEASE READ & DOCUMENT PPD TEST IN 48 HRS    Collection Time: 05/24/17 10:15 PM   Result Value Ref Range    PPD negative Negative    mm Induration 0 mm   CBC WITH AUTOMATED DIFF    Collection Time: 05/25/17  7:41 AM   Result Value Ref Range    WBC 0.7 (LL) 4.3 - 11.1 K/uL    RBC 2.43 (L) 4.23 - 5.67 M/uL    HGB 7.9 (L) 13.6 - 17.2 g/dL    HCT 22.8 (L) 41.1 - 50.3 %    MCV 93.8 79.6 - 97.8 FL    MCH 32.5 26.1 - 32.9 PG    MCHC 34.6 31.4 - 35.0 g/dL    RDW 17.1 (H) 11.9 - 14.6 %    PLATELET 82 (L) 738 - 450 K/uL    MPV 9.4 (L) 10.8 - 14.1 FL    DF WBC TOO FEW TO DIFFERENTIATE         Assessment     Patient Active Problem List    Diagnosis Date Noted    Staphylococcus epidermidis bacteremia 04/15/2017    Aundrea-rectal abscess 04/09/2017    Symptomatic anemia 03/29/2017    Elevated total protein 03/29/2017    Hyperbilirubinemia 03/29/2017    Dizziness 03/29/2017    ESRD (end stage renal disease) ([facility] 03/29/2017    HIT ([facility]parin-induced thrombocytopenia) [facility] 10/11/2016    CKD (chronic kidney disease) requiring chronic dialysis ([facility] 10/11/2016    Nocturnal hypoxemia 10/04/2016    Suspected sleep apnea 10/02/2016    Acute blood loss anemia 09/29/2016    Paroxysmal atrial fibrillation (HCC) 09/17/2016    Pleural effusion on left 09/12/2016    Acute hypoactive delirium due to multiple etiologies 09/11/2016    Thrombocytopenia ([facility] 09/08/2016    HTN (hypertension) 09/04/2016    GERD (gastroesophageal reflux disease) 09/04/2016    Alcohol abuse, daily use 09/03/2016    Hypokalemia 09/03/2016    VITO (acute kidney injury) ([facility] 09/03/2016    [facility]topenia ([facility] 09/03/2016           Problems Addressed by Nephrology     ESRD    Plan     Dialysis tomorrow. Disposition difficulties reviewed. I agree with SNF placement.

## 2017-05-26 LAB
BACTERIA SPEC CULT: ABNORMAL
BASOPHILS # BLD AUTO: 0 K/UL (ref 0–0.2)
BASOPHILS # BLD: 0 % (ref 0–2)
COLLECTION COMMENT, COLCM: NORMAL
DIFFERENTIAL METHOD BLD: ABNORMAL
EOSINOPHIL # BLD: 0 K/UL (ref 0–0.8)
EOSINOPHIL NFR BLD: 2 % (ref 0.5–7.8)
ERYTHROCYTE [DISTWIDTH] IN BLOOD BY AUTOMATED COUNT: 16.6 % (ref 11.9–14.6)
GRAM STN SPEC: ABNORMAL
HCT VFR BLD AUTO: 19.6 % (ref 41.1–50.3)
HCT VFR BLD AUTO: 21.8 % (ref 41.1–50.3)
HGB BLD-MCNC: 6.7 G/DL (ref 13.6–17.2)
HGB BLD-MCNC: 7.5 G/DL (ref 13.6–17.2)
IMM GRANULOCYTES # BLD: 0 K/UL (ref 0–0.5)
IMM GRANULOCYTES NFR BLD AUTO: 0 % (ref 0–5)
LYMPHOCYTES # BLD AUTO: 55 % (ref 13–44)
LYMPHOCYTES # BLD: 0.2 K/UL (ref 0.5–4.6)
MAGNESIUM SERPL-MCNC: 1.8 MG/DL (ref 1.8–2.4)
MCH RBC QN AUTO: 32.1 PG (ref 26.1–32.9)
MCHC RBC AUTO-ENTMCNC: 34.2 G/DL (ref 31.4–35)
MCV RBC AUTO: 93.8 FL (ref 79.6–97.8)
MM INDURATION POC: 0 MM (ref 0–5)
MONOCYTES # BLD: 0.2 K/UL (ref 0.1–1.3)
MONOCYTES NFR BLD AUTO: 33 % (ref 4–12)
NEUTS SEG # BLD: 0.1 K/UL (ref 1.7–8.2)
NEUTS SEG NFR BLD AUTO: 10 % (ref 43–78)
PLATELET # BLD AUTO: 85 K/UL (ref 150–450)
PLATELET COMMENTS,PCOM: ABNORMAL
PMV BLD AUTO: 9.4 FL (ref 10.8–14.1)
PPD POC: NEGATIVE NEGATIVE
PROLACTIN SERPL-MCNC: 5.9 NG/ML
RBC # BLD AUTO: 2.09 M/UL (ref 4.23–5.67)
RBC MORPH BLD: ABNORMAL
RBC MORPH BLD: ABNORMAL
SERVICE CMNT-IMP: ABNORMAL
SERVICE CMNT-IMP: ABNORMAL
VANCOMYCIN SERPL-MCNC: 16.3 UG/ML
WBC # BLD AUTO: 0.5 K/UL (ref 4.3–11.1)
WBC MORPH BLD: ABNORMAL

## 2017-05-26 PROCEDURE — 74011000258 HC RX REV CODE- 258: Performed by: INTERNAL MEDICINE

## 2017-05-26 PROCEDURE — 85025 COMPLETE CBC W/AUTO DIFF WBC: CPT | Performed by: NURSE PRACTITIONER

## 2017-05-26 PROCEDURE — 36415 COLL VENOUS BLD VENIPUNCTURE: CPT | Performed by: INTERNAL MEDICINE

## 2017-05-26 PROCEDURE — 65660000000 HC RM CCU STEPDOWN

## 2017-05-26 PROCEDURE — 90935 HEMODIALYSIS ONE EVALUATION: CPT

## 2017-05-26 PROCEDURE — 77030013131 HC IV BLD ST ICUM -A

## 2017-05-26 PROCEDURE — 74011250637 HC RX REV CODE- 250/637: Performed by: INTERNAL MEDICINE

## 2017-05-26 PROCEDURE — 74011250637 HC RX REV CODE- 250/637: Performed by: NURSE PRACTITIONER

## 2017-05-26 PROCEDURE — 36591 DRAW BLOOD OFF VENOUS DEVICE: CPT

## 2017-05-26 PROCEDURE — 85018 HEMOGLOBIN: CPT | Performed by: INTERNAL MEDICINE

## 2017-05-26 PROCEDURE — 86900 BLOOD TYPING SEROLOGIC ABO: CPT | Performed by: INTERNAL MEDICINE

## 2017-05-26 PROCEDURE — 80202 ASSAY OF VANCOMYCIN: CPT | Performed by: INTERNAL MEDICINE

## 2017-05-26 PROCEDURE — P9016 RBC LEUKOCYTES REDUCED: HCPCS | Performed by: INTERNAL MEDICINE

## 2017-05-26 PROCEDURE — 74011250636 HC RX REV CODE- 250/636: Performed by: HOSPITALIST

## 2017-05-26 PROCEDURE — 74011250636 HC RX REV CODE- 250/636: Performed by: NURSE PRACTITIONER

## 2017-05-26 PROCEDURE — 86923 COMPATIBILITY TEST ELECTRIC: CPT | Performed by: INTERNAL MEDICINE

## 2017-05-26 PROCEDURE — 74011250636 HC RX REV CODE- 250/636: Performed by: INTERNAL MEDICINE

## 2017-05-26 PROCEDURE — 83735 ASSAY OF MAGNESIUM: CPT | Performed by: INTERNAL MEDICINE

## 2017-05-26 PROCEDURE — 36430 TRANSFUSION BLD/BLD COMPNT: CPT

## 2017-05-26 RX ORDER — VANCOMYCIN HYDROCHLORIDE
1250 ONCE
Status: COMPLETED | OUTPATIENT
Start: 2017-05-26 | End: 2017-05-26

## 2017-05-26 RX ORDER — SODIUM CHLORIDE 9 MG/ML
250 INJECTION, SOLUTION INTRAVENOUS AS NEEDED
Status: DISCONTINUED | OUTPATIENT
Start: 2017-05-26 | End: 2017-06-01 | Stop reason: HOSPADM

## 2017-05-26 RX ORDER — HALOPERIDOL 5 MG/ML
8 INJECTION INTRAMUSCULAR ONCE
Status: COMPLETED | OUTPATIENT
Start: 2017-05-26 | End: 2017-05-26

## 2017-05-26 RX ADMIN — Medication 10 ML: at 06:41

## 2017-05-26 RX ADMIN — CEFEPIME 1 G: 1 INJECTION, POWDER, FOR SOLUTION INTRAMUSCULAR; INTRAVENOUS at 21:06

## 2017-05-26 RX ADMIN — PANTOPRAZOLE SODIUM 40 MG: 40 TABLET, DELAYED RELEASE ORAL at 06:41

## 2017-05-26 RX ADMIN — Medication 10 ML: at 21:36

## 2017-05-26 RX ADMIN — HALOPERIDOL LACTATE 8 MG: 5 INJECTION, SOLUTION INTRAMUSCULAR at 16:31

## 2017-05-26 RX ADMIN — Medication 10 ML: at 14:44

## 2017-05-26 RX ADMIN — SERTRALINE HYDROCHLORIDE 25 MG: 50 TABLET ORAL at 17:34

## 2017-05-26 RX ADMIN — VANCOMYCIN HYDROCHLORIDE 1250 MG: 10 INJECTION, POWDER, LYOPHILIZED, FOR SOLUTION INTRAVENOUS at 16:31

## 2017-05-26 RX ADMIN — PANTOPRAZOLE SODIUM 40 MG: 40 TABLET, DELAYED RELEASE ORAL at 16:44

## 2017-05-26 RX ADMIN — TBO-FILGRASTIM 480 MCG: 480 INJECTION, SOLUTION SUBCUTANEOUS at 14:47

## 2017-05-26 NOTE — PROGRESS NOTES
Subjective:   Daily Progress Note: 2017 9:05 AM    No complaints    Current Facility-Administered Medications   Medication Dose Route Frequency    sertraline (ZOLOFT) tablet 25 mg  25 mg Oral QPM    midazolam (VERSED) injection 0.5-2 mg  0.5-2 mg IntraVENous Multiple    fentaNYL citrate (PF) injection 12.5-100 mcg  12.5-100 mcg IntraVENous Multiple    tbo-filgrastim (GRANIX) injection 300 mcg  300 mcg SubCUTAneous DAILY    cefepime (MAXIPIME) 1 g in 0.9% sodium chloride (MBP/ADV) 50 mL  1 g IntraVENous Q24H    vancomycin (VANCOCIN) 1250 mg in  ml infusion  1,250 mg IntraVENous See Admin Instructions    sodium chloride (NS) flush 5-10 mL  5-10 mL IntraVENous Q8H    sodium chloride (NS) flush 5-10 mL  5-10 mL IntraVENous PRN    ondansetron (ZOFRAN) injection 4 mg  4 mg IntraVENous Q4H PRN    albuterol (PROVENTIL VENTOLIN) nebulizer solution 2.5 mg  2.5 mg Nebulization Q4H PRN    pantoprazole (PROTONIX) tablet 40 mg  40 mg Oral ACB&D    [START ON 2017] epoetin miky (EPOGEN;PROCRIT) injection 20,000 Units  20,000 Units SubCUTAneous Q7D               Objective:     Visit Vitals    /67    Pulse (!) 101    Temp 97.7 °F (36.5 °C)    Resp 20    Ht 5' 6\" (1.676 m)    Wt 72.7 kg (160 lb 4.8 oz)    SpO2 99%    BMI 25.87 kg/m2      O2 Device: Room air    Temp (24hrs), Av.4 °F (36.9 °C), Min:97.7 °F (36.5 °C), Max:99.3 °F (37.4 °C)          1901 -  0700  In: 840 [P.O.:840]  Out: 800 [Urine:700]      Visit Vitals    /67    Pulse (!) 101    Temp 97.7 °F (36.5 °C)    Resp 20    Ht 5' 6\" (1.676 m)    Wt 72.7 kg (160 lb 4.8 oz)    SpO2 99%    BMI 25.87 kg/m2     Head: Normocephalic, without obvious abnormality  Neck: no JVD  Lungs: clear to auscultation bilaterally  Heart: regular rate and rhythm  Abdomen: soft, non-tender.   Extremities: no edema          Data Review    Recent Results (from the past 48 hour(s))   GLUCOSE, POC    Collection Time: 17 10:42 AM Result Value Ref Range    Glucose (POC) 110 (H) 65 - 100 mg/dL   VANCOMYCIN, RANDOM    Collection Time: 05/24/17  6:28 PM   Result Value Ref Range    VANCOMYCIN,RANDOM 14.2 UG/ML   URINALYSIS W/ RFLX MICROSCOPIC    Collection Time: 05/24/17  6:40 PM   Result Value Ref Range    Color ABRIL      Appearance TURBID      Specific gravity 1.028 (H) 1.001 - 1.023      pH (UA) 8.5 5.0 - 9.0      Protein 300 (A) NEG mg/dL    Glucose NEGATIVE  mg/dL    Ketone 15 (A) NEG mg/dL    Bilirubin SMALL (A) NEG      Blood SMALL (A) NEG      Urobilinogen 0.2 0.2 - 1.0 EU/dL    Nitrites NEGATIVE  NEG      Leukocyte Esterase TRACE (A) NEG      WBC 10-20 0 /hpf    RBC 3-5 0 /hpf    Epithelial cells 5-10 0 /hpf    Bacteria 2+ (H) 0 /hpf    Casts 10-20 0 /lpf    Amorphous Crystals 3+ (H) 0   CULTURE, URINE    Collection Time: 05/24/17  6:40 PM   Result Value Ref Range    Special Requests: NO SPECIAL REQUESTS      Culture result: [id number]  COLONIES/mL  MIXED SKIN BRYANT ISOLATED       PLEASE READ & DOCUMENT PPD TEST IN 48 HRS    Collection Time: 05/24/17 10:15 PM   Result Value Ref Range    PPD negative Negative    mm Induration 0 mm   CBC WITH AUTOMATED DIFF    Collection Time: 05/25/17  7:41 AM   Result Value Ref Range    WBC 0.7 (LL) 4.3 - 11.1 K/uL    RBC 2.43 (L) 4.23 - 5.67 M/uL    HGB 7.9 (L) 13.6 - 17.2 g/dL    HCT 22.8 (L) 41.1 - 50.3 %    MCV 93.8 79.6 - 97.8 FL    MCH 32.5 26.1 - 32.9 PG    MCHC 34.6 31.4 - 35.0 g/dL    RDW 17.1 (H) 11.9 - 14.6 %    PLATELET 82 (L) 983 - 450 K/uL    MPV 9.4 (L) 10.8 - 14.1 FL    DF WBC TOO FEW TO DIFFERENTIATE     METABOLIC PANEL, BASIC    Collection Time: 05/25/17  3:35 PM   Result Value Ref Range    Sodium 144 136 - 145 mmol/L    Potassium 3.6 3.5 - 5.1 mmol/L    Chloride 107 98 - 107 mmol/L    CO2 24 21 - 32 mmol/L    Anion gap 13 7 - 16 mmol/L    Glucose 84 65 - 100 mg/dL    BUN 30 (H) 8 - 23 MG/DL    Creatinine 3.06 (H) 0.8 - 1.5 MG/DL    GFR est AA 27 (L) >60 ml/min/1.73m2    GFR est non-AA 22 (L) >60 ml/min/1.73m2    Calcium 8.0 (L) 8.3 - 10.4 MG/DL   HGB & HCT    Collection Time: 05/25/17  3:35 PM   Result Value Ref Range    HGB 7.4 (L) 13.6 - 17.2 g/dL    HCT 21.2 (L) 41.1 - 50.3 %       Assessment     Patient Active Problem List    Diagnosis Date Noted    Staphylococcus epidermidis bacteremia 04/15/2017    Aundrea-rectal abscess 04/09/2017    Symptomatic anemia 03/29/2017    Elevated total protein 03/29/2017    Hyperbilirubinemia 03/29/2017    Dizziness 03/29/2017    ESRD (end stage renal disease) ([facility] 03/29/2017    HIT (heparin-induced thrombocytopenia) ([facility] 10/11/2016    CKD (chronic kidney disease) requiring chronic dialysis ([facility] 10/11/2016    Nocturnal hypoxemia 10/04/2016    Suspected sleep apnea 10/02/2016    Acute blood loss anemia 09/29/2016    Paroxysmal atrial fibrillation (HCC) 09/17/2016    Pleural effusion on left 09/12/2016    Acute hypoactive delirium due to multiple etiologies 09/11/2016    Thrombocytopenia ([address] 09/08/2016    HTN (hypertension) 09/04/2016    GERD (gastroesophageal reflux disease) 09/04/2016    Alcohol abuse, daily use 09/03/2016    Hypokalemia 09/03/2016    VITO (acute kidney injury) ([facility] 09/03/2016    Pancytopenia ([facility] 09/03/2016           Problems Addressed by Nephrology     ESRD    Plan     Dialysis well tolerated. Numerous care issues. For eventual placement.

## 2017-05-26 NOTE — PROGRESS NOTES
Received bedside shift report from Zechariah Marquez RN. Pt off floor, in MRI. Was told in report that nurse is to go to MRI dept and given Ativan 1 mg IV for premedication.

## 2017-05-26 NOTE — PROGRESS NOTES
Respirations even and unlabored. No s/sx distress. No needs at present. To Xray without s/sx distress.

## 2017-05-26 NOTE — PROGRESS NOTES
Repeat LUE access US reviewed. The fistula is patent but is stenotic near/at the anastomosis and also in the cephalic arch. The fistula diameter otherwise is small - no sign of any significant maturation. He has a catheter for now for access. Present medical condition, untreated abscess, would complicate any elective revisions or surgical access. Since his surgery was done at [facility], and he has expressed no interest in having anything done here, I recommend he follow up after discharge with his surgeon at [facility]. I will sign off.

## 2017-05-26 NOTE — DIALYSIS
HD treatment completed without complication. Total of 1.8 kgs removed. Flushed both ports with 0.9 mL of NS.  VS stable, NAD. CVC dressing clean, dry, and intact, tego caps intact, bilateral lumen flushed with 9 cc NS. Patient experiencing generalized cramping. Given 200 mL saline. Transport contacted for return to room.

## 2017-05-26 NOTE — PROGRESS NOTES
Spoke with Dr. Marsh concerning MRI studies today and haldol was given yesterday prior to MRI. New orders received.

## 2017-05-26 NOTE — PROGRESS NOTES
[facility] Hematology & Oncology        Inpatient Hematology / Oncology Progress Note      Admission Date: 2017 10:46 AM  Reason for Admission/Hospital Course: Pancytopenia ([address]      24 Hour Events:  Seen in dialysis  C/o generalized pain  Continues with pancytopenia - WBC without improvement despite granix    ROS:  Constitutional: Positive for fatigue, weakness; negative for fever, chills   CV: Negative for chest pain, palpitations, edema. Respiratory: Negative for dyspnea, cough, wheezing. GI: Negative for nausea, abdominal pain, diarrhea. 10 point review of systems is otherwise negative with the exception of the elements mentioned above in the HPI. Allergies   Allergen Reactions    Heparin Analogues Other (comments)     H. I.T positive       OBJECTIVE:  Patient Vitals for the past 8 hrs:   BP Temp Pulse Resp SpO2 Weight   17 1146 108/47 - (!) 101 - - -   17 1138 (!) 85/53 - (!) 121 - - -   17 1100 (!) 133/94 - 97 - - -   17 1027 94/74 - 87 - - -   17 0954 106/71 - (!) 107 - - -   17 0926 115/71 - (!) 102 - - -   17 0853 119/67 - (!) 101 - - -   17 0830 104/64 - 96 - - -   17 0747 115/61 - 92 - - -   17 0730 121/59 97.7 °F (36.5 °C) 97 20 99 % -   17 0644 - - - - - 160 lb 4.8 oz (72.7 kg)   17 0435 106/58 98.6 °F (37 °C) 97 18 98 % -     Temp (24hrs), Av.5 °F (36.9 °C), Min:97.7 °F (36.5 °C), Max:99.3 °F (37.4 °C)     0701 -  1900  In: 0   Out: 1800     Physical Exam:  Constitutional: Frail appearing male in no acute distress, lying in hospital bed   HEENT: Normocephalic and atraumatic. Oropharynx is clear, mucous membranes are moist.    Neck supple     Lymph node   Deferred   Skin Warm and dry. No bruising and no rash noted. No erythema. + pallor   Respiratory Lungs are clear to auscultation bilaterally without wheezes, rales or rhonchi, normal air exchange without accessory muscle use.     CVS Normal rate, regular rhythm and normal S1 and S2. No murmurs, gallops, or rubs. Abdomen Soft, nontender and nondistended, normoactive bowel sounds. No palpable mass. No hepatosplenomegaly. Neuro Grossly nonfocal with no obvious sensory or motor deficits. MSK Normal range of motion in general.  No edema and no tenderness. Psych Appropriate mood and affect. Labs:    Recent Labs      05/26/17   0750  05/25/17   1535  05/25/17   0741  05/24/17   0633   WBC  0.5*   --   0.7*  0.7*   RBC  2.09*   --   2.43*  2.57*   HGB  6.7*  7.4*  7.9*  8.2*   HCT  19.6*  21.2*  22.8*  24.2*   MCV  93.8   --   93.8  94.2   MCH  32.1   --   32.5  31.9   MCHC  34.2   --   34.6  33.9   RDW  16.6*   --   17.1*  17.6*   PLT  85*   --   82*  78*   GRANS  10*   --    --   27*   LYMPH  55*   --    --   49*   MONOS  33*   --    --   22*   EOS  2   --    --   2   BASOS  0   --    --   0   IG  0   --    --   0   DF  MANUAL   --   WBC TOO FEW TO DIFFERENTIATE  AUTOMATED   ANEU  0.1*   --    --   0.2*   ABL  0.2*   --    --   0.3*   ABM  0.2   --    --   0.2   DANGELO  0.0   --    --   0.0   ABB  0.0   --    --   0.0   AIG  0.0   --    --   0.0      Recent Labs      05/25/17   1535  05/24/17   0633   NA  144  143   K  3.6  3.0*   CL  107  109*   CO2  24  21   AGAP  13  13   GLU  84  82   BUN  30*  39*   CREA  3.06*  4.06*   GFRAA  27*  19*   GFRNA  22*  16*   CA  8.0*  7.9*   ALB   --   2.2*   PHOS   --   2.5     Imaging:  CT abd/pelvis 5/22  FINDINGS: The partially seen lung bases are clear. Small unchanged hiatal hernia  noted.      Abdomen: There are no suspicious lesions in the liver or spleen. Splenomegaly  and gallstones again noted. Mild bilateral renal atrophy and cortical thinning  are unchanged. The adrenal glands and pancreas appear unremarkable. There is  normal enhancement of the kidneys, no hydronephrosis.      No lymphadenopathy, free air, focal inflammatory changes or fluid collections in  the abdomen.  Aorta normal caliber with scattered atherosclerotic change. Patent  major vessels.     There is no evidence of bowel obstruction. Unremarkable appendix. Left lower  quadrant colostomy remains. There is mild linear scarring in the anterior  abdominal wall with mild diastases recti. Several bowel loops in the midabdomen  extending to abut the anterior abdominal wall and may be partially herniated,  similar to prior, without obvious obstruction. There are surgical sutures in  left lower quadrant bowel with evidence of prior partial resection. Lack of oral  contrast limits GI evaluation throughout.     Pelvis: There is a crescentic rim-enhancing fluid collection abutting the  lateral left rectum measuring approximately 5.2 x 2.3 cm (axial image 81,  previously 4.4 x 1.8 cm). This demonstrates interval resolution of previously  visualized gas. There is persisting moderate wall thickening of the rectum. Oral  contrast has not been administered, limiting detail. Persistent overlying skin  thickening as well. Mild inflammatory stranding throughout the pelvis. No overt  lymphadenopathy. Nonspecific wall thickening of the urinary bladder is similar  to prior.     Bones: No evidence of acute fracture or osseous erosion. The right hip hardware  is partially seen.      IMPRESSION:   1. Persistent perirectal abscess. 2. Splenomegaly, colostomy, gallstones again noted.     Lack of oral contrast limits GI evaluation somewhat. Abdominal ultrasound 5/22/17  FINDINGS: There are no discrete lesions in the visualized portions of the liver. Liver size is 16.3 cm, within normal limits. Main portal vein is patent with  note of elevated velocity of unclear significance.     Main portal vein is patent on Doppler imaging.     There is no bile duct dilatation. The common bile duct diameter is 3 mm. The  gallbladder is contracted limiting detail. Small gallstones are seen. There is  nonspecific pre-4 mm no wall thickening.  Sonographic Hood's sign is not seen.     There are no discrete lesions in the limited visualized portions of the  pancreas, not well seen due to overlying bowel.      The right kidney measures 8 cm in length. There is no hydronephrosis. There is  no evidence of a solid renal mass. Color Doppler demonstrates expected right  renal flow.     There is no evidence of ascites.      The aorta is not well seen due to overlying bowel. IVC appears patent on Doppler  imaging.     IMPRESSION: Gallstones.  No biliary dilatation.          ASSESSMENT:    Problem List  Date Reviewed: 5/23/2017          Codes Class Noted    Staphylococcus epidermidis bacteremia ICD-10-CM: R78.81  ICD-9-CM: 790.7, 041.19  4/15/2017        Aundrea-rectal abscess (Chronic) ICD-10-CM: K61.1  ICD-9-CM: 566  4/9/2017        Symptomatic anemia ICD-10-CM: D64.9  ICD-9-CM: 285.9  3/29/2017        Elevated total protein ICD-10-CM: R77.8  ICD-9-CM: 790.99  3/29/2017        Hyperbilirubinemia (Chronic) ICD-10-CM: E80.6  ICD-9-CM: 782.4  3/29/2017        Dizziness ICD-10-CM: R42  ICD-9-CM: 780.4  3/29/2017        ESRD (end stage renal disease) ([facility] (Chronic) ICD-10-CM: N18.6  ICD-9-CM: 585.6  3/29/2017    Overview Signed 5/23/2017  6:23 AM by Anderson Chan NP     DIALYSIS               HIT (heparin-induced thrombocytopenia) ([facility] ICD-10-CM: W84.77  ICD-9-CM: 289.84  10/11/2016        CKD (chronic kidney disease) requiring chronic dialysis (HCC) ICD-10-CM: N18.6, Z99.2  ICD-9-CM: 585.6, V45.11  10/11/2016        Nocturnal hypoxemia (Chronic) ICD-10-CM: G47.34  ICD-9-CM: 327.24  10/4/2016        Suspected sleep apnea ICD-10-CM: G47.30  ICD-9-CM: 780.57  10/2/2016        Acute blood loss anemia ICD-10-CM: D62  ICD-9-CM: 285.1  9/29/2016        Paroxysmal atrial fibrillation (HCC) ICD-10-CM: I48.0  ICD-9-CM: 427.31  9/17/2016        Pleural effusion on left ICD-10-CM: J90  ICD-9-CM: 511.9  9/12/2016        Acute hypoactive delirium due to multiple etiologies ICD-10-CM: F05  ICD-9-CM: 293.0 9/11/2016        Thrombocytopenia ([facility] ICD-10-CM: D69.6  ICD-9-CM: 287.5  9/8/2016        HTN (hypertension) (Chronic) ICD-10-CM: I10  ICD-9-CM: 401.9  9/4/2016        GERD (gastroesophageal reflux disease) (Chronic) ICD-10-CM: K21.9  ICD-9-CM: 530.81  9/4/2016        Alcohol abuse, daily use (Chronic) ICD-10-CM: F10.10  ICD-9-CM: 305.01  9/3/2016        Hypokalemia (Chronic) ICD-10-CM: E87.6  ICD-9-CM: 276.8  9/3/2016    Overview Signed 9/3/2016  8:22 PM by Lauren Murphy NP     2.7 ON ADMISSION             VITO (acute kidney injury) ([facility] ICD-10-CM: N17.9  ICD-9-CM: 584.9  9/3/2016        * (Principal)Pancytopenia ([facility] ICD-10-CM: K61.476  ICD-9-CM: 284.19  9/3/2016    Overview Signed 9/3/2016  8:22 PM by Lauren Murphy NP     HGB 7.8 ON ADMISSION                     PLAN:  Mr. Jacklyn Epstein is known to Dr. Yolette Galvan however, he has failed to follow up in the past as planned. He has history of recurrent pancytopenia possibly from chronic infections. His BMbx in March 2017 showed no evidence of bone marrow disorder. We will give granix for WBC of 0.6 and follow along for labs.      Pancytopenia  5/23 Give granix. Transfuse for hemoglobin < 7 and for platelets < 65,882. Follow up with Dr. Yolette Galvan once discharged. 5/26 No improvement to WBC with granix. Total WBC today 500. Increase dose to 480mcg daily. Continue with transfusional support PRN      ESRD per nephology.       Aundrea-rectal abscess   5/26 Seen by IR and patient did not feel he could manage a drain at home thus it was d/c'd by IR. But now apparently agreeable so IR was reconsulted.  Continue antibiotics              [facility]  [address] [address]  Office : [phone]  Fax : [phone]

## 2017-05-26 NOTE — PROGRESS NOTES
Pt in MRI. Attempted to pull Ativan from Pyxis, none loaded. Called [facility] in Pharmacy, who stated there is a nationwide shortage and that it is on backorder and they are substituting IV Valium for it. Phoned hospitalist, telephone order received from Dr. Vandana Salazar for Haldol 5 mg IM.

## 2017-05-26 NOTE — PROGRESS NOTES
Walked to MRI dept and gave Haldol 5 mg IM in Rt gluteus hadley for premedication of anxiety for MRI.

## 2017-05-26 NOTE — DIALYSIS
On dialysis via right perma catheter. Both ports draw and flush easily. AM labs drawn. No heparin for this treatment. Vital signs HR=92, OX=745/61. Pt noted alert and oriented x 4. Will continue to monitor throughout treatment.

## 2017-05-26 NOTE — PROGRESS NOTES
Progress Note    Attempted to see the pt multiple times today but not in  The room. Labs reviwed. Vitals stable. Will transfuse 2 units prbcs today. Further mgt and workup as his clinical course dictates. No charge to the patient.

## 2017-05-26 NOTE — PROGRESS NOTES
End of shift report given to Junaid Johnson RN. In bed, resting quietly, NAD. Pt safety maintained throughout shift.

## 2017-05-26 NOTE — PROGRESS NOTES
GI DAILY PROGRESS NOTE    Admit Date:  5/22/2017    Today's Date:  5/26/2017    CC: elevated LFTS, h/o etoh liver dz    Subjective:     Currently in dialysis. Light brown stools in ostomy bag. No blood. No c/o abdominal pain, N/V. Continues to report some trouble swallowing where foods feel they are becoming stuck in the back of his throat. Medications:   Current Facility-Administered Medications   Medication Dose Route Frequency    sertraline (ZOLOFT) tablet 25 mg  25 mg Oral QPM    midazolam (VERSED) injection 0.5-2 mg  0.5-2 mg IntraVENous Multiple    fentaNYL citrate (PF) injection 12.5-100 mcg  12.5-100 mcg IntraVENous Multiple    tbo-filgrastim (GRANIX) injection 300 mcg  300 mcg SubCUTAneous DAILY    cefepime (MAXIPIME) 1 g in 0.9% sodium chloride (MBP/ADV) 50 mL  1 g IntraVENous Q24H    vancomycin (VANCOCIN) 1250 mg in  ml infusion  1,250 mg IntraVENous See Admin Instructions    sodium chloride (NS) flush 5-10 mL  5-10 mL IntraVENous Q8H    sodium chloride (NS) flush 5-10 mL  5-10 mL IntraVENous PRN    ondansetron (ZOFRAN) injection 4 mg  4 mg IntraVENous Q4H PRN    albuterol (PROVENTIL VENTOLIN) nebulizer solution 2.5 mg  2.5 mg Nebulization Q4H PRN    pantoprazole (PROTONIX) tablet 40 mg  40 mg Oral ACB&D    [START ON 5/29/2017] epoetin miky (EPOGEN;PROCRIT) injection 20,000 Units  20,000 Units SubCUTAneous Q7D       Review of Systems:  ROS was obtained, with pertinent positives as listed above. No chest pain or SOB.     Diet:  Renal diet    Objective:   Vitals:  Visit Vitals    /67    Pulse (!) 101    Temp 97.7 °F (36.5 °C)    Resp 20    Ht 5' 6\" (1.676 m)    Wt 72.7 kg (160 lb 4.8 oz)    SpO2 99%    BMI 25.87 kg/m2     Intake/Output:     05/24 1901 - 05/26 0700  In: 840 [P.O.:840]  Out: 800 [Urine:700]  Exam:  General appearance: alert, cooperative, no distress  Lungs: clear to auscultation bilaterally anteriorly  Heart: regular rate and rhythm  Abdomen: soft, Non-tender. Bowel sounds normal. Ostomy with red/orange stool. No masses, no organomegaly  Neuro:  alert and oriented    Data Review (Labs):    Recent Labs      05/25/17   1535  05/25/17   0741  05/24/17   0633   WBC   --   0.7*  0.7*   HGB  7.4*  7.9*  8.2*   HCT  21.2*  22.8*  24.2*   PLT   --   82*  78*   MCV   --   93.8  94.2   NA  144   --   143   K  3.6   --   3.0*   CL  107   --   109*   CO2  24   --   21   BUN  30*   --   39*   CREA  3.06*   --   4.06*   CA  8.0*   --   7.9*   GLU  84   --   82   ALB   --    --   2.2*       Assessment:     Principal Problem:    Pancytopenia (HCC) (9/3/2016)      Overview: HGB 7.8 ON ADMISSION    Active Problems:    Alcohol abuse, daily use (9/3/2016)      Hypokalemia (9/3/2016)      Overview: 2.7 ON ADMISSION      HTN (hypertension) (9/4/2016)      GERD (gastroesophageal reflux disease) (9/4/2016)      Nocturnal hypoxemia (10/4/2016)      Hyperbilirubinemia (3/29/2017)      [facility] (3/29/2017)      Overview: DIALYSIS            Aundrea-rectal abscess (4/9/2017)    74 y.o. male with PMH of Hx Perirectal abscess with prior hx associated gangrene and Sepsis s/p laparoscopic diverting end colostomy 9/13/16, HTN, CKD on dialysis, chronic anemia, ETOH abuse, who presented 5/22/17 with concern for excessive gas from his colostomy and rectal drainage, currently being seen in consultation at the request of Ashley Noel NP for evaluation of elevated LFTs, Hx ETOH abuse. On admission LFTs revealed Tbili 3.5 with indirect bili of 2.4, direct bili of 0.5, and all other LFTs WNL, Lipase WNL. INR 1.2. Albumin low at 2.6. Hepatitis panel 3/30/17 was negative. Since Tbili decreased at 1.9. RUQ US revealed gallstones with no biliary dilatation. CT abd/pelvis without oral contrast reported gallstones, splenomegaly, colostomy, persisting rectal abscess. He appears to have chronic anemia. Hgb on admit was 10.1, since decreased to 8.4, then 7.9, 7.4.  Iron studies revealed normal iron, transferrin sat with elevated ferritin of 844. No evidence of active GI bleeding. He has been followed by heme/onc for pancytopenia. No known prior EGD. Pt does not think he has had a Colonoscopy. Plan:     - Concern for Osteomyelitis. MRI results pending.   - Severe pancytopenia - hematology following- started Granix  - Hgb 7.4 today - Follow H/H, transfuse as needed. - Continue to c/o dysphagia. Would recommend EGD +/- Colonoscopy (for screening) once blood cell counts are within safte limits  - Recommend continuation of daily PPI- Protonix 40mg po b.i.d.  - Rectal abscess- IR following with?plan for drain placement today, ? short term rehab at time of d/c.    - No clear evidence of this on US however, based on his low platelets, low albumin, splenomegaly and heavy ETOH consumption, he may likely have ETOH cirrhosis currently well-compensated. Would benefit from complete ETOH cessation. Initially he reported to me 7-8beers daily for years however now he reports no ETOH use since at least 9/2016. Can f/u in outpt setting. Advised continued ETOH cessation.  - No further GI recs at this time. Will sign off. Please call back with additional questions/needs. Morena Laurent PA-C    Agree w/ above. Daily PPI for dysphagia, possible esophagitis. Consider outpt EGD/[place] when CBC is improved.        Kishore Castellon MD

## 2017-05-27 LAB
BACTERIA SPEC CULT: NORMAL
BACTERIA SPEC CULT: NORMAL
DIFFERENTIAL METHOD BLD: ABNORMAL
ERYTHROCYTE [DISTWIDTH] IN BLOOD BY AUTOMATED COUNT: 16.5 % (ref 11.9–14.6)
HCT VFR BLD AUTO: 24.9 % (ref 41.1–50.3)
HGB BLD-MCNC: 8.7 G/DL (ref 13.6–17.2)
LYMPHOCYTES # BLD: 0.4 K/UL (ref 0.5–4.6)
LYMPHOCYTES NFR BLD MANUAL: 58 % (ref 16–44)
MCH RBC QN AUTO: 31.2 PG (ref 26.1–32.9)
MCHC RBC AUTO-ENTMCNC: 34.9 G/DL (ref 31.4–35)
MCV RBC AUTO: 89.2 FL (ref 79.6–97.8)
METAMYELOCYTES NFR BLD MANUAL: 2 %
MONOCYTES # BLD: 0.1 K/UL (ref 0.1–1.3)
MONOCYTES NFR BLD MANUAL: 18 % (ref 3–9)
NEUTS BAND NFR BLD MANUAL: 4 % (ref 0–10)
NEUTS SEG # BLD: 0.2 K/UL (ref 1.7–8.2)
NEUTS SEG NFR BLD MANUAL: 18 % (ref 47–75)
PLATELET # BLD AUTO: 74 K/UL (ref 150–450)
PLATELET COMMENTS,PCOM: ABNORMAL
PMV BLD AUTO: 9.1 FL (ref 10.8–14.1)
RBC # BLD AUTO: 2.79 M/UL (ref 4.23–5.67)
RBC MORPH BLD: ABNORMAL
SERVICE CMNT-IMP: NORMAL
SERVICE CMNT-IMP: NORMAL
TOTAL CELLS COUNTED SPEC: 50
WBC # BLD AUTO: 0.7 K/UL (ref 4.3–11.1)
WBC MORPH BLD: ABNORMAL

## 2017-05-27 PROCEDURE — 74011000258 HC RX REV CODE- 258: Performed by: INTERNAL MEDICINE

## 2017-05-27 PROCEDURE — 36415 COLL VENOUS BLD VENIPUNCTURE: CPT | Performed by: NURSE PRACTITIONER

## 2017-05-27 PROCEDURE — 74011250637 HC RX REV CODE- 250/637: Performed by: NURSE PRACTITIONER

## 2017-05-27 PROCEDURE — P9016 RBC LEUKOCYTES REDUCED: HCPCS | Performed by: INTERNAL MEDICINE

## 2017-05-27 PROCEDURE — 36430 TRANSFUSION BLD/BLD COMPNT: CPT

## 2017-05-27 PROCEDURE — 74011250636 HC RX REV CODE- 250/636: Performed by: INTERNAL MEDICINE

## 2017-05-27 PROCEDURE — 85025 COMPLETE CBC W/AUTO DIFF WBC: CPT | Performed by: NURSE PRACTITIONER

## 2017-05-27 PROCEDURE — 74011250636 HC RX REV CODE- 250/636: Performed by: NURSE PRACTITIONER

## 2017-05-27 PROCEDURE — 65660000000 HC RM CCU STEPDOWN

## 2017-05-27 PROCEDURE — 74011250637 HC RX REV CODE- 250/637: Performed by: INTERNAL MEDICINE

## 2017-05-27 PROCEDURE — 30233N1 TRANSFUSION OF NONAUTOLOGOUS RED BLOOD CELLS INTO PERIPHERAL VEIN, PERCUTANEOUS APPROACH: ICD-10-PCS | Performed by: INTERNAL MEDICINE

## 2017-05-27 RX ADMIN — Medication 10 ML: at 22:00

## 2017-05-27 RX ADMIN — PANTOPRAZOLE SODIUM 40 MG: 40 TABLET, DELAYED RELEASE ORAL at 17:58

## 2017-05-27 RX ADMIN — PANTOPRAZOLE SODIUM 40 MG: 40 TABLET, DELAYED RELEASE ORAL at 06:16

## 2017-05-27 RX ADMIN — SERTRALINE HYDROCHLORIDE 25 MG: 50 TABLET ORAL at 18:01

## 2017-05-27 RX ADMIN — CEFEPIME 1 G: 1 INJECTION, POWDER, FOR SOLUTION INTRAMUSCULAR; INTRAVENOUS at 18:02

## 2017-05-27 RX ADMIN — Medication 5 ML: at 14:08

## 2017-05-27 RX ADMIN — Medication 10 ML: at 06:16

## 2017-05-27 RX ADMIN — TBO-FILGRASTIM 480 MCG: 480 INJECTION, SOLUTION SUBCUTANEOUS at 09:51

## 2017-05-27 NOTE — PROGRESS NOTES
Pharmacokinetic Consult to Pharmacist    Cathy Zee is a 74 y.o. male being treated for rectal abscess with concern for osteomyelitis with vanc/cefepime. Height: 5' 6\" (167.6 cm)  Weight: 72.1 kg (159 lb)  Lab Results   Component Value Date/Time    BUN 30 05/25/2017 03:35 PM    Creatinine 3.06 05/25/2017 03:35 PM    WBC 0.5 05/26/2017 07:50 AM      Estimated Creatinine Clearance: 21.7 mL/min (based on Cr of 3.06). CULTURES:  5/22:  blood cx: 1/2 staph epi, final       Wound: MRSA, final   c dif: neg   Urine cx: NG, final   MRSA swab: neg    Lab Results   Component Value Date/Time    Vancomycin,trough 42.4 09/05/2016 12:00 PM    VANCOMYCIN,RANDOM 16.3 05/26/2017 02:30 PM       Day 5 of vancomycin. Goal trough is 15-20. Post HD random level within goal yesterday. Vancomycin 1250 mg IV x 1 given. Continue to dose around random levels and HD. Pharmacy will continue to follow. Please call with any questions.     Thank you,  Doretha Dudley, PharmD  Clinical Pharmacist  [phone]

## 2017-05-27 NOTE — PROGRESS NOTES
Subjective:   Daily Progress Note: 2017 9:19 AM    Uncomfortable    Current Facility-Administered Medications   Medication Dose Route Frequency    tbo-filgrastim (GRANIX) injection 480 mcg  480 mcg SubCUTAneous DAILY    0.9% sodium chloride infusion 250 mL  250 mL IntraVENous PRN    sertraline (ZOLOFT) tablet 25 mg  25 mg Oral QPM    midazolam (VERSED) injection 0.5-2 mg  0.5-2 mg IntraVENous Multiple    fentaNYL citrate (PF) injection 12.5-100 mcg  12.5-100 mcg IntraVENous Multiple    cefepime (MAXIPIME) 1 g in 0.9% sodium chloride (MBP/ADV) 50 mL  1 g IntraVENous Q24H    vancomycin (VANCOCIN) 1250 mg in  ml infusion  1,250 mg IntraVENous See Admin Instructions    sodium chloride (NS) flush 5-10 mL  5-10 mL IntraVENous Q8H    sodium chloride (NS) flush 5-10 mL  5-10 mL IntraVENous PRN    ondansetron (ZOFRAN) injection 4 mg  4 mg IntraVENous Q4H PRN    albuterol (PROVENTIL VENTOLIN) nebulizer solution 2.5 mg  2.5 mg Nebulization Q4H PRN    pantoprazole (PROTONIX) tablet 40 mg  40 mg Oral ACB&D    [START ON 2017] epoetin miky (EPOGEN;PROCRIT) injection 20,000 Units  20,000 Units SubCUTAneous Q7D               Objective:     Visit Vitals    /57 (BP 1 Location: Right arm, BP Patient Position: At rest)    Pulse 80    Temp 98.1 °F (36.7 °C)    Resp 18    Ht 5' 6\" (1.676 m)    Wt 72.1 kg (159 lb)    SpO2 99%    BMI 25.66 kg/m2      O2 Device: Room air    Temp (24hrs), Av.7 °F (37.1 °C), Min:97.4 °F (36.3 °C), Max:99.6 °F (37.6 °C)         1 -  0700  In: 1132.8 [P.O.:720]  Out: 2500 [Urine:600]      Visit Vitals    /57 (BP 1 Location: Right arm, BP Patient Position: At rest)    Pulse 80    Temp 98.1 °F (36.7 °C)    Resp 18    Ht 5' 6\" (1.676 m)    Wt 72.1 kg (159 lb)    SpO2 99%    BMI 25.66 kg/m2     Head: Normocephalic, without obvious abnormality  Neck: no JVD  Lungs: clear to auscultation bilaterally  Heart: regular rate and rhythm  Abdomen: soft, non-tender. Extremities: no edema          Data Review    Recent Results (from the past 48 hour(s))   METABOLIC PANEL, BASIC    Collection Time: 05/25/17  3:35 PM   Result Value Ref Range    Sodium 144 136 - 145 mmol/L    Potassium 3.6 3.5 - 5.1 mmol/L    Chloride 107 98 - 107 mmol/L    CO2 24 21 - 32 mmol/L    Anion gap 13 7 - 16 mmol/L    Glucose 84 65 - 100 mg/dL    BUN 30 (H) 8 - 23 MG/DL    Creatinine 3.06 (H) 0.8 - 1.5 MG/DL    GFR est AA 27 (L) >60 ml/min/1.73m2    GFR est non-AA 22 (L) >60 ml/min/1.73m2    Calcium 8.0 (L) 8.3 - 10.4 MG/DL   HGB & HCT    Collection Time: 05/25/17  3:35 PM   Result Value Ref Range    HGB 7.4 (L) 13.6 - 17.2 g/dL    HCT 21.2 (L) 41.1 - 50.3 %   CBC WITH AUTOMATED DIFF    Collection Time: 05/26/17  7:50 AM   Result Value Ref Range    WBC 0.5 (LL) 4.3 - 11.1 K/uL    RBC 2.09 (L) 4.23 - 5.67 M/uL    HGB 6.7 (LL) 13.6 - 17.2 g/dL    HCT 19.6 (LL) 41.1 - 50.3 %    MCV 93.8 79.6 - 97.8 FL    MCH 32.1 26.1 - 32.9 PG    MCHC 34.2 31.4 - 35.0 g/dL    RDW 16.6 (H) 11.9 - 14.6 %    PLATELET 85 (L) 649 - 450 K/uL    MPV 9.4 (L) 10.8 - 14.1 FL    NEUTROPHILS 10 (L) 43 - 78 %    LYMPHOCYTES 55 (H) 13 - 44 %    MONOCYTES 33 (H) 4.0 - 12.0 %    EOSINOPHILS 2 0.5 - 7.8 %    BASOPHILS 0 0.0 - 2.0 %    IMMATURE GRANULOCYTES 0 0.0 - 5.0 %    ABS. NEUTROPHILS 0.1 (L) 1.7 - 8.2 K/UL    ABS. LYMPHOCYTES 0.2 (L) 0.5 - 4.6 K/UL    ABS. MONOCYTES 0.2 0.1 - 1.3 K/UL    ABS. EOSINOPHILS 0.0 0.0 - 0.8 K/UL    ABS. BASOPHILS 0.0 0.0 - 0.2 K/UL    ABS. IMM.  GRANS. 0.0 0.0 - 0.5 K/UL    RBC COMMENTS SLIGHT  ANISOCYTOSIS        RBC COMMENTS SLIGHT  MICROCYTOSIS        WBC COMMENTS Result Confirmed By Smear      PLATELET COMMENTS DECREASED      DF MANUAL     HGB & HCT    Collection Time: 05/26/17  2:30 PM   Result Value Ref Range    HGB 7.5 (L) 13.6 - 17.2 g/dL    HCT 21.8 (L) 41.1 - 50.3 %   COLLECTION COMMENT    Collection Time: 05/26/17  2:30 PM   Result Value Ref Range    Collection Comment 2HRS AFTER DIALYSIS    VANCOMYCIN, RANDOM    Collection Time: 05/26/17  2:30 PM   Result Value Ref Range    VANCOMYCIN,RANDOM 16.3 UG/ML   MAGNESIUM    Collection Time: 05/26/17  2:30 PM   Result Value Ref Range    Magnesium 1.8 1.8 - 2.4 mg/dL   TYPE & CROSSMATCH    Collection Time: 05/26/17  9:22 PM   Result Value Ref Range    Crossmatch Expiration 05/29/2017     ABO/Rh(D) O NEGATIVE     Antibody screen NEG     Unit number [id number]     Blood component type RC LR AS5     Unit division 00     Status of unit TRANSFUSED     Crossmatch result Compatible     Unit number [id number]     Blood component type RC LR AS5     Unit division 00     Status of unit ISSUED     Crossmatch result Compatible    PLEASE READ & DOCUMENT PPD TEST IN 72 HRS    Collection Time: 05/26/17 10:00 PM   Result Value Ref Range    PPD Negative Negative    mm Induration 0 mm       Assessment     Patient Active Problem List    Diagnosis Date Noted    Staphylococcus epidermidis bacteremia 04/15/2017    Aundrea-rectal abscess 04/09/2017    Symptomatic anemia 03/29/2017    Elevated total protein 03/29/2017    Hyperbilirubinemia 03/29/2017    Dizziness 03/29/2017    ESRD (end stage renal disease) ([facility] 03/29/2017    HIT (heparin-induced thrombocytopenia) ([facility] 10/11/2016    CKD (chronic kidney disease) requiring chronic dialysis ([facility] 10/11/2016    Nocturnal hypoxemia 10/04/2016    Suspected sleep apnea 10/02/2016    Acute blood loss anemia 09/29/2016    Paroxysmal atrial fibrillation (HCC) 09/17/2016    Pleural effusion on left 09/12/2016    Acute hypoactive delirium due to multiple etiologies 09/11/2016    Thrombocytopenia ([facility] 09/08/2016    HTN (hypertension) 09/04/2016    GERD (gastroesophageal reflux disease) 09/04/2016    Alcohol abuse, daily use 09/03/2016    Hypokalemia 09/03/2016    VITO (acute kidney injury) ([facility] 09/03/2016    Pancytopenia ([facility] 09/03/2016           Problems Addressed by Nephrology     ESRD  Advanced Care Planning    Plan     Dialysis Monday. I spent some time discussing his expectations and where he feels he will be in 1,3,6 months. Discussed the option of stopping dialysis, end of life issues given his quality of life and multiple comorbidities. He will consider his options. He does not feel he has improved in the last nine months and he expects to continue to deteriorate.

## 2017-05-27 NOTE — PROGRESS NOTES
Hospitalist Progress Note    2017  Admit Date: 2017 10:46 AM   NAME: Chano Figueroa   :  1950   MRN:  [id number]   Attending: Anand Johns MD  PCP:  Kevin Hu MD      Admitted for: abscess with concern for sepsis- pancytopenia    SUBJECTIVE:   2017- Patient seen- after he just refused to work with PT. He states that he wants help but we do not understand that he has no help. unable to care for himself at home. I explained to pt thatt we understand but he refuses all the help we offer him. We cannot help if he wont let us help. Explained to pt that unless he gets abscess treated he will likely eventually succumb to this process. Only hope he has of no longer having the ostomy and getting back to baseline is having the drain placed and treating the abscess over several months with follow up with IR as indicated and then maybe after a very long time if he heals the ostomy can be reversed. He voiced understanding but still offers up a number of excuses. 2017- pt seen - still has complaints of not being able to do anything but more agreeable- big obstacle is that patient thinks he cannot actually get better as he has been sick so long. Hospital Course  As previously documented:\"  Patient is a chronically ill, obese, noncompliant, alcoholic 74 y.o.  male who presented here from dialysis (all but 2 hours finished) for extreme concern for perception of excessive gas from colostomy placed last August for many year history of chronically draining perirectal abscess and multiple surgeries. He denies any new symptoms, fever, signs of infection, new symptoms. Vague SOB with exertion. Denies abdominal pain, reports bloating only. Anorexia. No nausea or vomiting. He is non compliant in general, is repetatively reporting he has no one to care for him. Apparently lives with brother who works outside the home. He has repeated hospitalizations.    He was last admitted here from - for new re-occurrence of perirectal abscess containing staph epidermidis bacteremia, proteus, and E Coli. Same drained by IR. hgb was 5.0 on that admission with subsequent transfusion. He was treated with IV vancomycin and zosyn, and discharged to rehab on augmentin. Has been home a brief time only. Recently had dialysis graft placed, reports indicate it is still premature. Prior to that, he was admitted from 3/29-17 for symptomatic anemia. Hgb this admission is 10.1. Chronic pancytopenia followed by Hematology: He has also lost extensive weight since last year. Unknown how much, estimates 50#. Has no idea of what medications he takes. Additional history as noted below. Patient extremely poor historian, unable to focus on any task a hand. \"    We originally  consulted surgery for the marisa-rectal abscess management- they recommended IR drainage and management. The patient was not willing to agree to the IR prolonged treatment and management recommendations. He is now agreeable. D/w  sw and they can arrange rehab placement. Currently waiting on mri for possible osteomyelitis and IR placement of a drain. Pt was so busy with procedures on 2017- so unable to have drain placed in IR.     Review of Systems negative with exception of pertinent positives noted above  Past medical history unchanged from H&P    PHYSICAL EXAM     Visit Vitals    /67    Pulse 93    Temp 99.2 °F (37.3 °C)    Resp 18    Ht 5' 6\" (1.676 m)    Wt 72.1 kg (159 lb)    SpO2 99%    BMI 25.66 kg/m2      Temp (24hrs), Av.8 °F (37.1 °C), Min:97.4 °F (36.3 °C), Max:99.6 °F (37.6 °C)    Oxygen Therapy  O2 Sat (%): 99 % (17 1523)  Pulse via Oximetry: 96 beats per minute (17 1908)  O2 Device: Room air (17 0710)    Intake/Output Summary (Last 24 hours) at 17 1755  Last data filed at 17 1703   Gross per 24 hour   Intake           1849.9 ml   Output              200 ml   Net           1649.9 ml        General: No acute distress  mucous membranes pink and moist acyanotic anicteric  Neck:  Supple full range of motion  Lungs:  Air entry equal bilaterally, no crepitations rales rhonchi   Heart:  Regular rate and rhythm,  No murmur, rub, or gallop  Abdomen: Soft, Non distended, Non tender, no rebound guarding Positive bowel sounds  Extremities: No cyanosis, clubbing or edema  Neurologic:  No focal deficits  Musculoskeletal: no   Joint swelling tenderness erythema  Skin:  No erythema, rashes noted          LAB  No results for input(s): GLUCPOC in the last 72 hours. No lab exists for component: GLPOC   Recent Labs      05/27/17   1427   WBC  0.7*   HGB  8.7*   HCT  24.9*   PLT  74*     Recent Labs      05/26/17   1430  05/25/17   1535   NA   --   144   K   --   3.6   CL   --   107   CO2   --   24   GLU   --   84   BUN   --   30*   CREA   --   3.06*   MG  1.8   --    CA   --   8.0*       EKG and imaging reviewed personally by me  No results found. Results for orders placed or performed during the hospital encounter of 04/09/17   EKG, 12 LEAD, INITIAL   Result Value Ref Range    Ventricular Rate 88 BPM    Atrial Rate 88 BPM    P-R Interval 184 ms    QRS Duration 150 ms    Q-T Interval 410 ms    QTC Calculation (Bezet) 496 ms    Calculated P Axis 41 degrees    Calculated R Axis -70 degrees    Calculated T Axis 59 degrees    Diagnosis       !! AGE AND GENDER SPECIFIC ECG ANALYSIS !! Normal sinus rhythm  Right bundle branch block  Left anterior fascicular block  !!! Bifascicular block !!!   Possible Lateral infarct , age undetermined  Abnormal ECG  When compared with ECG of 29-MAR-2017 10:29,  Borderline criteria for Lateral infarct are now Present  Confirmed by Faisal Levy ([id number]) on 4/10/2017 7:31:14 AM       XR Results (most recent):    Results from Hospital Encounter encounter on 04/09/17   XR CHEST PORT   Narrative Chest X-ray    INDICATION:   Sepsis, fever    A portable AP view of the chest was obtained. FINDINGS: The lungs are clear. There are no infiltrates or effusions. There is  mild cardia megaly. Central line remains in place. Impression IMPRESSION: No acute findings in the chest          Active problems  Active Hospital Problems    Diagnosis Date Noted    Aundrea-rectal abscess 04/09/2017    Hyperbilirubinemia 03/29/2017    ESRD (end stage renal disease) ([facility] 03/29/2017     DIALYSIS        Nocturnal hypoxemia 10/04/2016    HTN (hypertension) 09/04/2016    GERD (gastroesophageal reflux disease) 09/04/2016    Pancytopenia ([facility] 09/03/2016     HGB 7.8 ON ADMISSION      Hypokalemia 09/03/2016     2.7 ON ADMISSION      Alcohol abuse, daily use 09/03/2016       ASSESSMENT  AND PLAN      · Abscess- d/w gen surgery- IR consulted to drain- pt initially refused follow up needed for drain placement so IR reluctant to place- He is now agreeable so I will re-consult IR for drain placement tomorrow   ·  D/W case mgt- can go to short term rehab- if longer needed at that time can progress from there- He unfortunately is not a candidate for [facility]. · Concern for osteo- pt now agreeable- d/w radiologist Dr. Arleen Sethi - can get pelvic mri as renal function won't support ct with contrast- results are still pending- cont abx  · Ostomy- pt very concerned about the gas in his ostomy- worried that bag will burst when he downstairs having any procedures- this was one of his reasons for refusing studies- explained cannot ensure that the bag won't burst - but we can clean him up afterwards if that happens- the possibility of the bag bursting is not a good reason to refuse care. - GI was but they have signed off.  Can re-consult when stable for egd/colonoscopy for the nemia  · Alcohol abuse- monitor for withdrawal  · Pancytopenia- hem- following started granix- input appreciated  · Hd graft- vascular input appreciated  · Depression- low dose zoloft   · Anemia- s/p 2units prbcs- will monitor  · esrd on hd- renal following for hd    DVT Prophylaxis: scds  Patient remains high risk for decompensation, given abscess- recurrent, depression, lack of motivation.     Signed By: Luna Merrill MD     May 27, 2017

## 2017-05-28 LAB
ABO + RH BLD: NORMAL
ANION GAP BLD CALC-SCNC: 11 MMOL/L (ref 7–16)
BLD PROD TYP BPU: NORMAL
BLD PROD TYP BPU: NORMAL
BLOOD GROUP ANTIBODIES SERPL: NORMAL
BPU ID: NORMAL
BPU ID: NORMAL
BUN SERPL-MCNC: 39 MG/DL (ref 8–23)
CALCIUM SERPL-MCNC: 9.1 MG/DL (ref 8.3–10.4)
CHLORIDE SERPL-SCNC: 103 MMOL/L (ref 98–107)
CO2 SERPL-SCNC: 26 MMOL/L (ref 21–32)
CREAT SERPL-MCNC: 4.11 MG/DL (ref 0.8–1.5)
CROSSMATCH RESULT,%XM: NORMAL
CROSSMATCH RESULT,%XM: NORMAL
DIFFERENTIAL METHOD BLD: ABNORMAL
ERYTHROCYTE [DISTWIDTH] IN BLOOD BY AUTOMATED COUNT: 16.6 % (ref 11.9–14.6)
GLUCOSE SERPL-MCNC: 91 MG/DL (ref 65–100)
HCT VFR BLD AUTO: 26.4 % (ref 41.1–50.3)
HGB BLD-MCNC: 9 G/DL (ref 13.6–17.2)
LYMPHOCYTES # BLD: 0.3 K/UL (ref 0.5–4.6)
LYMPHOCYTES NFR BLD MANUAL: 32 % (ref 16–44)
MCH RBC QN AUTO: 30.2 PG (ref 26.1–32.9)
MCHC RBC AUTO-ENTMCNC: 34.1 G/DL (ref 31.4–35)
MCV RBC AUTO: 88.6 FL (ref 79.6–97.8)
MONOCYTES # BLD: 0.2 K/UL (ref 0.1–1.3)
MONOCYTES NFR BLD MANUAL: 29 % (ref 3–9)
NEUTS BAND NFR BLD MANUAL: 7 % (ref 0–10)
NEUTS SEG # BLD: 0.3 K/UL (ref 1.7–8.2)
NEUTS SEG NFR BLD MANUAL: 32 % (ref 47–75)
PLATELET # BLD AUTO: 84 K/UL (ref 150–450)
PLATELET COMMENTS,PCOM: ABNORMAL
PMV BLD AUTO: 9.9 FL (ref 10.8–14.1)
POTASSIUM SERPL-SCNC: 3.8 MMOL/L (ref 3.5–5.1)
RBC # BLD AUTO: 2.98 M/UL (ref 4.23–5.67)
RBC MORPH BLD: ABNORMAL
SODIUM SERPL-SCNC: 140 MMOL/L (ref 136–145)
SPECIMEN EXP DATE BLD: NORMAL
STATUS OF UNIT,%ST: NORMAL
STATUS OF UNIT,%ST: NORMAL
TOTAL CELLS COUNTED SPEC: 76
UNIT DIVISION, %UDIV: 0
UNIT DIVISION, %UDIV: 0
WBC # BLD AUTO: 0.8 K/UL (ref 4.3–11.1)
WBC MORPH BLD: ABNORMAL

## 2017-05-28 PROCEDURE — 74011250637 HC RX REV CODE- 250/637: Performed by: INTERNAL MEDICINE

## 2017-05-28 PROCEDURE — 65660000000 HC RM CCU STEPDOWN

## 2017-05-28 PROCEDURE — 80048 BASIC METABOLIC PNL TOTAL CA: CPT | Performed by: NURSE PRACTITIONER

## 2017-05-28 PROCEDURE — 74011250636 HC RX REV CODE- 250/636: Performed by: NURSE PRACTITIONER

## 2017-05-28 PROCEDURE — 77030018836 HC SOL IRR NACL ICUM -A

## 2017-05-28 PROCEDURE — 74011250637 HC RX REV CODE- 250/637: Performed by: NURSE PRACTITIONER

## 2017-05-28 PROCEDURE — 36415 COLL VENOUS BLD VENIPUNCTURE: CPT | Performed by: NURSE PRACTITIONER

## 2017-05-28 PROCEDURE — 85025 COMPLETE CBC W/AUTO DIFF WBC: CPT | Performed by: NURSE PRACTITIONER

## 2017-05-28 RX ADMIN — TBO-FILGRASTIM 480 MCG: 480 INJECTION, SOLUTION SUBCUTANEOUS at 14:07

## 2017-05-28 RX ADMIN — Medication 10 ML: at 06:00

## 2017-05-28 RX ADMIN — Medication 10 ML: at 22:02

## 2017-05-28 RX ADMIN — PANTOPRAZOLE SODIUM 40 MG: 40 TABLET, DELAYED RELEASE ORAL at 06:01

## 2017-05-28 RX ADMIN — SERTRALINE HYDROCHLORIDE 25 MG: 50 TABLET ORAL at 17:26

## 2017-05-28 RX ADMIN — Medication 5 ML: at 14:11

## 2017-05-28 RX ADMIN — PANTOPRAZOLE SODIUM 40 MG: 40 TABLET, DELAYED RELEASE ORAL at 17:26

## 2017-05-28 NOTE — PROGRESS NOTES
Subjective:   Daily Progress Note: 2017 10:59 AM    C/o pain    Current Facility-Administered Medications   Medication Dose Route Frequency    tbo-filgrastim (GRANIX) injection 480 mcg  480 mcg SubCUTAneous DAILY    0.9% sodium chloride infusion 250 mL  250 mL IntraVENous PRN    sertraline (ZOLOFT) tablet 25 mg  25 mg Oral QPM    midazolam (VERSED) injection 0.5-2 mg  0.5-2 mg IntraVENous Multiple    fentaNYL citrate (PF) injection 12.5-100 mcg  12.5-100 mcg IntraVENous Multiple    cefepime (MAXIPIME) 1 g in 0.9% sodium chloride (MBP/ADV) 50 mL  1 g IntraVENous Q24H    vancomycin (VANCOCIN) 1250 mg in  ml infusion  1,250 mg IntraVENous See Admin Instructions    sodium chloride (NS) flush 5-10 mL  5-10 mL IntraVENous Q8H    sodium chloride (NS) flush 5-10 mL  5-10 mL IntraVENous PRN    ondansetron (ZOFRAN) injection 4 mg  4 mg IntraVENous Q4H PRN    albuterol (PROVENTIL VENTOLIN) nebulizer solution 2.5 mg  2.5 mg Nebulization Q4H PRN    pantoprazole (PROTONIX) tablet 40 mg  40 mg Oral ACB&D    [START ON 2017] epoetin miky (EPOGEN;PROCRIT) injection 20,000 Units  20,000 Units SubCUTAneous Q7D               Objective:     Visit Vitals    /60 (BP 1 Location: Right arm, BP Patient Position: At rest)    Pulse (!) 102    Temp 97.5 °F (36.4 °C)    Resp 18    Ht 5' 6\" (1.676 m)    Wt 70.6 kg (155 lb 11.2 oz)    SpO2 100%    BMI 25.13 kg/m2      O2 Device: Room air    Temp (24hrs), Av.6 °F (37 °C), Min:97.5 °F (36.4 °C), Max:99.4 °F (37.4 °C)         1 -  0700  In: 1849.9 [I.V.:981]  Out: 975 [Urine:975]      Visit Vitals    /60 (BP 1 Location: Right arm, BP Patient Position: At rest)    Pulse (!) 102    Temp 97.5 °F (36.4 °C)    Resp 18    Ht 5' 6\" (1.676 m)    Wt 70.6 kg (155 lb 11.2 oz)    SpO2 100%    BMI 25.13 kg/m2     Head: Normocephalic, without obvious abnormality  Lungs: clear to auscultation bilaterally  Heart: regular rate and rhythm  Abdomen: soft, non-tender. Extremities: no edema          Data Review    Recent Results (from the past 48 hour(s))   HGB & HCT    Collection Time: 05/26/17  2:30 PM   Result Value Ref Range    HGB 7.5 (L) 13.6 - 17.2 g/dL    HCT 21.8 (L) 41.1 - 50.3 %   COLLECTION COMMENT    Collection Time: 05/26/17  2:30 PM   Result Value Ref Range    Collection Comment 2HRS AFTER DIALYSIS    VANCOMYCIN, RANDOM    Collection Time: 05/26/17  2:30 PM   Result Value Ref Range    VANCOMYCIN,RANDOM 16.3 UG/ML   MAGNESIUM    Collection Time: 05/26/17  2:30 PM   Result Value Ref Range    Magnesium 1.8 1.8 - 2.4 mg/dL   TYPE & CROSSMATCH    Collection Time: 05/26/17  9:22 PM   Result Value Ref Range    Crossmatch Expiration 05/29/2017     ABO/Rh(D) O NEGATIVE     Antibody screen NEG     Unit number [id number]     Blood component type  LR AS5     Unit division 00     Status of unit TRANSFUSED     Crossmatch result Compatible     Unit number [id number]     Blood component type  LR AS5     Unit division 00     Status of unit TRANSFUSED     Crossmatch result Compatible    PLEASE READ & DOCUMENT PPD TEST IN 72 HRS    Collection Time: 05/26/17 10:00 PM   Result Value Ref Range    PPD Negative Negative    mm Induration 0 mm   CBC WITH AUTOMATED DIFF    Collection Time: 05/27/17  2:27 PM   Result Value Ref Range    WBC 0.7 (LL) 4.3 - 11.1 K/uL    RBC 2.79 (L) 4.23 - 5.67 M/uL    HGB 8.7 (L) 13.6 - 17.2 g/dL    HCT 24.9 (L) 41.1 - 50.3 %    MCV 89.2 79.6 - 97.8 FL    MCH 31.2 26.1 - 32.9 PG    MCHC 34.9 31.4 - 35.0 g/dL    RDW 16.5 (H) 11.9 - 14.6 %    PLATELET 74 (L) 285 - 450 K/uL    MPV 9.1 (L) 10.8 - 14.1 FL    NEUTROPHILS 18 (L) 47 - 75 %    BAND NEUTROPHILS 4 0 - 10 %    LYMPHOCYTES 58 (H) 16 - 44 %    MONOCYTES 18 (H) 3 - 9 %    METAMYELOCYTES 2 %    TOTAL CELLS COUNTED 50      ABS. NEUTROPHILS 0.2 (L) 1.7 - 8.2 K/UL    ABS. LYMPHOCYTES 0.4 (L) 0.5 - 4.6 K/UL    ABS.  MONOCYTES 0.1 0.1 - 1.3 K/UL    RBC COMMENTS NORMOCYTIC/NORMOCHROMIC      WBC COMMENTS ATYPICAL LYMPHOCYTES PRESENT      PLATELET COMMENTS DECREASED      DF MANUAL     CBC WITH AUTOMATED DIFF    Collection Time: 05/28/17  6:59 AM   Result Value Ref Range    WBC 0.8 (LL) 4.3 - 11.1 K/uL    RBC 2.98 (L) 4.23 - 5.67 M/uL    HGB 9.0 (L) 13.6 - 17.2 g/dL    HCT 26.4 (L) 41.1 - 50.3 %    MCV 88.6 79.6 - 97.8 FL    MCH 30.2 26.1 - 32.9 PG    MCHC 34.1 31.4 - 35.0 g/dL    RDW 16.6 (H) 11.9 - 14.6 %    PLATELET 84 (L) 171 - 450 K/uL    MPV 9.9 (L) 10.8 - 14.1 FL    NEUTROPHILS 32 (L) 47 - 75 %    BAND NEUTROPHILS 7 0 - 10 %    LYMPHOCYTES 32 16 - 44 %    MONOCYTES 29 (H) 3 - 9 %    TOTAL CELLS COUNTED 76      ABS. NEUTROPHILS 0.3 (L) 1.7 - 8.2 K/UL    ABS. LYMPHOCYTES 0.3 (L) 0.5 - 4.6 K/UL    ABS.  MONOCYTES 0.2 0.1 - 1.3 K/UL    RBC COMMENTS SLIGHT  ANISOCYTOSIS + POIKILOCYTOSIS        WBC COMMENTS Result Confirmed By Smear      PLATELET COMMENTS DECREASED      DF MANUAL     METABOLIC PANEL, BASIC    Collection Time: 05/28/17  6:59 AM   Result Value Ref Range    Sodium 140 136 - 145 mmol/L    Potassium 3.8 3.5 - 5.1 mmol/L    Chloride 103 98 - 107 mmol/L    CO2 26 21 - 32 mmol/L    Anion gap 11 7 - 16 mmol/L    Glucose 91 65 - 100 mg/dL    BUN 39 (H) 8 - 23 MG/DL    Creatinine 4.11 (H) 0.8 - 1.5 MG/DL    GFR est AA 19 (L) >60 ml/min/1.73m2    GFR est non-AA 16 (L) >60 ml/min/1.73m2    Calcium 9.1 8.3 - 10.4 MG/DL       Assessment     Patient Active Problem List    Diagnosis Date Noted    Staphylococcus epidermidis bacteremia 04/15/2017    Aundrea-rectal abscess 04/09/2017    Symptomatic anemia 03/29/2017    Elevated total protein 03/29/2017    Hyperbilirubinemia 03/29/2017    Dizziness 03/29/2017    ESRD (end stage renal disease) ([facility] 03/29/2017    [facility] 10/11/2016    CKD (chronic kidney disease) requiring chronic dialysis ([facility] 10/11/2016    Nocturnal hypoxemia 10/04/2016    Suspected sleep apnea 10/02/2016    Acute blood loss anemia 09/29/2016    Paroxysmal atrial fibrillation (HCC) 09/17/2016    Pleural effusion on left 09/12/2016    Acute hypoactive delirium due to multiple etiologies 09/11/2016    Thrombocytopenia ([facility] 09/08/2016    HTN (hypertension) 09/04/2016    GERD (gastroesophageal reflux disease) 09/04/2016    Alcohol abuse, daily use 09/03/2016    Hypokalemia 09/03/2016    VITO (acute kidney injury) ([facility] 09/03/2016    [facility] 09/03/2016           Problems Addressed by Nephrology     ESRD    Plan     Electrolytes unremarkable. Volume stable. Plan dialysis tomorrow. Continue care discussions and support.

## 2017-05-28 NOTE — PROGRESS NOTES
Received bedside shift report from Ayo Tejada RN. Pt lying in bed. No apparent distress. Respirations even and unlabored. Instructed to call for assistance with needs, as they arise. Pt voiced understanding.

## 2017-05-28 NOTE — PROGRESS NOTES
Hospitalist Progress Note    2017  Admit Date: 2017 10:46 AM   NAME: Cathy Zee   :  1950   MRN:  [id number]   Attending: Bakari Connelly MD  PCP:  Cindy Oliva MD      Admitted for: abscess with concern for sepsis- pancytopenia    SUBJECTIVE:   2017- Patient seen- after he just refused to work with PT. He states that he wants help but we do not understand that he has no help. unable to care for himself at home. I explained to pt thatt we understand but he refuses all the help we offer him. We cannot help if he wont let us help. Explained to pt that unless he gets abscess treated he will likely eventually succumb to this process. Only hope he has of no longer having the ostomy and getting back to baseline is having the drain placed and treating the abscess over several months with follow up with IR as indicated and then maybe after a very long time if he heals the ostomy can be reversed. He voiced understanding but still offers up a number of excuses. 2017- pt seen - still has complaints of not being able to do anything but more agreeable- big obstacle is that patient thinks he cannot actually get better as he has been sick so long. 2017- pt seen - no new complaints- awaiting IR drain placement    Hospital Course  As previously documented:\"  Patient is a chronically ill, obese, noncompliant, alcoholic 74 y.o.  male who presented here from dialysis (all but 2 hours finished) for extreme concern for perception of excessive gas from colostomy placed last August for many year history of chronically draining perirectal abscess and multiple surgeries. He denies any new symptoms, fever, signs of infection, new symptoms. Vague SOB with exertion. Denies abdominal pain, reports bloating only. Anorexia. No nausea or vomiting. He is non compliant in general, is repetatively reporting he has no one to care for him.  Apparently lives with brother who works outside the home. He has repeated hospitalizations. He was last admitted here from - for new re-occurrence of perirectal abscess containing staph epidermidis bacteremia, proteus, and E Coli. Same drained by IR. hgb was 5.0 on that admission with subsequent transfusion. He was treated with IV vancomycin and zosyn, and discharged to rehab on augmentin. Has been home a brief time only. Recently had dialysis graft placed, reports indicate it is still premature. Prior to that, he was admitted from 3/29-17 for symptomatic anemia. Hgb this admission is 10.1. Chronic pancytopenia followed by Hematology: He has also lost extensive weight since last year. Unknown how much, estimates 50#. Has no idea of what medications he takes. Additional history as noted below. Patient extremely poor historian, unable to focus on any task a hand. \"    We originally  consulted surgery for the marisa-rectal abscess management- they recommended IR drainage and management. The patient was not willing to agree to the IR prolonged treatment and management recommendations. He is now agreeable. D/w  sw and they can arrange rehab placement. Currently waiting on mri for possible osteomyelitis and IR placement of a drain. Pt was so busy with procedures on 2017- so unable to have drain placed in IR.     Review of Systems negative with exception of pertinent positives noted above  Past medical history unchanged from H&P    PHYSICAL EXAM     Visit Vitals    /55    Pulse 94    Temp 98.2 °F (36.8 °C)    Resp 18    Ht 5' 6\" (1.676 m)    Wt 70.6 kg (155 lb 11.2 oz)    SpO2 100%    BMI 25.13 kg/m2      Temp (24hrs), Av.3 °F (36.8 °C), Min:97.5 °F (36.4 °C), Max:99.4 °F (37.4 °C)    Oxygen Therapy  O2 Sat (%): 100 % (17 1537)  Pulse via Oximetry: 96 beats per minute (17 1908)  O2 Device: Room air (17 0710)    Intake/Output Summary (Last 24 hours) at 17 4481  Last data filed at 17 0609   Gross per 24 hour   Intake                0 ml   Output              575 ml   Net             -575 ml        General: No acute distress  mucous membranes pink and moist acyanotic anicteric  Neck:  Supple full range of motion  Lungs:  Air entry equal bilaterally, no crepitations rales rhonchi   Heart:  Regular rate and rhythm,  No murmur, rub, or gallop  Abdomen: Soft, Non distended, Non tender, no rebound guarding Positive bowel sounds  Extremities: No cyanosis, clubbing or edema  Neurologic:  No focal deficits  Musculoskeletal: no   Joint swelling tenderness erythema  Skin:  No erythema, rashes noted          LAB  No results for input(s): GLUCPOC in the last 72 hours. No lab exists for component: [place]   Recent Labs      05/28/17   0659   WBC  0.8*   HGB  9.0*   HCT  26.4*   PLT  84*     Recent Labs      05/28/17   0659  05/26/17   1430   NA  140   --    K  3.8   --    CL  103   --    CO2  26   --    GLU  91   --    BUN  39*   --    CREA  4.11*   --    MG   --   1.8   CA  9.1   --        EKG and imaging reviewed personally by me  No results found. Results for orders placed or performed during the hospital encounter of 04/09/17   EKG, 12 LEAD, INITIAL   Result Value Ref Range    Ventricular Rate 88 BPM    Atrial Rate 88 BPM    P-R Interval 184 ms    QRS Duration 150 ms    Q-T Interval 410 ms    QTC Calculation (Bezet) 496 ms    Calculated P Axis 41 degrees    Calculated R Axis -70 degrees    Calculated T Axis 59 degrees    Diagnosis       !! AGE AND GENDER SPECIFIC ECG ANALYSIS !! Normal sinus rhythm  Right bundle branch block  Left anterior fascicular block  !!! Bifascicular block !!!   Possible Lateral infarct , age undetermined  Abnormal ECG  When compared with ECG of 29-MAR-2017 10:29,  Borderline criteria for Lateral infarct are now Present  Confirmed by Miko Huggins ([id number]) on 4/10/2017 7:31:14 AM       XR Results (most recent):    Results from [place] encounter on 04/09/17   XR CHEST PORT   Narrative Chest X-ray    INDICATION:   Sepsis, fever    A portable AP view of the chest was obtained. FINDINGS: The lungs are clear. There are no infiltrates or effusions. There is  mild cardia megaly. Central line remains in place. Impression IMPRESSION: No acute findings in the chest          Active problems  Active Hospital Problems    Diagnosis Date Noted    Aundrea-rectal abscess 04/09/2017    Hyperbilirubinemia 03/29/2017    ESRD (end stage renal disease) ([facility] 03/29/2017     DIALYSIS        Nocturnal hypoxemia 10/04/2016    HTN (hypertension) 09/04/2016    GERD (gastroesophageal reflux disease) 09/04/2016    Pancytopenia ([facility] 09/03/2016     HGB 7.8 ON ADMISSION      Hypokalemia 09/03/2016     2.7 ON ADMISSION      Alcohol abuse, daily use 09/03/2016       ASSESSMENT  AND PLAN      · Abscess- d/w gen surgery- IR consulted to drain- pt initially refused follow up needed for drain placement so IR reluctant to place- He is now agreeable so IR re-consulted for drain placement- hopefully monday  ·  D/W case mgt- can go to short term rehab- if longer needed at that time can progress from there- He unfortunately is not a candidate for [facility]. · Concern for osteo- official report pending but unofficial shows no evidence of osteomyelitis so will dc abx  ·  Ostomy- pt very concerned about the gas in his ostomy- worried that bag will burst when he downstairs having any procedures- this was one of his reasons for refusing studies- explained cannot ensure that the bag won't burst - but we can clean him up afterwards if that happens- the possibility of the bag bursting is not a good reason to refuse care. - GI was managing but they have signed off.  Can re-consult when stable for egd/colonoscopy for the nemia  · Alcohol abuse- monitor for withdrawal  · Pancytopenia- heme- following started granix- input appreciated  · Hd graft- vascular input appreciated  · Depression- low dose zoloft   · Anemia- s/p 2units prbcs- Hb stable- will monitor  · esrd on hd- renal following for hd    DVT Prophylaxis: scds  Patient remains high risk for decompensation, given abscess- recurrent, depression, lack of motivation.     Signed By: Liam Livingston MD     May 28, 2017

## 2017-05-28 NOTE — PROGRESS NOTES
Respirations even and unlabored. No c/o pain or SOB. Encouraged to call for needs. Assessment completed. Has eyes tightly closed, while I am talking to him.

## 2017-05-28 NOTE — PROGRESS NOTES
End of shift report given to Virginia Gar RN. In bed, resting quietly, NAD. Pt safety maintained throughout shift.

## 2017-05-28 NOTE — PROGRESS NOTES
Resting quietly without c/o SOB or pain, respirations even and unlabored. Safety maintained through shift. Call bell and personal items within reach.

## 2017-05-29 LAB
BASOPHILS # BLD AUTO: 0 K/UL (ref 0–0.2)
BASOPHILS # BLD: 1 % (ref 0–2)
DIFFERENTIAL METHOD BLD: ABNORMAL
EOSINOPHIL # BLD: 0 K/UL (ref 0–0.8)
EOSINOPHIL NFR BLD: 2 % (ref 0.5–7.8)
ERYTHROCYTE [DISTWIDTH] IN BLOOD BY AUTOMATED COUNT: 16.4 % (ref 11.9–14.6)
GLUCOSE BLD STRIP.AUTO-MCNC: 106 MG/DL (ref 65–100)
GLUCOSE BLD STRIP.AUTO-MCNC: 98 MG/DL (ref 65–100)
HCT VFR BLD AUTO: 23.8 % (ref 41.1–50.3)
HCT VFR BLD AUTO: 24.2 % (ref 41.1–50.3)
HGB BLD-MCNC: 8.2 G/DL (ref 13.6–17.2)
HGB BLD-MCNC: 8.4 G/DL (ref 13.6–17.2)
IMM GRANULOCYTES # BLD: 0 K/UL (ref 0–0.5)
IMM GRANULOCYTES NFR BLD AUTO: 0 % (ref 0–5)
LYMPHOCYTES # BLD AUTO: 52 % (ref 13–44)
LYMPHOCYTES # BLD: 0.4 K/UL (ref 0.5–4.6)
MCH RBC QN AUTO: 30.1 PG (ref 26.1–32.9)
MCHC RBC AUTO-ENTMCNC: 34.5 G/DL (ref 31.4–35)
MCV RBC AUTO: 87.5 FL (ref 79.6–97.8)
MONOCYTES # BLD: 0.2 K/UL (ref 0.1–1.3)
MONOCYTES NFR BLD AUTO: 24 % (ref 4–12)
NEUTS SEG # BLD: 0.2 K/UL (ref 1.7–8.2)
NEUTS SEG NFR BLD AUTO: 21 % (ref 43–78)
PLATELET # BLD AUTO: 78 K/UL (ref 150–450)
PMV BLD AUTO: 9.6 FL (ref 10.8–14.1)
RBC # BLD AUTO: 2.72 M/UL (ref 4.23–5.67)
WBC # BLD AUTO: 0.9 K/UL (ref 4.3–11.1)

## 2017-05-29 PROCEDURE — 74011250636 HC RX REV CODE- 250/636: Performed by: INTERNAL MEDICINE

## 2017-05-29 PROCEDURE — 85018 HEMOGLOBIN: CPT | Performed by: INTERNAL MEDICINE

## 2017-05-29 PROCEDURE — 36415 COLL VENOUS BLD VENIPUNCTURE: CPT | Performed by: NURSE PRACTITIONER

## 2017-05-29 PROCEDURE — 82962 GLUCOSE BLOOD TEST: CPT

## 2017-05-29 PROCEDURE — 74011250637 HC RX REV CODE- 250/637: Performed by: INTERNAL MEDICINE

## 2017-05-29 PROCEDURE — 65660000000 HC RM CCU STEPDOWN

## 2017-05-29 PROCEDURE — 90935 HEMODIALYSIS ONE EVALUATION: CPT

## 2017-05-29 PROCEDURE — 74011250637 HC RX REV CODE- 250/637: Performed by: NURSE PRACTITIONER

## 2017-05-29 PROCEDURE — 74011250636 HC RX REV CODE- 250/636: Performed by: NURSE PRACTITIONER

## 2017-05-29 PROCEDURE — 85025 COMPLETE CBC W/AUTO DIFF WBC: CPT | Performed by: NURSE PRACTITIONER

## 2017-05-29 RX ADMIN — TBO-FILGRASTIM 480 MCG: 480 INJECTION, SOLUTION SUBCUTANEOUS at 14:00

## 2017-05-29 RX ADMIN — Medication 5 ML: at 21:22

## 2017-05-29 RX ADMIN — ERYTHROPOIETIN 20000 UNITS: 2000 INJECTION, SOLUTION INTRAVENOUS; SUBCUTANEOUS at 14:01

## 2017-05-29 RX ADMIN — SERTRALINE HYDROCHLORIDE 25 MG: 50 TABLET ORAL at 16:32

## 2017-05-29 RX ADMIN — Medication 5 ML: at 14:02

## 2017-05-29 RX ADMIN — PANTOPRAZOLE SODIUM 40 MG: 40 TABLET, DELAYED RELEASE ORAL at 05:46

## 2017-05-29 RX ADMIN — PANTOPRAZOLE SODIUM 40 MG: 40 TABLET, DELAYED RELEASE ORAL at 16:32

## 2017-05-29 RX ADMIN — Medication 5 ML: at 05:46

## 2017-05-29 NOTE — PROGRESS NOTES
Hospitalist Progress Note    2017  Admit Date: 2017 10:46 AM   NAME: Jeanne Burrows   :  1950   MRN:  [id number]   Attending: Zoran Berg MD  PCP:  Carmenza Lloyd MD      Admitted for: abscess with concern for sepsis- pancytopenia    SUBJECTIVE:     Hospital Course  As previously documented:\"  Patient is a chronically ill, obese, noncompliant, alcoholic 74 y.o.  male who presented here from dialysis (all but 2 hours finished) for extreme concern for perception of excessive gas from colostomy placed last August for many year history of chronically draining perirectal abscess and multiple surgeries. He denies any new symptoms, fever, signs of infection, new symptoms. Vague SOB with exertion. Denies abdominal pain, reports bloating only. Anorexia. No nausea or vomiting. He is non compliant in general, is repetatively reporting he has no one to care for him. Apparently lives with brother who works outside the home. He has repeated hospitalizations. He was last admitted here from - for new re-occurrence of perirectal abscess containing staph epidermidis bacteremia, proteus, and E Coli. Same drained by IR. hgb was 5.0 on that admission with subsequent transfusion. He was treated with IV vancomycin and zosyn, and discharged to rehab on augmentin. Has been home a brief time only. Recently had dialysis graft placed, reports indicate it is still premature. Prior to that, he was admitted from 3/29-17 for symptomatic anemia. Hgb this admission is 10.1. Chronic pancytopenia followed by Hematology: He has also lost extensive weight since last year. Unknown how much, estimates 50#. Has no idea of what medications he takes. Additional history as noted below. Patient extremely poor historian, unable to focus on any task a hand. \"    We originally  consulted surgery for the marisa-rectal abscess management- they recommended IR drainage and management.  The patient was not willing to agree to the IR prolonged treatment and management recommendations. He then later agreed. D/w  sw and they can arrange rehab placement. MRI showed no osteomyelitis. Waiting on  IR for placement of a drain. Pt was so busy with procedures on 2017- so unable to have drain placed in IR and then no IR on the holiday weekend. Pt may appear confused at times but he is aware of his situation but lacks the insight to do the right things at times. He says he cant walk but refuses to work with PT. Says he cant help himself but refuses to acknowledge that we are trying to help him. He does not seem to believe he can get better. But when presented with the prospect of possibly dying he then states he wants help. He quickly will change the conservation to other health problems when addressing something else and has to be encouraged to focus on one clinical problem at a time.           Review of Systems negative with exception of pertinent positives noted above  Past medical history unchanged from H&P    PHYSICAL EXAM     Visit Vitals    /58    Pulse (!) 105    Temp 98.2 °F (36.8 °C)    Resp 18    Ht 5' 6\" (1.676 m)    Wt 70.9 kg (156 lb 4.8 oz)    SpO2 99%    BMI 25.23 kg/m2      Temp (24hrs), Av.4 °F (36.9 °C), Min:98.2 °F (36.8 °C), Max:98.6 °F (37 °C)    Oxygen Therapy  O2 Sat (%): 99 % (17 1551)  Pulse via Oximetry: 96 beats per minute (17 1908)  O2 Device: Room air (17 0710)    Intake/Output Summary (Last 24 hours) at 17 1917  Last data filed at 17 1419   Gross per 24 hour   Intake                0 ml   Output             1270 ml   Net            -1270 ml        General: No acute distress  mucous membranes pink and moist acyanotic anicteric  Neck:  Supple full range of motion  Lungs:  Air entry equal bilaterally, no crepitations rales rhonchi   Heart:  Regular rate and rhythm,  No murmur, rub, or gallop  Abdomen: Soft, Non distended, Non tender, no rebound guarding Positive bowel sounds  Extremities: No cyanosis, clubbing or edema  Neurologic:  No focal deficits  Musculoskeletal: no   Joint swelling tenderness erythema  Skin:  No erythema, rashes noted          LAB  Recent Labs      05/29/17   1641   GLUCPOC  98      Recent Labs      05/29/17   1610  05/29/17   0550   WBC   --   0.9*   HGB  8.4*  8.2*   HCT  24.2*  23.8*   PLT   --   78*     Recent Labs      05/28/17   0659   NA  140   K  3.8   CL  103   CO2  26   GLU  91   BUN  39*   CREA  4.11*   CA  9.1       EKG and imaging reviewed personally by me  No results found. Results for orders placed or performed during the hospital encounter of 04/09/17   EKG, 12 LEAD, INITIAL   Result Value Ref Range    Ventricular Rate 88 BPM    Atrial Rate 88 BPM    P-R Interval 184 ms    QRS Duration 150 ms    Q-T Interval 410 ms    QTC Calculation (Bezet) 496 ms    Calculated P Axis 41 degrees    Calculated R Axis -70 degrees    Calculated T Axis 59 degrees    Diagnosis       !! AGE AND GENDER SPECIFIC ECG ANALYSIS !! Normal sinus rhythm  Right bundle branch block  Left anterior fascicular block  !!! Bifascicular block !!! Possible Lateral infarct , age undetermined  Abnormal ECG  When compared with ECG of 29-MAR-2017 10:29,  Borderline criteria for Lateral infarct are now Present  Confirmed by Aleah Barboza ([id number]) on 4/10/2017 7:31:14 AM       XR Results (most recent):    Results from Hospital Encounter encounter on 04/09/17   XR CHEST PORT   Narrative Chest X-ray    INDICATION:   Sepsis, fever    A portable AP view of the chest was obtained. FINDINGS: The lungs are clear. There are no infiltrates or effusions. There is  mild cardia megaly. Central line remains in place.            Impression IMPRESSION: No acute findings in the chest          Active problems  Active Hospital Problems    Diagnosis Date Noted    Aundrea-rectal abscess 04/09/2017    Hyperbilirubinemia 03/29/2017    ESRD (end stage renal disease) ([facility] 03/29/2017 DIALYSIS        Nocturnal hypoxemia 10/04/2016    HTN (hypertension) 09/04/2016    GERD (gastroesophageal reflux disease) 09/04/2016    Pancytopenia ([address] 09/03/2016     HGB 7.8 ON ADMISSION      Hypokalemia 09/03/2016     2.7 ON ADMISSION      Alcohol abuse, daily use 09/03/2016       ASSESSMENT  AND PLAN      · Abscess- d/w gen surgery- IR consulted to drain- pt initially refused follow up needed for drain placement so IR reluctant to place- He is now agreeable so IR re-consulted for drain placement- hopefully tomorrow  ·  D/W case mgt- can go to short term rehab- if longer needed at that time can progress from there- He unfortunately is not a candidate for [facility]. · Concern for osteo- official report pending but unofficial shows no evidence of osteomyelitis so will dc abx  ·  Ostomy- pt very concerned about the gas in his ostomy- worried that bag will burst when he downstairs having any procedures- this was one of his reasons for refusing studies- explained cannot ensure that the bag won't burst - but we can clean him up afterwards if that happens- the possibility of the bag bursting is not a good reason to refuse care. - GI was managing but they have signed off. Will need to re-consult them again for this problem specifically. And then can re-consult when stable for egd/colonoscopy for the nemia  · Alcohol abuse- monitor for withdrawal  · Pancytopenia- heme- following started granix- input appreciated  · Hd graft- vascular input appreciated  · Depression- started on low dose zoloft   · Anemia- s/p 2units prbcs- Hb stable- will monitor  · esrd on hd- renal following for hd  · Anorexia- spt appears to be not eating because of concerns of gas piling up in ostomy bag and constant need to be cleaned - will start some megace- d/w gi options for the gas management. Plan is to have drain placed by IR and send to rehab where he can be taken back and forth to HD & to have the drain managed by IR.  He needs to be more motivated however    DVT Prophylaxis: scds  Patient remains high risk for decompensation, given abscess- recurrent, depression, lack of motivation.     Signed By: Martha Luu MD     May 29, 2017

## 2017-05-29 NOTE — PROGRESS NOTES
Dialysis complete . 1 Kg's removed. R SC permcath Catheter flushed with 10 ml NS per protocol. Vital signs  stable. Pt noted alert and oriented to person, place, and day. Pt to 810 after treatment completed.

## 2017-05-29 NOTE — PROGRESS NOTES
When performing ostomy care I noted that pt is constantly squinting his eyes shut. When asked why, stated that he does not know, that he is going to die. When asked why he feels that way, he stated \"cause of that thing you are holding in your hand, makes it impossible to breath. \"  I was holding the colostomy bag in my hand as I was emptying it. Appears confused. Denies pain at this time. Oriented to self and place.

## 2017-05-29 NOTE — PROGRESS NOTES
Subjective:   Daily Progress Note: 2017 11:44 AM    No complaints    Current Facility-Administered Medications   Medication Dose Route Frequency    tbo-filgrastim (GRANIX) injection 480 mcg  480 mcg SubCUTAneous DAILY    0.9% sodium chloride infusion 250 mL  250 mL IntraVENous PRN    sertraline (ZOLOFT) tablet 25 mg  25 mg Oral QPM    midazolam (VERSED) injection 0.5-2 mg  0.5-2 mg IntraVENous Multiple    fentaNYL citrate (PF) injection 12.5-100 mcg  12.5-100 mcg IntraVENous Multiple    sodium chloride (NS) flush 5-10 mL  5-10 mL IntraVENous Q8H    sodium chloride (NS) flush 5-10 mL  5-10 mL IntraVENous PRN    ondansetron (ZOFRAN) injection 4 mg  4 mg IntraVENous Q4H PRN    albuterol (PROVENTIL VENTOLIN) nebulizer solution 2.5 mg  2.5 mg Nebulization Q4H PRN    pantoprazole (PROTONIX) tablet 40 mg  40 mg Oral ACB&D    epoetin miky (EPOGEN;PROCRIT) injection 20,000 Units  20,000 Units SubCUTAneous Q7D               Objective:     Visit Vitals    BP (!) 86/51    Pulse 97    Temp 98.4 °F (36.9 °C)    Resp 18    Ht 5' 6\" (1.676 m)    Wt 70.9 kg (156 lb 4.8 oz)    SpO2 99%    BMI 25.23 kg/m2      O2 Device: Room air    Temp (24hrs), Av.4 °F (36.9 °C), Min:98.2 °F (36.8 °C), Max:98.6 °F (37 °C)          1901 -  0700  In: 240 [P.O.:240]  Out: 795 [Urine:775]      Visit Vitals    BP (!) 86/51    Pulse 97    Temp 98.4 °F (36.9 °C)    Resp 18    Ht 5' 6\" (1.676 m)    Wt 70.9 kg (156 lb 4.8 oz)    SpO2 99%    BMI 25.23 kg/m2     Head: Normocephalic, without obvious abnormality  Neck: no JVD  Lungs: clear to auscultation bilaterally  Heart: regular rate and rhythm  Abdomen: soft, non-tender.   Extremities: no edema          Data Review    Recent Results (from the past 48 hour(s))   CBC WITH AUTOMATED DIFF    Collection Time: 17  2:27 PM   Result Value Ref Range    WBC 0.7 (LL) 4.3 - 11.1 K/uL    RBC 2.79 (L) 4.23 - 5.67 M/uL    HGB 8.7 (L) 13.6 - 17.2 g/dL    HCT 24.9 (L) 41.1 - 50.3 %    MCV 89.2 79.6 - 97.8 FL    MCH 31.2 26.1 - 32.9 PG    MCHC 34.9 31.4 - 35.0 g/dL    RDW 16.5 (H) 11.9 - 14.6 %    PLATELET 74 (L) 846 - 450 K/uL    MPV 9.1 (L) 10.8 - 14.1 FL    NEUTROPHILS 18 (L) 47 - 75 %    BAND NEUTROPHILS 4 0 - 10 %    LYMPHOCYTES 58 (H) 16 - 44 %    MONOCYTES 18 (H) 3 - 9 %    METAMYELOCYTES 2 %    TOTAL CELLS COUNTED 50      ABS. NEUTROPHILS 0.2 (L) 1.7 - 8.2 K/UL    ABS. LYMPHOCYTES 0.4 (L) 0.5 - 4.6 K/UL    ABS. MONOCYTES 0.1 0.1 - 1.3 K/UL    RBC COMMENTS NORMOCYTIC/NORMOCHROMIC      WBC COMMENTS ATYPICAL LYMPHOCYTES PRESENT      PLATELET COMMENTS DECREASED      DF MANUAL     CBC WITH AUTOMATED DIFF    Collection Time: 05/28/17  6:59 AM   Result Value Ref Range    WBC 0.8 (LL) 4.3 - 11.1 K/uL    RBC 2.98 (L) 4.23 - 5.67 M/uL    HGB 9.0 (L) 13.6 - 17.2 g/dL    HCT 26.4 (L) 41.1 - 50.3 %    MCV 88.6 79.6 - 97.8 FL    MCH 30.2 26.1 - 32.9 PG    MCHC 34.1 31.4 - 35.0 g/dL    RDW 16.6 (H) 11.9 - 14.6 %    PLATELET 84 (L) 976 - 450 K/uL    MPV 9.9 (L) 10.8 - 14.1 FL    NEUTROPHILS 32 (L) 47 - 75 %    BAND NEUTROPHILS 7 0 - 10 %    LYMPHOCYTES 32 16 - 44 %    MONOCYTES 29 (H) 3 - 9 %    TOTAL CELLS COUNTED 76      ABS. NEUTROPHILS 0.3 (L) 1.7 - 8.2 K/UL    ABS. LYMPHOCYTES 0.3 (L) 0.5 - 4.6 K/UL    ABS.  MONOCYTES 0.2 0.1 - 1.3 K/UL    RBC COMMENTS SLIGHT  ANISOCYTOSIS + POIKILOCYTOSIS        WBC COMMENTS Result Confirmed By Smear      PLATELET COMMENTS DECREASED      DF MANUAL     METABOLIC PANEL, BASIC    Collection Time: 05/28/17  6:59 AM   Result Value Ref Range    Sodium 140 136 - 145 mmol/L    Potassium 3.8 3.5 - 5.1 mmol/L    Chloride 103 98 - 107 mmol/L    CO2 26 21 - 32 mmol/L    Anion gap 11 7 - 16 mmol/L    Glucose 91 65 - 100 mg/dL    BUN 39 (H) 8 - 23 MG/DL    Creatinine 4.11 (H) 0.8 - 1.5 MG/DL    GFR est AA 19 (L) >60 ml/min/1.73m2    GFR est non-AA 16 (L) >60 ml/min/1.73m2    Calcium 9.1 8.3 - 10.4 MG/DL   CBC WITH AUTOMATED DIFF    Collection Time: 05/29/17  5:50 AM Result Value Ref Range    WBC 0.9 (LL) 4.3 - 11.1 K/uL    RBC 2.72 (L) 4.23 - 5.67 M/uL    HGB 8.2 (L) 13.6 - 17.2 g/dL    HCT 23.8 (L) 41.1 - 50.3 %    MCV 87.5 79.6 - 97.8 FL    MCH 30.1 26.1 - 32.9 PG    MCHC 34.5 31.4 - 35.0 g/dL    RDW 16.4 (H) 11.9 - 14.6 %    PLATELET 78 (L) 287 - 450 K/uL    MPV 9.6 (L) 10.8 - 14.1 FL    DF AUTOMATED      NEUTROPHILS 21 (L) 43 - 78 %    LYMPHOCYTES 52 (H) 13 - 44 %    MONOCYTES 24 (H) 4.0 - 12.0 %    EOSINOPHILS 2 0.5 - 7.8 %    BASOPHILS 1 0.0 - 2.0 %    IMMATURE GRANULOCYTES 0.0 0.0 - 5.0 %    ABS. NEUTROPHILS 0.2 (L) 1.7 - 8.2 K/UL    ABS. LYMPHOCYTES 0.4 (L) 0.5 - 4.6 K/UL    ABS. MONOCYTES 0.2 0.1 - 1.3 K/UL    ABS. EOSINOPHILS 0.0 0.0 - 0.8 K/UL    ABS. BASOPHILS 0.0 0.0 - 0.2 K/UL    ABS. IMM. GRANS. 0.0 0.0 - 0.5 K/UL       Assessment     Patient Active Problem List    Diagnosis Date Noted    Staphylococcus epidermidis bacteremia 04/15/2017    Aundrea-rectal abscess 04/09/2017    Symptomatic anemia 03/29/2017    Elevated total protein 03/29/2017    Hyperbilirubinemia 03/29/2017    Dizziness 03/29/2017    ESRD (end stage renal disease) ([facility] 03/29/2017    HIT (heparin-induced thrombocytopenia) ([facility] 10/11/2016    CKD (chronic kidney disease) requiring chronic dialysis ([facility] 10/11/2016    Nocturnal hypoxemia 10/04/2016    Suspected sleep apnea 10/02/2016    Acute blood loss anemia 09/29/2016    Paroxysmal atrial fibrillation (HCC) 09/17/2016    Pleural effusion on left 09/12/2016    Acute hypoactive delirium due to multiple etiologies 09/11/2016    Thrombocytopenia ([address] 09/08/2016    HTN (hypertension) 09/04/2016    GERD (gastroesophageal reflux disease) 09/04/2016    Alcohol abuse, daily use 09/03/2016    Hypokalemia 09/03/2016    VITO (acute kidney injury) ([facility] 09/03/2016    Pancytopenia ([facility] 09/03/2016           Problems Addressed by Nephrology     ESRD    Plan     Seen on dialysis. Well tolerated. IR drain tomorrow.

## 2017-05-29 NOTE — PROGRESS NOTES
Received bedside shift report from Marlee Henriquez RN. Pt lying in bed. No apparent distress. Respirations even and unlabored. Instructed to call for assistance with needs, as they arise. Pt voiced understanding.

## 2017-05-29 NOTE — PROGRESS NOTES
On dialysis via R SC permcath by YUNI Cota RN. No heparin used for this treatment. Vital signs 112/57, hr 103. Pt noted alert and oriented x 3. Will continue to monitor throughout treatment.

## 2017-05-29 NOTE — PROGRESS NOTES
PT Note/OFF FLOOR:  Bhumi Colindres currently off floor at this time--will attempt to see when Chuy Pozo back on floor and as PT schedule allows. Thank you!   Mariah Blas, TOMAT

## 2017-05-30 LAB
BLASTS NFR BLD: 2 %
DIFFERENTIAL METHOD BLD: ABNORMAL
ERYTHROCYTE [DISTWIDTH] IN BLOOD BY AUTOMATED COUNT: 16.5 % (ref 11.9–14.6)
GLUCOSE BLD STRIP.AUTO-MCNC: 112 MG/DL (ref 65–100)
GLUCOSE BLD STRIP.AUTO-MCNC: 134 MG/DL (ref 65–100)
HCT VFR BLD AUTO: 22 % (ref 41.1–50.3)
HGB BLD-MCNC: 7.4 G/DL (ref 13.6–17.2)
LYMPHOCYTES # BLD: 0.7 K/UL (ref 0.5–4.6)
LYMPHOCYTES NFR BLD MANUAL: 57 % (ref 16–44)
MCH RBC QN AUTO: 29.7 PG (ref 26.1–32.9)
MCHC RBC AUTO-ENTMCNC: 33.6 G/DL (ref 31.4–35)
MCV RBC AUTO: 88.4 FL (ref 79.6–97.8)
MONOCYTES # BLD: 0.2 K/UL (ref 0.1–1.3)
MONOCYTES NFR BLD MANUAL: 13 % (ref 3–9)
NEUTS BAND NFR BLD MANUAL: 14 % (ref 0–10)
NEUTS SEG # BLD: 0.4 K/UL (ref 1.7–8.2)
NEUTS SEG NFR BLD MANUAL: 14 % (ref 47–75)
PLATELET # BLD AUTO: 84 K/UL (ref 150–450)
PLATELET COMMENTS,PCOM: ABNORMAL
PMV BLD AUTO: 10.6 FL (ref 10.8–14.1)
RBC # BLD AUTO: 2.49 M/UL (ref 4.23–5.67)
RBC MORPH BLD: ABNORMAL
WBC # BLD AUTO: 1.3 K/UL (ref 4.3–11.1)
WBC MORPH BLD: ABNORMAL

## 2017-05-30 PROCEDURE — 74011250636 HC RX REV CODE- 250/636: Performed by: NURSE PRACTITIONER

## 2017-05-30 PROCEDURE — 85025 COMPLETE CBC W/AUTO DIFF WBC: CPT | Performed by: NURSE PRACTITIONER

## 2017-05-30 PROCEDURE — 97110 THERAPEUTIC EXERCISES: CPT

## 2017-05-30 PROCEDURE — 97530 THERAPEUTIC ACTIVITIES: CPT

## 2017-05-30 PROCEDURE — 74011250636 HC RX REV CODE- 250/636: Performed by: INTERNAL MEDICINE

## 2017-05-30 PROCEDURE — 74011250637 HC RX REV CODE- 250/637: Performed by: PHYSICIAN ASSISTANT

## 2017-05-30 PROCEDURE — 86900 BLOOD TYPING SEROLOGIC ABO: CPT | Performed by: INTERNAL MEDICINE

## 2017-05-30 PROCEDURE — 74011250637 HC RX REV CODE- 250/637: Performed by: INTERNAL MEDICINE

## 2017-05-30 PROCEDURE — 36430 TRANSFUSION BLD/BLD COMPNT: CPT

## 2017-05-30 PROCEDURE — 86923 COMPATIBILITY TEST ELECTRIC: CPT | Performed by: INTERNAL MEDICINE

## 2017-05-30 PROCEDURE — 65660000000 HC RM CCU STEPDOWN

## 2017-05-30 PROCEDURE — 77030013131 HC IV BLD ST ICUM -A

## 2017-05-30 PROCEDURE — P9016 RBC LEUKOCYTES REDUCED: HCPCS | Performed by: INTERNAL MEDICINE

## 2017-05-30 PROCEDURE — 74011250637 HC RX REV CODE- 250/637: Performed by: NURSE PRACTITIONER

## 2017-05-30 PROCEDURE — 82962 GLUCOSE BLOOD TEST: CPT

## 2017-05-30 PROCEDURE — 36415 COLL VENOUS BLD VENIPUNCTURE: CPT | Performed by: NURSE PRACTITIONER

## 2017-05-30 RX ORDER — L. ACIDOPHILUS/L.BULGARICUS 100MM CELL
1 GRANULES IN PACKET (EA) ORAL 2 TIMES DAILY
Status: DISCONTINUED | OUTPATIENT
Start: 2017-05-30 | End: 2017-05-30

## 2017-05-30 RX ORDER — SODIUM CHLORIDE 9 MG/ML
250 INJECTION, SOLUTION INTRAVENOUS AS NEEDED
Status: DISCONTINUED | OUTPATIENT
Start: 2017-05-30 | End: 2017-06-01 | Stop reason: HOSPADM

## 2017-05-30 RX ORDER — UREA 10 %
2 LOTION (ML) TOPICAL 2 TIMES DAILY
Status: DISCONTINUED | OUTPATIENT
Start: 2017-05-30 | End: 2017-06-01 | Stop reason: HOSPADM

## 2017-05-30 RX ADMIN — PANTOPRAZOLE SODIUM 40 MG: 40 TABLET, DELAYED RELEASE ORAL at 09:30

## 2017-05-30 RX ADMIN — TBO-FILGRASTIM 480 MCG: 480 INJECTION, SOLUTION SUBCUTANEOUS at 09:30

## 2017-05-30 RX ADMIN — LACTOBACILLUS TAB 2 TABLET: TAB at 17:27

## 2017-05-30 RX ADMIN — Medication 5 ML: at 21:09

## 2017-05-30 RX ADMIN — RIFAXIMIN 200 MG: 200 TABLET ORAL at 21:07

## 2017-05-30 RX ADMIN — PANTOPRAZOLE SODIUM 40 MG: 40 TABLET, DELAYED RELEASE ORAL at 17:27

## 2017-05-30 RX ADMIN — SODIUM CHLORIDE 250 ML: 900 INJECTION, SOLUTION INTRAVENOUS at 15:20

## 2017-05-30 RX ADMIN — Medication 10 ML: at 05:22

## 2017-05-30 RX ADMIN — RIFAXIMIN 200 MG: 200 TABLET ORAL at 17:27

## 2017-05-30 RX ADMIN — Medication 5 ML: at 15:21

## 2017-05-30 RX ADMIN — SERTRALINE HYDROCHLORIDE 25 MG: 50 TABLET ORAL at 17:27

## 2017-05-30 NOTE — PROGRESS NOTES
Shift assessment complete. Pt resting in bed. No complaints this AM. Colostomy bag burped. Safety measures in place. Call light in reach.

## 2017-05-30 NOTE — PROGRESS NOTES
Subjective:   Daily Progress Note: 2017 11:44 AM    No complaints. Dialyzed yesterday. Current Facility-Administered Medications   Medication Dose Route Frequency    tbo-filgrastim (GRANIX) injection 480 mcg  480 mcg SubCUTAneous DAILY    0.9% sodium chloride infusion 250 mL  250 mL IntraVENous PRN    sertraline (ZOLOFT) tablet 25 mg  25 mg Oral QPM    midazolam (VERSED) injection 0.5-2 mg  0.5-2 mg IntraVENous Multiple    fentaNYL citrate (PF) injection 12.5-100 mcg  12.5-100 mcg IntraVENous Multiple    sodium chloride (NS) flush 5-10 mL  5-10 mL IntraVENous Q8H    sodium chloride (NS) flush 5-10 mL  5-10 mL IntraVENous PRN    ondansetron (ZOFRAN) injection 4 mg  4 mg IntraVENous Q4H PRN    albuterol (PROVENTIL VENTOLIN) nebulizer solution 2.5 mg  2.5 mg Nebulization Q4H PRN    pantoprazole (PROTONIX) tablet 40 mg  40 mg Oral ACB&D    epoetin miky (EPOGEN;PROCRIT) injection 20,000 Units  20,000 Units SubCUTAneous Q7D               Objective:     Visit Vitals    BP 94/48    Pulse 85    Temp 97.6 °F (36.4 °C)    Resp 19    Ht 5' 6\" (1.676 m)    Wt 65.6 kg (144 lb 9.6 oz)    SpO2 98%    BMI 23.34 kg/m2      O2 Device: Room air    Temp (24hrs), Av.1 °F (36.7 °C), Min:97.6 °F (36.4 °C), Max:98.7 °F (37.1 °C)      701 - 1900  In: -   Out: 75   1901 -  07  In: 320 [P.O.:320]  Out: 1520 [Urine:250]      Visit Vitals    BP 94/48    Pulse 85    Temp 97.6 °F (36.4 °C)    Resp 19    Ht 5' 6\" (1.676 m)    Wt 65.6 kg (144 lb 9.6 oz)    SpO2 98%    BMI 23.34 kg/m2     Head: Normocephalic, without obvious abnormality  Neck: no JVD  Lungs: clear to auscultation bilaterally  Heart: regular rate and rhythm  Abdomen: soft, non-tender.   Extremities: no edema  TCC in place          Data Review    Recent Results (from the past 48 hour(s))   CBC WITH AUTOMATED DIFF    Collection Time: 17  5:50 AM   Result Value Ref Range    WBC 0.9 (LL) 4.3 - 11.1 K/uL    RBC 2.72 (L) 4.23 - 5.67 M/uL    HGB 8.2 (L) 13.6 - 17.2 g/dL    HCT 23.8 (L) 41.1 - 50.3 %    MCV 87.5 79.6 - 97.8 FL    MCH 30.1 26.1 - 32.9 PG    MCHC 34.5 31.4 - 35.0 g/dL    RDW 16.4 (H) 11.9 - 14.6 %    PLATELET 78 (L) 466 - 450 K/uL    MPV 9.6 (L) 10.8 - 14.1 FL    DF AUTOMATED      NEUTROPHILS 21 (L) 43 - 78 %    LYMPHOCYTES 52 (H) 13 - 44 %    MONOCYTES 24 (H) 4.0 - 12.0 %    EOSINOPHILS 2 0.5 - 7.8 %    BASOPHILS 1 0.0 - 2.0 %    IMMATURE GRANULOCYTES 0.0 0.0 - 5.0 %    ABS. NEUTROPHILS 0.2 (L) 1.7 - 8.2 K/UL    ABS. LYMPHOCYTES 0.4 (L) 0.5 - 4.6 K/UL    ABS. MONOCYTES 0.2 0.1 - 1.3 K/UL    ABS. EOSINOPHILS 0.0 0.0 - 0.8 K/UL    ABS. BASOPHILS 0.0 0.0 - 0.2 K/UL    ABS. IMM. GRANS. 0.0 0.0 - 0.5 K/UL   HGB & HCT    Collection Time: 05/29/17  4:10 PM   Result Value Ref Range    HGB 8.4 (L) 13.6 - 17.2 g/dL    HCT 24.2 (L) 41.1 - 50.3 %   GLUCOSE, POC    Collection Time: 05/29/17  4:41 PM   Result Value Ref Range    Glucose (POC) 98 65 - 100 mg/dL   GLUCOSE, POC    Collection Time: 05/29/17  9:05 PM   Result Value Ref Range    Glucose (POC) 106 (H) 65 - 100 mg/dL   CBC WITH AUTOMATED DIFF    Collection Time: 05/30/17  6:43 AM   Result Value Ref Range    WBC 1.3 (LL) 4.3 - 11.1 K/uL    RBC 2.49 (L) 4.23 - 5.67 M/uL    HGB 7.4 (L) 13.6 - 17.2 g/dL    HCT 22.0 (L) 41.1 - 50.3 %    MCV 88.4 79.6 - 97.8 FL    MCH 29.7 26.1 - 32.9 PG    MCHC 33.6 31.4 - 35.0 g/dL    RDW 16.5 (H) 11.9 - 14.6 %    PLATELET 84 (L) 743 - 450 K/uL    MPV 10.6 (L) 10.8 - 14.1 FL    NEUTROPHILS 14 (L) 47 - 75 %    BAND NEUTROPHILS 14 (H) 0 - 10 %    LYMPHOCYTES 57 (H) 16 - 44 %    MONOCYTES 13 (H) 3 - 9 %    BLASTS 2 %    ABS. NEUTROPHILS 0.4 (L) 1.7 - 8.2 K/UL    ABS. LYMPHOCYTES 0.7 0.5 - 4.6 K/UL    ABS.  MONOCYTES 0.2 0.1 - 1.3 K/UL    RBC COMMENTS SLIGHT  ANISOCYTOSIS + POIKILOCYTOSIS        WBC COMMENTS Result Confirmed By Smear      PLATELET COMMENTS DECREASED      DF MANUAL         Assessment     Patient Active Problem List    Diagnosis Date Noted    Staphylococcus epidermidis bacteremia 04/15/2017    Aundrea-rectal abscess 04/09/2017    Symptomatic anemia 03/29/2017    Elevated total protein 03/29/2017    Hyperbilirubinemia 03/29/2017    Dizziness 03/29/2017    ESRD (end stage renal disease) ([address] 03/29/2017    HIT (heparin-induced thrombocytopenia) ([address] 10/11/2016    CKD (chronic kidney disease) requiring chronic dialysis ([address] 10/11/2016    Nocturnal hypoxemia 10/04/2016    Suspected sleep apnea 10/02/2016    Acute blood loss anemia 09/29/2016    Paroxysmal atrial fibrillation (HCC) 09/17/2016    Pleural effusion on left 09/12/2016    Acute hypoactive delirium due to multiple etiologies 09/11/2016    Thrombocytopenia ([address] 09/08/2016    HTN (hypertension) 09/04/2016    GERD (gastroesophageal reflux disease) 09/04/2016    Alcohol abuse, daily use 09/03/2016    Hypokalemia 09/03/2016    VITO (acute kidney injury) ([address] 09/03/2016    Pancytopenia ([address] 09/03/2016           Problems Addressed by Nephrology     ESRD  Abscess  Immature AVF    Plan   -HD tomorrow  -May get Abd drain

## 2017-05-30 NOTE — PROGRESS NOTES
Pt resting in bed. No complaints. Waiting on crossmatch to finalize and blood from blood bank. BP improving. Safety measures in place. Call light in reach.

## 2017-05-30 NOTE — PROGRESS NOTES
Report received from [facility], RN. No distress. Colostomy just emptied. Will monitor. Instructed pt to call should needs arise. PT voiced understanding. Call light within reach.

## 2017-05-30 NOTE — PROGRESS NOTES
[facility]        Inpatient Hematology / Oncology Progress Note      Admission Date: 2017 10:46 AM  Reason for Admission/Hospital Course: Pancytopenia (HCC)      24 Hour Events:  Uneventful evening  WBC up to 1.3 with ANC   Dialysis yesterday    ROS:  Constitutional: Positive for fatigue, weakness; negative for fever, chills   CV: Negative for chest pain, palpitations, edema. Respiratory: Negative for dyspnea, cough, wheezing. GI: Negative for nausea, abdominal pain, diarrhea. 10 point review of systems is otherwise negative with the exception of the elements mentioned above in the HPI. Allergies   Allergen Reactions    Heparin Analogues Other (comments)     H. I.T positive       OBJECTIVE:  Patient Vitals for the past 8 hrs:   BP Temp Pulse Resp SpO2 Weight   17 1130 94/50 97.6 °F (36.4 °C) 84 17 100 % -   17 0953 94/48 - 85 - - -   17 0746 (!) 87/48 97.6 °F (36.4 °C) 84 19 98 % -   17 0636 - - - - - 144 lb 9.6 oz (65.6 kg)     Temp (24hrs), Av.1 °F (36.7 °C), Min:97.6 °F (36.4 °C), Max:98.7 °F (37.1 °C)     0701 -  1900  In: 120 [P.O.:120]  Out: 75     Physical Exam:  Constitutional: Frail appearing male in no acute distress, lying in hospital bed   HEENT: Normocephalic and atraumatic. Oropharynx is clear, mucous membranes are moist.    Neck supple     Lymph node   Deferred   Skin Warm and dry. No bruising and no rash noted. No erythema. + pallor   Respiratory Lungs are clear to auscultation bilaterally without wheezes, rales or rhonchi, normal air exchange without accessory muscle use. CVS Normal rate, regular rhythm and normal S1 and S2. No murmurs, gallops, or rubs. Abdomen Soft, nontender and nondistended, normoactive bowel sounds. No palpable mass. Neuro Grossly nonfocal with no obvious sensory or motor deficits. MSK Normal range of motion in general.  No edema and no tenderness. Psych Appropriate mood and affect. Labs:      Recent Labs      05/30/17   0643  05/29/17   1610  05/29/17   0550  05/28/17   0659   WBC  1.3*   --   0.9*  0.8*   RBC  2.49*   --   2.72*  2.98*   HGB  7.4*  8.4*  8.2*  9.0*   HCT  22.0*  24.2*  23.8*  26.4*   MCV  88.4   --   87.5  88.6   MCH  29.7   --   30.1  30.2   MCHC  33.6   --   34.5  34.1   RDW  16.5*   --   16.4*  16.6*   PLT  84*   --   78*  84*   GRANS  14*   --   21*  32*   LYMPH  57*   --   52*  32   MONOS  13*   --   24*  29*   EOS   --    --   2   --    BASOS   --    --   1   --    IG   --    --   0.0   --    DF  MANUAL   --   AUTOMATED  MANUAL   ANEU  0.4*   --   0.2*  0.3*   ABL  0.7   --   0.4*  0.3*   ABM  0.2   --   0.2  0.2   DANGELO   --    --   0.0   --    ABB   --    --   0.0   --    AIG   --    --   0.0   --         Recent Labs      05/28/17   0659   NA  140   K  3.8   CL  103   CO2  26   AGAP  11   GLU  91   BUN  39*   CREA  4.11*   GFRAA  19*   GFRNA  16*   CA  9.1     Imaging:  CT abd/pelvis 5/22  FINDINGS: The partially seen lung bases are clear. Small unchanged hiatal hernia  noted.      Abdomen: There are no suspicious lesions in the liver or spleen. Splenomegaly  and gallstones again noted. Mild bilateral renal atrophy and cortical thinning  are unchanged. The adrenal glands and pancreas appear unremarkable. There is  normal enhancement of the kidneys, no hydronephrosis.      No lymphadenopathy, free air, focal inflammatory changes or fluid collections in  the abdomen. Aorta normal caliber with scattered atherosclerotic change. Patent  major vessels.     There is no evidence of bowel obstruction. Unremarkable appendix. Left lower  quadrant colostomy remains. There is mild linear scarring in the anterior  abdominal wall with mild diastases recti. Several bowel loops in the midabdomen  extending to abut the anterior abdominal wall and may be partially herniated,  similar to prior, without obvious obstruction.  There are surgical sutures in  left lower quadrant bowel with evidence of prior partial resection. Lack of oral  contrast limits GI evaluation throughout.     Pelvis: There is a crescentic rim-enhancing fluid collection abutting the  lateral left rectum measuring approximately 5.2 x 2.3 cm (axial image 81,  previously 4.4 x 1.8 cm). This demonstrates interval resolution of previously  visualized gas. There is persisting moderate wall thickening of the rectum. Oral  contrast has not been administered, limiting detail. Persistent overlying skin  thickening as well. Mild inflammatory stranding throughout the pelvis. No overt  lymphadenopathy. Nonspecific wall thickening of the urinary bladder is similar  to prior.     Bones: No evidence of acute fracture or osseous erosion. The right hip hardware  is partially seen.      IMPRESSION:   1. Persistent perirectal abscess. 2. Splenomegaly, colostomy, gallstones again noted.     Lack of oral contrast limits GI evaluation somewhat. Abdominal ultrasound 5/22/17  FINDINGS: There are no discrete lesions in the visualized portions of the liver. Liver size is 16.3 cm, within normal limits. Main portal vein is patent with  note of elevated velocity of unclear significance.     Main portal vein is patent on Doppler imaging.     There is no bile duct dilatation. The common bile duct diameter is 3 mm. The  gallbladder is contracted limiting detail. Small gallstones are seen. There is  nonspecific pre-4 mm no wall thickening. Sonographic Hood's sign is not seen.     There are no discrete lesions in the limited visualized portions of the  pancreas, not well seen due to overlying bowel.      The right kidney measures 8 cm in length. There is no hydronephrosis. There is  no evidence of a solid renal mass. Color Doppler demonstrates expected right  renal flow.     There is no evidence of ascites.      The aorta is not well seen due to overlying bowel. IVC appears patent on Doppler  imaging.     IMPRESSION: Gallstones.  No biliary dilatation.          ASSESSMENT:    Problem List  Date Reviewed: 5/23/2017          Codes Class Noted    Staphylococcus epidermidis bacteremia ICD-10-CM: R78.81  ICD-9-CM: 790.7, 041.19  4/15/2017        Aundrea-rectal abscess (Chronic) ICD-10-CM: K61.1  ICD-9-CM: 566  4/9/2017        Symptomatic anemia ICD-10-CM: D64.9  ICD-9-CM: 285.9  3/29/2017        Elevated total protein ICD-10-CM: R77.8  ICD-9-CM: 790.99  3/29/2017        Hyperbilirubinemia (Chronic) ICD-10-CM: E80.6  ICD-9-CM: 782.4  3/29/2017        Dizziness ICD-10-CM: R42  ICD-9-CM: 780.4  3/29/2017        ESRD (end stage renal disease) ([facility] (Chronic) ICD-10-CM: N18.6  ICD-9-CM: 585.6  3/29/2017    Overview Signed 5/23/2017  6:23 AM by Luz Payan, BETTY     DIALYSIS               HIT (heparin-induced thrombocytopenia) ([facility] ICD-10-CM: F05.52  ICD-9-CM: 289.84  10/11/2016        CKD (chronic kidney disease) requiring chronic dialysis (HCC) ICD-10-CM: N18.6, Z99.2  ICD-9-CM: 585.6, V45.11  10/11/2016        Nocturnal hypoxemia (Chronic) ICD-10-CM: G47.34  ICD-9-CM: 327.24  10/4/2016        Suspected sleep apnea ICD-10-CM: G47.30  ICD-9-CM: 780.57  10/2/2016        Acute blood loss anemia ICD-10-CM: D62  ICD-9-CM: 285.1  9/29/2016        Paroxysmal atrial fibrillation (HCC) ICD-10-CM: I48.0  ICD-9-CM: 427.31  9/17/2016        Pleural effusion on left ICD-10-CM: J90  ICD-9-CM: 511.9  9/12/2016        Acute hypoactive delirium due to multiple etiologies ICD-10-CM: F05  ICD-9-CM: 293.0  9/11/2016        Thrombocytopenia ([address] ICD-10-CM: D69.6  ICD-9-CM: 287.5  9/8/2016        HTN (hypertension) (Chronic) ICD-10-CM: I10  ICD-9-CM: 401.9  9/4/2016        GERD (gastroesophageal reflux disease) (Chronic) ICD-10-CM: K21.9  ICD-9-CM: 530.81  9/4/2016        Alcohol abuse, daily use (Chronic) ICD-10-CM: F10.10  ICD-9-CM: 305.01  9/3/2016        Hypokalemia (Chronic) ICD-10-CM: E87.6  ICD-9-CM: 276.8  9/3/2016    Overview Signed 9/3/2016  8:22 PM by Kaya Plummer BETTY Asencio     2.7 ON ADMISSION             VITO (acute kidney injury) ([facility] ICD-10-CM: N17.9  ICD-9-CM: 584.9  9/3/2016        * (Principal)Pancytopenia [facility]) ICD-10-CM: Y56.589  ICD-9-CM: 284.19  9/3/2016    Overview Signed 9/3/2016  8:22 PM by Lauren Murphy NP     HGB 7.8 ON ADMISSION                     PLAN:  Mr. Jacklyn Epstein is known to Dr. Yolette Galvan however, he has failed to follow up in the past as planned. He has history of recurrent pancytopenia possibly from chronic infections. His BMbx in March 2017 showed no evidence of bone marrow disorder. We will give granix for WBC of 0.6 and follow along for labs.      Pancytopenia  5/23 Give granix. Transfuse for hemoglobin < 7 and for platelets < 43,951. Follow up with Dr. Yolette Galvan once discharged. 5/26 No improvement to WBC with granix. Total WBC today 500. Increase dose to 480mcg daily. Continue with transfusional support PRN  5/30 WBC improving with increased dose of granix. Continue for now as well as transfusional support PRN      ESRD per nephology.       Aundrea-rectal abscess   5/26 Seen by IR and patient did not feel he could manage a drain at home thus it was d/c'd by IR. But now apparently agreeable so IR was reconsulted. Continue antibiotics  05/30 IR planning for drain placement tomorrow    Disposition: Per primary team. Planning on rehab               [facility]  [address] [place] [address]  Office : [phone]  Fax : [phone]     Attending Addendum:  I personally evaluated the patient with Jessica Garcia N.P.,  and agree with the assessment, findings and plan as documented. Appears stable, continue G- mcg SubQ daily until his ANC improves.               Sancho Contreras MD  [facility]  [id number] [place] [address]  Office : [phone]  Fax : [phone]

## 2017-05-30 NOTE — PROGRESS NOTES
Blood consent verified. PRBC initiated. Verified with dual nurse, Jason Ludwig RN. Resp even and unlabored at rest on room air. No distress.

## 2017-05-30 NOTE — PROGRESS NOTES
Problem: Mobility Impaired (Adult and Pediatric)  Goal: *Acute Goals and Plan of Care (Insert Text)  LTG:  (1.)Mr. Pozo will move from supine to sit and sit to supine , scoot up and down and roll side to side with INDEPENDENT within 7 day(s). (2.)Mr. Pozo will transfer from bed to chair and chair to bed with MODIFIED INDEPENDENCE using the least restrictive device within 7 day(s). (3.)Mr. Pozo will ambulate with MODIFIED INDEPENDENCE for 150 feet with the least restrictive device within 7 day(s). ___________________________________________________________________________________________      PHYSICAL THERAPY: Daily Note, Treatment Day: 1st and PM 5/30/2017  INPATIENT: Hospital Day: 9  [place] / Product Type: Medicare /      NAME/AGE/GENDER: Leo Stein is a 74 y.o. male         PRIMARY DIAGNOSIS: Pancytopenia ([address] Pancytopenia ([address] Pancytopenia ([address]        ICD-10: Treatment Diagnosis:       · Other abnormalities of gait and mobility (R26.89)   Precaution/Allergies:  Heparin analogues       ASSESSMENT:      Mr. Ángel Brarera is supine and agreeable to working with PT most likely due to his talk with  regarding going to a SNF. He sat up from supine without help and additional time. He stood into the walker with CGA and walked around the bed (10 feet) with light use of the walker to the chair. He performed seated exercises below with good ability. Good progress. This section established at most recent assessment   PROBLEM LIST (Impairments causing functional limitations):  1. Decreased ADL/Functional Activities  2. Decreased Transfer Abilities  3. Decreased Ambulation Ability/Technique  4. Decreased Balance  5. Decreased Activity Tolerance    INTERVENTIONS PLANNED: (Benefits and precautions of physical therapy have been discussed with the patient.)  1. Balance Exercise  2. Bed Mobility  3. Family Education  4. Gait Training  5.  Home Exercise Program (HEP)  6. Therapeutic Activites  7. Therapeutic Exercise/Strengthening  8. Transfer Training  9. Group Therapy      TREATMENT PLAN: Frequency/Duration: 3 times a week for trial basis x 1 week, 3-5 visits  Rehabilitation Potential For Stated Goals: GOOD      RECOMMENDED REHABILITATION/EQUIPMENT: (at time of discharge pending progress): Continue Skilled Therapy and Discussed with Case Management. HISTORY:   History of Present Injury/Illness (Reason for Referral):  Patient is a chronically ill, obese, noncompliant, alcoholic 74 y.o.  male who presented here from dialysis (all but 2 hours finished) for extreme concern for perception of excessive gas from colostomy placed last August for many year history of chronically draining perirectal abscess and multiple surgeries. He denies any new symptoms, fever, signs of infection, new symptoms. Vague SOB with exertion. Denies abdominal pain, reports bloating only. Anorexia. No nausea or vomiting. He is non compliant in general, is repetatively reporting he has no one to care for him. Apparently lives with brother who works outside the home. He has repeated hospitalizations. He was last admitted here from 4/9-18/17 for new re-occurrence of perirectal abscess containing staph epidermidis bacteremia, proteus, and E Coli. Same drained by IR. hgb was 5.0 on that admission with subsequent transfusion. He was treated with IV vancomycin and zosyn, and discharged to rehab on augmentin. Has been home a brief time only. Recently had dialysis graft placed, reports indicate it is still premature. Prior to that, he was admitted from 3/29-4/2/17 for symptomatic anemia. Hgb this admission is 10.1. Chronic pancytopenia followed by Hematology: He has also lost extensive weight since last year. Unknown how much, estimates 50#. Has no idea of what medications he takes. Additional history as noted below.  Patient extremely poor historian, unable to focus on any task a hand.   Past Medical History/Comorbidities:   Mr. Fara Shannon  has a past medical history of Alcohol abuse, daily use (09/03/2016); Anemia (9/3/2016); Chronic kidney disease; HIT (heparin-induced thrombocytopenia) ([facility] Hypertension; Hypokalemia (9/3/2016); and Tobacco abuse (9/3/2016). Mr. Fara Shannon  has a past surgical history that includes debride necrotic skin/ tissue, abd wall (09/04/2016); abdomen surgery proc unlisted; and other surgical.  Social History/Living Environment:   Home Environment: Private residence  # Steps to Enter: 0  Wheelchair Ramp: Yes  One/Two Story Residence: One story  Living Alone: No  Support Systems: Family member(s)  Patient Expects to be Discharged to[de-identified] Skilled nursing facility  Current DME Used/Available at Home: Walker, rolling, Wheelchair  Tub or Shower Type: Tub/Shower combination  Prior Level of Function/Work/Activity:  Lives with brother; limited self with ambulation to using w/c; ambulates short distances in home; independent in ADLs  Personal Factors:          Sex:  male        Age:  74 y.o. Overall Behavior:  Perseverates with conversation   Number of Personal Factors/Comorbidities that affect the Plan of Care:  ETOH, HTN 1-2: MODERATE COMPLEXITY   EXAMINATION:   Most Recent Physical Functioning:   Gross Assessment:                  Posture:     Balance:    Bed Mobility:  Supine to Sit: Modified independent  Wheelchair Mobility:     Transfers:  Sit to Stand: Stand-by asssistance;Contact guard assistance  Stand to Sit: Stand-by asssistance  Gait:     Step Length: Left shortened;Right shortened  Gait Abnormalities: Decreased step clearance;Shuffling gait; Steppage gait  Distance (ft): 10 Feet (ft)  Assistive Device: Walker, rolling  Ambulation - Level of Assistance: Contact guard assistance       Body Structures Involved:  1. Muscles Body Functions Affected:  1. Movement Related Activities and Participation Affected:  1. General Tasks and Demands  2. Mobility  3.  Self Care Number of elements that affect the Plan of Care: 4+: HIGH COMPLEXITY   CLINICAL PRESENTATION:   Presentation: Evolving clinical presentation with changing clinical characteristics: MODERATE COMPLEXITY   CLINICAL DECISION MAKIN [facility] Mobility Inpatient Short Form  How much difficulty does the patient currently have. .. Unable A Lot A Little None   1. Turning over in bed (including adjusting bedclothes, sheets and blankets)? [ ] 1   [ ] 2   [ ] 3   [X] 4   2. Sitting down on and standing up from a chair with arms ( e.g., wheelchair, bedside commode, etc.)   [ ] 1   [ ] 2   [ ] 3   [X] 4   3. Moving from lying on back to sitting on the side of the bed? [ ] 1   [ ] 2   [X] 3   [ ] 4   How much help from another person does the patient currently need. .. Total A Lot A Little None   4. Moving to and from a bed to a chair (including a wheelchair)? [ ] 1   [ ] 2   [X] 3   [ ] 4   5. Need to walk in hospital room? [ ] 1   [ ] 2   [X] 3   [ ] 4   6. Climbing 3-5 steps with a railing? [ ] 1   [ ] 2   [X] 3   [ ] 4   © , Trustees of [address] [place] [address], under license to [employer]. All rights reserved    Score:  Initial: 20 Most Recent: X (Date: -- )     Interpretation of Tool:  Represents activities that are increasingly more difficult (i.e. Bed mobility, Transfers, Gait).        Score 24 23 22-20 19-15 14-10 9-7 6       Modifier CH CI CJ CK CL CM CN         · Mobility - Walking and Moving Around:               - CURRENT STATUS:    CJ - 20%-39% impaired, limited or restricted               - GOAL STATUS:           CI - 1%-19% impaired, limited or restricted               - D/C STATUS:                       ---------------To be determined---------------  [place] / Product Type: Medicare /       Medical Necessity:     · Patient is expected to demonstrate progress in strength, range of motion, balance and coordination to decrease assistance required with overall functional mobility, transfers, ambulation. · Patient demonstrates good rehab potential due to higher previous functional level. Reason for Services/Other Comments:  · Patient continues to require present interventions due to patient's inability to perform bed mobility, transfers, ambulation safely and effectively. Use of outcome tool(s) and clinical judgement create a POC that gives a: Clear prediction of patient's progress: LOW COMPLEXITY                 TREATMENT:   (In addition to Assessment/Re-Assessment sessions the following treatments were rendered)   Pre-treatment Symptoms/Complaints:  \"I would give a million dollars to walk. \"  Pain: Initial:   Pain Intensity 1: 0  Post Session:  0/10 post assessment, in chair      Therapeutic Activity: (    15 min): Therapeutic activities including Bed transfers, sit to stand and Ambulation on level ground,  to improve mobility, strength, balance and coordination. Required minimal   to promote static and dynamic balance in standing and promote coordination of bilateral, lower extremity(s). Therapeutic Exercise: (10 Minutes):  Exercises per grid below to improve mobility and strength. Required minimal visual and verbal cues to promote proper body mechanics. Progressed complexity of movement as indicated. Date:  5/30/17 Date:   Date:     Activity/Exercise Parameters Parameters Parameters   Seated TKE 10x2 B     Seated marching 15x B     Seated heel/toe raises 20x B     Seated hip abd 15x B                              Braces/Orthotics/Lines/Etc:   · none  Treatment/Session Assessment:    · Response to Treatment:  Tolerated fine  · Interdisciplinary Collaboration:  · Physical Therapy Assistant and Registered Nurse  · After treatment position/precautions:  · Up in chair, Bed/Chair-wheels locked, Bed in low position, Call light within reach and RN notified  · Compliance with Program/Exercises:  Will assess as treatment progresses. · Recommendations/Intent for next treatment session: \"Next visit will focus on advancements to more challenging activities\".   Total Treatment Duration:  PT Patient Time In/Time Out  Time In: 1415  Time Out: 1440     Ivar Ax, PTA

## 2017-05-30 NOTE — PROGRESS NOTES
GI DAILY PROGRESS NOTE    Admit Date:  5/22/2017    Today's Date:  5/30/2017    CC:  Gas per ostomy    Subjective:     Patient is a 73 y/o male with h/o preirectal abscess with prior history of associated gangrene and sepesis s/p laparoscopic diverting end colostomy 9/13/16, HTN, ESRD on HD, pancytopenia (followed by heme/onc), EtOH abuse and noncompliance who was admitted on 5/22 with concern for excessive gas from his colostomy and rectal drainage. CT showed perirectal abscess, splenomegaly and gallstones and he was treated with abx. Surgery was apparently consulted for drainage of the abscess but they recommended having IR drain and manage. Patient initially refused having this done (said he would not be compliant with follow up), but is now apparently agreeable and IR may be placing the drain today (?). We were asked to see the patient last week regarding elevated LFTs and h/o EtOH liver disease. Dr. Eveline Bowling suspected EtOH cirroshis (given splenomegaly, hypoalbuminemia, pancytopenia, hyperbilirubinemia, and coagulopathy). Planned EGD/colo at some point given his anemia (although he had unremarkable iron studies this admission) and solid food dysphagia. These procedures were being deferred given pancytopenia. We signed off on 5/26. Today, we have been asked to see the patient again regarding increased gas per ostomy. Patient says he has had a lot of gas in his ostomy since his surgery in 9/2016. It has never \"exploded\" although this is his concern. He also complains of belching, gas in the abdomen/bloating. Was seen in our office on 5/18/17 for these complaints (plus weight loss) and stool studies were ordered and he a trial of Darlys Plan was recommended. However, patient says he \"almost passed out\" after he took one of these pills.   He continues to have solid food dysphagia and for this reason, (plus his concern regarding the gas in his colostomy) he has been drinking liquids and decreasing his solid food intake and has lost over 100 pounds reportedly in the last year. Also says that he is unable to sit up to really eat. Says he cannot walk. According to records, he feels that he is not going to be able to get better and provides multiple excuses/reasons why he refuses certain tests and treatments. Still having rectal drainage--like \"white paste. \"  Has orangish output per ostomy bag. Stool this admission with negative culture and c. Diff. Medications:   Current Facility-Administered Medications   Medication Dose Route Frequency    tbo-filgrastim (GRANIX) injection 480 mcg  480 mcg SubCUTAneous DAILY    0.9% sodium chloride infusion 250 mL  250 mL IntraVENous PRN    sertraline (ZOLOFT) tablet 25 mg  25 mg Oral QPM    midazolam (VERSED) injection 0.5-2 mg  0.5-2 mg IntraVENous Multiple    fentaNYL citrate (PF) injection 12.5-100 mcg  12.5-100 mcg IntraVENous Multiple    sodium chloride (NS) flush 5-10 mL  5-10 mL IntraVENous Q8H    sodium chloride (NS) flush 5-10 mL  5-10 mL IntraVENous PRN    ondansetron (ZOFRAN) injection 4 mg  4 mg IntraVENous Q4H PRN    albuterol (PROVENTIL VENTOLIN) nebulizer solution 2.5 mg  2.5 mg Nebulization Q4H PRN    pantoprazole (PROTONIX) tablet 40 mg  40 mg Oral ACB&D    epoetin miky (EPOGEN;PROCRIT) injection 20,000 Units  20,000 Units SubCUTAneous Q7D       Review of Systems:  No chest pain or SOB. Diet:  renal    Objective:   Vitals:  Visit Vitals    BP 94/48    Pulse 85    Temp 97.6 °F (36.4 °C)    Resp 19    Ht 5' 6\" (1.676 m)    Wt 65.6 kg (144 lb 9.6 oz)    SpO2 98%    BMI 23.34 kg/m2     Intake/Output:  05/30 0701 - 05/30 1900  In: -   Out: 75   05/28 1901 - 05/30 0700  In: 320 [P.O.:320]  Out: 1520 [Urine:250]  Exam:  General appearance: alert, cooperative, no distress  Lungs: clear to auscultation bilaterally anteriorly  Heart: regular rate and rhythm  Abdomen: soft, non-tender. Bowel sounds normal. Colostomy in LLQ with orangish stool. Extremities: extremities normal, atraumatic, no cyanosis or edema  Neuro:  alert and oriented    Data Review (Labs):    Recent Labs      05/30/17   0643  05/29/17   1610  05/29/17   0550  05/28/17   0659  05/27/17   1427   WBC  1.3*   --   0.9*  0.8*  0.7*   HGB  7.4*  8.4*  8.2*  9.0*  8.7*   HCT  22.0*  24.2*  23.8*  26.4*  24.9*   PLT  84*   --   78*  84*  74*   MCV  88.4   --   87.5  88.6  89.2   NA   --    --    --   140   --    K   --    --    --   3.8   --    CL   --    --    --   103   --    CO2   --    --    --   26   --    BUN   --    --    --   39*   --    CREA   --    --    --   4.11*   --    CA   --    --    --   9.1   --    GLU   --    --    --   91   --        Assessment:     Principal Problem:    Pancytopenia ([facility] (9/3/2016)      Overview: HGB 7.8 ON ADMISSION    Active Problems:    Alcohol abuse, daily use (9/3/2016)      Hypokalemia (9/3/2016)      Overview: 2.7 ON ADMISSION      HTN (hypertension) (9/4/2016)      GERD (gastroesophageal reflux disease) (9/4/2016)      Nocturnal hypoxemia (10/4/2016)      Hyperbilirubinemia (3/29/2017)      ESRD (end stage renal disease) ([facility] (3/29/2017)      Overview: DIALYSIS            Aundrea-rectal abscess (4/9/2017)      75 y/o male with PMH of preirectal absces with prior hx associated gangrene and sepsis s/p laparoscopic diverting end colostomy 9/2016, HTN, ESRD on HD, pancytopenia, probable EtOH cirrhosis is admitted with persistent/recurrent rectal abscess and seen by GI for chronic complaints of gas per ostomy, belching, bloating. IR may be draining abscess today (?). He was given Zenpep as outpatient for his GI symptoms and he says this apparently caused him to \"almost pass out. \"    Plan:   1) will restrict lactose in his diet  2) discuss other options with Dr. Arias--?trial of xifaxan  3) plan EGD/dilation and colonoscopy at some point when he recovers from these acute issues and his CBC is appropriate  4) on protonix BID      Stefano Lynch, PA    ATTENDING NOTE:  I agree with the above assessment and plan. The differential diagnosis for patient's increased gas and bloating includes a motility disturbance (such as gastroparesis), mechanical obstruction (e.g. partial small bowel obstruction), biliary tract disease, irritable bowel syndrome, small bowel bacterial overgrowth, medication adverse effect or a dietary intolerance. As bacterial overgrowth remains a possibility and as breath testing for overgrowth is unreliable, empiric treatment would be an appropriate strategy. Will give a trial of Xifaxan and use a probiotic agent.     Kathy Juarez MD

## 2017-05-30 NOTE — PROGRESS NOTES
Will schedule CT guided drain placement for tomorrow. Will discuss drain care with the patient, again. Will need to confirm placement into a healthcare facility that will provide drain care.     Blair Espinosa MD

## 2017-05-30 NOTE — PROGRESS NOTES
Problem: Nutrition Deficit  Goal: *Optimize nutritional status  Nutrition:  Assessment based on LOS. Assessment:  Anthropometrics:              Ht - 5'6\", wgt - 65.6 kg (8th floor bed 5/30/17), BMI 23.4 c/w \"normal weight\", edema - none reported. Macronutrient Needs:  Estimated calorie needs - 8050-8975 kali/day (25-30 kali/kg/day)   Estimated protein needs - 78-91 gm pro/day (1.2-1.4 gm pro/kgIBW/day) (GFR 16 ml/min)  Intake/Comparative Standards: This patient's average intake of renal diet for the past 6 recorded days/13 meals: 81%. This potentially meets 91% of calorie and 88% of protein goals. Diet:              Renal.  Pertinent Labs:              BUN 39, creatinine 4. 11. Pertinent Medications:              Protonix, Zoloft. Food/Nutrition History:              The patient's nursing admission malnutrition screen remains incomplete and the amount of weight loss this patient has experienced was not noted. At home he indicates that he eats what his brother prepares and denies that he is eating as well here as what has been recorded. He flatly refuses all supplements stating that they bloat him and creates lots of gas in his colostomy bag. He acts surprised when I tell him his current weight, however he acknowledges that he knows that he has lost [place] lot of weight\". Diagnosis (Nutrition): Inadequate oral intake related to dislike of therapeutic diet restriction as evidenced by reported poor intake. Intervention:  Meals and Snacks: Continue renal diet. Menu was explained to the patient and he was encouraged to actively participate in his meal selections. Feeding Assistance: He may benefit from assistance with tray set up. Nanette Matthews.  [facility]  [phone]

## 2017-05-30 NOTE — PROGRESS NOTES
Dr. Cristobal Doss notified of pt's BP and vital signs. Dr. Jain Ready to order bolus and blood transfusion.

## 2017-05-30 NOTE — PROGRESS NOTES
Report received from John Tolentino RN. Pt resting in bed with eyes closed. Resp even and unlabored at rest.  Awaiting blood transfusion.

## 2017-05-30 NOTE — PROGRESS NOTES
Hospitalist Progress Note    2017  Admit Date: 2017 10:46 AM   NAME: Lorraine Armenta   :  1950   MRN:  [id number]   Attending: Elmer Ruff MD  PCP:  Braydon Bradley MD    SUBJECTIVE:   As previously documented:  'Patient is a chronically ill, obese, noncompliant, alcoholic 74 y.o.  male who presented here from dialysis (all but 2 hours finished) for extreme concern for perception of excessive gas from colostomy placed last August for many year history of chronically draining perirectal abscess and multiple surgeries. He denies any new symptoms, fever, signs of infection, new symptoms. Vague SOB with exertion. Denies abdominal pain, reports bloating only. Anorexia. No nausea or vomiting. He is non compliant in general, is repetatively reporting he has no one to care for him. Apparently lives with brother who works outside the home. He has repeated hospitalizations. He was last admitted here from - for new re-occurrence of perirectal abscess containing staph epidermidis bacteremia, proteus, and E Coli. Same drained by IR. hgb was 5.0 on that admission with subsequent transfusion. He was treated with IV vancomycin and zosyn, and discharged to rehab on augmentin. Has been home a brief time only. Recently had dialysis graft placed, reports indicate it is still premature. Prior to that, he was admitted from 3/29-17 for symptomatic anemia. Hgb this admission is 10.1. Chronic pancytopenia followed by Hematology: He has also lost extensive weight since last year. Unknown how much, estimates 50#. Has no idea of what medications he takes. Additional history as noted below. Patient extremely poor historian, unable to focus on any task a hand.'    17- states he does not feel well, but has no specific complaints. Noted to be more anemic and RN notified me of bp low at times today. He is going for IR perirectal abscess drain tomorrow.       Review of Systems negative with exception of pertinent positives noted above  PHYSICAL EXAM     Visit Vitals    BP 91/47    Pulse 97    Temp 97.9 °F (36.6 °C)    Resp 18    Ht 5' 6\" (1.676 m)    Wt 65.6 kg (144 lb 9.6 oz)    SpO2 99%    BMI 23.34 kg/m2      Temp (24hrs), Av °F (36.7 °C), Min:97.6 °F (36.4 °C), Max:98.7 °F (37.1 °C)    Oxygen Therapy  O2 Sat (%): 99 % (17 1522)  Pulse via Oximetry: 96 beats per minute (17 1908)  O2 Device: Room air (17 0710)    Intake/Output Summary (Last 24 hours) at 17 1940  Last data filed at 17 1500   Gross per 24 hour   Intake              430 ml   Output              275 ml   Net              155 ml      General: No acute distress    Lungs:  CTA Bilaterally. Heart:  Regular rate and rhythm,  No murmur, rub, or gallop  Abdomen: Soft, Non distended, Non tender, Positive bowel sounds  Extremities: No cyanosis, clubbing or edema  Neurologic:  No focal deficits    ASSESSMENT      Active Hospital Problems    Diagnosis Date Noted    Aundrea-rectal abscess 2017    Hyperbilirubinemia 2017    ESRD (end stage renal disease) ([facility] 2017     DIALYSIS        Nocturnal hypoxemia 10/04/2016    HTN (hypertension) 2016    GERD (gastroesophageal reflux disease) 2016    Pancytopenia ([facility] 2016     HGB 7.8 ON ADMISSION      Hypokalemia 2016     2.7 ON ADMISSION      Alcohol abuse, daily use 2016     Plan:  · Aundrea-rectal abscess- IR drainage tomorrow. · Worsening anemia- transfuse 2 units pRBCs. · Hypotension- bolus 250 ml NS. Start midodrine if needed. · Alcohol abuse- no signs of withdrawal.  · Pancytopenia- per oncology. · ESRD on HD- appreciate nephrology and vascular input. Dispo- STR on discharge. DVT Prophylaxis: SCDs    Signed By: Jamarcsu Longo.  Lisa Nino MD     May 30, 2017

## 2017-05-31 ENCOUNTER — APPOINTMENT (OUTPATIENT)
Dept: CT IMAGING | Age: 67
DRG: 808 | End: 2017-05-31
Attending: HOSPITALIST
Payer: MEDICARE

## 2017-05-31 LAB
BLASTS NFR BLD: 3 %
DIFFERENTIAL METHOD BLD: ABNORMAL
EOSINOPHIL # BLD: 0 K/UL (ref 0–0.8)
EOSINOPHIL NFR BLD MANUAL: 2 % (ref 1–8)
ERYTHROCYTE [DISTWIDTH] IN BLOOD BY AUTOMATED COUNT: 16.7 % (ref 11.9–14.6)
HCT VFR BLD AUTO: 22.6 % (ref 41.1–50.3)
HGB BLD-MCNC: 7.7 G/DL (ref 13.6–17.2)
LYMPHOCYTES # BLD: 0.4 K/UL (ref 0.5–4.6)
LYMPHOCYTES NFR BLD MANUAL: 39 % (ref 16–44)
MCH RBC QN AUTO: 29.5 PG (ref 26.1–32.9)
MCHC RBC AUTO-ENTMCNC: 34.1 G/DL (ref 31.4–35)
MCV RBC AUTO: 86.6 FL (ref 79.6–97.8)
MONOCYTES # BLD: 0.1 K/UL (ref 0.1–1.3)
MONOCYTES NFR BLD MANUAL: 7 % (ref 3–9)
NEUTS BAND NFR BLD MANUAL: 11 % (ref 0–10)
NEUTS SEG # BLD: 0.5 K/UL (ref 1.7–8.2)
NEUTS SEG NFR BLD MANUAL: 38 % (ref 47–75)
PLATELET # BLD AUTO: 69 K/UL (ref 150–450)
PLATELET COMMENTS,PCOM: ABNORMAL
PMV BLD AUTO: 10.2 FL (ref 10.8–14.1)
RBC # BLD AUTO: 2.61 M/UL (ref 4.23–5.67)
RBC MORPH BLD: ABNORMAL
WBC # BLD AUTO: 1.1 K/UL (ref 4.3–11.1)
WBC MORPH BLD: ABNORMAL

## 2017-05-31 PROCEDURE — 74011250636 HC RX REV CODE- 250/636: Performed by: NURSE PRACTITIONER

## 2017-05-31 PROCEDURE — 74011250637 HC RX REV CODE- 250/637: Performed by: NURSE PRACTITIONER

## 2017-05-31 PROCEDURE — 36430 TRANSFUSION BLD/BLD COMPNT: CPT

## 2017-05-31 PROCEDURE — 65270000029 HC RM PRIVATE

## 2017-05-31 PROCEDURE — 65660000000 HC RM CCU STEPDOWN

## 2017-05-31 PROCEDURE — 90935 HEMODIALYSIS ONE EVALUATION: CPT

## 2017-05-31 PROCEDURE — 74011250637 HC RX REV CODE- 250/637: Performed by: PHYSICIAN ASSISTANT

## 2017-05-31 PROCEDURE — 85025 COMPLETE CBC W/AUTO DIFF WBC: CPT | Performed by: HOSPITALIST

## 2017-05-31 PROCEDURE — P9016 RBC LEUKOCYTES REDUCED: HCPCS | Performed by: INTERNAL MEDICINE

## 2017-05-31 PROCEDURE — 72192 CT PELVIS W/O DYE: CPT

## 2017-05-31 PROCEDURE — 74011250637 HC RX REV CODE- 250/637: Performed by: INTERNAL MEDICINE

## 2017-05-31 PROCEDURE — 36591 DRAW BLOOD OFF VENOUS DEVICE: CPT

## 2017-05-31 PROCEDURE — 74011250636 HC RX REV CODE- 250/636

## 2017-05-31 RX ORDER — LIDOCAINE HYDROCHLORIDE 20 MG/ML
40-120 INJECTION, SOLUTION INFILTRATION; PERINEURAL ONCE
Status: ACTIVE | OUTPATIENT
Start: 2017-05-31 | End: 2017-06-01

## 2017-05-31 RX ORDER — FENTANYL CITRATE 50 UG/ML
12.5-1 INJECTION, SOLUTION INTRAMUSCULAR; INTRAVENOUS
Status: DISCONTINUED | OUTPATIENT
Start: 2017-05-31 | End: 2017-06-01 | Stop reason: HOSPADM

## 2017-05-31 RX ORDER — DIPHENHYDRAMINE HYDROCHLORIDE 50 MG/ML
12.5-5 INJECTION, SOLUTION INTRAMUSCULAR; INTRAVENOUS ONCE
Status: ACTIVE | OUTPATIENT
Start: 2017-05-31 | End: 2017-06-01

## 2017-05-31 RX ORDER — SODIUM CHLORIDE 9 MG/ML
25 INJECTION, SOLUTION INTRAVENOUS CONTINUOUS
Status: DISCONTINUED | OUTPATIENT
Start: 2017-05-31 | End: 2017-06-01 | Stop reason: HOSPADM

## 2017-05-31 RX ORDER — MIDAZOLAM HYDROCHLORIDE 1 MG/ML
.25-2 INJECTION, SOLUTION INTRAMUSCULAR; INTRAVENOUS
Status: DISCONTINUED | OUTPATIENT
Start: 2017-05-31 | End: 2017-06-01 | Stop reason: HOSPADM

## 2017-05-31 RX ADMIN — TBO-FILGRASTIM 480 MCG: 480 INJECTION, SOLUTION SUBCUTANEOUS at 08:11

## 2017-05-31 RX ADMIN — PANTOPRAZOLE SODIUM 40 MG: 40 TABLET, DELAYED RELEASE ORAL at 05:38

## 2017-05-31 RX ADMIN — RIFAXIMIN 200 MG: 200 TABLET ORAL at 22:23

## 2017-05-31 RX ADMIN — MIDAZOLAM HYDROCHLORIDE 1 MG: 1 INJECTION, SOLUTION INTRAMUSCULAR; INTRAVENOUS at 15:11

## 2017-05-31 RX ADMIN — Medication 5 ML: at 05:38

## 2017-05-31 RX ADMIN — PANTOPRAZOLE SODIUM 40 MG: 40 TABLET, DELAYED RELEASE ORAL at 16:49

## 2017-05-31 RX ADMIN — RIFAXIMIN 200 MG: 200 TABLET ORAL at 08:11

## 2017-05-31 RX ADMIN — LACTOBACILLUS TAB 2 TABLET: TAB at 08:11

## 2017-05-31 RX ADMIN — RIFAXIMIN 200 MG: 200 TABLET ORAL at 16:49

## 2017-05-31 RX ADMIN — Medication 5 ML: at 22:24

## 2017-05-31 RX ADMIN — FENTANYL CITRATE 25 MCG: 50 INJECTION, SOLUTION INTRAMUSCULAR; INTRAVENOUS at 15:11

## 2017-05-31 RX ADMIN — LACTOBACILLUS TAB 2 TABLET: TAB at 16:50

## 2017-05-31 RX ADMIN — SERTRALINE HYDROCHLORIDE 25 MG: 50 TABLET ORAL at 16:50

## 2017-05-31 NOTE — DIALYSIS
Hemodialysis treatment initiated using right perm catheter by Jaycee Mendoza RN. Aspirated and flushed both ports without problem. Dressing clean, dry and intact. Pt alert and VS stable. Machine settings per MD order. Will monitor during treatment.

## 2017-05-31 NOTE — PROGRESS NOTES
TRANSFER - IN REPORT:    [facility] on [address]  being received from  for routine progression of care      Report consisted of patients Situation, Background, Assessment and   Recommendations(SBAR). Information from the following report(s) Procedure Summary was reviewed with the receiving nurse. Opportunity for questions and clarification was provided. Assessment completed upon patients arrival to unit and care assumed.

## 2017-05-31 NOTE — PROGRESS NOTES
GI DAILY PROGRESS NOTE    Admit Date:  5/22/2017    Today's Date:  5/31/2017    CC:  Gas per ostomy    Subjective:     Patient seen in hemodialysis. He denies abdominal pain, vomiting. Says he cannot tell a difference in his intestinal gas, but his abdomen is scaphoid and there is no gas in his ostomy bag (he says it was last changed last night). Medications:   Current Facility-Administered Medications   Medication Dose Route Frequency    rifAXIMin (XIFAXAN) tablet 200 mg  200 mg Oral TID    Lactobacillus Acidoph & Bulgar [facility]) tablet 2 Tab  2 Tab Oral BID    0.9% sodium chloride infusion 250 mL  250 mL IntraVENous PRN    tbo-filgrastim (GRANIX) injection 480 mcg  480 mcg SubCUTAneous DAILY    0.9% sodium chloride infusion 250 mL  250 mL IntraVENous PRN    sertraline (ZOLOFT) tablet 25 mg  25 mg Oral QPM    midazolam (VERSED) injection 0.5-2 mg  0.5-2 mg IntraVENous Multiple    fentaNYL citrate (PF) injection 12.5-100 mcg  12.5-100 mcg IntraVENous Multiple    sodium chloride (NS) flush 5-10 mL  5-10 mL IntraVENous Q8H    sodium chloride (NS) flush 5-10 mL  5-10 mL IntraVENous PRN    ondansetron (ZOFRAN) injection 4 mg  4 mg IntraVENous Q4H PRN    albuterol (PROVENTIL VENTOLIN) nebulizer solution 2.5 mg  2.5 mg Nebulization Q4H PRN    pantoprazole (PROTONIX) tablet 40 mg  40 mg Oral ACB&D    epoetin miky (EPOGEN;PROCRIT) injection 20,000 Units  20,000 Units SubCUTAneous Q7D       Review of Systems:  No chest pain or SOB.     Diet:  Renal, lactose free    Objective:   Vitals:  Visit Vitals    BP 93/50    Pulse 69    Temp 97.4 °F (36.3 °C)    Resp 18    Ht 5' 6\" (1.676 m)    Wt 65.2 kg (143 lb 11.2 oz)    SpO2 96%    BMI 23.19 kg/m2     Intake/Output:     05/29 1901 - 05/31 0700  In: 1242.3 [P.O.:530]  Out: 275 [Urine:50]  Exam:  General appearance: alert, cooperative, no distress  Lungs: clear to auscultation bilaterally anteriorly  Heart: regular rate and rhythm  Abdomen: soft, non-tender, scaphoid. Bowel sounds normal. Colostomy in LLQ with no stool in bag. Extremities: extremities normal, atraumatic, no cyanosis or edema  Neuro:  alert and oriented    Data Review (Labs):    Recent Labs      05/31/17   0856  05/30/17   0643  05/29/17   1610  05/29/17   0550   WBC  1.1*  1.3*   --   0.9*   HGB  7.7*  7.4*  8.4*  8.2*   HCT  22.6*  22.0*  24.2*  23.8*   PLT  69*  84*   --   78*   MCV  86.6  88.4   --   87.5       Assessment:     Principal Problem:    Pancytopenia ([facility] (9/3/2016)      Overview: HGB 7.8 ON ADMISSION    Active Problems:    Alcohol abuse, daily use (9/3/2016)      Hypokalemia (9/3/2016)      Overview: 2.7 ON ADMISSION      HTN (hypertension) (9/4/2016)      GERD (gastroesophageal reflux disease) (9/4/2016)      Nocturnal hypoxemia (10/4/2016)      Hyperbilirubinemia (3/29/2017)      ESRD (end stage renal disease) ([facility] (3/29/2017)      Overview: DIALYSIS            Aundrea-rectal abscess (4/9/2017)      75 y/o male with PMH of preirectal absces with prior hx associated gangrene and sepsis s/p laparoscopic diverting end colostomy 9/2016, HTN, ESRD on HD, pancytopenia, probable EtOH cirrhosis is admitted with persistent/recurrent rectal abscess and seen by GI for chronic complaints of gas per ostomy, belching, bloating. IR to drain abscess today    The differential diagnosis for patient's increased gas and bloating includes a motility disturbance (such as gastroparesis), mechanical obstruction (e.g. partial small bowel obstruction), biliary tract disease, irritable bowel syndrome, small bowel bacterial overgrowth, medication adverse effect or a dietary intolerance. As bacterial overgrowth remains a possibility and as breath testing for overgrowth is unreliable, empiric treatment would be an appropriate strategy, and thus he was started on Xifaxan 5/30.      Plan:   1) continue xifaxan x 2 weeks  2) continue probiotic  3) plan EGD/dilation and colonoscopy at some point when he recovers from these acute issues and his CBC is appropriate--likely outpatient  4) on protonix BID  5) we will sign off and will arrange outpatient follow up      MALIHA Dalton    ATTENDING NOTE:  I agree with the above assessment and plan. We will sign off, but do not hesitate to contact us for any additional assistance. We will arrange for a GI follow-up office visit in 4 weeks. Patient will be contacted by our office.     Renetta Styles MD

## 2017-05-31 NOTE — PROGRESS NOTES
TRANSFER - OUT REPORT:    Verbal report given to [facility] RN(name) on Tanner Corona  being transferred to 810(unit) for routine progression of care       Report consisted of patients Situation, Background, Assessment and   Recommendations(SBAR). Information from the following report(s) SBAR, Kardex, Intake/Output and MAR was reviewed with the receiving nurse. Lines:   Peripheral IV 05/30/17 Right Arm (Active)   Site Assessment Clean, dry, & intact 5/31/2017  7:45 AM   Phlebitis Assessment 0 5/31/2017  7:45 AM   Infiltration Assessment 0 5/31/2017  7:45 AM   Dressing Status Clean, dry, & intact 5/31/2017  7:45 AM   Dressing Type Transparent 5/31/2017  7:45 AM   Hub Color/Line Status Capped 5/31/2017  7:45 AM   Alcohol Cap Used No 5/31/2017  7:45 AM        Opportunity for questions and clarification was provided.       Patient transported with:   [employer]

## 2017-05-31 NOTE — DIALYSIS
HD treatment completed without complication. No fluid removed due to hypotension during tx. CVC dressing clean, dry, and intact, tego caps intact, bilateral lumen flushed with 9cc NS and curos applied. Transport contacted for return to room. No complaints at this time.

## 2017-05-31 NOTE — PROGRESS NOTES
Hospitalist Progress Note    2017  Admit Date: 2017 10:46 AM   NAME: Deepak Nunes   :  1950   MRN:  [id number]   Attending: Theodore King MD  PCP:  Kenyon Jacob MD    SUBJECTIVE:   As previously documented:  'Patient is a chronically ill, obese, noncompliant, alcoholic 74 y.o.  male who presented here from dialysis (all but 2 hours finished) for extreme concern for perception of excessive gas from colostomy placed last August for many year history of chronically draining perirectal abscess and multiple surgeries. He denies any new symptoms, fever, signs of infection, new symptoms. Vague SOB with exertion. Denies abdominal pain, reports bloating only. Anorexia. No nausea or vomiting. He is non compliant in general, is repetatively reporting he has no one to care for him. Apparently lives with brother who works outside the home. He has repeated hospitalizations. He was last admitted here from - for new re-occurrence of perirectal abscess containing staph epidermidis bacteremia, proteus, and E Coli. Same drained by IR. hgb was 5.0 on that admission with subsequent transfusion. He was treated with IV vancomycin and zosyn, and discharged to rehab on augmentin. Has been home a brief time only. Recently had dialysis graft placed, reports indicate it is still premature. Prior to that, he was admitted from 3/29-17 for symptomatic anemia. Hgb this admission is 10.1. Chronic pancytopenia followed by Hematology: He has also lost extensive weight since last year. Unknown how much, estimates 50#. Has no idea of what medications he takes. Additional history as noted below. Patient extremely poor historian, unable to focus on any task a hand.'    17- states he does not feel well, but has no specific complaints. Noted to be more anemic and RN notified me of bp low at times today. He is going for IR perirectal abscess drain tomorrow.   17- reports he is 'feeling some better.' Seen on dialysis. He is to get marisa-rectal drain done per IR today. Review of Systems negative with exception of pertinent positives noted above  PHYSICAL EXAM     Visit Vitals    /59 (BP 1 Location: Right arm, BP Patient Position: Prone)    Pulse 80    Temp 97.6 °F (36.4 °C)    Resp 18    Ht 5' 6\" (1.676 m)    Wt 65.2 kg (143 lb 11.2 oz)    SpO2 100%    BMI 23.19 kg/m2      Temp (24hrs), Av.7 °F (36.5 °C), Min:97.2 °F (36.2 °C), Max:98.7 °F (37.1 °C)    Oxygen Therapy  O2 Sat (%): 100 % (17 1500)  Pulse via Oximetry: 96 beats per minute (17 1908)  O2 Device: Room air (17 1231)  ETCO2 (mmHg): 38 mmHg (17 1500)    Intake/Output Summary (Last 24 hours) at 17 1621  Last data filed at 17 1140   Gross per 24 hour   Intake            812.3 ml   Output                0 ml   Net            812.3 ml      General: No acute distress    Lungs:  CTA Bilaterally. Heart:  Regular rate and rhythm,  No murmur, rub, or gallop  Abdomen: Soft, Non distended, Non tender, Positive bowel sounds  Extremities: No cyanosis, clubbing or edema  Neurologic:  No focal deficits    ASSESSMENT      Active Hospital Problems    Diagnosis Date Noted    Marisa-rectal abscess 2017    Hyperbilirubinemia 2017    ESRD (end stage renal disease) ([facility] 2017     DIALYSIS        Nocturnal hypoxemia 10/04/2016    HTN (hypertension) 2016    GERD (gastroesophageal reflux disease) 2016    Pancytopenia ([facility] 2016     HGB 7.8 ON ADMISSION      Hypokalemia 2016     2.7 ON ADMISSION      Alcohol abuse, daily use 2016     Plan:  · Marisa-rectal abscess- IR drainage tomorrow. · Worsening anemia- transfuse 2 units pRBCs. · Hypotension- low while on HD  · Alcohol abuse- no signs of withdrawal.  · Pancytopenia- per oncology. · ESRD on HD- appreciate nephrology and vascular input. Dispo- STR on discharge. DVT Prophylaxis: SCDs    Signed By: Kaushik Velasquez. Vinnie Iraheta MD     May 31, 2017

## 2017-05-31 NOTE — PROGRESS NOTES
Spoke to Maunel  on 8th floor. He reports that arrangements have been made for patient to be discharged post drain placed to [address] [place] [address] Dr has been approved for 100 days short term and then will be able to change over to a medicare bed if care needs require him to stay longer. Dr. Ken Partida and Patient both made aware of discharge plans. Patient is agreeable to having drain placed but also expresses concerns regarding his care after discharge. Reinforced information about discharge to [facility] as provided by social work. Emotional support offered.

## 2017-05-31 NOTE — PROGRESS NOTES
TRANSFER - OUT REPORT:    Verbal report given to DEVAN Perkins on [employer]  being transferred to IR after hemodialysis for routine progression of care       Report consisted of patients Situation, Background, Assessment and   Recommendations(SBAR). Information from the following report(s) SBAR was reviewed with the receiving nurse. Opportunity for questions and clarification was provided.

## 2017-05-31 NOTE — CONSULTS
Department of Interventional Radiology  [phone]        Consult Note     Patient: Lorraine Armenta MRN: [id number]  SSN: [id number]    YOB: 1950  Age: 74 y.o. Sex: male      Referring Physician: Hospitalist    Consult Date: 5/31/2017     Saw Mr. Franklin Spar today regarding placement of marisa-rectal abscess catheter. Discussed at length placement of the catheter, however, the patient remains undecided at this time.

## 2017-05-31 NOTE — PROGRESS NOTES
Blood finished and tolerated without difficulty. Pt resting at long intervals. BP minimal 105/53. Will monitor and update oncoming nurse.

## 2017-05-31 NOTE — PROGRESS NOTES
[address] Nephrology        Subjective: ESRD  Pt seen on dialysis. Review of Systems -   General ROS: negative for - chills, fatigue or fever  Respiratory ROS: no cough, shortness of breath, or wheezing  Cardiovascular ROS: no chest pain or dyspnea on exertion  Gastrointestinal ROS: no abdominal pain, change in bowel habits, or black or bloody stools  Genito-Urinary ROS: no dysuria, trouble voiding, or hematuria  Neurological ROS: no TIA or stroke symptoms        Objective:    Vitals:    05/31/17 0536 05/31/17 0543 05/31/17 0730 05/31/17 0823   BP: 105/53  104/55 91/47   Pulse: 71  74 60   Resp: 18  18    Temp: 97.3 °F (36.3 °C)  97.4 °F (36.3 °C)    SpO2: 100%  96%    Weight:  65.2 kg (143 lb 11.2 oz)     Height:           PE  Gen: in no acute distress  CV: reg rate  Chest: clear  Abd: soft  Ext/Access: rt IJ tunneled dialysis cath ok,  Immature left arm av fistula. [facility]  Recent Labs      05/30/17   0643  05/29/17   1610  05/29/17   0550   WBC  1.3*   --   0.9*   HGB  7.4*  8.4*  8.2*   HCT  22.0*  24.2*  23.8*   PLT  84*   --   78*     No results for input(s): NA, K, CL, CO2, GLU, BUN, CREA, MG, PHOS, CA, TROIQ, ALB, TBIL, TBILI, GPT, ALT, SGOT, BNPP in the last 72 hours.     No lab exists for component: TROIP        Radiology    A/P:   Patient Active Problem List   Diagnosis Code    Alcohol abuse, daily use F10.10    Hypokalemia E87.6    VITO (acute kidney injury) ([address] N17.9    Pancytopenia ([address] D61.818    HTN (hypertension) I10    GERD (gastroesophageal reflux disease) K21.9    Thrombocytopenia (HCC) D69.6    Acute hypoactive delirium due to multiple etiologies F05    Pleural effusion on left J90    Paroxysmal atrial fibrillation (HCC) I48.0    Acute blood loss anemia D62    Suspected sleep apnea G47.30    Nocturnal hypoxemia G47.34    HIT (heparin-induced thrombocytopenia) [facility] D75.82    CKD (chronic kidney disease) requiring chronic dialysis ([facility]) N18.6, Z99.2    Symptomatic anemia D64.9    Elevated total protein R77.8    Hyperbilirubinemia E80.6    Dizziness R42    ESRD (end stage renal disease) (HCC) N18.6    Aundrea-rectal abscess K61.1    Staphylococcus epidermidis bacteremia R78.81         On dialysis for clearance. Awaiting drain placement. He got 2 units  PRBC yesterday. Will check H/H today.     De Rollins MD

## 2017-06-01 VITALS
BODY MASS INDEX: 27.38 KG/M2 | OXYGEN SATURATION: 100 % | TEMPERATURE: 98.1 F | HEIGHT: 66 IN | DIASTOLIC BLOOD PRESSURE: 52 MMHG | HEART RATE: 78 BPM | RESPIRATION RATE: 18 BRPM | WEIGHT: 170.4 LBS | SYSTOLIC BLOOD PRESSURE: 96 MMHG

## 2017-06-01 LAB
ABO + RH BLD: NORMAL
ALBUMIN SERPL BCP-MCNC: 2.5 G/DL (ref 3.2–4.6)
ANION GAP BLD CALC-SCNC: 8 MMOL/L (ref 7–16)
BASOPHILS # BLD AUTO: 0 K/UL (ref 0–0.2)
BASOPHILS # BLD: 0 % (ref 0–2)
BLD PROD TYP BPU: NORMAL
BLD PROD TYP BPU: NORMAL
BLOOD GROUP ANTIBODIES SERPL: NORMAL
BPU ID: NORMAL
BPU ID: NORMAL
BUN SERPL-MCNC: 28 MG/DL (ref 8–23)
CALCIUM SERPL-MCNC: 8.5 MG/DL (ref 8.3–10.4)
CHLORIDE SERPL-SCNC: 103 MMOL/L (ref 98–107)
CO2 SERPL-SCNC: 29 MMOL/L (ref 21–32)
CREAT SERPL-MCNC: 3.64 MG/DL (ref 0.8–1.5)
CROSSMATCH RESULT,%XM: NORMAL
CROSSMATCH RESULT,%XM: NORMAL
DIFFERENTIAL METHOD BLD: ABNORMAL
EOSINOPHIL # BLD: 0 K/UL (ref 0–0.8)
EOSINOPHIL NFR BLD: 1 % (ref 0.5–7.8)
ERYTHROCYTE [DISTWIDTH] IN BLOOD BY AUTOMATED COUNT: 17 % (ref 11.9–14.6)
GLUCOSE SERPL-MCNC: 86 MG/DL (ref 65–100)
HCT VFR BLD AUTO: 23.2 % (ref 41.1–50.3)
HGB BLD-MCNC: 7.7 G/DL (ref 13.6–17.2)
IMM GRANULOCYTES # BLD: 0 K/UL (ref 0–0.5)
LYMPHOCYTES # BLD AUTO: 52 % (ref 13–44)
LYMPHOCYTES # BLD: 0.7 K/UL (ref 0.5–4.6)
MCH RBC QN AUTO: 29.3 PG (ref 26.1–32.9)
MCHC RBC AUTO-ENTMCNC: 33.2 G/DL (ref 31.4–35)
MCV RBC AUTO: 88.2 FL (ref 79.6–97.8)
MONOCYTES # BLD: 0.3 K/UL (ref 0.1–1.3)
MONOCYTES NFR BLD AUTO: 20 % (ref 4–12)
NEUTS SEG # BLD: 0.4 K/UL (ref 1.7–8.2)
NEUTS SEG NFR BLD AUTO: 27 % (ref 43–78)
PHOSPHATE SERPL-MCNC: 1.7 MG/DL (ref 2.3–3.7)
PLATELET # BLD AUTO: 73 K/UL (ref 150–450)
PLATELET COMMENTS,PCOM: ABNORMAL
PMV BLD AUTO: 10.4 FL (ref 10.8–14.1)
POTASSIUM SERPL-SCNC: 4 MMOL/L (ref 3.5–5.1)
RBC # BLD AUTO: 2.63 M/UL (ref 4.23–5.67)
RBC MORPH BLD: ABNORMAL
SODIUM SERPL-SCNC: 140 MMOL/L (ref 136–145)
SPECIMEN EXP DATE BLD: NORMAL
STATUS OF UNIT,%ST: NORMAL
STATUS OF UNIT,%ST: NORMAL
UNIT DIVISION, %UDIV: 0
UNIT DIVISION, %UDIV: 0
WBC # BLD AUTO: 1.4 K/UL (ref 4.3–11.1)
WBC MORPH BLD: ABNORMAL

## 2017-06-01 PROCEDURE — 36415 COLL VENOUS BLD VENIPUNCTURE: CPT | Performed by: NURSE PRACTITIONER

## 2017-06-01 PROCEDURE — 85025 COMPLETE CBC W/AUTO DIFF WBC: CPT | Performed by: NURSE PRACTITIONER

## 2017-06-01 PROCEDURE — 74011250636 HC RX REV CODE- 250/636: Performed by: NURSE PRACTITIONER

## 2017-06-01 PROCEDURE — 80069 RENAL FUNCTION PANEL: CPT | Performed by: NURSE PRACTITIONER

## 2017-06-01 RX ORDER — SERTRALINE HYDROCHLORIDE 25 MG/1
25 TABLET, FILM COATED ORAL EVERY EVENING
Qty: 30 TAB | Refills: 0 | Status: SHIPPED | OUTPATIENT
Start: 2017-06-01

## 2017-06-01 RX ORDER — UREA 10 %
2 LOTION (ML) TOPICAL 2 TIMES DAILY
Qty: 120 TAB | Refills: 0 | Status: SHIPPED
Start: 2017-06-01

## 2017-06-01 RX ADMIN — TBO-FILGRASTIM 480 MCG: 480 INJECTION, SOLUTION SUBCUTANEOUS at 08:31

## 2017-06-01 RX ADMIN — Medication 5 ML: at 06:00

## 2017-06-01 NOTE — PROGRESS NOTES
Subjective:   Daily Progress Note: 2017 11:44 AM    No complaints. Dialyzed yesterday. Hopefully for drain today.     Current Facility-Administered Medications   Medication Dose Route Frequency    fentaNYL citrate (PF) injection 12.5-100 mcg  12.5-100 mcg IntraVENous Multiple    midazolam (VERSED) injection 0.25-2 mg  0.25-2 mg IntraVENous Multiple    0.9% sodium chloride infusion  25 mL/hr IntraVENous CONTINUOUS    rifAXIMin (XIFAXAN) tablet 200 mg  200 mg Oral TID    Lactobacillus Acidoph & Bulgar (FLORANEX) tablet 2 Tab  2 Tab Oral BID    0.9% sodium chloride infusion 250 mL  250 mL IntraVENous PRN    tbo-filgrastim (GRANIX) injection 480 mcg  480 mcg SubCUTAneous DAILY    0.9% sodium chloride infusion 250 mL  250 mL IntraVENous PRN    sertraline (ZOLOFT) tablet 25 mg  25 mg Oral QPM    midazolam (VERSED) injection 0.5-2 mg  0.5-2 mg IntraVENous Multiple    fentaNYL citrate (PF) injection 12.5-100 mcg  12.5-100 mcg IntraVENous Multiple    sodium chloride (NS) flush 5-10 mL  5-10 mL IntraVENous Q8H    sodium chloride (NS) flush 5-10 mL  5-10 mL IntraVENous PRN    ondansetron (ZOFRAN) injection 4 mg  4 mg IntraVENous Q4H PRN    albuterol (PROVENTIL VENTOLIN) nebulizer solution 2.5 mg  2.5 mg Nebulization Q4H PRN    pantoprazole (PROTONIX) tablet 40 mg  40 mg Oral ACB&D    epoetin miky (EPOGEN;PROCRIT) injection 20,000 Units  20,000 Units SubCUTAneous Q7D               Objective:     Visit Vitals    /52 (BP 1 Location: Right arm, BP Patient Position: At rest)    Pulse 75    Temp 98 °F (36.7 °C)    Resp 18    Ht 5' 6\" (1.676 m)    Wt 77.3 kg (170 lb 6.4 oz)    SpO2 100%    BMI 27.5 kg/m2      O2 Device: Room air    Temp (24hrs), Av.1 °F (36.7 °C), Min:97.6 °F (36.4 °C), Max:98.4 °F (36.9 °C)          1901 -  0700  In: 1052.3 [P.O.:340]  Out: 0       Visit Vitals    /52 (BP 1 Location: Right arm, BP Patient Position: At rest)    Pulse 75    Temp 98 °F (36.7 °C)  Resp 18    Ht 5' 6\" (1.676 m)    Wt 77.3 kg (170 lb 6.4 oz)    SpO2 100%    BMI 27.5 kg/m2     Head: Normocephalic, without obvious abnormality  Neck: no JVD  Lungs: clear to auscultation bilaterally  Heart: regular rate and rhythm  Abdomen: soft, non-tender. Extremities: no edema  TCC in place          Data Review    Recent Results (from the past 48 hour(s))   GLUCOSE, POC    Collection Time: 05/30/17 11:22 AM   Result Value Ref Range    Glucose (POC) 112 (H) 65 - 100 mg/dL   GLUCOSE, POC    Collection Time: 05/30/17  3:02 PM   Result Value Ref Range    Glucose (POC) 134 (H) 65 - 100 mg/dL   TYPE & CROSSMATCH    Collection Time: 05/30/17  5:10 PM   Result Value Ref Range    Crossmatch Expiration 06/02/2017     ABO/Rh(D) O NEGATIVE     Antibody screen NEG     Unit number [id number]     Blood component type  LR AS3,2     Unit division 00     Status of unit TRANSFUSED     Crossmatch result Compatible     Unit number [id number]     Blood component type  LR AS3     Unit division 00     Status of unit TRANSFUSED     Crossmatch result Compatible    CBC WITH AUTOMATED DIFF    Collection Time: 05/31/17  8:56 AM   Result Value Ref Range    WBC 1.1 (LL) 4.3 - 11.1 K/uL    RBC 2.61 (L) 4.23 - 5.67 M/uL    HGB 7.7 (L) 13.6 - 17.2 g/dL    HCT 22.6 (L) 41.1 - 50.3 %    MCV 86.6 79.6 - 97.8 FL    MCH 29.5 26.1 - 32.9 PG    MCHC 34.1 31.4 - 35.0 g/dL    RDW 16.7 (H) 11.9 - 14.6 %    PLATELET 69 (L) 805 - 450 K/uL    MPV 10.2 (L) 10.8 - 14.1 FL    NEUTROPHILS 38 (L) 47 - 75 %    BAND NEUTROPHILS 11 (H) 0 - 10 %    LYMPHOCYTES 39 16 - 44 %    MONOCYTES 7 3 - 9 %    EOSINOPHILS 2 1 - 8 %    BLASTS 3 %    ABS. NEUTROPHILS 0.5 (L) 1.7 - 8.2 K/UL    ABS. LYMPHOCYTES 0.4 (L) 0.5 - 4.6 K/UL    ABS. MONOCYTES 0.1 0.1 - 1.3 K/UL    ABS.  EOSINOPHILS 0.0 0.0 - 0.8 K/UL    RBC COMMENTS SLIGHT  ANISOCYTOSIS + POIKILOCYTOSIS        WBC COMMENTS Result Confirmed By Smear      PLATELET COMMENTS DECREASED      DF MANUAL     CBC WITH AUTOMATED DIFF    Collection Time: 06/01/17  5:29 AM   Result Value Ref Range    WBC 1.4 (LL) 4.3 - 11.1 K/uL    RBC 2.63 (L) 4.23 - 5.67 M/uL    HGB 7.7 (L) 13.6 - 17.2 g/dL    HCT 23.2 (L) 41.1 - 50.3 %    MCV 88.2 79.6 - 97.8 FL    MCH 29.3 26.1 - 32.9 PG    MCHC 33.2 31.4 - 35.0 g/dL    RDW 17.0 (H) 11.9 - 14.6 %    PLATELET 73 (L) 028 - 450 K/uL    MPV 10.4 (L) 10.8 - 14.1 FL    NEUTROPHILS 27 (L) 43 - 78 %    LYMPHOCYTES 52 (H) 13 - 44 %    MONOCYTES 20 (H) 4.0 - 12.0 %    EOSINOPHILS 1 0.5 - 7.8 %    BASOPHILS 0 0.0 - 2.0 %    ABS. NEUTROPHILS 0.4 (L) 1.7 - 8.2 K/UL    ABS. LYMPHOCYTES 0.7 0.5 - 4.6 K/UL    ABS. MONOCYTES 0.3 0.1 - 1.3 K/UL    ABS. EOSINOPHILS 0.0 0.0 - 0.8 K/UL    ABS. BASOPHILS 0.0 0.0 - 0.2 K/UL    ABS. IMM.  GRANS. 0.0 0.0 - 0.5 K/UL    RBC COMMENTS SLIGHT  ANISOCYTOSIS + POIKILOCYTOSIS        WBC COMMENTS Result Confirmed By Smear      PLATELET COMMENTS DECREASED      DF AUTOMATED     RENAL FUNCTION PANEL    Collection Time: 06/01/17  5:29 AM   Result Value Ref Range    Sodium 140 136 - 145 mmol/L    Potassium 4.0 3.5 - 5.1 mmol/L    Chloride 103 98 - 107 mmol/L    CO2 29 21 - 32 mmol/L    Anion gap 8 7 - 16 mmol/L    Glucose 86 65 - 100 mg/dL    BUN 28 (H) 8 - 23 MG/DL    Creatinine 3.64 (H) 0.8 - 1.5 MG/DL    GFR est AA 22 (L) >60 ml/min/1.73m2    GFR est non-AA 18 (L) >60 ml/min/1.73m2    Calcium 8.5 8.3 - 10.4 MG/DL    Phosphorus 1.7 (L) 2.3 - 3.7 MG/DL    Albumin 2.5 (L) 3.2 - 4.6 g/dL       Assessment     Patient Active Problem List    Diagnosis Date Noted    Staphylococcus epidermidis bacteremia 04/15/2017    Aundrea-rectal abscess 04/09/2017    Symptomatic anemia 03/29/2017    Elevated total protein 03/29/2017    Hyperbilirubinemia 03/29/2017    Dizziness 03/29/2017    [facility] 03/29/2017    HIT (heparin-induced thrombocytopenia) ([facility]) 10/11/2016    CKD (chronic kidney disease) requiring chronic dialysis ([facility] 10/11/2016    Nocturnal hypoxemia 10/04/2016    Suspected sleep apnea 10/02/2016    Acute blood loss anemia 09/29/2016    Paroxysmal atrial fibrillation (HCC) 09/17/2016    Pleural effusion on left 09/12/2016    Acute hypoactive delirium due to multiple etiologies 09/11/2016    Thrombocytopenia ([facility] 09/08/2016    HTN (hypertension) 09/04/2016    GERD (gastroesophageal reflux disease) 09/04/2016    Alcohol abuse, daily use 09/03/2016    Hypokalemia 09/03/2016    VITO (acute kidney injury) ([facility] 09/03/2016    Pancytopenia ([facility] [facility] 09/03/2016           Problems Addressed by Nephrology     ESRD  Abscess  Immature AVF    Plan   -HD tomorrow  -May get Abd drain today

## 2017-06-01 NOTE — PROGRESS NOTES
Patient will discharge to [facility] today. He will transport via [employer]. Patient voices agreement with the discharge plan. Awaiting injection prior to discharge per MD. Case Management will arrange transport time once meds completed.     Care Management Interventions  Transition of Care Consult (CM Consult): Discharge Planning  Discharge Durable Medical Equipment: No  Physical Therapy Consult: Yes  Occupational Therapy Consult: Yes  Speech Therapy Consult: No  Current Support Network: Lives Alone, Own Home  Confirm Follow Up Transport: Family  Plan discussed with Pt/Family/Caregiver: Yes  Freedom of Choice Offered: Yes  Discharge Location  Discharge Placement: Rehab Unit Subacute

## 2017-06-01 NOTE — PROGRESS NOTES
Shift assessment completed. Patient alert and oriented x 3. Pt. On RA Respirations even and unlabored. No acute distress noted. Bed low, locked, call bell within reach. Side rails up x 2. No complaints voiced at this time. Pt. Instructed to call for assistance if needed.

## 2017-06-01 NOTE — PROGRESS NOTES
Mr. Jacklyn Epstein resting quietly in bed. Eyes open to voice; oriented in all spheres at this time. Lung sound diminished on room air. No cough or dyspnea. LLQ with ostomy with brown output with some red streaks. Skin appears jaundiced with red sacral area; skin is blanchable. Turned on right side at this time and encouraged to continue with turning schedule. Perirectal abscess with packing in place. NPO for perirectal drain today in IR. Without further needs or complaints. [place] alarm on for safety. Will monitor closely.

## 2017-06-01 NOTE — DISCHARGE SUMMARY
Hospitalist Discharge Summary     Patient ID:  Kandi Foreman  [id number]  74 y.o.  1950  Admit date: 5/22/2017 10:46 AM  Discharge date and time: 6/1/2017  Attending: Loreta Lee MD  PCP:  Teagan Murguia MD  Treatment Team: Attending Provider: Loreta Lee MD; Hospitalist: Angella Porter NP; Consulting Provider: Deepak Hernandez MD; Consulting Provider: Santiago Martinez MD; Care Manager: Gwen Ray RN; Utilization Review: rTu Pedroza RN    Principal Diagnosis Pancytopenia [facility])   Principal Problem:    Pancytopenia ([facility] [address] (9/3/2016)      Overview: HGB 7.8 ON ADMISSION    Active Problems:    Alcohol abuse, daily use (9/3/2016)      Hypokalemia (9/3/2016)      Overview: 2.7 ON ADMISSION      HTN (hypertension) (9/4/2016)      GERD (gastroesophageal reflux disease) (9/4/2016)      Nocturnal hypoxemia (10/4/2016)      Hyperbilirubinemia (3/29/2017)      ESRD (end stage renal disease) ([facility] [address] (3/29/2017)      Overview: DIALYSIS            Aundrea-rectal abscess (4/9/2017)           HPI:  'Patient is a chronically ill, obese, noncompliant, alcoholic 74 y.o.  male who presented here from dialysis (all but 2 hours finished) for extreme concern for perception of excessive gas from colostomy placed last August for many year history of chronically draining perirectal abscess and multiple surgeries. He denies any new symptoms, fever, signs of infection, new symptoms. Vague SOB with exertion. Denies abdominal pain, reports bloating only. Anorexia. No nausea or vomiting. He is non compliant in general, is repetatively reporting he has no one to care for him. Apparently lives with brother who works outside the home. He has repeated hospitalizations. He was last admitted here from 4/9-18/17 for new re-occurrence of perirectal abscess containing staph epidermidis bacteremia, proteus, and E Coli. Same drained by IR. hgb was 5.0 on that admission with subsequent transfusion.  He was treated with IV vancomycin and zosyn, and discharged to rehab on augmentin. Has been home a brief time only. Recently had dialysis graft placed, reports indicate it is still premature. Prior to that, he was admitted from 3/29-4/2/17 for symptomatic anemia. Hgb this admission is 10.1. Chronic pancytopenia followed by Hematology: He has also lost extensive weight since last year. Unknown how much, estimates 50#. Has no idea of what medications he takes. Additional history as noted below. Patient extremely poor historian, unable to focus on any task a hand.'    Hospital Course:  He was admitted and treated with 6 days of cefepime and 4 days of vancomycin for his marisa-rectal abscess. Wound grew MRSA. IR consulted for drain placement, but he initially refused. They were re-consulted, but when marisa-rectal abscess was re-evaluated by CT pelvis on 5/31, there was resolution. He also had problems with pancytopenia. Heme/onc consulted and felt this was due to chronic infection. He was started on Granix and WBC count maira from 0.5 on admit to 1.4 on discharge. Hematology recommended follow up with Dr. Chano Bentley in 7-10 days after discharge. He also had high output of his ostomy and gas/abdominal distention. GI consulted. Stool culture negative and stool also negative for C diff. GI felt symptoms related to possible bacterial overgrowth and started him on probiotic and xifaxin. Xifaxin to be continued for 2 weeks after discharge. For his ESRD, he was treated by nephrology and HD. On the day of discharge, he was deemed stable for discharge and he felt okay and denied complaints.     Significant Diagnostic Studies:       Labs: Results:       Chemistry Recent Labs      06/01/17   0529   GLU  86   NA  140   K  4.0   CL  103   CO2  29   BUN  28*   CREA  3.64*   CA  8.5   AGAP  8   ALB  2.5*      CBC w/Diff Recent Labs      06/01/17   0529  05/31/17   0856  05/30/17   0643   WBC  1.4*  1.1*  1.3*   RBC  2.63*  2.61*  2.49*   HGB  7.7* 7. 7*  7.4*   HCT  23.2*  22.6*  22.0*   PLT  73*  69*  84*   GRANS  27*  38*  14*   LYMPH  52*  39  57*   EOS  1  2   --       Cardiac Enzymes No results for input(s): CPK, CKND1, MERVAT in the last 72 hours. No lab exists for component: CKRMB, TROIP   Coagulation No results for input(s): PTP, INR, APTT in the last 72 hours. No lab exists for component: INREXT    Lipid Panel Lab Results   Component Value Date/Time    Cholesterol, total 80 09/29/2016 02:43 AM    HDL Cholesterol 16 09/29/2016 02:43 AM    LDL, calculated 30 09/29/2016 02:43 AM    VLDL, calculated 34 09/29/2016 02:43 AM    Triglyceride 170 09/29/2016 02:43 AM    CHOL/HDL Ratio 5.0 09/29/2016 02:43 AM      BNP No results for input(s): BNPP in the last 72 hours. Liver Enzymes Recent Labs      06/01/17   0529   ALB  2.5*      Thyroid Studies Lab Results   Component Value Date/Time    T4, Total 5.7 10/02/2016 03:00 PM    T3 Uptake 34 10/02/2016 03:00 PM    TSH 2.760 03/30/2017 09:10 AM            Discharge Exam:  Visit Vitals    BP 96/52 (BP 1 Location: Right arm, BP Patient Position: At rest)    Pulse 78    Temp 98.1 °F (36.7 °C)    Resp 18    Ht 5' 6\" (1.676 m)    Wt 77.3 kg (170 lb 6.4 oz)    SpO2 100%    BMI 27.5 kg/m2     General appearance: chronically ill appearing, NAD  Lungs: clear to auscultation bilaterally  Heart: regular rate and rhythm, S1, S2 normal, no murmur, click, rub or gallop  Abdomen: soft, non-tender, ostomy in place. Extremities: no cyanosis or edema  Neurologic: Grossly normal    Disposition: STR  Discharge Condition: stable  Patient Instructions:   Current Discharge Medication List      START taking these medications    Details   Lactobacillus Acidoph & Bulgar (FLORANEX) 1 million cell tab tablet Take 2 Tabs by mouth two (2) times a day. Qty: 120 Tab, Refills: 0      rifAXIMin (XIFAXAN) 200 mg tablet Take 1 Tab by mouth three (3) times daily for 14 days.  Indications: High ostomy output, suspected bacterial overgrowth  Qty: 42 Tab, Refills: 0      sertraline (ZOLOFT) 25 mg tablet Take 1 Tab by mouth every evening. Indications: ANXIETY WITH DEPRESSION  Qty: 30 Tab, Refills: 0         CONTINUE these medications which have NOT CHANGED    Details   albuterol (PROVENTIL VENTOLIN) 2.5 mg /3 mL (0.083 %) nebulizer solution 3 mL by Nebulization route every six (6) hours as needed for Wheezing. Qty: 24 Each, Refills: 0    Associated Diagnoses: Perirectal abscess      Cholecalciferol, Vitamin D3, (VITAMIN D3) 2,000 unit cap capsule Take 2,000 Units by mouth once. OXYGEN-AIR DELIVERY SYSTEMS Take 2 L/min by inhalation nightly. via NC      magnesium oxide (MAG-OX) 400 mg tablet Take 400 mg by mouth daily. ferrous sulfate 325 mg (65 mg iron) tablet Take 1 Tab by mouth two (2) times daily (with meals). Qty: 60 Tab, Refills: 0      acetaminophen (TYLENOL) 325 mg tablet Take 2 Tabs by mouth two (2) times daily as needed. Use only if necessary. Patient has history of alcoholic liver disease  Qty: 30 Tab, Refills: 0      epoetin miky (EPOGEN;PROCRIT) 10,000 unit/mL injection 1 mL by SubCUTAneous route every seven (7) days. Indications: RENAL DIALYSIS  Qty: 1 Vial, Refills: 0      pantoprazole (PROTONIX) 40 mg tablet Take 1 Tab by mouth Before breakfast and dinner. Qty: 60 Tab, Refills: 0         STOP taking these medications       metoprolol tartrate (LOPRESSOR) 25 mg tablet Comments:   Reason for Stopping:               Activity: PT/OT Eval and Treat  Diet: Renal Diet and lactose free  Wound Care: Aundrea-rectal wound care per wound care recommendations. Ostomy care. Follow-up  ·   Dr. Sudarshan Hairston (heme/onc) in 7-10 days for pancytopenia. · [facility] in 4 weeks. · Physician at [facility]. · Nephrology and hemodialysis per his dialysis schedule. Time spent to discharge patient 35 minutes  Signed:  Roseline Grande.  Jacey Pineda MD  6/1/2017  11:30 AM

## 2017-06-01 NOTE — PROGRESS NOTES
IV to right wrist removed; tip intact. [facility] No personal belongings to send with patient except glasses, which are on patient at this time. To be picked up at 1345.

## 2017-06-08 ENCOUNTER — HOSPITAL ENCOUNTER (OUTPATIENT)
Dept: INFUSION THERAPY | Age: 67
Discharge: HOME | End: 2017-06-08
Payer: COMMERCIAL

## 2017-06-08 VITALS
SYSTOLIC BLOOD PRESSURE: 106 MMHG | RESPIRATION RATE: 18 BRPM | OXYGEN SATURATION: 99 % | TEMPERATURE: 98.4 F | DIASTOLIC BLOOD PRESSURE: 59 MMHG | HEART RATE: 76 BPM

## 2017-06-08 PROCEDURE — P9016 RBC LEUKOCYTES REDUCED: HCPCS | Performed by: INTERNAL MEDICINE

## 2017-06-08 PROCEDURE — 36430 TRANSFUSION BLD/BLD COMPNT: CPT

## 2017-06-08 PROCEDURE — 86923 COMPATIBILITY TEST ELECTRIC: CPT | Performed by: INTERNAL MEDICINE

## 2017-06-08 PROCEDURE — 86900 BLOOD TYPING SEROLOGIC ABO: CPT | Performed by: INTERNAL MEDICINE

## 2017-06-08 PROCEDURE — 77030018667 ADMN ST IV BLD FENW -A

## 2017-06-08 PROCEDURE — 74011250636 HC RX REV CODE- 250/636: Performed by: INTERNAL MEDICINE

## 2017-06-08 RX ORDER — SODIUM CHLORIDE 9 MG/ML
250 INJECTION, SOLUTION INTRAVENOUS AS NEEDED
Status: DISCONTINUED | OUTPATIENT
Start: 2017-06-08 | End: 2017-06-12 | Stop reason: HOSPADM

## 2017-06-08 RX ORDER — SODIUM CHLORIDE 0.9 % (FLUSH) 0.9 %
10 SYRINGE (ML) INJECTION AS NEEDED
Status: ACTIVE | OUTPATIENT
Start: 2017-06-08 | End: 2017-06-09

## 2017-06-08 RX ADMIN — SODIUM CHLORIDE 250 ML: 900 INJECTION, SOLUTION INTRAVENOUS at 10:00

## 2017-06-08 RX ADMIN — Medication 10 ML: at 15:35

## 2017-06-08 RX ADMIN — Medication 10 ML: at 10:00

## 2017-06-08 NOTE — PROGRESS NOTES
Arrived to the [facility]. 2 units of PRBCs completed. Patient tolerated well. Any issues or concerns during appointment: None. Discharged on stretcher accompanied by transport.

## 2017-06-09 LAB
ABO + RH BLD: NORMAL
BLD PROD TYP BPU: NORMAL
BLD PROD TYP BPU: NORMAL
BLOOD GROUP ANTIBODIES SERPL: NORMAL
BPU ID: NORMAL
BPU ID: NORMAL
CROSSMATCH RESULT,%XM: NORMAL
CROSSMATCH RESULT,%XM: NORMAL
SPECIMEN EXP DATE BLD: NORMAL
STATUS OF UNIT,%ST: NORMAL
STATUS OF UNIT,%ST: NORMAL
UNIT DIVISION, %UDIV: 0
UNIT DIVISION, %UDIV: 0

## 2017-07-18 ENCOUNTER — PATIENT OUTREACH (OUTPATIENT)
Dept: CASE MANAGEMENT | Age: 67
End: 2017-07-18

## 2017-07-18 NOTE — PROGRESS NOTES
RICHMOND GRAVES and DEVAN GRAVES made visit today to skilled nursing facility at request of team Director to speak with patient and ascertain why a sudden change had taken place in his willingness to perform colostomy care, refusing dialysis, appetite/ changes in eating pattern and refusing to engage in therapy - patient has been at facility since April of this year in placement from hospital - RICHMOND and RN discussed patient's current circumstances, desire to remain in facility or return home with brother, reasons for not going to dialysis, eating, not wanting to do colostomy care or not participating in physical therapy  what patient was able to finally express is that he (1) wants to remain long-term at the facility (I explained to him that his SSA check would go to facility and Medicaid would cover the rest  patient stated understanding of this), (2) he stated that he had difficulty swallowing which affected his appetite and (3) he appears to be depressed and even though he is currently on an anti-depressant, this might need to be tweaked  we communicated results of our conversation to  Cecilia Mendoza) and made recommendations for (1) psych consult, tweaking of meds and possible talk therapy with a psychologist , (2) a new swallow eval (patient has not been eating well apparently for awhile), and patient stated that he would follow through with dialysis and will attempt therapy again we stressed the importance of nutrition and therapy to build his energy levels and stamina  also told patient that if he did not go to dialysis he would have made a different choice and would probably die  patient did not want this  both RN CM and pierce FRAGOSO met with  following visit -discussed conversation with patient and recommendations - she was very appreciative of input  - team Director updated on case status.

## 2017-07-19 ENCOUNTER — PATIENT OUTREACH (OUTPATIENT)
Dept: CASE MANAGEMENT | Age: 67
End: 2017-07-19

## 2017-07-19 NOTE — PROGRESS NOTES
ISSA and RICHMOND met with patient on 7/18/17 at [facility] as requested by team Director to assist facility team regarding patients sudden change in patients willingness to participate in his care with colostomy, refusing to eat, refusing to attend dialysis treatments and refusal to participatie in PT. While speaking with the patient he was concenred about being sent home and not being ready. He stated that he would be at home by himself while his brother was at work and he would have to do everything for himself and he didnt feel as though he would be able. I feel if I go home I wont make it. [facility] feels as though he needs to remain at [facility] long term. His issue with not attending dialysis and therapy was his inability due to weakness. CCM emphasized the importance of proper nutrition and therapy to regain his strength. Patient reported that he is refusing to eat because it is difficult for him to swallow. CCM also observed constant belching during conversation with patient. When speaking about colostomy care he stated, \"all of this is just too much it doesn't matter anyway I'm not getting better. \" ISSA and RICHMOND stressed the importance of participating in healthcare as recommended by his physician and therapy to regain his strength. RICHMOND and ISSA met with  to discuss recommendations of a psychiatric evaluation for therapy sessions related to his life changes regarding his medical conditions and to review current medications. A Speech evaluation regarding patients report of difficulty with swallowing and continuous belching.  grateful for assistance and will discuss RICHMOND and ISSA recommendations with patient's care team.  This note will not be viewable in [address]

## 2017-07-24 ENCOUNTER — HOSPITAL ENCOUNTER (INPATIENT)
Age: 67
LOS: 4 days | Discharge: NURSING, REHAB OR LONG-TERM CARE | DRG: 314 | End: 2017-07-28
Attending: EMERGENCY MEDICINE | Admitting: INTERNAL MEDICINE
Payer: MEDICARE

## 2017-07-24 ENCOUNTER — APPOINTMENT (OUTPATIENT)
Dept: GENERAL RADIOLOGY | Age: 67
DRG: 314 | End: 2017-07-24
Attending: EMERGENCY MEDICINE
Payer: MEDICARE

## 2017-07-24 DIAGNOSIS — D64.9 ANEMIA, UNSPECIFIED TYPE: Primary | ICD-10-CM

## 2017-07-24 DIAGNOSIS — R53.83 FATIGUE, UNSPECIFIED TYPE: ICD-10-CM

## 2017-07-24 LAB
ALBUMIN SERPL BCP-MCNC: 2.8 G/DL (ref 3.2–4.6)
ALBUMIN/GLOB SERPL: 0.6 {RATIO} (ref 1.2–3.5)
ALP SERPL-CCNC: 74 U/L (ref 50–136)
ALT SERPL-CCNC: 9 U/L (ref 12–65)
ANION GAP BLD CALC-SCNC: 9 MMOL/L (ref 7–16)
AST SERPL W P-5'-P-CCNC: 21 U/L (ref 15–37)
BACTERIA SPEC CULT: NORMAL
BASOPHILS # BLD AUTO: 0 K/UL (ref 0–0.2)
BASOPHILS # BLD: 0 % (ref 0–2)
BILIRUB SERPL-MCNC: 1 MG/DL (ref 0.2–1.1)
BUN SERPL-MCNC: 21 MG/DL (ref 8–23)
CALCIUM SERPL-MCNC: 7.6 MG/DL (ref 8.3–10.4)
CHLORIDE SERPL-SCNC: 98 MMOL/L (ref 98–107)
CO2 SERPL-SCNC: 30 MMOL/L (ref 21–32)
CREAT SERPL-MCNC: 1.75 MG/DL (ref 0.8–1.5)
DIFFERENTIAL METHOD BLD: ABNORMAL
EOSINOPHIL # BLD: 0 K/UL (ref 0–0.8)
EOSINOPHIL NFR BLD: 1 % (ref 0.5–7.8)
ERYTHROCYTE [DISTWIDTH] IN BLOOD BY AUTOMATED COUNT: 17.3 % (ref 11.9–14.6)
GLOBULIN SER CALC-MCNC: 4.6 G/DL (ref 2.3–3.5)
GLUCOSE SERPL-MCNC: 79 MG/DL (ref 65–100)
HCT VFR BLD AUTO: 19.8 % (ref 41.1–50.3)
HGB BLD-MCNC: 7.1 G/DL (ref 13.6–17.2)
IMM GRANULOCYTES # BLD: 0 K/UL (ref 0–0.5)
IMM GRANULOCYTES NFR BLD AUTO: 0 % (ref 0–5)
LYMPHOCYTES # BLD AUTO: 73 % (ref 13–44)
LYMPHOCYTES # BLD: 0.7 K/UL (ref 0.5–4.6)
MCH RBC QN AUTO: 30.2 PG (ref 26.1–32.9)
MCHC RBC AUTO-ENTMCNC: 35.9 G/DL (ref 31.4–35)
MCV RBC AUTO: 84.3 FL (ref 79.6–97.8)
MONOCYTES # BLD: 0.2 K/UL (ref 0.1–1.3)
MONOCYTES NFR BLD AUTO: 18 % (ref 4–12)
NEUTS SEG # BLD: 0.1 K/UL (ref 1.7–8.2)
NEUTS SEG NFR BLD AUTO: 8 % (ref 43–78)
PLATELET # BLD AUTO: 153 K/UL (ref 150–450)
PMV BLD AUTO: 8.9 FL (ref 10.8–14.1)
POTASSIUM SERPL-SCNC: 3 MMOL/L (ref 3.5–5.1)
PROT SERPL-MCNC: 7.4 G/DL (ref 6.3–8.2)
RBC # BLD AUTO: 2.35 M/UL (ref 4.23–5.67)
SERVICE CMNT-IMP: NORMAL
SODIUM SERPL-SCNC: 137 MMOL/L (ref 136–145)
TROPONIN I SERPL-MCNC: <0.02 NG/ML (ref 0.02–0.05)
WBC # BLD AUTO: 0.9 K/UL (ref 4.3–11.1)

## 2017-07-24 PROCEDURE — 93005 ELECTROCARDIOGRAM TRACING: CPT | Performed by: EMERGENCY MEDICINE

## 2017-07-24 PROCEDURE — 86900 BLOOD TYPING SEROLOGIC ABO: CPT | Performed by: EMERGENCY MEDICINE

## 2017-07-24 PROCEDURE — 77030013131 HC IV BLD ST ICUM -A

## 2017-07-24 PROCEDURE — 86923 COMPATIBILITY TEST ELECTRIC: CPT | Performed by: EMERGENCY MEDICINE

## 2017-07-24 PROCEDURE — 30233N1 TRANSFUSION OF NONAUTOLOGOUS RED BLOOD CELLS INTO PERIPHERAL VEIN, PERCUTANEOUS APPROACH: ICD-10-PCS | Performed by: INTERNAL MEDICINE

## 2017-07-24 PROCEDURE — 80053 COMPREHEN METABOLIC PANEL: CPT | Performed by: EMERGENCY MEDICINE

## 2017-07-24 PROCEDURE — 96360 HYDRATION IV INFUSION INIT: CPT | Performed by: EMERGENCY MEDICINE

## 2017-07-24 PROCEDURE — 71020 XR CHEST PA LAT: CPT

## 2017-07-24 PROCEDURE — 99284 EMERGENCY DEPT VISIT MOD MDM: CPT | Performed by: EMERGENCY MEDICINE

## 2017-07-24 PROCEDURE — 65660000000 HC RM CCU STEPDOWN

## 2017-07-24 PROCEDURE — 36430 TRANSFUSION BLD/BLD COMPNT: CPT

## 2017-07-24 PROCEDURE — 81003 URINALYSIS AUTO W/O SCOPE: CPT | Performed by: EMERGENCY MEDICINE

## 2017-07-24 PROCEDURE — 85025 COMPLETE CBC W/AUTO DIFF WBC: CPT | Performed by: EMERGENCY MEDICINE

## 2017-07-24 PROCEDURE — 84484 ASSAY OF TROPONIN QUANT: CPT | Performed by: EMERGENCY MEDICINE

## 2017-07-24 PROCEDURE — 87641 MR-STAPH DNA AMP PROBE: CPT | Performed by: INTERNAL MEDICINE

## 2017-07-24 PROCEDURE — P9016 RBC LEUKOCYTES REDUCED: HCPCS | Performed by: EMERGENCY MEDICINE

## 2017-07-24 PROCEDURE — 5A1D60Z PERFORMANCE OF URINARY FILTRATION, MULTIPLE: ICD-10-PCS | Performed by: INTERNAL MEDICINE

## 2017-07-24 PROCEDURE — 74011250636 HC RX REV CODE- 250/636: Performed by: EMERGENCY MEDICINE

## 2017-07-24 RX ORDER — ALBUTEROL SULFATE 0.83 MG/ML
2.5 SOLUTION RESPIRATORY (INHALATION)
Status: DISCONTINUED | OUTPATIENT
Start: 2017-07-24 | End: 2017-07-28 | Stop reason: HOSPADM

## 2017-07-24 RX ORDER — MELATONIN
2000 DAILY
Status: DISCONTINUED | OUTPATIENT
Start: 2017-07-25 | End: 2017-07-28 | Stop reason: HOSPADM

## 2017-07-24 RX ORDER — ONDANSETRON 2 MG/ML
4 INJECTION INTRAMUSCULAR; INTRAVENOUS
Status: DISCONTINUED | OUTPATIENT
Start: 2017-07-24 | End: 2017-07-28 | Stop reason: HOSPADM

## 2017-07-24 RX ORDER — SODIUM CHLORIDE 0.9 % (FLUSH) 0.9 %
5-10 SYRINGE (ML) INJECTION AS NEEDED
Status: DISCONTINUED | OUTPATIENT
Start: 2017-07-24 | End: 2017-07-28 | Stop reason: HOSPADM

## 2017-07-24 RX ORDER — LANOLIN ALCOHOL/MO/W.PET/CERES
325 CREAM (GRAM) TOPICAL 2 TIMES DAILY WITH MEALS
Status: DISCONTINUED | OUTPATIENT
Start: 2017-07-24 | End: 2017-07-28 | Stop reason: HOSPADM

## 2017-07-24 RX ORDER — SODIUM CHLORIDE 9 MG/ML
250 INJECTION, SOLUTION INTRAVENOUS AS NEEDED
Status: DISCONTINUED | OUTPATIENT
Start: 2017-07-24 | End: 2017-07-28 | Stop reason: HOSPADM

## 2017-07-24 RX ORDER — UREA 10 %
2 LOTION (ML) TOPICAL 2 TIMES DAILY
Status: DISCONTINUED | OUTPATIENT
Start: 2017-07-24 | End: 2017-07-28 | Stop reason: HOSPADM

## 2017-07-24 RX ORDER — SODIUM CHLORIDE 0.9 % (FLUSH) 0.9 %
5-10 SYRINGE (ML) INJECTION EVERY 8 HOURS
Status: DISCONTINUED | OUTPATIENT
Start: 2017-07-24 | End: 2017-07-28 | Stop reason: HOSPADM

## 2017-07-24 RX ORDER — SERTRALINE HYDROCHLORIDE 50 MG/1
25 TABLET, FILM COATED ORAL EVERY EVENING
Status: DISCONTINUED | OUTPATIENT
Start: 2017-07-24 | End: 2017-07-28 | Stop reason: HOSPADM

## 2017-07-24 RX ORDER — ACETAMINOPHEN 325 MG/1
650 TABLET ORAL
Status: DISCONTINUED | OUTPATIENT
Start: 2017-07-24 | End: 2017-07-28 | Stop reason: HOSPADM

## 2017-07-24 RX ADMIN — SODIUM CHLORIDE 500 ML: 900 INJECTION, SOLUTION INTRAVENOUS at 15:12

## 2017-07-24 NOTE — Clinical Note
Status[de-identified] Inpatient [101] Type of Bed: Telemetry [19] Inpatient Hospitalization Certified Necessary for the Following Reasons: 1. Patient Failed outpatient treatment (further clarification in H&P documentation) Admitting Diagnosis: Symptomatic anemia [[id number]] Admitting Physician: Lexii Hancock [[id number]] Attending Physician: Lexii Hancock [[id number]] Estimated Length of Stay: 2 Midnights Discharge Plan[de-identified] Extended Care Facility (e.g. Adult Home, Nursing Home, etc.)

## 2017-07-24 NOTE — ED NOTES
TRANSFER - OUT REPORT:    Verbal report given to Cristy Mitchell RN on Xavier Concepcion  being transferred to [phone] for routine progression of care       Report consisted of patients Situation, Background, Assessment and   Recommendations(SBAR). Information from the following report(s) SBAR, Kardex, ED Summary and MAR was reviewed with the receiving nurse. Lines:       Opportunity for questions and clarification was provided.

## 2017-07-24 NOTE — PROGRESS NOTES
Patient arrived to floor via transport. Patient is alert and orientated with no distress noted. Patient appears very weak and pale. Duel skin assessment completed with DEVAN Bowman. Patient has a dialysis access in [facility] that was placed about 3 weeks ago per patient. Patient has a right dialysis PC that is c/d/i. Patient has a left abd colostomy. Patients skin is very pale and lower extremities cool to the touch. Bilateral feet with flaky skin but no redness noted. Respirations even and unlabored with clear breath sounds. IV intact and patent with no s/s of infection noted. Patient able to ambulate x1 assist r/t weakness and low hgb. Patient orientated to room and surroundings. Call light within reach and patient instructed to call if assistance is needed. Bed in low locked position.   Report to be given to oncoming RN 7p-7a

## 2017-07-24 NOTE — H&P
History and Physical    Patient: Manson Gitelman MRN: [id number]  SSN: [id number]    YOB: 1950  Age: 74 y.o. Sex: male        Date of Admission: 7/24/2017  2:38 PM  Primary Care Physician: Juan Jones MD  Attending on Admission: Mary Beth Alford MD     Subjective   History of Present Illness: Manson Gitelman is a 74 y.o. male with PMH of ESRD on HD (dialyzed today) and perirectal abscess requiring multiple hospitalizations who was sent to the ED from his care facility due to abnormal labs. Pt is a poor historian so information is limited. Hgb was found to be 7.1 in the ED. He is unsure whether he has had any melena or BRBPR. He has a colostomy and does note increased gas. No hematemesis. He has required blood transfsions in the past. He has had some dizziness but no syncopal episodes. He says his BP has been running low recently but is unable to tell me levels. He was found to have BP of 80s/40s in the ED. He denies any fevers and vomiting. He continues to have pain from his perirectal abscess with ongoing drainage. He is not sure if he is on abx. Review of Systems:  10 point review of systems was obtained and was positive for dizziness, increased OP from colostomy, low BP, perirectal abscess. otherwise negative unless mentioned in the HPI. Objective   Past Medical History:   Past Medical History:   Diagnosis Date    Alcohol abuse, daily use 09/03/2016    None since going to rehab    Anemia 9/3/2016    Pancytopenia    Chronic kidney disease     end stage renal disease- dialysis on MWF    HIT (heparin-induced thrombocytopenia) ([facility] [address]     Hypertension              Tobacco abuse 9/3/2016    Not smoking since in rehab       Past Surgical History:  Past Surgical History:   Procedure Laterality Date    ABDOMEN SURGERY PROC UNLISTED      Sept. 2016.     DEBRIDE NECROTIC SKIN/ TISSUE, ABD WALL  09/04/2016    I&D rectal abscess with drain placement - Dr. Lamon Klinefelter rectal abcess surgery that also got infected-  Subsequent I & D of the rectal abcess. Past Family and Social History:  Social History     Social History    Marital status: SINGLE     Spouse name: N/A    Number of children: N/A    Years of education: N/A     Occupational History    Not on file. Social History Main Topics    Smoking status: Former Smoker     Packs/day: 2.00     Years: 40.00     Types: Cigarettes    Smokeless tobacco: Never Used      Comment: recent discontinuation    Alcohol use 42.0 - 63.0 oz/week     70 - 105 Cans of beer per week      Comment: 10-15 CANS DAILY, recent discontinuation    Drug use: No    Sexual activity: Not on file     Other Topics Concern    Not on file     Social History Narrative    Pt currently residing at care facility             Family History   Problem Relation Age of Onset    Stroke Mother        Allergy:     Allergies   Allergen Reactions    Heparin Analogues Other (comments)     H. I.T positive       Medications: (From last discharge)    No current facility-administered medications on file prior to encounter. Current Outpatient Prescriptions on File Prior to Encounter   Medication Sig Dispense Refill    Lactobacillus Acidoph & Bulgar [facility]) 1 million cell tab tablet Take 2 Tabs by mouth two (2) times a day. 120 Tab 0    sertraline (ZOLOFT) 25 mg tablet Take 1 Tab by mouth every evening. Indications: ANXIETY WITH DEPRESSION 30 Tab 0    albuterol (PROVENTIL VENTOLIN) 2.5 mg /3 mL (0.083 %) nebulizer solution 3 mL by Nebulization route every six (6) hours as needed for Wheezing. 24 Each 0    Cholecalciferol, Vitamin D3, (VITAMIN D3) 2,000 unit cap capsule Take 2,000 Units by mouth once.  OXYGEN-AIR DELIVERY SYSTEMS Take 2 L/min by inhalation nightly. via NC      magnesium oxide (MAG-OX) 400 mg tablet Take 400 mg by mouth daily.  ferrous sulfate 325 mg (65 mg iron) tablet Take 1 Tab by mouth two (2) times daily (with meals).  60 Tab 0    acetaminophen (TYLENOL) 325 mg tablet Take 2 Tabs by mouth two (2) times daily as needed. Use only if necessary. Patient has history of alcoholic liver disease 30 Tab 0    epoetin miky (EPOGEN;PROCRIT) 10,000 unit/mL injection 1 mL by SubCUTAneous route every seven (7) days. Indications: RENAL DIALYSIS 1 Vial 0    pantoprazole (PROTONIX) 40 mg tablet Take 1 Tab by mouth Before breakfast and dinner. 60 Tab 0        Physical Examination   Vital signs:   Visit Vitals    BP 96/50    Pulse 84    Temp 97.7 °F (36.5 °C)    Resp 18    Ht 5' 6\" (1.676 m)    Wt 65.8 kg (145 lb)    SpO2 100%    BMI 23.4 kg/m2    Weight: 65.8 kg (145 lb) with Body mass index is 23.4 kg/(m^2). General: elderly  male lying in bed, slow to answer questions, NAD   HENT: Normocephalic and atraumatic. Pupils are equal, round, and reactive to light. Extraocular movements intact. Oropharynx pink/moist.   Neck: Normal range of motion. Neck supple. Cardiovascular: Regular rate and rhythm; no murmurs, rubs, gallops. Pulmonary/Chest: No increased work of breathing. No respiratory distress. Clear to auscultation bilaterally. No wheezes, crackles. Abdominal: Soft, non distended, non tender. Colostomy with stool and air in bag. Extremities: 1+ pitting edema to shins. Neurological: Cranial nerves II-XII intact. No focal deficits. Skin: Warm, intact. No rashes/lesions. Pallor. Rectal: perirectal abscess with bandage, packing is loose under bandage. No purulence or foul odor.        Data   Pertinent Labs:  Recent Results (from the past 12 hour(s))   TROPONIN I    Collection Time: 07/24/17  1:50 PM   Result Value Ref Range    Troponin-I, Qt. <0.02 (L) 0.02 - 0.05 NG/ML   CBC WITH AUTOMATED DIFF    Collection Time: 07/24/17  1:50 PM   Result Value Ref Range    WBC 0.9 (LL) 4.3 - 11.1 K/uL    RBC 2.35 (L) 4.23 - 5.67 M/uL    HGB 7.1 (L) 13.6 - 17.2 g/dL    HCT 19.8 (LL) 41.1 - 50.3 %    MCV 84.3 79.6 - 97.8 FL    MCH 30.2 26.1 - 32.9 PG    MCHC 35.9 (H) 31.4 - 35.0 g/dL    RDW 17.3 (H) 11.9 - 14.6 %    PLATELET 206 302 - 500 K/uL    MPV 8.9 (L) 10.8 - 14.1 FL    DF AUTOMATED      NEUTROPHILS 8 (L) 43 - 78 %    LYMPHOCYTES 73 (H) 13 - 44 %    MONOCYTES 18 (H) 4.0 - 12.0 %    EOSINOPHILS 1 0.5 - 7.8 %    BASOPHILS 0 0.0 - 2.0 %    IMMATURE GRANULOCYTES 0.0 0.0 - 5.0 %    ABS. NEUTROPHILS 0.1 (L) 1.7 - 8.2 K/UL    ABS. LYMPHOCYTES 0.7 0.5 - 4.6 K/UL    ABS. MONOCYTES 0.2 0.1 - 1.3 K/UL    ABS. EOSINOPHILS 0.0 0.0 - 0.8 K/UL    ABS. BASOPHILS 0.0 0.0 - 0.2 K/UL    ABS. IMM. GRANS. 0.0 0.0 - 0.5 K/UL   METABOLIC PANEL, COMPREHENSIVE    Collection Time: 07/24/17  1:50 PM   Result Value Ref Range    Sodium 137 136 - 145 mmol/L    Potassium 3.0 (L) 3.5 - 5.1 mmol/L    Chloride 98 98 - 107 mmol/L    CO2 30 21 - 32 mmol/L    Anion gap 9 7 - 16 mmol/L    Glucose 79 65 - 100 mg/dL    BUN 21 8 - 23 MG/DL    Creatinine 1.75 (H) 0.8 - 1.5 MG/DL    GFR est AA 51 (L) >60 ml/min/1.73m2    GFR est non-AA 42 (L) >60 ml/min/1.73m2    Calcium 7.6 (L) 8.3 - 10.4 MG/DL    Bilirubin, total 1.0 0.2 - 1.1 MG/DL    ALT (SGPT) 9 (L) 12 - 65 U/L    AST (SGOT) 21 15 - 37 U/L    Alk. phosphatase 74 50 - 136 U/L    Protein, total 7.4 6.3 - 8.2 g/dL    Albumin 2.8 (L) 3.2 - 4.6 g/dL    Globulin 4.6 (H) 2.3 - 3.5 g/dL    A-G Ratio 0.6 (L) 1.2 - 3.5     TYPE & SCREEN    Collection Time: 07/24/17  1:50 PM   Result Value Ref Range    Crossmatch Expiration PENDING     ABO/Rh(D) O NEGATIVE     Antibody screen NEG        Imaging Studies:     XR CHEST PA LAT   Final Result   IMPRESSION:      1. No acute abnormality. CXR: No acute cardiopulmonary processes.     EKG: personally reviewed, NSR, inverted T wave in V1 otherwise no abnormalities    Emergency Department course:     Medications   0.9% sodium chloride infusion 250 mL (not administered)   sodium chloride 0.9 % bolus infusion 500 mL (500 mL IntraVENous New Bag 7/24/17 1512)        Assessment and Plan   Jesse Mann is a 74 y.o. male with PMH of ESRD and perirectal abscess being admitted for near syncope with hypotension and anemia. 1. Near syncope - This is most likely from underlying anemia and hypotension. Will transfuse and see if symptoms improve. 2. Hypotension - Pt dialyzed today. It is unclear if fluid was removed. Pt also anemic. He has responded to 750ml NS and will be receiving blood this evening. 3. Anemia - Most likely just downward trend with anemia of CKD. Pt does not appear to have any melena or blood in his colostomy bag. Transfuse 1 unit  PRBCs. FOBT. Pt receives ferrous sulfate as OP and it will be continued. Epo also given as OP. B12 and folate in March were normal.  4. ESRD on HD - Pt dialyzed today. Nephrology will be consulted for inpatient HD. 5. Leukopenia - This is chronic, however, he normally also has thrombocytopenia but counts are normal today. Underlying bone marrow DO - MDS? No e/o infection. Perirectal abscess looks ok. 6. Perirectal abscess - Wound consult. Does not appear actively infected. 7. Hypokalemia - Cautiously replete with underlying ESRD. 8. DVT ppx - SCDs      Code Status: FULL    Patient will be admitted as inpatient as expected hospitalization is greater than 2 midnights for workup of anemia with hypotension and near syncope. On the date of admission approximately 55 minutes was spent with this patient with greater than 50% of time spent on counseling and coordination of care.     Janice Valladares MD  [facility] Hospitalist  07/24/17, 4:23 PM

## 2017-07-24 NOTE — ED PROVIDER NOTES
HPI Comments: Patient is a 79-year-old male with fatigue. States he's had worsening fatigue over the past couple of weeks, states he is told to come up to the emergency department because his hemoglobin was low. States he has felt very faint, thinks he may have passed out earlier \"but maybe not all the way\". He denies any specific complaints, specifically no abdominal pain, no chest pain, no nausea or vomiting, no further complaints. Patient is a 74 y.o. male presenting with fatigue. The history is provided by the patient. No  was used. Fatigue   Pertinent negatives include no shortness of breath, no chest pain, no vomiting, no headaches and no nausea. Past Medical History:   Diagnosis Date    Alcohol abuse, daily use 09/03/2016    NONE X 7 DAYS, USUALLY DRINKS 10-15 BEERS DAILY. TAKES A WEEK OFF AT A TIME ONCE A MONTH    Anemia 9/3/2016    HGB 7.8 ON ADMISSION    Chronic kidney disease     end stage renal disease- dialysis on Tues, Thurs. , Saturday mornings.  HIT (heparin-induced thrombocytopenia) ([facility] [address]     Hypertension     Hypokalemia 9/3/2016    2.7 ON ADMISSION    Tobacco abuse 9/3/2016    QUIT 7 DAYS AGO       Past Surgical History:   Procedure Laterality Date    ABDOMEN SURGERY PROC UNLISTED      Sept. 2016.  DEBRIDE NECROTIC SKIN/ TISSUE, ABD WALL  09/04/2016    I&D rectal abscess with drain placement - Dr. Jennifer Darden      rectal abcess surgery that also got infected-  Subsequent I & D of the rectal abcess. Family History:   Problem Relation Age of Onset    Stroke Mother        Social History     Social History    Marital status: SINGLE     Spouse name: N/A    Number of children: N/A    Years of education: N/A     Occupational History    Not on file.      Social History Main Topics    Smoking status: Former Smoker     Packs/day: 2.00     Years: 40.00     Types: Cigarettes    Smokeless tobacco: Never Used      Comment: STATES HE QUIT 7 DAYS AGO    Alcohol use 42.0 - 63.0 oz/week     70 - 105 Cans of beer per week      Comment: 10-15 CANS DAILY    Drug use: No    Sexual activity: Not on file     Other Topics Concern    Not on file     Social History Narrative    9/3/16:  PATIENT IS SINGLE, LIVES WITH BROTHERDEVENDRA        5/22/17:  NO CHANGE TO ABOVE. PATIENT STATES HE CAN NOT CARE FOR SELF AND HAS NO ONE TO CARE FOR HIM         ALLERGIES: Heparin analogues    Review of Systems   Constitutional: Positive for fatigue. Negative for chills and fever. HENT: Negative for rhinorrhea and sore throat. Eyes: Negative for visual disturbance. Respiratory: Negative for cough and shortness of breath. Cardiovascular: Negative for chest pain and leg swelling. Gastrointestinal: Negative for abdominal pain, diarrhea, nausea and vomiting. Genitourinary: Negative for dysuria. Musculoskeletal: Negative for back pain and neck pain. Skin: Negative for rash. Neurological: Negative for weakness and headaches. Psychiatric/Behavioral: The patient is not nervous/anxious. Vitals:    07/24/17 1329 07/24/17 1451   BP: (!) 80/40 (!) 88/45   Pulse: 84    Resp: 18    Temp: 97.7 °F (36.5 °C)    SpO2: 100%    Weight: 65.8 kg (145 lb)    Height: 5' 6\" (1.676 m)             Physical Exam   Constitutional: He is oriented to person, place, and time. He appears well-developed and well-nourished. HENT:   Head: Normocephalic. Right Ear: External ear normal.   Left Ear: External ear normal.   Eyes: Conjunctivae and EOM are normal. Pupils are equal, round, and reactive to light. Neck: Normal range of motion. Neck supple. No tracheal deviation present. Cardiovascular: Normal rate, regular rhythm, normal heart sounds and intact distal pulses. No murmur heard. Pulmonary/Chest: Effort normal and breath sounds normal. No respiratory distress. Abdominal: Soft. He exhibits no distension. There is no tenderness.    Colostomy bag in place Musculoskeletal: Normal range of motion. Neurological: He is alert and oriented to person, place, and time. No cranial nerve deficit. Skin: No rash noted. Nursing note and vitals reviewed. MDM  Number of Diagnoses or Management Options  Anemia, unspecified type: new and requires workup  Fatigue, unspecified type: new and requires workup     Amount and/or Complexity of Data Reviewed  Clinical lab tests: ordered and reviewed  Tests in the radiology section of CPT®: ordered and reviewed  Tests in the medicine section of CPT®: ordered and reviewed  Review and summarize past medical records: yes    Risk of Complications, Morbidity, and/or Mortality  Presenting problems: high  Diagnostic procedures: high  Management options: high    Patient Progress  Patient progress: stable    ED Course       Procedures    Recent Results (from the past 12 hour(s))   TROPONIN I    Collection Time: 07/24/17  1:50 PM   Result Value Ref Range    Troponin-I, Qt. <0.02 (L) 0.02 - 0.05 NG/ML   CBC WITH AUTOMATED DIFF    Collection Time: 07/24/17  1:50 PM   Result Value Ref Range    WBC 0.9 (LL) 4.3 - 11.1 K/uL    RBC 2.35 (L) 4.23 - 5.67 M/uL    HGB 7.1 (L) 13.6 - 17.2 g/dL    HCT 19.8 (LL) 41.1 - 50.3 %    MCV 84.3 79.6 - 97.8 FL    MCH 30.2 26.1 - 32.9 PG    MCHC 35.9 (H) 31.4 - 35.0 g/dL    RDW 17.3 (H) 11.9 - 14.6 %    PLATELET 050 603 - 296 K/uL    MPV 8.9 (L) 10.8 - 14.1 FL    DF AUTOMATED      NEUTROPHILS 8 (L) 43 - 78 %    LYMPHOCYTES 73 (H) 13 - 44 %    MONOCYTES 18 (H) 4.0 - 12.0 %    EOSINOPHILS 1 0.5 - 7.8 %    BASOPHILS 0 0.0 - 2.0 %    IMMATURE GRANULOCYTES 0.0 0.0 - 5.0 %    ABS. NEUTROPHILS 0.1 (L) 1.7 - 8.2 K/UL    ABS. LYMPHOCYTES 0.7 0.5 - 4.6 K/UL    ABS. MONOCYTES 0.2 0.1 - 1.3 K/UL    ABS. EOSINOPHILS 0.0 0.0 - 0.8 K/UL    ABS. BASOPHILS 0.0 0.0 - 0.2 K/UL    ABS. IMM.  GRANS. 0.0 0.0 - 0.5 K/UL   METABOLIC PANEL, COMPREHENSIVE    Collection Time: 07/24/17  1:50 PM   Result Value Ref Range    Sodium 137 136 - 145 mmol/L    Potassium 3.0 (L) 3.5 - 5.1 mmol/L    Chloride 98 98 - 107 mmol/L    CO2 30 21 - 32 mmol/L    Anion gap 9 7 - 16 mmol/L    Glucose 79 65 - 100 mg/dL    BUN 21 8 - 23 MG/DL    Creatinine 1.75 (H) 0.8 - 1.5 MG/DL    GFR est AA 51 (L) >60 ml/min/1.73m2    GFR est non-AA 42 (L) >60 ml/min/1.73m2    Calcium 7.6 (L) 8.3 - 10.4 MG/DL    Bilirubin, total 1.0 0.2 - 1.1 MG/DL    ALT (SGPT) 9 (L) 12 - 65 U/L    AST (SGOT) 21 15 - 37 U/L    Alk. phosphatase 74 50 - 136 U/L    Protein, total 7.4 6.3 - 8.2 g/dL    Albumin 2.8 (L) 3.2 - 4.6 g/dL    Globulin 4.6 (H) 2.3 - 3.5 g/dL    A-G Ratio 0.6 (L) 1.2 - 3.5     TYPE & SCREEN    Collection Time: 07/24/17  1:50 PM   Result Value Ref Range    Crossmatch Expiration PENDING     ABO/Rh(D) O NEGATIVE     Antibody screen NEG      Xr Chest Pa Lat    Result Date: 7/24/2017  Chest X-ray  7/24/2017 3:35 PM Clinical indication:  Dizziness and fatigue. Anemia. Comparison: 4/9/2017. Findings: 2 views upright AP and 2 views sitting lateral chest. Stable right IJ vas catheter terminating in the right atrium. Lungs are well-aerated and clear. No focal airspace opacities nor edema. No effusions. Incidental thoracic ankylosis. Stable cardiomediastinal contour. IMPRESSION: 1. No acute abnormality. 73 yo male with symptomatic anemia:     Patient with hypotension, fatigue, near syncope vs syncope and anemia. Will admit for obs and transfusion.

## 2017-07-24 NOTE — ED TRIAGE NOTES
Pt states he feels weak and was at dialysis this morning and was told his hemoglobin was low. Pt denies any blood on his stool or vomiting blood.

## 2017-07-24 NOTE — PROGRESS NOTES
TRANSFER - IN REPORT:    Verbal report received from Saurabh Mann, [facility] on [address]  being received from ED for routine progression of care      Report consisted of patients Situation, Background, Assessment and   Recommendations(SBAR). Information from the following report(s) SBAR was reviewed with the receiving nurse. Opportunity for questions and clarification was provided. Assessment completed upon patients arrival to unit and care assumed.

## 2017-07-25 LAB
ANION GAP BLD CALC-SCNC: 11 MMOL/L (ref 7–16)
ATRIAL RATE: 80 BPM
BASOPHILS # BLD AUTO: 0 K/UL (ref 0–0.2)
BASOPHILS # BLD: 0 % (ref 0–2)
BUN SERPL-MCNC: 25 MG/DL (ref 8–23)
CALCIUM SERPL-MCNC: 7.8 MG/DL (ref 8.3–10.4)
CALCULATED P AXIS, ECG09: 82 DEGREES
CALCULATED R AXIS, ECG10: -80 DEGREES
CALCULATED T AXIS, ECG11: 80 DEGREES
CHLORIDE SERPL-SCNC: 101 MMOL/L (ref 98–107)
CO2 SERPL-SCNC: 27 MMOL/L (ref 21–32)
CREAT SERPL-MCNC: 2.53 MG/DL (ref 0.8–1.5)
DIAGNOSIS, 93000: NORMAL
DIFFERENTIAL METHOD BLD: ABNORMAL
EOSINOPHIL # BLD: 0 K/UL (ref 0–0.8)
EOSINOPHIL NFR BLD: 1 % (ref 0.5–7.8)
ERYTHROCYTE [DISTWIDTH] IN BLOOD BY AUTOMATED COUNT: 17.1 % (ref 11.9–14.6)
GLUCOSE SERPL-MCNC: 72 MG/DL (ref 65–100)
HCT VFR BLD AUTO: 22.7 % (ref 41.1–50.3)
HEMOCCULT STL QL: NEGATIVE
HGB BLD-MCNC: 7.9 G/DL (ref 13.6–17.2)
IMM GRANULOCYTES # BLD: 0 K/UL (ref 0–0.5)
IMM GRANULOCYTES NFR BLD AUTO: 1 % (ref 0–5)
LYMPHOCYTES # BLD AUTO: 79 % (ref 13–44)
LYMPHOCYTES # BLD: 0.8 K/UL (ref 0.5–4.6)
MCH RBC QN AUTO: 29.3 PG (ref 26.1–32.9)
MCHC RBC AUTO-ENTMCNC: 34.8 G/DL (ref 31.4–35)
MCV RBC AUTO: 84.1 FL (ref 79.6–97.8)
MONOCYTES # BLD: 0.1 K/UL (ref 0.1–1.3)
MONOCYTES NFR BLD AUTO: 13 % (ref 4–12)
NEUTS SEG # BLD: 0.1 K/UL (ref 1.7–8.2)
NEUTS SEG NFR BLD AUTO: 6 % (ref 43–78)
P-R INTERVAL, ECG05: 176 MS
PLATELET # BLD AUTO: 124 K/UL (ref 150–450)
PMV BLD AUTO: 9.2 FL (ref 10.8–14.1)
POTASSIUM SERPL-SCNC: 3.1 MMOL/L (ref 3.5–5.1)
Q-T INTERVAL, ECG07: 440 MS
QRS DURATION, ECG06: 144 MS
QTC CALCULATION (BEZET), ECG08: 507 MS
RBC # BLD AUTO: 2.7 M/UL (ref 4.23–5.67)
SODIUM SERPL-SCNC: 139 MMOL/L (ref 136–145)
VENTRICULAR RATE, ECG03: 80 BPM
WBC # BLD AUTO: 1 K/UL (ref 4.3–11.1)

## 2017-07-25 PROCEDURE — 74011250637 HC RX REV CODE- 250/637: Performed by: INTERNAL MEDICINE

## 2017-07-25 PROCEDURE — 77030010541

## 2017-07-25 PROCEDURE — 36430 TRANSFUSION BLD/BLD COMPNT: CPT

## 2017-07-25 PROCEDURE — 82272 OCCULT BLD FECES 1-3 TESTS: CPT | Performed by: INTERNAL MEDICINE

## 2017-07-25 PROCEDURE — 97161 PT EVAL LOW COMPLEX 20 MIN: CPT

## 2017-07-25 PROCEDURE — 80048 BASIC METABOLIC PNL TOTAL CA: CPT | Performed by: INTERNAL MEDICINE

## 2017-07-25 PROCEDURE — 97165 OT EVAL LOW COMPLEX 30 MIN: CPT

## 2017-07-25 PROCEDURE — 65660000000 HC RM CCU STEPDOWN

## 2017-07-25 PROCEDURE — 77030013131 HC IV BLD ST ICUM -A

## 2017-07-25 PROCEDURE — 77030030708 HC OINT IODOSRB S&N -A

## 2017-07-25 PROCEDURE — 36415 COLL VENOUS BLD VENIPUNCTURE: CPT | Performed by: INTERNAL MEDICINE

## 2017-07-25 PROCEDURE — 85025 COMPLETE CBC W/AUTO DIFF WBC: CPT | Performed by: INTERNAL MEDICINE

## 2017-07-25 PROCEDURE — P9016 RBC LEUKOCYTES REDUCED: HCPCS | Performed by: EMERGENCY MEDICINE

## 2017-07-25 PROCEDURE — 77030019605

## 2017-07-25 PROCEDURE — 77030019895 HC PCKNG STRP IODO -A

## 2017-07-25 PROCEDURE — 77030010522

## 2017-07-25 RX ORDER — POTASSIUM CHLORIDE 14.9 MG/ML
20 INJECTION INTRAVENOUS
Status: DISCONTINUED | OUTPATIENT
Start: 2017-07-25 | End: 2017-07-25

## 2017-07-25 RX ORDER — SODIUM CHLORIDE 9 MG/ML
250 INJECTION, SOLUTION INTRAVENOUS AS NEEDED
Status: DISCONTINUED | OUTPATIENT
Start: 2017-07-25 | End: 2017-07-28 | Stop reason: HOSPADM

## 2017-07-25 RX ORDER — SODIUM CHLORIDE 9 MG/ML
75 INJECTION, SOLUTION INTRAVENOUS CONTINUOUS
Status: DISCONTINUED | OUTPATIENT
Start: 2017-07-25 | End: 2017-07-25

## 2017-07-25 RX ORDER — DIPHENOXYLATE HYDROCHLORIDE AND ATROPINE SULFATE 2.5; .025 MG/1; MG/1
1 TABLET ORAL DAILY
Status: DISCONTINUED | OUTPATIENT
Start: 2017-07-25 | End: 2017-07-28 | Stop reason: HOSPADM

## 2017-07-25 RX ORDER — POTASSIUM CHLORIDE 20 MEQ/1
20 TABLET, EXTENDED RELEASE ORAL 2 TIMES DAILY
Status: COMPLETED | OUTPATIENT
Start: 2017-07-25 | End: 2017-07-25

## 2017-07-25 RX ADMIN — Medication 5 ML: at 06:16

## 2017-07-25 RX ADMIN — Medication 5 ML: at 21:20

## 2017-07-25 RX ADMIN — VITAMIN D, TAB 1000IU (100/BT) 2000 UNITS: 25 TAB at 09:02

## 2017-07-25 RX ADMIN — FERROUS SULFATE TAB 325 MG (65 MG ELEMENTAL FE) 325 MG: 325 (65 FE) TAB at 17:19

## 2017-07-25 RX ADMIN — FERROUS SULFATE TAB 325 MG (65 MG ELEMENTAL FE) 325 MG: 325 (65 FE) TAB at 09:03

## 2017-07-25 RX ADMIN — DIPHENOXYLATE HYDROCHLORIDE AND ATROPINE SULFATE 1 TABLET: 2.5; .025 TABLET ORAL at 17:18

## 2017-07-25 RX ADMIN — Medication 5 ML: at 11:43

## 2017-07-25 RX ADMIN — SERTRALINE HYDROCHLORIDE 25 MG: 50 TABLET ORAL at 17:20

## 2017-07-25 RX ADMIN — POTASSIUM CHLORIDE 20 MEQ: 20 TABLET, EXTENDED RELEASE ORAL at 17:18

## 2017-07-25 RX ADMIN — LACTOBACILLUS TAB 2 TABLET: TAB at 17:18

## 2017-07-25 RX ADMIN — LACTOBACILLUS TAB 2 TABLET: TAB at 09:03

## 2017-07-25 RX ADMIN — POTASSIUM CHLORIDE 20 MEQ: 20 TABLET, EXTENDED RELEASE ORAL at 11:40

## 2017-07-25 NOTE — PROGRESS NOTES
Alert without complaints voiced. B\P 103/51. 1 units PRBC given in evening. States feels stronger. Denies needs or discomfort.

## 2017-07-25 NOTE — PROGRESS NOTES
Pt resting in bed with facial grimaces. Pt states \"Im ok\" pt with poor po intake with lunch, no distress noted at this time. Call light in reach, door open will monitor.

## 2017-07-25 NOTE — CONSULTS
Nephrology consult    Admission Date:  7/24/2017    Admission Diagnosis  Symptomatic anemia  Symptomatic anemia    We are asked by Dr. Evy Corley    History of Present Illness:this is a 74year old male chronically ill and progressively debilitated. He is admitted with weakness and presyncope. He is chronically hypotensive and found to be anemic. I have been consulted to provide dialysis while hospitalized. Past Medical History:   Diagnosis Date    Alcohol abuse, daily use 09/03/2016    NONE X 7 DAYS, USUALLY DRINKS 10-15 BEERS DAILY. TAKES A WEEK OFF AT A TIME ONCE A MONTH    Anemia 9/3/2016    HGB 7.8 ON ADMISSION    Chronic kidney disease     end stage renal disease- dialysis on Tues, Thurs. , Saturday mornings.  HIT (heparin-induced thrombocytopenia) ([facility] [address]     Hypertension     Hypokalemia 9/3/2016    2.7 ON ADMISSION    Tobacco abuse 9/3/2016    QUIT 7 DAYS AGO      Past Surgical History:   Procedure Laterality Date    ABDOMEN SURGERY PROC UNLISTED      Sept. 2016.  DEBRIDE NECROTIC SKIN/ TISSUE, ABD WALL  09/04/2016    I&D rectal abscess with drain placement - Dr. Karyn Walterr      rectal abcess surgery that also got infected-  Subsequent I & D of the rectal abcess.       Current Facility-Administered Medications   Medication Dose Route Frequency    0.9% sodium chloride infusion 250 mL  250 mL IntraVENous PRN    acetaminophen (TYLENOL) tablet 650 mg  650 mg Oral BID PRN    albuterol (PROVENTIL VENTOLIN) nebulizer solution 2.5 mg  2.5 mg Nebulization Q6H PRN    [START ON 7/25/2017] cholecalciferol (VITAMIN D3) tablet 2,000 Units  2,000 Units Oral DAILY    ferrous sulfate tablet 325 mg  325 mg Oral BID WITH MEALS    Lactobacillus Acidoph & Bulgar [facility]) tablet 2 Tab  2 Tab Oral BID    sertraline (ZOLOFT) tablet 25 mg  25 mg Oral QPM    sodium chloride (NS) flush 5-10 mL  5-10 mL IntraVENous Q8H    sodium chloride (NS) flush 5-10 mL  5-10 mL IntraVENous PRN    ondansetron (ZOFRAN) injection 4 mg  4 mg IntraVENous Q4H PRN     Allergies   Allergen Reactions    Heparin Analogues Other (comments)     H. I.T positive      Social History   Substance Use Topics    Smoking status: Former Smoker     Packs/day: 2.00     Years: 40.00     Types: Cigarettes    Smokeless tobacco: Never Used      Comment: STATES HE QUIT 7 DAYS AGO    Alcohol use 42.0 - 63.0 oz/week     70 - 105 Cans of beer per week      Comment: 10-15 CANS DAILY      Family History   Problem Relation Age of Onset    Stroke Mother         Review of Systems  Weak, weight loss, presyncope. Objective:     Vitals:    07/24/17 2001 07/24/17 2048 07/24/17 2151 07/24/17 2213   BP: (!) 89/49 100/51 100/56 102/52   Pulse: 70 67 73 72   Resp: 16 16 18 16   Temp: 97.7 °F (36.5 °C) 98 °F (36.7 °C) 97.6 °F (36.4 °C) 97.8 °F (36.6 °C)   SpO2: 100%  100% 100%   Weight:       Height:           Intake/Output Summary (Last 24 hours) at 07/24/17 2232  Last data filed at 07/24/17 2213   Gross per 24 hour   Intake              339 ml   Output                0 ml   Net              339 ml       Physical Exam  GEN :in no distress, alert and oriented  HEENT: anicteric sclerae, eomi. Oropharynx without lesions. Mucous membranes are moist.  Neck - supple without JVD, no thyromegaly. No lymphadenopathy. CV - regular rate and rhythm, no murmur, no rub  Lung - clear bilaterally, lungs expand symmetrically  Chest wall - normal appearance  Abd - soft, nontender, bowel sounds present, no hepatosplenomegaly  Ext - no clubbing, no cyanosis, no edema, LUE AVG  Neurologic - nonfocal  Genitourinary - bladder nonpalpable  Skin - no rashes, no purpura, no ecchymoses  Psychiatric: Normal mood and affect.       Data Review:   Recent Labs      07/24/17   1350   WBC  0.9*   HGB  7.1*   HCT  19.8*   PLT  153     Recent Labs      07/24/17   1350   NA  137   K  3.0*   CL  98   CO2  30   BUN  21   CREA  1.75*   GLU  79   CA  7.6*     No results for input(s): PH, PCO2, PO2, PCO2 in the last 72 hours. Problem List:     Patient Active Problem List    Diagnosis Date Noted    Staphylococcus epidermidis bacteremia 04/15/2017    Aundrea-rectal abscess 04/09/2017    Symptomatic anemia 03/29/2017    Elevated total protein 03/29/2017    Hyperbilirubinemia 03/29/2017    Dizziness 03/29/2017    ESRD (end stage renal disease) ([facility] [address] 03/29/2017    HIT (heparin-induced thrombocytopenia) ([facility] [address] 10/11/2016    CKD (chronic kidney disease) requiring chronic dialysis ([facility] [address] 10/11/2016    Nocturnal hypoxemia 10/04/2016    Suspected sleep apnea 10/02/2016    Acute blood loss anemia 09/29/2016    Paroxysmal atrial fibrillation (HCC) 09/17/2016    Pleural effusion on left 09/12/2016    Acute hypoactive delirium due to multiple etiologies 09/11/2016    Thrombocytopenia ([facility] [address] 09/08/2016    HTN (hypertension) 09/04/2016    GERD (gastroesophageal reflux disease) 09/04/2016    Alcohol abuse, daily use 09/03/2016    Hypokalemia 09/03/2016    VITO (acute kidney injury) ([facility] [address] 09/03/2016    Pancytopenia ([facility] [address] 09/03/2016       Impression:    Plan:   1. Presyncope  2. Hypotension  3. High output colostomy??  4. Anemia receiving 2 UPRBC  5. Progressive weakness and failure to thrive  Pt needs Palliative care guidance for prognosis and comfort care  He needs discharge to [facility] for meds that slow transit time in pt's with colostomy and weight loss  Thanks will follow with you.

## 2017-07-25 NOTE — WOUND CARE
Left buttock/ marisa rectal area with [id number] open area, with pink viable base, no tunnels or tracts, rolled and closed edges, thin foul smelling green drainage, concern for infection, this is a chronic wound, has had packing, wound vac and wound treatments and antibiotics for possible osteo to coccyx area in past for this wound. The edges of the left buttock/ marisa rectal area are hypertrophic, rolled and closed and once fully granulated will need edges opened by chemical (silvernitrate) or surgical means. Concern that pancytopenia is masking possible infection left buttock area/ per rectal area, Dr. Mary Harding notified of all above. Will use Iodosorb gel and foam dressing for now. Will monitor.

## 2017-07-25 NOTE — PROGRESS NOTES
Problem: Interdisciplinary Rounds  Goal: Interdisciplinary Rounds  Outcome: Progressing Towards Goal  Interdisciplinary team rounds were held 7/25/2017 with the following team members:Nursing, Physician and Clinical Coordinator and the patient. Plan of care discussed. See clinical pathway and/or care plan for interventions and desired outcomes. Mr. Johnnie Mueller appears to be alert and oriented. States \"I think my hemoglobin was low and I got some blood last night. \"

## 2017-07-25 NOTE — PROGRESS NOTES
Problem: Self Care Deficits Care Plan (Adult)  Goal: *Acute Goals and Plan of Care (Insert Text)  1. Patient will complete lower body bathing and dressing with modified independence and adaptive equipment as needed. 2. Patient will complete toileting with modified independence. 3. Patient will tolerate 30 minutes of OT treatment with 2 rest breaks to increase activity tolerance for ADLs. 4. Patient will complete functional transfers with modified independence and adaptive equipment as needed. Timeframe: 7 visits       OCCUPATIONAL THERAPY: Initial Assessment 7/25/2017  INPATIENT: Hospital Day: 2  [place] / Product Type: Medicare /      NAME/AGE/GENDER: Emeka José is a 74 y.o. male         PRIMARY DIAGNOSIS:  Symptomatic anemia  Symptomatic anemia <principal problem not specified> <principal problem not specified>        ICD-10: Treatment Diagnosis:        · Generalized Muscle Weakness (M62.81)   Precautions/Allergies:         Heparin analogues       ASSESSMENT:      Mr. Billy Gould presents supine in bed, reluctant to participate. Pt is oriented x 4 and known to this therapist from prior admissions. Pt's BUE ROM is WFLs and his strength is decreased, functional.  At baseline, he is independent with basic ADLs. Currently, presents with decreased activity tolerance, and decreased independence for self care, functional mobility, and ADL's. He requires CGA for extensive mobility and ADLs. Mr. Purdy Comfort skilled OT to maximize independence with self care tasks and functional transfers, educate on adaptive equipment. Pt left with PT and all needs in reach, pt instructed to call for assistance; RN aware. This section established at most recent assessment   PROBLEM LIST (Impairments causing functional limitations):  1. Decreased Strength  2. Decreased ADL/Functional Activities  3. Decreased Ambulation Ability/Technique  4.  Decreased Activity Tolerance  5. Decreased Work Simplification/Energy Conservation Techniques  6. Increased Fatigue    INTERVENTIONS PLANNED: (Benefits and precautions of occupational therapy have been discussed with the patient.)  1. Activities of daily living training  2. Adaptive equipment training  3. Donning&doffing training  4. Therapeutic activity  5. Therapeutic exercise      TREATMENT PLAN: Frequency/Duration: Follow patient 1x/week to address above goals. Rehabilitation Potential For Stated Goals: GOOD      RECOMMENDED REHABILITATION/EQUIPMENT: (at time of discharge pending progress): Continue Skilled Therapy. OCCUPATIONAL PROFILE AND HISTORY:   History of Present Injury/Illness (Reason for Referral):  See H&P  Past Medical History/Comorbidities:   Mr. Raydell Mortimer  has a past medical history of Alcohol abuse, daily use (09/03/2016); Anemia (9/3/2016); Chronic kidney disease; HIT (heparin-induced thrombocytopenia) ([facility] [address] Hypertension; Hypokalemia (9/3/2016); and Tobacco abuse (9/3/2016).   Mr. Raydell Mortimer  has a past surgical history that includes debride necrotic skin/ tissue, abd wall (09/04/2016); abdomen surgery proc unlisted; and other surgical.  Social History/Living Environment:   Home Environment: Skilled nursing facility  # Steps to Enter: 0  One/Two Story Residence: One story  Living Alone: No  Support Systems: Skilled nursing facility  Patient Expects to be Discharged to[de-identified] Skilled nursing facility  Current DME Used/Available at Home: Wheelchair  Tub or Shower Type: Shower  Prior Level of Function/Work/Activity:  Modified independent for simple ADLs      Number of Personal Factors/Comorbidities that affect the Plan of Care: Expanded review of therapy/medical records (1-2):  MODERATE COMPLEXITY   ASSESSMENT OF OCCUPATIONAL PERFORMANCE[de-identified]   Activities of Daily Living:           Basic ADLs (From Assessment) Complex ADLs (From Assessment)   Basic ADL  Feeding: Modified independent  Oral Facial Hygiene/Grooming: Modified Independent  Bathing: Stand-by assistance  Upper Body Dressing: Modified independent  Lower Body Dressing: Supervision  Toileting: Supervision Instrumental ADL  Meal Preparation: Stand-by asssistance  Homemaking: Contact guard assistance   Grooming/Bathing/Dressing Activities of Daily Living     Cognitive Retraining  Safety/Judgement: Fall prevention                 Functional Transfers  Toilet Transfer : Contact guard assistance  Tub Transfer: Contact guard assistance   Lower Body Dressing Assistance  Socks: Supervision/set-up Bed/Mat Mobility  Rolling: Supervision; Additional time  Supine to Sit: Supervision; Additional time  Sit to Stand: Contact guard assistance  Bed to Chair: Contact guard assistance  Scooting: Modified independent          Most Recent Physical Functioning:   Gross Assessment:  AROM: Within functional limits  Strength: Generally decreased, functional               Posture:     Balance:  Sitting: Intact; Without support  Standing: Intact; Without support Bed Mobility:  Rolling: Supervision; Additional time  Supine to Sit: Supervision; Additional time  Scooting: Modified independent  Wheelchair Mobility:     Transfers:  Sit to Stand: Contact guard assistance  Stand to Sit: Contact guard assistance  Bed to Chair: Contact guard assistance                 Patient Vitals for the past 6 hrs:       BP SpO2 Pulse   07/25/17 1100 93/52 100 % 71   07/25/17 1525 100/53 100 % 68   07/25/17 1541 94/49 - 66        Mental Status  Neurologic State: Alert  Orientation Level: Oriented X4  Cognition: Follows commands  Perception: Appears intact  Perseveration: No perseveration noted  Safety/Judgement: Fall prevention                               Physical Skills Involved:  1. Balance  2. Strength  3. Activity Tolerance Cognitive Skills Affected (resulting in the inability to perform in a timely and safe manner):  1. Perception  2. Executive Function  3.  Comprehension Psychosocial Skills Affected:  1. Habits/Routines  2. Environmental Adaptation  3. Social Interaction  4. Emotional Regulation  5. Self-Awareness  6. Awareness of Others   Number of elements that affect the Plan of Care: 5+:  HIGH COMPLEXITY   CLINICAL DECISION MAKIN[address] [place] [address] AM-PAC 6 Clicks   Daily Activity Inpatient Short Form  How much help from another person does the patient currently need. .. Total A Lot A Little None   1. Putting on and taking off regular lower body clothing?   [ ] 1   [ ] 2   [X] 3   [ ] 4   2. Bathing (including washing, rinsing, drying)? [ ] 1   [ ] 2   [X] 3   [ ] 4   3. Toileting, which includes using toilet, bedpan or urinal?   [ ] 1   [ ] 2   [X] 3   [ ] 4   4. Putting on and taking off regular upper body clothing?   [ ] 1   [ ] 2   [ ] 3   [X] 4   5. Taking care of personal grooming such as brushing teeth? [ ] 1   [ ] 2   [ ] 3   [X] 4   6. Eating meals? [ ] 1   [ ] 2   [ ] 3   [X] 4   © , Trustees of [address] [place] [address], under license to [employer]. All rights reserved    Score:  Initial: 21 Most Recent: X (Date: -- )     Interpretation of Tool:  Represents activities that are increasingly more difficult (i.e. Bed mobility, Transfers, Gait). Score 24 23 22-20 19-15 14-10 9-7 6       Modifier CH CI CJ CK CL CM CN         · Self Care:               - CURRENT STATUS:    CJ - 20%-39% impaired, limited or restricted               - GOAL STATUS:           CI - 1%-19% impaired, limited or restricted               - D/C STATUS:                       ---------------To be determined---------------  [place] / Product Type: Medicare /       Medical Necessity:     · Patient demonstrates good rehab potential due to higher previous functional level. Reason for Services/Other Comments:  · Patient continues to require skilled intervention due to decreased ability to perform self care tasks. .   Use of outcome tool(s) and clinical judgement create a POC that gives a: LOW COMPLEXITY             TREATMENT:   (In addition to Assessment/Re-Assessment sessions the following treatments were rendered)      Pre-treatment Symptoms/Complaints:  Decreased ability to perform ADLs, self care, and functional mobility; decreased tolerance for activities  Pain: Initial:   Pain Intensity 1: 0  Post Session:  0      Assessment/Reassessment only, no treatment provided today     Braces/Orthotics/Lines/Etc:   · O2 Device: Room air  Treatment/Session Assessment:    · Response to Treatment:  Agrees to therapy  · Interdisciplinary Collaboration:  · Physical Therapist  · Occupational Therapist  · Registered Nurse  · After treatment position/precautions:  · Up in chair  · Bed/Chair-wheels locked  · Call light within reach  · RN notified  · left with PT  · Compliance with Program/Exercises: Will assess as treatment progresses. · Recommendations/Intent for next treatment session: \"Next visit will focus on advancements to more challenging activities and reduction in assistance provided\".   Total Treatment Duration:  OT Patient Time In/Time Out  Time In: 0952  Time Out: [address] DON Henning/L

## 2017-07-25 NOTE — PROGRESS NOTES
Pt resting in bed no distress noted at this time. Pt aware of blood transfusion order. Pt encouraged to call for assistance if needed call light in reach, door open will monitor.

## 2017-07-25 NOTE — PROGRESS NOTES
Problem: Mobility Impaired (Adult and Pediatric)  Goal: *Acute Goals and Plan of Care (Insert Text)  Discharge Goals:  (1.)Mr. Pozo will move from supine to sit and sit to supine with INDEPENDENT within 7 day(s). (2.)Mr. Pozo will transfer from bed to chair and chair to bed with SUPERVISION using the least restrictive device within 7 day(s). (3.)Mr. Pozo will ambulate with STAND BY ASSIST for 250+ feet with the least restrictive device within 7 day(s). ________________________________________________________________________________________________      PHYSICAL THERAPY: INITIAL ASSESSMENT, TREATMENT DAY: DAY OF ASSESSMENT, AM 7/25/2017  INPATIENT: Hospital Day: 2  [place] / Product Type: Medicare /      NAME/AGE/GENDER: Jesse Mann is a 74 y.o. male         PRIMARY DIAGNOSIS: Symptomatic anemia  Symptomatic anemia <principal problem not specified> <principal problem not specified>        ICD-10: Treatment Diagnosis:       · Generalized Muscle Weakness (M62.81)  · Difficulty in walking, Not elsewhere classified (R26.2)   Precaution/Allergies:  Heparin analogues       ASSESSMENT:      Mr. Ayla Bradley is sitting EOB finishing working with OT upon contact. Pt is oriented X 4 this morning. Pt reports living in nursing home where he requires assistance with all mobility and ADLs. Pt states he spends most of his days in a wheelchair due to weakness and inability to walk and requires assist from staff for transfer bed<>wheelchair. Pt demonstrates good static sitting balance EOB and is CGA with sit to stand. Pt takes steps this morning to bedside chair with CGA. Pt left sitting up in chair with all needs met and within reach. Pt requesting already to return to bed. PT educated pt on importance of early mobility and benefit of sitting up versus lying in bed all day. Pt agrees to sit up for at least 1 hour. Pt is very self-limiting throughout evaluation.  Jesse Mann will benefit from skilled PT (medically necessary) to address decreased strength, decreased balance, decreased functional tolerance, decreased cardiopulmonary endurance affecting participation in basic ADLs and functional tasks. This section established at most recent assessment   PROBLEM LIST (Impairments causing functional limitations):  1. Decreased Strength  2. Decreased ADL/Functional Activities  3. Decreased Transfer Abilities  4. Decreased Ambulation Ability/Technique  5. Decreased Balance  6. Decreased Activity Tolerance  7. Increased Fatigue    INTERVENTIONS PLANNED: (Benefits and precautions of physical therapy have been discussed with the patient.)  1. Balance Exercise  2. Bed Mobility  3. Gait Training  4. Home Exercise Program (HEP)  5. Neuromuscular Re-education/Strengthening  6. Range of Motion (ROM)  7. Therapeutic Activites  8. Therapeutic Exercise/Strengthening  9. Group Therapy      TREATMENT PLAN: Frequency/Duration: 3 times a week for duration of hospital stay  Rehabilitation Potential For Stated Goals: FAIR      RECOMMENDED REHABILITATION/EQUIPMENT: (at time of discharge pending progress): Continue Skilled Therapy and Rehab. HISTORY:   History of Present Injury/Illness (Reason for Referral):  See H&P below  Pamela Terry is a 74 y.o. male with PMH of ESRD on HD (dialyzed today) and perirectal abscess requiring multiple hospitalizations who was sent to the ED from his care facility due to abnormal labs. Pt is a poor historian so information is limited. Hgb was found to be 7.1 in the ED. He is unsure whether he has had any melena or BRBPR. He has a colostomy and does note increased gas. No hematemesis. He has required blood transfsions in the past. He has had some dizziness but no syncopal episodes. He says his BP has been running low recently but is unable to tell me levels. He was found to have BP of 80s/40s in the ED. He denies any fevers and vomiting.  He continues to have pain from his perirectal abscess with ongoing drainage. He is not sure if he is on abx. Past Medical History/Comorbidities:   Mr. Raydell Mortimer  has a past medical history of Alcohol abuse, daily use (09/03/2016); Anemia (9/3/2016); Chronic kidney disease; HIT (heparin-induced thrombocytopenia) ([facility] [address] Hypertension; Hypokalemia (9/3/2016); and Tobacco abuse (9/3/2016). Mr. Raydell Mortimer  has a past surgical history that includes debride necrotic skin/ tissue, abd wall (09/04/2016); abdomen surgery proc unlisted; and other surgical.  Social History/Living Environment:   Home Environment: Skilled nursing facility  # Steps to Enter: 0  One/Two Story Residence: One story  Living Alone: No  Support Systems: Skilled nursing facility  Patient Expects to be Discharged to[de-identified] Skilled nursing facility  Current DME Used/Available at Home: Wheelchair  Tub or Shower Type: Shower  Prior Level of Function/Work/Activity:  Lives in nursing home, reports assist with transfers, inability to ambulate, and assist with ADLs from nursing home staff? ?       Number of Personal Factors/Comorbidities that affect the Plan of Care: 1-2: MODERATE COMPLEXITY   EXAMINATION:   Most Recent Physical Functioning:   Gross Assessment:  AROM: Generally decreased, functional  Strength: Generally decreased, functional  Coordination: Generally decreased, functional  Sensation: Intact               Posture:     Balance:  Sitting: Intact; Without support  Standing: Intact; Without support Bed Mobility:     Wheelchair Mobility:     Transfers:  Sit to Stand: Contact guard assistance  Stand to Sit: Contact guard assistance  Gait:     Speed/Kell: Slow  Step Length: Left shortened;Right shortened  Gait Abnormalities: Decreased step clearance  Distance (ft): 3 Feet (ft)  Assistive Device:  (none)  Ambulation - Level of Assistance: Contact guard assistance  Interventions: Tactile cues; Safety awareness training;Verbal cues       Body Structures Involved:  1. Heart  2. Lungs  3. Bones  4. Joints  5. Muscles Body Functions Affected:  1. Sensory/Pain  2. Cardio  3. Respiratory  4. Neuromusculoskeletal  5. Movement Related Activities and Participation Affected:  1. General Tasks and Demands  2. Mobility  3. Self Care  4. Interpersonal Interactions and Relationships  5. Community, Social and Warrick Fortine   Number of elements that affect the Plan of Care: 4+: HIGH COMPLEXITY   CLINICAL PRESENTATION:   Presentation: Stable and uncomplicated: LOW COMPLEXITY   CLINICAL DECISION MAKIN [facility] Inpatient Short Form  How much difficulty does the patient currently have. .. Unable A Lot A Little None   1. Turning over in bed (including adjusting bedclothes, sheets and blankets)? [ ] 1   [ ] 2   [X] 3   [ ] 4   2. Sitting down on and standing up from a chair with arms ( e.g., wheelchair, bedside commode, etc.)   [ ] 1   [ ] 2   [X] 3   [ ] 4   3. Moving from lying on back to sitting on the side of the bed? [ ] 1   [ ] 2   [X] 3   [ ] 4   How much help from another person does the patient currently need. .. Total A Lot A Little None   4. Moving to and from a bed to a chair (including a wheelchair)? [ ] 1   [ ] 2   [X] 3   [ ] 4   5. Need to walk in hospital room? [ ] 1   [ ] 2   [X] 3   [ ] 4   6. Climbing 3-5 steps with a railing? [ ] 1   [ ] 2   [X] 3   [ ] 4   © , Trustees of [address] [place] [id number], under license to [employer]. All rights reserved    Score:  Initial: 18 Most Recent: X (Date: -- )     Interpretation of Tool:  Represents activities that are increasingly more difficult (i.e. Bed mobility, Transfers, Gait).        Score 24 23 22-20 19-15 14-10 9-7 6       Modifier CH CI CJ CK CL CM CN         · Mobility - Walking and Moving Around:               - CURRENT STATUS:    CK - 40%-59% impaired, limited or restricted               - GOAL STATUS:           CJ - 20%-39% impaired, limited or restricted               - D/C STATUS:                       ---------------To be determined---------------  [place] / Product Type: Medicare /       Medical Necessity:     · Patient is expected to demonstrate progress in strength, balance, coordination and functional technique to decrease assistance required with gait, transfers, and functional mobility. Reason for Services/Other Comments:  · Patient continues to require skilled intervention due to decreased strength, decreased balance, decreased functional tolerance, decreased cardiopulmonary endurance affecting participation in basic ADLs and functional tasks. Use of outcome tool(s) and clinical judgement create a POC that gives a: Questionable prediction of patient's progress: MODERATE COMPLEXITY                 TREATMENT:   (In addition to Assessment/Re-Assessment sessions the following treatments were rendered)   Pre-treatment Symptoms/Complaints:  Weakness  Pain: Initial:   Pain Intensity 1: 0  Post Session:  0/10, visual      Assessment/Reassessment only, no treatment provided today     Braces/Orthotics/Lines/Etc:   · O2 Device: Room air  Treatment/Session Assessment:    · Response to Treatment:  Pt very self limiting. Pt required CGA with sit to stand and to take steps over to bedside chair. · Interdisciplinary Collaboration:  · Physical Therapist  · Occupational Therapist  · Registered Nurse  · After treatment position/precautions:  · Up in chair  · Bed/Chair-wheels locked  · Bed in low position  · Call light within reach  · RN notified  · Compliance with Program/Exercises: Will assess as treatment progresses. · Recommendations/Intent for next treatment session: \"Next visit will focus on advancements to more challenging activities and reduction in assistance provided\".   Total Treatment Duration:  PT Patient Time In/Time Out  Time In: 1002  Time Out: [address] [facility]

## 2017-07-25 NOTE — PROGRESS NOTES
Progress Note    Patient: Solomon Coates MRN: [id number]  SSN: [id number]    YOB: 1950  Age: 74 y.o. Sex: male      Admit Date: 7/24/2017    LOS: 1 day     Subjective:     Pt continues to have some dizziness despite 1 units PRBCs yesterday. Objective:     Vitals:    07/25/17 0315 07/25/17 0603 07/25/17 0700 07/25/17 1100   BP: 103/51  98/53 93/52   Pulse: 76  84 71   Resp: 16 17 17   Temp: 97.4 °F (36.3 °C)  97.6 °F (36.4 °C) 98.8 °F (37.1 °C)   SpO2: 100%  100% 100%   Weight:  65.8 kg (145 lb 1.6 oz)     Height:              Physical Exam:   General: elderly  male lying in bed, NAD  HEENT: NCAT, EOMI, OOP pink/moist   CV: RRR, no m/g/r   Lungs - CTAB, no wheezes/crackles   Abd - soft, NT, ND, ostomy with OP - bag just changed  Ext - no edema       Lab/Data Review:  Recent Results (from the past 24 hour(s))   EKG, 12 LEAD, INITIAL    Collection Time: 07/24/17  1:35 PM   Result Value Ref Range    Ventricular Rate 80 BPM    Atrial Rate 80 BPM    P-R Interval 176 ms    QRS Duration 144 ms    Q-T Interval 440 ms    QTC Calculation (Bezet) 507 ms    Calculated P Axis 82 degrees    Calculated R Axis -80 degrees    Calculated T Axis 80 degrees    Diagnosis       Normal sinus rhythm  Right bundle branch block  Left anterior fascicular block  !!! Bifascicular block !!!   Abnormal ECG  When compared with ECG of 09-APR-2017 18:33,  Borderline criteria for Lateral infarct are no longer Present  Confirmed by MOOKIE MARIANO (), Sofy Ware ([id number]) on 7/25/2017 9:37:47 AM     TROPONIN I    Collection Time: 07/24/17  1:50 PM   Result Value Ref Range    Troponin-I, Qt. <0.02 (L) 0.02 - 0.05 NG/ML   CBC WITH AUTOMATED DIFF    Collection Time: 07/24/17  1:50 PM   Result Value Ref Range    WBC 0.9 (LL) 4.3 - 11.1 K/uL    RBC 2.35 (L) 4.23 - 5.67 M/uL    HGB 7.1 (L) 13.6 - 17.2 g/dL    HCT 19.8 (LL) 41.1 - 50.3 %    MCV 84.3 79.6 - 97.8 FL    MCH 30.2 26.1 - 32.9 PG    MCHC 35.9 (H) 31.4 - 35.0 g/dL    RDW 17.3 (H) 11.9 - 14.6 %    PLATELET 991 077 - 840 K/uL    MPV 8.9 (L) 10.8 - 14.1 FL    DF AUTOMATED      NEUTROPHILS 8 (L) 43 - 78 %    LYMPHOCYTES 73 (H) 13 - 44 %    MONOCYTES 18 (H) 4.0 - 12.0 %    EOSINOPHILS 1 0.5 - 7.8 %    BASOPHILS 0 0.0 - 2.0 %    IMMATURE GRANULOCYTES 0.0 0.0 - 5.0 %    ABS. NEUTROPHILS 0.1 (L) 1.7 - 8.2 K/UL    ABS. LYMPHOCYTES 0.7 0.5 - 4.6 K/UL    ABS. MONOCYTES 0.2 0.1 - 1.3 K/UL    ABS. EOSINOPHILS 0.0 0.0 - 0.8 K/UL    ABS. BASOPHILS 0.0 0.0 - 0.2 K/UL    ABS. IMM. GRANS. 0.0 0.0 - 0.5 K/UL   METABOLIC PANEL, COMPREHENSIVE    Collection Time: 07/24/17  1:50 PM   Result Value Ref Range    Sodium 137 136 - 145 mmol/L    Potassium 3.0 (L) 3.5 - 5.1 mmol/L    Chloride 98 98 - 107 mmol/L    CO2 30 21 - 32 mmol/L    Anion gap 9 7 - 16 mmol/L    Glucose 79 65 - 100 mg/dL    BUN 21 8 - 23 MG/DL    Creatinine 1.75 (H) 0.8 - 1.5 MG/DL    GFR est AA 51 (L) >60 ml/min/1.73m2    GFR est non-AA 42 (L) >60 ml/min/1.73m2    Calcium 7.6 (L) 8.3 - 10.4 MG/DL    Bilirubin, total 1.0 0.2 - 1.1 MG/DL    ALT (SGPT) 9 (L) 12 - 65 U/L    AST (SGOT) 21 15 - 37 U/L    Alk.  phosphatase 74 50 - 136 U/L    Protein, total 7.4 6.3 - 8.2 g/dL    Albumin 2.8 (L) 3.2 - 4.6 g/dL    Globulin 4.6 (H) 2.3 - 3.5 g/dL    A-G Ratio 0.6 (L) 1.2 - 3.5     TYPE & SCREEN    Collection Time: 07/24/17  1:50 PM   Result Value Ref Range    Crossmatch Expiration 07/27/2017     ABO/Rh(D) O NEGATIVE     Antibody screen NEG     Unit number [id number]     Blood component type RC LR AS5     Unit division 00     Status of unit TRANSFUSED     Crossmatch result Compatible     Unit number [id number]     Blood component type RC LR AS5     Unit division 00     Status of unit ALLOCATED     Crossmatch result Compatible    MRSA SCREEN - PCR (NASAL)    Collection Time: 07/24/17  6:38 PM   Result Value Ref Range    Special Requests: NO SPECIAL REQUESTS      Culture result:        MRSA target DNA is not detected (presumptive not colonized with MRSA) OCCULT BLOOD, STOOL    Collection Time: 07/25/17  1:45 AM   Result Value Ref Range    Occult blood, stool NEGATIVE  NEG     METABOLIC PANEL, BASIC    Collection Time: 07/25/17  6:00 AM   Result Value Ref Range    Sodium 139 136 - 145 mmol/L    Potassium 3.1 (L) 3.5 - 5.1 mmol/L    Chloride 101 98 - 107 mmol/L    CO2 27 21 - 32 mmol/L    Anion gap 11 7 - 16 mmol/L    Glucose 72 65 - 100 mg/dL    BUN 25 (H) 8 - 23 MG/DL    Creatinine 2.53 (H) 0.8 - 1.5 MG/DL    GFR est AA 33 (L) >60 ml/min/1.73m2    GFR est non-AA 27 (L) >60 ml/min/1.73m2    Calcium 7.8 (L) 8.3 - 10.4 MG/DL   CBC WITH AUTOMATED DIFF    Collection Time: 07/25/17  6:00 AM   Result Value Ref Range    WBC 1.0 (LL) 4.3 - 11.1 K/uL    RBC 2.70 (L) 4.23 - 5.67 M/uL    HGB 7.9 (L) 13.6 - 17.2 g/dL    HCT 22.7 (L) 41.1 - 50.3 %    MCV 84.1 79.6 - 97.8 FL    MCH 29.3 26.1 - 32.9 PG    MCHC 34.8 31.4 - 35.0 g/dL    RDW 17.1 (H) 11.9 - 14.6 %    PLATELET 632 (L) 625 - 450 K/uL    MPV 9.2 (L) 10.8 - 14.1 FL    DF AUTOMATED      NEUTROPHILS 6 (L) 43 - 78 %    LYMPHOCYTES 79 (H) 13 - 44 %    MONOCYTES 13 (H) 4.0 - 12.0 %    EOSINOPHILS 1 0.5 - 7.8 %    BASOPHILS 0 0.0 - 2.0 %    IMMATURE GRANULOCYTES 1.0 0.0 - 5.0 %    ABS. NEUTROPHILS 0.1 (L) 1.7 - 8.2 K/UL    ABS. LYMPHOCYTES 0.8 0.5 - 4.6 K/UL    ABS. MONOCYTES 0.1 0.1 - 1.3 K/UL    ABS. EOSINOPHILS 0.0 0.0 - 0.8 K/UL    ABS. BASOPHILS 0.0 0.0 - 0.2 K/UL    ABS. IMM. GRANS. 0.0 0.0 - 0.5 K/UL       Assessment:   Mr. Norberto Pearce is a 74 y.o. male with PMH of ESRD on HD, anemia and perirectabl abscess  admitted for symptomatic hypotension and anemia. Plan:   1. Symptomatic hypotension - Partially related to anemia. I assume high stool OP is also contributing. Transfuse another unit of PRBCs today given minimal rise with first unit. 500ml NS now. 2. Anemia - In the setting of CKD. No e/o acute blood loss. FOBT negative. Transfusion as above. Pt receives epo as OP. Continue ferrous sulfate.   3. ESRD on HD - Normal HD days are MWF. Nephrology following. 4. Perirectal abscess - Does not appear acutely infeted. Wound team.  5. Hypokalemia - Cautiously replete with ESRD.   6. DVT ppx - SCDs    FULL CODE      Signed By: Amalia Servin MD     July 25, 2017

## 2017-07-25 NOTE — PROGRESS NOTES
Pt resting in bed. Pt alert oriented times 3 at this time. Pt denies pain or distress at this time. Pt reports feeling \"light headed when getting up\" pt on RA at this time. Pt has colostomy to left lower abd, soft brown stool noted in place. Stoma pink. SCDs in place. Pt encouraged to call for assistance if needed call light in reach, door open will monitor.

## 2017-07-25 NOTE — PROGRESS NOTES
1 unit of blood completed. Pt tolerated well. Pt assisted sitting up in bed eating dinner. Will monitor.

## 2017-07-25 NOTE — PROGRESS NOTES
1  Unit of PRBC began infusing at [facility] through 20 g in right [facility]. Will monitor.

## 2017-07-25 NOTE — PROGRESS NOTES
[facility] progress note    Follow-Up on: 7/25/2017     Patient seen and examined. He denies any CP or SOB this morning. Had one unit PRBC overnight. Was reporting some lightheadedness earlier when getting up. ROS:  Gen - no fever, no chills  CV - no chest pain, no orthopnea  Lung - no shortness of breath, no cough  Abd - no tenderness, no nausea, no vomiting  Ext - no edema    Exam:  Vitals:    07/24/17 2315 07/25/17 0315 07/25/17 0603 07/25/17 0700   BP: 95/55 103/51  98/53   Pulse: 67 76  84   Resp: 16 16 17   Temp: 97.9 °F (36.6 °C) 97.4 °F (36.3 °C)  97.6 °F (36.4 °C)   SpO2: 100% 100%  100%   Weight:   65.8 kg (145 lb 1.6 oz)    Height:             Intake/Output Summary (Last 24 hours) at 07/25/17 0919  Last data filed at 07/25/17 0532   Gross per 24 hour   Intake              339 ml   Output              400 ml   Net              -61 ml       GEN - in no distress  CV - S1, S2, no rub  Lung - clear bilaterally  Abd - soft, nontender  Ext - no edema, LUE thrill    Recent Labs      07/25/17   0600  07/24/17   1350   WBC  1.0*  0.9*   HGB  7.9*  7.1*   HCT  22.7*  19.8*   PLT  124*  153        Recent Labs      07/25/17   0600  07/24/17   1350   NA  139  137   K  3.1*  3.0*   CL  101  98   CO2  27  30   BUN  25*  21   CREA  2.53*  1.75*   CA  7.8*  7.6*   GLU  72  79       Assessment / Plan:    1. ESRD - M, W, F via TCC. Has LUE AVG with good thrill. This was revised from an AVF on 7/6.    2.  Hypokalemia - replace. Adjust K bath on HD 7/26.     3.  Anemia - s/p PRBC. Outpatient ISAAC dose is 9200 tiw. Venofer is 50 mg weekly. 4.  Chronic Hypotension. HD on 7/26.

## 2017-07-26 PROBLEM — R19.8 HIGH OUTPUT ILEOSTOMY (HCC): Status: ACTIVE | Noted: 2017-07-26

## 2017-07-26 PROBLEM — R78.81 STAPHYLOCOCCUS EPIDERMIDIS BACTEREMIA: Status: RESOLVED | Noted: 2017-04-15 | Resolved: 2017-07-26

## 2017-07-26 PROBLEM — R77.8 ELEVATED TOTAL PROTEIN: Status: RESOLVED | Noted: 2017-03-29 | Resolved: 2017-07-26

## 2017-07-26 PROBLEM — B95.7 STAPHYLOCOCCUS EPIDERMIDIS BACTEREMIA: Status: RESOLVED | Noted: 2017-04-15 | Resolved: 2017-07-26

## 2017-07-26 PROBLEM — Z93.2 HIGH OUTPUT ILEOSTOMY (HCC): Status: ACTIVE | Noted: 2017-07-26

## 2017-07-26 PROBLEM — E80.6 HYPERBILIRUBINEMIA: Chronic | Status: RESOLVED | Noted: 2017-03-29 | Resolved: 2017-07-26

## 2017-07-26 LAB
ABO + RH BLD: NORMAL
ANION GAP BLD CALC-SCNC: 15 MMOL/L (ref 7–16)
BASOPHILS # BLD AUTO: 0 K/UL (ref 0–0.2)
BASOPHILS # BLD: 0 % (ref 0–2)
BLD PROD TYP BPU: NORMAL
BLD PROD TYP BPU: NORMAL
BLOOD GROUP ANTIBODIES SERPL: NORMAL
BPU ID: NORMAL
BPU ID: NORMAL
BUN SERPL-MCNC: 32 MG/DL (ref 8–23)
CALCIUM SERPL-MCNC: 7.9 MG/DL (ref 8.3–10.4)
CHLORIDE SERPL-SCNC: 105 MMOL/L (ref 98–107)
CO2 SERPL-SCNC: 22 MMOL/L (ref 21–32)
COLLECTION COMMENT, COLCM: NORMAL
CREAT SERPL-MCNC: 3.49 MG/DL (ref 0.8–1.5)
CROSSMATCH RESULT,%XM: NORMAL
CROSSMATCH RESULT,%XM: NORMAL
CRP SERPL-MCNC: 2.4 MG/DL (ref 0–0.9)
DIFFERENTIAL METHOD BLD: ABNORMAL
EOSINOPHIL # BLD: 0 K/UL (ref 0–0.8)
EOSINOPHIL NFR BLD: 1 % (ref 0.5–7.8)
ERYTHROCYTE [DISTWIDTH] IN BLOOD BY AUTOMATED COUNT: 17 % (ref 11.9–14.6)
ERYTHROCYTE [SEDIMENTATION RATE] IN BLOOD: 41 MM/HR (ref 0–20)
GLUCOSE SERPL-MCNC: 75 MG/DL (ref 65–100)
HCT VFR BLD AUTO: 25.5 % (ref 41.1–50.3)
HGB BLD-MCNC: 8.8 G/DL (ref 13.6–17.2)
IMM GRANULOCYTES # BLD: 0 K/UL (ref 0–0.5)
IMM GRANULOCYTES NFR BLD AUTO: 0 % (ref 0–5)
LACTATE SERPL-SCNC: 0.5 MMOL/L (ref 0.4–2)
LYMPHOCYTES # BLD AUTO: 65 % (ref 13–44)
LYMPHOCYTES # BLD: 0.5 K/UL (ref 0.5–4.6)
MCH RBC QN AUTO: 29.1 PG (ref 26.1–32.9)
MCHC RBC AUTO-ENTMCNC: 34.5 G/DL (ref 31.4–35)
MCV RBC AUTO: 84.4 FL (ref 79.6–97.8)
MONOCYTES # BLD: 0.2 K/UL (ref 0.1–1.3)
MONOCYTES NFR BLD AUTO: 22 % (ref 4–12)
NEUTS SEG # BLD: 0.1 K/UL (ref 1.7–8.2)
NEUTS SEG NFR BLD AUTO: 12 % (ref 43–78)
PLATELET # BLD AUTO: 111 K/UL (ref 150–450)
PMV BLD AUTO: 9.1 FL (ref 10.8–14.1)
POTASSIUM SERPL-SCNC: 3.6 MMOL/L (ref 3.5–5.1)
RBC # BLD AUTO: 3.02 M/UL (ref 4.23–5.67)
SODIUM SERPL-SCNC: 142 MMOL/L (ref 136–145)
SPECIMEN EXP DATE BLD: NORMAL
STATUS OF UNIT,%ST: NORMAL
STATUS OF UNIT,%ST: NORMAL
UNIT DIVISION, %UDIV: 0
UNIT DIVISION, %UDIV: 0
WBC # BLD AUTO: 0.8 K/UL (ref 4.3–11.1)

## 2017-07-26 PROCEDURE — 90935 HEMODIALYSIS ONE EVALUATION: CPT

## 2017-07-26 PROCEDURE — 36591 DRAW BLOOD OFF VENOUS DEVICE: CPT

## 2017-07-26 PROCEDURE — 74640000003 HC CRRT SET UP OR EXCHANGE

## 2017-07-26 PROCEDURE — 86140 C-REACTIVE PROTEIN: CPT | Performed by: INTERNAL MEDICINE

## 2017-07-26 PROCEDURE — 74011250636 HC RX REV CODE- 250/636: Performed by: INTERNAL MEDICINE

## 2017-07-26 PROCEDURE — 85652 RBC SED RATE AUTOMATED: CPT | Performed by: INTERNAL MEDICINE

## 2017-07-26 PROCEDURE — 83605 ASSAY OF LACTIC ACID: CPT | Performed by: INTERNAL MEDICINE

## 2017-07-26 PROCEDURE — 85025 COMPLETE CBC W/AUTO DIFF WBC: CPT | Performed by: INTERNAL MEDICINE

## 2017-07-26 PROCEDURE — 87040 BLOOD CULTURE FOR BACTERIA: CPT | Performed by: INTERNAL MEDICINE

## 2017-07-26 PROCEDURE — 80048 BASIC METABOLIC PNL TOTAL CA: CPT | Performed by: INTERNAL MEDICINE

## 2017-07-26 PROCEDURE — 36415 COLL VENOUS BLD VENIPUNCTURE: CPT | Performed by: INTERNAL MEDICINE

## 2017-07-26 PROCEDURE — 65660000000 HC RM CCU STEPDOWN

## 2017-07-26 PROCEDURE — 74011250637 HC RX REV CODE- 250/637: Performed by: INTERNAL MEDICINE

## 2017-07-26 RX ORDER — SILVER SULFADIAZINE 10 G/1000G
CREAM TOPICAL DAILY
Status: DISCONTINUED | OUTPATIENT
Start: 2017-07-27 | End: 2017-07-28 | Stop reason: HOSPADM

## 2017-07-26 RX ADMIN — Medication 5 ML: at 21:32

## 2017-07-26 RX ADMIN — ERYTHROPOIETIN 10000 UNITS: 10000 INJECTION, SOLUTION INTRAVENOUS; SUBCUTANEOUS at 08:12

## 2017-07-26 RX ADMIN — Medication 10 ML: at 14:01

## 2017-07-26 RX ADMIN — FERROUS SULFATE TAB 325 MG (65 MG ELEMENTAL FE) 325 MG: 325 (65 FE) TAB at 16:47

## 2017-07-26 RX ADMIN — SERTRALINE HYDROCHLORIDE 25 MG: 50 TABLET ORAL at 16:49

## 2017-07-26 RX ADMIN — Medication 5 ML: at 05:52

## 2017-07-26 RX ADMIN — LACTOBACILLUS TAB 2 TABLET: TAB at 16:47

## 2017-07-26 NOTE — PROGRESS NOTES
Pt in room alert and oriented. rr are even and unlabored. Lung sounds diminished. Pt has asked to be left alone this afternoon so he can rest. Pt is stable safety measures in place. Colostomy in place and intact. Safety measures in place will continue to monitor.

## 2017-07-26 NOTE — DIALYSIS
Treatment initiated using Right CVC by Rosales Fraga. Both ports aspirated and flushed without problems. Machine set per MD orders. Pt VS stable. Will continue to monitor.

## 2017-07-26 NOTE — DIALYSIS
HD treatment completed without complication. Total of 2 kgs removed. CVC dressing changed per protocol, clean, dry, and intact, tego caps intact and changed, bilateral lumen flushed with 9cc NS and curos applied. Transport contacted for return to room. No complaints at this time.

## 2017-07-26 NOTE — PROGRESS NOTES
Patient in bed resting with no complaints at this time. Patient is alert and orientated with no distress noted. IV intact and patent with no s/s of infection noted. Respirations even and unlabored with heart rate regular. Patient able to ambulate independently without assistance. Bed in low locked position with call light within reach. Patient instructed to call if assistance is needed. Will continue to monitor. Patient going to dialysis at this time via transport.

## 2017-07-26 NOTE — PROGRESS NOTES
[facility] progress note    Follow-Up on: 7/26/2017     Patient seen and examined on HD. Had another unit of PRBC yesterday. Remains with lightheadedness. ROS:  Gen - no fever, no chills  CV - no chest pain, no orthopnea  Lung - no shortness of breath, no cough  Abd - no tenderness, no nausea, no vomiting  Ext - no edema    Exam:  Vitals:    07/26/17 0530 07/26/17 0700 07/26/17 0725 07/26/17 0758   BP:  109/59 100/57 99/60   Pulse:  66 64 60   Resp:  16     Temp:  97.7 °F (36.5 °C)     SpO2:  99%     Weight: 65.8 kg (145 lb)      Height:             Intake/Output Summary (Last 24 hours) at 07/26/17 0841  Last data filed at 07/26/17 0530   Gross per 24 hour   Intake                0 ml   Output              550 ml   Net             -550 ml       GEN - in no distress  CV - S1, S2, no rub  Lung - clear bilaterally  Abd - soft, nontender  Ext - no edema, LUE with good thrill    Recent Labs      07/26/17   0545  07/25/17   0600  07/24/17   1350   WBC  0.8*  1.0*  0.9*   HGB  8.8*  7.9*  7.1*   HCT  25.5*  22.7*  19.8*   PLT  111*  124*  153        Recent Labs      07/26/17   0545  07/25/17   0600  07/24/17   1350   NA  142  139  137   K  3.6  3.1*  3.0*   CL  105  101  98   CO2  22  27  30   BUN  32*  25*  21   CREA  3.49*  2.53*  1.75*   CA  7.9*  7.8*  7.6*   GLU  75  72  79       Assessment / Plan:    1. ESRD - M, W, F via TCC. Has LUE AVG with good thrill. This was revised from an AVF on 7/6.    2.  Hypokalemia -on 4K bath    3. Anemia - s/p 2 units PRBC. Outpatient ISAAC dose is 9200 tiw. Venofer is 50 mg weekly. 4.  Chronic Hypotension. 5.  Pancytopenia    6. Hx of HIT    Seen on HD with BP of 102/50 and HR of 60. Qb of 300 mL/min via TCC. May consider cannulation of AVG on Friday.

## 2017-07-26 NOTE — CONSULTS
[facility]  H&P/Consult Note    Pamela Terry  MRN: [id number]  :1950  Age:67 y.o.    HPI: Pamela Terry is a 74 y.o.  male who is well known to us. He had marisa-rectal abscess 2016 that was I&D by Dr. Kait Saenz. He subsequently required diverting colostomy. He had an extended hospitalization and required several trips to OR for further wound management. He has multiple underlying medical problems and comorbidities. He has chronic pancytopenia. He has a chronic sacral wound. He lives at a SNF and has not seen the wound or drainage. He reports being told wound had green drainage and foul odor. He came to the ED and was admitted by the Hospitalist for hypotension and anemia. We are asked to evaluate for possible marisa-rectal abscess. Last imaging was CT on 17 which showed resolution of the marisa-rectal abscess. Past Medical History:   Diagnosis Date    Alcohol abuse, daily use 2016    NONE X 7 DAYS, USUALLY DRINKS 10-15 BEERS DAILY. TAKES A WEEK OFF AT A TIME ONCE A MONTH    Anemia 9/3/2016    HGB 7.8 ON ADMISSION    Chronic kidney disease     end stage renal disease- dialysis on Tues, Thurs. , Saturday mornings.  HIT (heparin-induced thrombocytopenia) ([facility] [address]     Hypertension     Hypokalemia 9/3/2016    2.7 ON ADMISSION    Tobacco abuse 9/3/2016    QUIT 7 DAYS AGO     Past Surgical History:   Procedure Laterality Date    ABDOMEN SURGERY PROC UNLISTED      2016.  DEBRIDE NECROTIC SKIN/ TISSUE, ABD WALL  2016    I&D rectal abscess with drain placement - Dr. Hoffman Single      rectal abcess surgery that also got infected-  Subsequent I & D of the rectal abcess.      Current Facility-Administered Medications   Medication Dose Route Frequency    [START ON 2017] silver sulfADIAZINE (SILVADENE) 1 % topical cream   Topical DAILY    epoetin miky (EPOGEN;PROCRIT) injection 10,000 Units  10,000 Units SubCUTAneous Q7D    0.9% sodium chloride infusion 250 mL  250 mL IntraVENous PRN    diphenoxylate-atropine (LOMOTIL) tablet 1 Tab  1 Tab Oral DAILY    0.9% sodium chloride infusion 250 mL  250 mL IntraVENous PRN    acetaminophen (TYLENOL) tablet 650 mg  650 mg Oral BID PRN    albuterol (PROVENTIL VENTOLIN) nebulizer solution 2.5 mg  2.5 mg Nebulization Q6H PRN    cholecalciferol (VITAMIN D3) tablet 2,000 Units  2,000 Units Oral DAILY    ferrous sulfate tablet 325 mg  325 mg Oral BID WITH MEALS    Lactobacillus Acidoph & Bulgar (FLORANEX) tablet 2 Tab  2 Tab Oral BID    sertraline (ZOLOFT) tablet 25 mg  25 mg Oral QPM    sodium chloride (NS) flush 5-10 mL  5-10 mL IntraVENous Q8H    sodium chloride (NS) flush 5-10 mL  5-10 mL IntraVENous PRN    ondansetron (ZOFRAN) injection 4 mg  4 mg IntraVENous Q4H PRN     Heparin analogues  Social History     Social History    Marital status: SINGLE     Spouse name: N/A    Number of children: N/A    Years of education: N/A     Social History Main Topics    Smoking status: Former Smoker     Packs/day: 2.00     Years: 40.00     Types: Cigarettes    Smokeless tobacco: Never Used      Comment: STATES HE QUIT 7 DAYS AGO    Alcohol use 42.0 - 63.0 oz/week     70 - 105 Cans of beer per week      Comment: 10-15 CANS DAILY    Drug use: No    Sexual activity: Not Asked     Other Topics Concern    None     Social History Narrative    9/3/16:  PATIENT IS SINGLE, LIVES WITH BROTHERDEVENDRA        5/22/17:  NO CHANGE TO ABOVE. PATIENT STATES HE CAN NOT CARE FOR SELF AND HAS NO ONE TO CARE FOR HIM     History   Smoking Status    Former Smoker    Packs/day: 2.00    Years: 40.00    Types: Cigarettes   Smokeless Tobacco    Never Used     Comment: STATES HE QUIT 7 DAYS AGO     Family History   Problem Relation Age of Onset    Stroke Mother      ROS: The patient has no difficulty with chest pain or shortness of breath. No fever or chills.   Comprehensive review of systems was otherwise unremarkable except as noted above. Physical Exam:   Visit Vitals    BP 94/58    Pulse 77    Temp 98.9 °F (37.2 °C)    Resp 16    Ht 5' 6\" (1.676 m)    Wt 65.8 kg (145 lb)    SpO2 100%    BMI 23.4 kg/m2     Constitutional: Alert, oriented, cooperative patient in no acute distress; appears stated age    Eyes: Sclera are clear. EOMs intact  ENMT: No external lesions, gross hearing normal; no obvious neck masses, no ear or lip lesions, nares normal  CV: RRR, S1S2. Normal perfusion, pulses palpable. R HD cath noted. Resp: No JVD. Breathing is non-labored; no audible wheezing. BBS clear, on RA  GI: Soft and non-distended, +BS, ostomy viable and +stool in pouch     Musculoskeletal: Unremarkable with normal function. No embolic signs or cyanosis. Neuro: Alert, oriented; moves all 4; no focal deficits  Psychiatric: Appropriate affect and mood, no memory impairment  Skin: Chronic wound noted to sacrum. No drainage present, no odor, healed edges, no slough, wound is clean. No surrounding erythema, induration or areas of fluctuance. Prior abscess I&D is healed with hypertrophic skin. TTP just over open wound.    Genitalia: No abnormalities, no marisa-rectal, scrotal or penile edema or pain    Recent vitals (if inpt):  Patient Vitals for the past 24 hrs:   BP Temp Pulse Resp SpO2 Weight   07/26/17 1500 94/58 98.9 °F (37.2 °C) 77 16 100 % -   07/26/17 1210 97/53 97.7 °F (36.5 °C) 67 17 100 % -   07/26/17 1136 (!) 87/58 - 71 - - -   07/26/17 1132 90/57 - 69 - - -   07/26/17 1059 92/56 - 64 - - -   07/26/17 1025 96/58 - 68 - - -   07/26/17 1003 97/54 - (!) 58 - - -   07/26/17 1001 (!) 86/53 - (!) 57 - - -   07/26/17 0932 101/51 - (!) 58 - - -   07/26/17 0904 102/57 - 61 - - -   07/26/17 0835 102/50 - 60 - - -   07/26/17 0758 99/60 - 60 - - -   07/26/17 0725 100/57 - 64 - - -   07/26/17 0700 109/59 97.7 °F (36.5 °C) 66 16 99 % -   07/26/17 0530 - - - - - 65.8 kg (145 lb)   07/26/17 0330 98/48 97.8 °F (36.6 °C) 67 18 100 % -   07/25/17 7925 99/53 97.5 °F (36.4 °C) 63 14 100 % -   07/25/17 1925 102/57 98 °F (36.7 °C) 65 16 100 % -   07/25/17 1754 103/56 98 °F (36.7 °C) 62 18 - -   07/25/17 1724 109/58 97.8 °F (36.6 °C) 67 18 100 % -       Labs:  Recent Labs      07/26/17   0545   07/24/17   1350   WBC  0.8*   < >  0.9*   HGB  8.8*   < >  7.1*   PLT  111*   < >  153   NA  142   < >  137   K  3.6   < >  3.0*   CL  105   < >  98   CO2  22   < >  30   BUN  32*   < >  21   CREA  3.49*   < >  1.75*   GLU  75   < >  79   TBILI   --    --   1.0   SGOT   --    --   21   ALT   --    --   9*   AP   --    --   74   TROIQ   --    --   <0.02*    < > = values in this interval not displayed. Lab Results   Component Value Date/Time    WBC 0.8 07/26/2017 05:45 AM    HGB 8.8 07/26/2017 05:45 AM    PLATELET 000 57/96/1471 05:45 AM    Sodium 142 07/26/2017 05:45 AM    Potassium 3.6 07/26/2017 05:45 AM    Chloride 105 07/26/2017 05:45 AM    CO2 22 07/26/2017 05:45 AM    BUN 32 07/26/2017 05:45 AM    Creatinine 3.49 07/26/2017 05:45 AM    Glucose 75 07/26/2017 05:45 AM    INR 1.2 05/22/2017 11:45 PM    aPTT 32.0 09/17/2016 12:50 AM    Bilirubin, total 1.0 07/24/2017 01:50 PM    Bilirubin, direct 0.4 05/23/2017 07:10 AM    AST (SGOT) 21 07/24/2017 01:50 PM    ALT (SGPT) 9 07/24/2017 01:50 PM    Alk.  phosphatase 74 07/24/2017 01:50 PM    Lipase 49 05/22/2017 11:25 AM    Ammonia 11 05/22/2017 09:10 PM    Troponin-I, Qt. <0.02 07/24/2017 01:50 PM       Admission date (for inpatients): 7/24/2017   * No surgery found *  * No surgery found *    ASSESSMENT/PLAN:  Problem List  Date Reviewed: 5/23/2017          Codes Class Noted    High output ileostomy ([facility] ICD-10-CM: R19.8, Z93.2  ICD-9-CM: 787.99, V44.2  7/26/2017        Aundrea-rectal abscess (Chronic) ICD-10-CM: K61.1  ICD-9-CM: 841  4/9/2017        * (Principal)Symptomatic anemia ICD-10-CM: D64.9  ICD-9-CM: 285.9  3/29/2017        Dizziness ICD-10-CM: R42  ICD-9-CM: 780.4  3/29/2017        HIT (heparin-induced thrombocytopenia) ([facility] ICD-10-CM: Q66.52  ICD-9-CM: 289.84  10/11/2016        CKD (chronic kidney disease) requiring chronic dialysis [facility]) ICD-10-CM: N18.6, Z99.2  ICD-9-CM: 585.6, V45.11  10/11/2016        Nocturnal hypoxemia (Chronic) ICD-10-CM: G47.34  ICD-9-CM: 327.24  10/4/2016        Suspected sleep apnea ICD-10-CM: G47.30  ICD-9-CM: 780.57  10/2/2016        Acute blood loss anemia ICD-10-CM: D62  ICD-9-CM: 285.1  9/29/2016        Paroxysmal atrial fibrillation (HCC) ICD-10-CM: I48.0  ICD-9-CM: 427.31  9/17/2016        Pleural effusion on left ICD-10-CM: J90  ICD-9-CM: 511.9  9/12/2016        Acute hypoactive delirium due to multiple etiologies ICD-10-CM: F05  ICD-9-CM: 293.0  9/11/2016        HTN (hypertension) (Chronic) ICD-10-CM: I10  ICD-9-CM: 401.9  9/4/2016        GERD (gastroesophageal reflux disease) (Chronic) ICD-10-CM: K21.9  ICD-9-CM: 530.81  9/4/2016        Alcohol abuse, daily use (Chronic) ICD-10-CM: F10.10  ICD-9-CM: 305.01  9/3/2016        Hypotension ICD-10-CM: I95.9  ICD-9-CM: 458.9  9/3/2016        Pancytopenia ([facility] ICD-10-CM: G25.461  ICD-9-CM: 284.19  9/3/2016    Overview Signed 9/3/2016  8:22 PM by Lyn Manriquez NP     HGB 7.8 ON ADMISSION                 Principal Problem:    Symptomatic anemia (3/29/2017)    Active Problems:    Hypotension (9/3/2016)      Pancytopenia ([facility] [address] (9/3/2016)      Overview: HGB 7.8 ON ADMISSION      CKD (chronic kidney disease) requiring [facility] (10/11/2016)      Dizziness (3/29/2017)      Aundrea-rectal abscess (4/9/2017)      High output ileostomy ([facility] [address] (7/26/2017)       PLAN:    No palpable abscess -- if concern for abscess could repeat CT   Chronic wound noted -- wound RN following  D/w Hospitalist -- no acute surgical issues noted, wound RN feels that wound can be managed with bedside admin of silver nitrate    Would not be anxious to take Pt to surgery -- he has pancytopenia and poor baseline nutrition -- any additional/enlargement of wound will extend healing time. Local wound care preferred.       Signed:  Ileana Osborn NP-C

## 2017-07-26 NOTE — PROGRESS NOTES
Progress Note    Patient: Hung Harris MRN: [id number]  SSN: [id number]    YOB: 1950  Age: 74 y.o. Sex: male      Admit Date: 7/24/2017    LOS: 2 days     Subjective:     Pt seen in dialysis. He said he still doesn't feel right. He is not able to give me more detail. He denies N/V/F, abdominal pain. + dizziness, body aches. Ostomy OP has slowed some. Objective:     Vitals:    07/26/17 0932 07/26/17 1001 07/26/17 1003 07/26/17 1025   BP: 101/51 (!) 86/53 97/54 96/58   Pulse: (!) 58 (!) 57 (!) 58 68   Resp:       Temp:       SpO2:       Weight:       Height:              Physical Exam:   General: elderly  male lying in bed, NAD  HEENT: NCAT, EOMI, OOP pink/moist   CV: RRR, no m/g/r   Lungs - CTAB, no wheezes/crackles   Abd - soft, NT, ND, ostomy with minimal OP   Ext - no edema       Lab/Data Review:  Recent Results (from the past 24 hour(s))   CBC WITH AUTOMATED DIFF    Collection Time: 07/26/17  5:45 AM   Result Value Ref Range    WBC 0.8 (LL) 4.3 - 11.1 K/uL    RBC 3.02 (L) 4.23 - 5.67 M/uL    HGB 8.8 (L) 13.6 - 17.2 g/dL    HCT 25.5 (L) 41.1 - 50.3 %    MCV 84.4 79.6 - 97.8 FL    MCH 29.1 26.1 - 32.9 PG    MCHC 34.5 31.4 - 35.0 g/dL    RDW 17.0 (H) 11.9 - 14.6 %    PLATELET 792 (L) 116 - 450 K/uL    MPV 9.1 (L) 10.8 - 14.1 FL    DF AUTOMATED      NEUTROPHILS 12 (L) 43 - 78 %    LYMPHOCYTES 65 (H) 13 - 44 %    MONOCYTES 22 (H) 4.0 - 12.0 %    EOSINOPHILS 1 0.5 - 7.8 %    BASOPHILS 0 0.0 - 2.0 %    IMMATURE GRANULOCYTES 0.0 0.0 - 5.0 %    ABS. NEUTROPHILS 0.1 (L) 1.7 - 8.2 K/UL    ABS. LYMPHOCYTES 0.5 0.5 - 4.6 K/UL    ABS. MONOCYTES 0.2 0.1 - 1.3 K/UL    ABS. EOSINOPHILS 0.0 0.0 - 0.8 K/UL    ABS. BASOPHILS 0.0 0.0 - 0.2 K/UL    ABS. IMM.  GRANS. 0.0 0.0 - 0.5 K/UL   METABOLIC PANEL, BASIC    Collection Time: 07/26/17  5:45 AM   Result Value Ref Range    Sodium 142 136 - 145 mmol/L    Potassium 3.6 3.5 - 5.1 mmol/L    Chloride 105 98 - 107 mmol/L    CO2 22 21 - 32 mmol/L    Anion gap 15 7 - 16 mmol/L    Glucose 75 65 - 100 mg/dL    BUN 32 (H) 8 - 23 MG/DL    Creatinine 3.49 (H) 0.8 - 1.5 MG/DL    GFR est AA 23 (L) >60 ml/min/1.73m2    GFR est non-AA 19 (L) >60 ml/min/1.73m2    Calcium 7.9 (L) 8.3 - 10.4 MG/DL       Assessment:   Mr. Michael Hudson is a 74 y.o. male with PMH of ESRD on HD, anemia and perirectabl abscess  admitted for symptomatic hypotension and anemia. Plan:   1. Symptomatic hypotension - Partially related to anemia. Chronically somewhat low. I assume high stool OP is also contributing. Pt has received 2 units of PRBCs inpatient. Pt has perirectal abscess and with his body aches, leukopenia and hypotension, ongoing infection is of concern. Check lactate and BCx.  2. Anemia - In the setting of CKD. No e/o acute blood loss. FOBT negative. Transfused 2 units this admission. Epo given this admission as well. Continue ferrous sulfate. 3. ESRD on HD - Normal HD days are MWF. Nephrology following. 4. Perirectal abscess - Does not appear acutely infected from the outside but exquisitely tender. Wound team evaluated and is concerned about deeper infection. I will have surgery again take a look. BCx and lactate also pending.  Will add ESR and CRP.   5.. DVT ppx - SCDs    FULL CODE      Signed By: Akash Mayfield MD     July 26, 2017

## 2017-07-27 LAB
ERYTHROCYTE [DISTWIDTH] IN BLOOD BY AUTOMATED COUNT: 17.3 % (ref 11.9–14.6)
HCT VFR BLD AUTO: 27.5 % (ref 41.1–50.3)
HGB BLD-MCNC: 9.3 G/DL (ref 13.6–17.2)
MCH RBC QN AUTO: 28.9 PG (ref 26.1–32.9)
MCHC RBC AUTO-ENTMCNC: 33.8 G/DL (ref 31.4–35)
MCV RBC AUTO: 85.4 FL (ref 79.6–97.8)
PLATELET # BLD AUTO: 95 K/UL (ref 150–450)
PMV BLD AUTO: 9 FL (ref 10.8–14.1)
RBC # BLD AUTO: 3.22 M/UL (ref 4.23–5.67)
WBC # BLD AUTO: 0.7 K/UL (ref 4.3–11.1)

## 2017-07-27 PROCEDURE — 36415 COLL VENOUS BLD VENIPUNCTURE: CPT | Performed by: INTERNAL MEDICINE

## 2017-07-27 PROCEDURE — 85027 COMPLETE CBC AUTOMATED: CPT | Performed by: INTERNAL MEDICINE

## 2017-07-27 PROCEDURE — 65660000000 HC RM CCU STEPDOWN

## 2017-07-27 PROCEDURE — 77030019605

## 2017-07-27 PROCEDURE — 74011250637 HC RX REV CODE- 250/637: Performed by: INTERNAL MEDICINE

## 2017-07-27 PROCEDURE — 74011000250 HC RX REV CODE- 250: Performed by: INTERNAL MEDICINE

## 2017-07-27 PROCEDURE — 99223 1ST HOSP IP/OBS HIGH 75: CPT | Performed by: INTERNAL MEDICINE

## 2017-07-27 RX ADMIN — LACTOBACILLUS TAB 2 TABLET: TAB at 09:23

## 2017-07-27 RX ADMIN — LACTOBACILLUS TAB 2 TABLET: TAB at 17:48

## 2017-07-27 RX ADMIN — SERTRALINE HYDROCHLORIDE 25 MG: 50 TABLET ORAL at 17:48

## 2017-07-27 RX ADMIN — DIPHENOXYLATE HYDROCHLORIDE AND ATROPINE SULFATE 1 TABLET: 2.5; .025 TABLET ORAL at 09:23

## 2017-07-27 RX ADMIN — Medication 10 ML: at 21:36

## 2017-07-27 RX ADMIN — SILVER SULFADIAZINE: 10 CREAM TOPICAL at 09:26

## 2017-07-27 RX ADMIN — Medication 5 ML: at 05:27

## 2017-07-27 RX ADMIN — VITAMIN D, TAB 1000IU (100/BT) 2000 UNITS: 25 TAB at 09:23

## 2017-07-27 RX ADMIN — FERROUS SULFATE TAB 325 MG (65 MG ELEMENTAL FE) 325 MG: 325 (65 FE) TAB at 09:23

## 2017-07-27 RX ADMIN — Medication 5 ML: at 17:50

## 2017-07-27 RX ADMIN — FERROUS SULFATE TAB 325 MG (65 MG ELEMENTAL FE) 325 MG: 325 (65 FE) TAB at 17:47

## 2017-07-27 NOTE — PROGRESS NOTES
[address] Nephrology progress note    Follow-Up on: 7/27/2017     Patient seen and examined. Continues with ostomy output. Had HD yesterday with 2 kg UF.      ROS:  Gen - no fever, no chills  CV - no chest pain, no orthopnea  Lung - no shortness of breath, no cough  Abd - no tenderness, no nausea, no vomiting  Ext - no edema    Exam:  Vitals:    07/26/17 2320 07/27/17 0320 07/27/17 0536 07/27/17 0736   BP: 90/55 103/53  98/51   Pulse: 69 73  72   Resp: 16 12  16   Temp: 98.8 °F (37.1 °C) 97.6 °F (36.4 °C)  98.5 °F (36.9 °C)   SpO2: 99% 100%  100%   Weight:   64.2 kg (141 lb 9.6 oz)    Height:             Intake/Output Summary (Last 24 hours) at 07/27/17 0944  Last data filed at 07/26/17 1842   Gross per 24 hour   Intake                0 ml   Output             2000 ml   Net            -2000 ml       GEN - in no distress  CV - S1, S2, no rub  Lung - clear bilaterally  Abd - soft, nontender  Ext - no edema, LUE with good thrill    Recent Labs      07/27/17   0703  07/26/17   0545  07/25/17   0600   WBC  0.7*  0.8*  1.0*   HGB  9.3*  8.8*  7.9*   HCT  27.5*  25.5*  22.7*   PLT  95*  111*  124*        Recent Labs      07/26/17   0545  07/25/17   0600  07/24/17   1350   NA  142  139  137   K  3.6  3.1*  3.0*   CL  105  101  98   CO2  22  27  30   BUN  32*  25*  21   CREA  3.49*  2.53*  1.75*   CA  7.9*  7.8*  7.6*   GLU  75  72  79       Assessment / Plan:    1. ESRD - M, W, F via TCC. Has LUE AVG with good thrill. This was revised from an AVF on 7/6.    2.  Hypokalemia - better    3. Anemia - s/p 2 units PRBC. Outpatient ISAAC dose is 9200 tiw. Venofer is 50 mg weekly. 4.  Chronic Hypotension. 5.  Pancytopenia    6. Hx of HIT    I have confirmed ok to cannulate AVG tomorrow. HD nurses aware. Hopefully, can get TCC out soon. Orders written and patient informed of plan.

## 2017-07-27 NOTE — PROGRESS NOTES
Pt is reclining in bed. Pt is alert and oriented. Pt has left sided ostomy (CDI). No needs voiced at this time. NO SOB or pain is reported at this time. Call light is within reach. Will continue to monitor.

## 2017-07-27 NOTE — PROGRESS NOTES
Problem: Nutrition Deficit  Goal: *Optimize nutritional status  Nutrition  Reason for assessment: Referral received from nursing admission Malnutrition Screening Tool for unsure weight loss without trying and eating poorly due to decreased appetite. Assessment:   Diet order(s): Renal  Food/Nutrition Patient History:  Patient with a h/o ESRD on HD, marisa-rectal abscess s/p colostomy. Patient reports unknown weight loss to RD. States \"I weighed 210 lbs at some point and they told me today I weigh 141 lbs, you do the math. \" Patient reports that his weight loss is related to increased gas ever since he got his colostomy in September 2016. States that his body doesn't seem to be able to handle food without him bloating and having gas in his colostomy bag. Patient seems to be a poor historian and cannot recall what he typically eats at home or what his brother typically buys for him at the grocery store. Discussed foods that are likely to cause bloating and gas. Patient cannot recall if he consumes these types of foods or not. RD discussed keeping a food journal and documenting what foods seem to cause gas. Patient reports this is not feasible due to his \"bad memory. \" Explained to write the food down as he is eating them therefore he does not forget what he eats. Patient seems very uninterested. RD discussed supplements as it appears patient typically declines them. Patient agrees to try Ensure, reports he has done Nepro in the past with poor acceptance due to bloating and gas. At this point the patients main barrier to nutrition is gas and bloating. RD cannot assess typical home food intake to determine what foods seems to cause this due to patient's inability to remember what he typically eats. RD cannot verify that weights per EMR are accurate due to unknown weight source (pt stated vs estimated vs measured).   Weight Loss Metrics 7/27/2017 6/1/2017 4/18/2017 3/31/2017   Today's Wt 141 lb 9.6 oz 170 lb 6.4 oz 170 lb 12.8 oz 166 lb 3.2 oz   BMI 22.85 kg/m2 27.5 kg/m2 27.57 kg/m2 25.27 kg/m2       Weight Loss Metrics 3/22/2017 1/18/2017 11/23/2016 11/14/2016   Today's Wt 200 lb 203 lb 203 lb 3.2 oz 205 lb   BMI 32.28 kg/m2 32.77 kg/m2 32.8 kg/m2 33.09 kg/m2       Weight Loss Metrics 10/23/2016   Today's Wt 218 lb 6.4 oz   BMI 33.21 kg/m2   According to the EMR over the past 9 months the patient has lost ~77 lbs. This is a clinically significant weight loss of 35%. Anthropometrics:Height: 5' 6\" (167.6 cm),  Weight: 64.2 kg (141 lb 9.6 oz), Weight Source: Standing scale (comment), Body mass index is 22.85 kg/(m^2). Macronutrient needs:  EER:  9984-1328 kcal /day (25-30 kcal/kg actual BW)  EPR:  77-90 grams protein/day (1.2-1.4 grams/kg actual BW) (on HD)  Intake/Comparative Standards: Average intake for past 4 recorded meal(s): 27%. This potentially meets ~31% of kcal and ~30% of protein needs     Nutrition Diagnosis: Unintended weight loss related to gas and bloating as evidenced by patient with minimal po intakes and recent weight loss of 77 lbs over the past 9 months. Malnutrition related to weight loss and poor po intakes as evidenced by patient reports minimal intake prior to admission and significant weight loss of 35% over 9 months. Meets Criteria for Malnutrition in the context of Chronic Illness   [X] Severe Malnutrition, as evidenced by:              [ ] Severe loss of muscle mass              [X] Nutritional intake of <75% of energy intake compared to estimated energy needs for > 1 month              [X] Weight loss of >5% in 1 month, >7.5% in 3 months,  >10% in 6 months, or >20% in 12 months              [ ] Severe edema              [ ] Severe loss of subcutaneous fat              [ ] Functional decline         Intervention:  Meals and snacks: Continue current diet. Nutrition Supplement Therapy: Add Ensure [employer] with all meals.    Consider tube feeding if patient remains unable to meet needs orally. Nutrition Discharge Plan: Undetermined at this time. Shailesh Dukes [address], [address],  [phone]

## 2017-07-27 NOTE — PROGRESS NOTES
Pt resting in bed. Pt alert oriented times 3 at this time. Pt denies pain or distress at this time. Pt reports \"I feel ok\" pt has colostomy to left lower abd. Small amount of dark brown liquid stool noted in bag, unable to view stoma at this time. Pt reports less output in colostomy. Pt encouraged to call for assistance if needed call light in reach, door open will monitor.

## 2017-07-27 NOTE — PROGRESS NOTES
Hospitalist Progress Note    2017  Admit Date: 2017  2:38 PM   NAME: Norberto Pearce   :  1950   MRN:  [id number]   Attending: Pari Elliott MD  PCP:  Pawan Angel MD    SUBJECTIVE:   74 y.o. male with PMH of ESRD on HD and perirectal abscess requiring multiple hospitalizations who was sent to the ED from his care facility due to abnormal labs. Hgb was found to be 7.1 in the ED as well as dizziness and weakness - BP of 80s/40s in the ED. Admitted for symptomatic hypotension.  - \"i feel a little better. My backside hurts\". Review of Systems negative with exception of pertinent positives noted above  PHYSICAL EXAM     Visit Vitals    BP 93/53 (BP 1 Location: Right arm, BP Patient Position: At rest)    Pulse 70    Temp 98.7 °F (37.1 °C)    Resp 14    Ht 5' 6\" (1.676 m)    Wt 64.2 kg (141 lb 9.6 oz)    SpO2 99%    BMI 22.85 kg/m2      Temp (24hrs), Av.5 °F (36.9 °C), Min:97.6 °F (36.4 °C), Max:98.9 °F (37.2 °C)    Oxygen Therapy  O2 Sat (%): 99 % (17 1514)  Pulse via Oximetry: 85 beats per minute (17 1329)  O2 Device: Room air (17 1730)    Intake/Output Summary (Last 24 hours) at 17 1702  Last data filed at 17 1842   Gross per 24 hour   Intake                0 ml   Output                0 ml   Net                0 ml      General: No acute distress    Lungs:  CTA Bilaterally.    Heart:  Regular rate and rhythm,  No murmur, rub, or gallop  Abdomen: Soft, Non distended, Non tender, Positive bowel sounds  Extremities: No cyanosis, clubbing or edema  Neurologic:  No focal deficits    ASSESSMENT      Active Hospital Problems    Diagnosis Date Noted    [facility] 2017    Aundrea-rectal abscess 2017    Symptomatic anemia 2017    Dizziness 2017    CKD (chronic kidney disease) requiring chronic dialysis ([facility] 10/11/2016    Hypotension 2016    Pancytopenia ([facility] 2016     HGB 7.8 ON ADMISSION A/p  - Symptomatic anemia - secondary to CKD. FOBT neg. S/p 2 units prbc  - hypotension - still borderline but chronically low. - Aundrea-rectal abscess - not acutely infected. Gen sx has seen - no plans for intervention  - Pancytopenia - chronic. Heme/onc has seen - outpatient f/u  - ESRD - HD per nephro. Plan to try to cannulate avf tomorrow rather.  ?pull out TCC     DVT Prophylaxis: scds (hx of ROSALBA)    Signed By: Dimitry Ricardo DO     July 27, 2017

## 2017-07-27 NOTE — PROGRESS NOTES
Physical Therapy note: Patient refusing PT today despite MAX encouragement and education on benefits of mobility/activity. Did briefly sit up to edge of bed with supervision then quickly returned to supine. Will check back another day when pt willing to participate. Thank you.      TOMA CraigT

## 2017-07-27 NOTE — PROGRESS NOTES
Pt resting in bed no distress noted at this time. Pt encouraged to call for assistance if needed call light in reach, door open will monitor.

## 2017-07-27 NOTE — CONSULTS
[facility]        Inpatient Hematology / Oncology Consult Note    Reason for Consult:  Symptomatic anemia  Symptomatic anemia  Referring Physician: Fallon Ballard MD    History of Present Illness:       Mr. Sheyla Babcock is a 61year old man that came into the ER on 7/24/17 from SNF for abnormal labs (anemia)  and hypotension. Sept 2016 he was admitted with septic shock due to a perirectal abscess and Carol's gangrene. He underwent extensive debridement of the perineal area and eventually underwent a diverting colostomy at that time, unfortunately, he went into renal failure and never recovered and is on scheduled HD. He has continued to have hospital admission for chronic perirectal abscess. He is a known patient of ours first seen by Dr. Yu Gill in clinic back in November 2016 for pancytopenia after a lengthy hospitalization with sepsis and an ICU stay. His counts were improving and he was encouraged to follow up in 6 weeks with plans to bone marrow if counts worsened, but he never returned to clinic. On another hospital admission in March of 2017 he was seen by Dr. Yasmeen Reynoso for pancytopenia and had BMbx at that time which reported no major finding. We were consulted on patient again in May of 2017 by Dr. Gurpreet Dale  for pancytopenia which was felt to be related to chronic infections. He was then treated with granix and transfusions. He again was scheduled for follow up with Dr. Yu Gill and did not show. We are consulted again for his pancytopenia.        Review of Systems:  Constitutional Denies fever, chills, weight loss, appetite changes. HEENT Denies trauma, blurry vision, hearing loss, ear pain, nosebleeds, sore throat, neck pain. Skin Denies lesions or rashes. Lungs Denies dyspnea, cough, sputum production or hemoptysis. Cardiovascular Denies chest pain, palpitations, or lower extremity edema.    Gastrointestinal Denies nausea, vomiting, changes in bowel habits, bloody or black stools, abdominal pain. Colostomy. Neuro Denies headaches, visual changes or ataxia. Denies dizziness, tingling, tremors, sensory change, speech change, focal weakness or headaches. MSK Denies back pain, arthralgias, myalgias or frequent falls. Psychiatric/Behavioral  The patient is not nervous/anxious. Allergies   Allergen Reactions    Heparin Analogues Other (comments)     H. I.T positive     Past Medical History:   Diagnosis Date    Alcohol abuse, daily use 09/03/2016    NONE X 7 DAYS, USUALLY DRINKS 10-15 BEERS DAILY. TAKES A WEEK OFF AT A TIME ONCE A MONTH    Anemia 9/3/2016    HGB 7.8 ON ADMISSION    Chronic kidney disease     end stage renal disease- dialysis on Tues, Thurs. , Saturday mornings.  HIT (heparin-induced thrombocytopenia) ([facility] [address] [address]     Hypertension     Hypokalemia 9/3/2016    2.7 ON ADMISSION    Tobacco abuse 9/3/2016    QUIT 7 DAYS AGO     Past Surgical History:   Procedure Laterality Date    ABDOMEN SURGERY PROC UNLISTED      Sept. 2016.  DEBRIDE NECROTIC SKIN/ TISSUE, ABD WALL  09/04/2016    I&D rectal abscess with drain placement - Dr. Macrina Camacho      rectal abcess surgery that also got infected-  Subsequent I & D of the rectal abcess. Family History   Problem Relation Age of Onset    Stroke Mother      Social History     Social History    Marital status: SINGLE     Spouse name: N/A    Number of children: N/A    Years of education: N/A     Occupational History    Not on file.      Social History Main Topics    Smoking status: Former Smoker     Packs/day: 2.00     Years: 40.00     Types: Cigarettes    Smokeless tobacco: Never Used      Comment: STATES HE QUIT 7 DAYS AGO    Alcohol use 42.0 - 63.0 oz/week     70 - 105 Cans of beer per week      Comment: 10-15 CANS DAILY    Drug use: No    Sexual activity: Not on file     Other Topics Concern    Not on file     Social History Narrative    9/3/16:  PATIENT IS SINGLE, LIVES WITH BROTHERDEVENDRA        5/22/17:  NO CHANGE TO ABOVE.   PATIENT STATES HE CAN NOT CARE FOR SELF AND HAS NO ONE TO CARE FOR HIM     Current Facility-Administered Medications   Medication Dose Route Frequency Provider Last Rate Last Dose    silver sulfADIAZINE (SILVADENE) 1 % topical cream   Topical DAILY Marina Guo MD        epoetin miky (EPOGEN;PROCRIT) injection 10,000 Units  10,000 Units SubCUTAneous Q7D Heriberto Sow, DO   10,000 Units at 07/26/17 3087    0.9% sodium chloride infusion 250 mL  250 mL IntraVENous PRN Marina Guo MD        diphenoxylate-atropine (LOMOTIL) tablet 1 Tab  1 Tab Oral DAILY Marina Guo MD   1 Tab at 07/27/17 7936    0.9% sodium chloride infusion 250 mL  250 mL IntraVENous PRN Asha Lane MD        acetaminophen (TYLENOL) tablet 650 mg  650 mg Oral BID PRN Marina Guo MD        albuterol (PROVENTIL VENTOLIN) nebulizer solution 2.5 mg  2.5 mg Nebulization Q6H PRN Marina Guo MD        cholecalciferol (VITAMIN D3) tablet 2,000 Units  2,000 Units Oral DAILY Marina Guo MD   2,000 Units at 07/27/17 2908    ferrous sulfate tablet 325 mg  325 mg Oral BID WITH MEALS Marina Guo MD   325 mg at 07/27/17 3880    Lactobacillus Acidoph & Bulgar [facility]) tablet 2 Tab  2 Tab Oral BID Marina Guo MD   2 Tab at 07/27/17 0057    sertraline (ZOLOFT) tablet 25 mg  25 mg Oral QPM Marina Guo MD   25 mg at 07/26/17 1649    sodium chloride (NS) flush 5-10 mL  5-10 mL IntraVENous Q8H Marina Guo MD   5 mL at 07/27/17 0527    sodium chloride (NS) flush 5-10 mL  5-10 mL IntraVENous PRN Marina Guo MD        ondansetron [facility]) injection 4 mg  4 mg IntraVENous Q4H PRN Marina Guo MD           OBJECTIVE:  Patient Vitals for the past 8 hrs:   BP Temp Pulse Resp SpO2 Weight   07/27/17 0736 98/51 98.5 °F (36.9 °C) 72 16 100 % -   07/27/17 0536 - - - - - 141 lb 9.6 oz (64.2 kg)   07/27/17 0320 103/53 97.6 °F (36.4 °C) 73 12 100 % -     Temp (24hrs), Av.4 °F (36.9 °C), Min:97.6 °F (36.4 °C), Max:98.9 °F (37.2 °C)         Physical Exam:  Constitutional: Well developed, well nourished male in no acute distress, sitting comfortably in the hospital bed. HEENT: Normocephalic and atraumatic. Oropharynx is clear, mucous membranes are moist.  Pupils are equal, round, and reactive to light. Extraocular muscles are intact. Sclerae anicteric. Neck supple without JVD. No thyromegaly present. Lymph node   No palpable submandibular, cervical, supraclavicular, axillary or inguinal lymph nodes. Skin Warm and dry. No bruising and no rash noted. No erythema. No pallor. Respiratory Lungs are clear to auscultation bilaterally without wheezes, rales or rhonchi, normal air exchange without accessory muscle use. CVS Normal rate, regular rhythm and normal S1 and S2. No murmurs, gallops, or rubs. Abdomen Soft, nontender and nondistended, normoactive bowel sounds. No palpable mass. No hepatosplenomegaly. Neuro Grossly nonfocal with no obvious sensory or motor deficits. MSK Normal range of motion in general.  No edema and no tenderness. Psych Appropriate mood and affect.         Labs:    Recent Results (from the past 24 hour(s))   CULTURE, BLOOD    Collection Time: 17 11:25 AM   Result Value Ref Range    Special Requests: HEMO DIALYSIS ARTERIAL PORT     Culture result: NO GROWTH AFTER 21 HOURS     LACTIC ACID    Collection Time: 17 11:25 AM   Result Value Ref Range    Lactic acid 0.5 0.4 - 2.0 MMOL/L   CULTURE, BLOOD    Collection Time: 17 11:45 AM   Result Value Ref Range    Special Requests: HEMO DIALYSIS ARTERIAL     Culture result: NO GROWTH AFTER 21 HOURS     C REACTIVE PROTEIN, QT    Collection Time: 17  3:45 PM   Result Value Ref Range    C-Reactive protein 2.4 (H) 0.0 - 0.9 mg/dL   SED RATE, AUTOMATED    Collection Time: 17  3:45 PM   Result Value Ref Range    Sed rate, automated 41 (H) 0 - 20 mm/hr   COLLECTION COMMENT    Collection Time: 07/26/17  3:45 PM   Result Value Ref Range    Collection Comment PATIENT ON DIALYSIS    CBC W/O DIFF    Collection Time: 07/27/17  7:03 AM   Result Value Ref Range    WBC 0.7 (LL) 4.3 - 11.1 K/uL    RBC 3.22 (L) 4.23 - 5.67 M/uL    HGB 9.3 (L) 13.6 - 17.2 g/dL    HCT 27.5 (L) 41.1 - 50.3 %    MCV 85.4 79.6 - 97.8 FL    MCH 28.9 26.1 - 32.9 PG    MCHC 33.8 31.4 - 35.0 g/dL    RDW 17.3 (H) 11.9 - 14.6 %    PLATELET 95 (L) 797 - 450 K/uL    MPV 9.0 (L) 10.8 - 14.1 FL       Imaging:  [unfilled]    ASSESSMENT:  Problem List  Date Reviewed: 5/23/2017          Codes Class Noted    High output ileostomy ([facility] ICD-10-CM: R19.8, Z93.2  ICD-9-CM: 787.99, V44.2  7/26/2017        Aundrea-rectal abscess (Chronic) ICD-10-CM: K61.1  ICD-9-CM: 900  4/9/2017        * (Principal)Symptomatic anemia ICD-10-CM: D64.9  ICD-9-CM: 285.9  3/29/2017        Dizziness ICD-10-CM: R42  ICD-9-CM: 780.4  3/29/2017        HIT (heparin-induced thrombocytopenia) (HCC) ICD-10-CM: P40.05  ICD-9-CM: 289.84  10/11/2016        CKD (chronic kidney disease) requiring chronic dialysis ([facility] ICD-10-CM: N18.6, Z99.2  ICD-9-CM: 585.6, V45.11  10/11/2016        Nocturnal hypoxemia (Chronic) ICD-10-CM: G47.34  ICD-9-CM: 327.24  10/4/2016        Suspected sleep apnea ICD-10-CM: G47.30  ICD-9-CM: 780.57  10/2/2016        Acute blood loss anemia ICD-10-CM: D62  ICD-9-CM: 285.1  9/29/2016        Paroxysmal atrial fibrillation (HCC) ICD-10-CM: I48.0  ICD-9-CM: 427.31  9/17/2016        Pleural effusion on left ICD-10-CM: J90  ICD-9-CM: 511.9  9/12/2016        Acute hypoactive delirium due to multiple etiologies ICD-10-CM: F05  ICD-9-CM: 293.0  9/11/2016        HTN (hypertension) (Chronic) ICD-10-CM: I10  ICD-9-CM: 401.9  9/4/2016        GERD (gastroesophageal reflux disease) (Chronic) ICD-10-CM: K21.9  ICD-9-CM: 530.81  9/4/2016        Alcohol abuse, daily use (Chronic) ICD-10-CM: F10.10  ICD-9-CM: 305.01 9/3/2016        Hypotension ICD-10-CM: I95.9  ICD-9-CM: 458.9  9/3/2016        Pancytopenia ([facility] [address] ICD-10-CM: F23.988  ICD-9-CM: 284.19  9/3/2016    Overview Signed 9/3/2016  8:22 PM by Rafa Carrillo NP     HGB 7.8 ON ADMISSION                     RECOMMENDATIONS:  Pancytopenia  7/27 Hgb stable at 9.3 platelets 13,471, WBC 0.7. Follow up with Dr. Aliyah Mcdowell once discharged.     ESRD  7/27 per nephology. May be responsible for low counts. Receiving procrit 10,000 q 7 days      Lab studies and imaging studies  were personally reviewed. Pertinent old records were reviewed.     Thank you for allowing us to participate in the care of Mr. Margo Cole. Patient states that he will go to follow up appointment with Dr. Aliyah Mcdowell as long as SNF will take him to appointment. I will put in follow up appointment. We will sign off and be available on as need basis. Sheila Caceres NP   [facility]  [address] [address]  Office : [phone]  Fax : [phone]   Patient seen, examed and discussed with NP, agree with above history/assessment/plan. 74 y. o.male admitted on 7/24 for anemia and hypotension from NH, consulted for pancytopenia. Review of record showed pancytopenia had been over a year with fluctuation with each hospitalization, initially seen by Dr. Aliyah Mcdowell but never followed up and later seen by Dr. Edilberto Szymanski performed marrow biopsy without remarkable finding, last admission received granix for 5 days per Dr. Eugenio Triana, also receiving ISAAC per nephrology. Given the chronic nature of cytopenia and no evidence of ongoing infection, would not intervene, follow Dr. Aliyah Mcdowell in office if pt is compliant. Please call for questions. Kevin Pedroza M.D.   [address]  Office : [phone]  Fax : [phone]

## 2017-07-28 VITALS
RESPIRATION RATE: 16 BRPM | DIASTOLIC BLOOD PRESSURE: 59 MMHG | HEIGHT: 66 IN | BODY MASS INDEX: 22.84 KG/M2 | WEIGHT: 142.1 LBS | SYSTOLIC BLOOD PRESSURE: 103 MMHG | OXYGEN SATURATION: 99 % | HEART RATE: 63 BPM | TEMPERATURE: 97.9 F

## 2017-07-28 PROCEDURE — 90935 HEMODIALYSIS ONE EVALUATION: CPT

## 2017-07-28 PROCEDURE — 36591 DRAW BLOOD OFF VENOUS DEVICE: CPT

## 2017-07-28 RX ORDER — DIPHENOXYLATE HYDROCHLORIDE AND ATROPINE SULFATE 2.5; .025 MG/1; MG/1
1 TABLET ORAL DAILY
Qty: 1 TAB | Refills: 0 | Status: SHIPPED | OUTPATIENT
Start: 2017-07-28

## 2017-07-28 RX ADMIN — Medication 5 ML: at 05:11

## 2017-07-28 NOTE — PROGRESS NOTES
Renal Progress Note    Admission Date: 7/24/2017   Subjective: The patient was seen on dialysis at 8:49 AM .  BP is stable. Access is working well. Objective:     Physical Exam:    Patient Vitals for the past 8 hrs:   BP Temp Pulse Resp SpO2 Weight   07/28/17 0828 90/53 - 63 - - -   07/28/17 0759 90/54 - 69 - - -   07/28/17 0719 95/51 - 71 - - -   07/28/17 0700 100/56 97.9 °F (36.6 °C) 78 16 99 % -   07/28/17 0513 - - - - - 64.5 kg (142 lb 1.6 oz)   07/28/17 0300 97/53 98.3 °F (36.8 °C) 72 20 99 % -      Gen: comfortable , NAD  HEENT: dry membranes  CV: S1, S2  Lungs: Clear bilaterally  Extem: no edema      Current Facility-Administered Medications   Medication Dose Route Frequency    norflurane-pentafluoropropane (PAIN EASE) topical spray 1 Spray  1 Spray Topical DIALYSIS PRN    silver sulfADIAZINE (SILVADENE) 1 % topical cream   Topical DAILY    epoetin miky (EPOGEN;PROCRIT) injection 10,000 Units  10,000 Units SubCUTAneous Q7D    0.9% sodium chloride infusion 250 mL  250 mL IntraVENous PRN    diphenoxylate-atropine (LOMOTIL) tablet 1 Tab  1 Tab Oral DAILY    0.9% sodium chloride infusion 250 mL  250 mL IntraVENous PRN    acetaminophen (TYLENOL) tablet 650 mg  650 mg Oral BID PRN    albuterol (PROVENTIL VENTOLIN) nebulizer solution 2.5 mg  2.5 mg Nebulization Q6H PRN    cholecalciferol (VITAMIN D3) tablet 2,000 Units  2,000 Units Oral DAILY    ferrous sulfate tablet 325 mg  325 mg Oral BID WITH MEALS    Lactobacillus Acidoph & Bulgar (FLORANEX) tablet 2 Tab  2 Tab Oral BID    sertraline (ZOLOFT) tablet 25 mg  25 mg Oral QPM    sodium chloride (NS) flush 5-10 mL  5-10 mL IntraVENous Q8H    sodium chloride (NS) flush 5-10 mL  5-10 mL IntraVENous PRN    ondansetron (ZOFRAN) injection 4 mg  4 mg IntraVENous Q4H PRN            Data Review:     LABS: No results found for this or any previous visit (from the past 12 hour(s)).       Plan:     Principal Problem:    Symptomatic anemia (3/29/2017)    Active Problems:    Hypotension (9/3/2016)      Pancytopenia ([address] (9/3/2016)      Overview: HGB 7.8 ON ADMISSION      CKD (chronic kidney disease) requiring chronic dialysis ([address] (10/11/2016)      Dizziness (3/29/2017)      Aundrea-rectal abscess (4/9/2017)      High output ileostomy ([address] (7/26/2017)      1. ESRD - M, W, F via [facility]. Has LUE AVG with good thrill.       2. Chronic Hypotension.     3.  Pancytopenia     4. Hx of HIT     On dialysis today. Access cannulated with no difficulty running well.

## 2017-07-28 NOTE — PROGRESS NOTES
Patient is returning to [facility] this afternoon where he is in long-term care. He will transfer via [facility] at 15:15. Case Management will remain available to assist as needed.     Care Management Interventions  Transition of Care Consult (CM Consult): Discharge Planning  Discharge Durable Medical Equipment: No  Physical Therapy Consult: Yes  Occupational Therapy Consult: Yes  Speech Therapy Consult: No  Current Support Network: [address] [place] [address]  Confirm Follow Up Transport: Family  Plan discussed with Pt/Family/Caregiver: Yes  Freedom of Choice Offered: Yes  Discharge Location  Discharge Placement: Skilled nursing facility

## 2017-07-28 NOTE — DISCHARGE SUMMARY
Hospitalist Discharge Summary     Patient ID:  Santosh De La Rosa  [id number]  74 y.o.  1950  Admit date: 7/24/2017  2:38 PM  Discharge date and time: 7/28/2017  Attending: Lola Mcclellan MD  PCP:  Sima Chiang MD  Treatment Team: Attending Provider: Lola Mcclellan MD; Consulting Provider: Margy Kruger MD; Care Manager: Ave Goode. [place] [facility]; Utilization Review: Alli Other    Principal Diagnosis Symptomatic anemia   Principal Problem:    Symptomatic anemia (3/29/2017)    Active Problems:    Hypotension (9/3/2016)      Pancytopenia ([address] (9/3/2016)      Overview: HGB 7.8 ON ADMISSION      CKD (chronic kidney disease) requiring chronic dialysis ([address] (10/11/2016)      Dizziness (3/29/2017)      Aundrea-rectal abscess (4/9/2017)      High output ileostomy ([address] (7/26/2017)             Hospital Course:  Please refer to the admission H&P for details of presentation. In summary, 74 y. o. male with PMH of ESRD on HD and perirectal abscess requiring multiple hospitalizations who was sent to the ED from his care facility due to abnormal labs. Hgb was found to be 7.1 in the ED as well as dizziness and weakness - BP of 80s/40s in the ED. Admitted for symptomatic hypotension. Pt is chronically hypotensive - SBP in 90's to low 100's. Also found to be anemic during stay (hgb 7.1) secondary to anemia of chronic disease. Transfused 2 units prbc and given slow IVF with improvement. 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 Also seen by heme/onc for pancytopenia - will need to follow-up outpatient.  Also complains of painful broken left lower molar tooth - needs outpatient dental eval. No evidence of surrounding gum infection. Will discharge back to [facility].    Significant Diagnostic Studies:   Final result (Exam End: 7/24/2017  3:35 PM) Open        Study Result      Chest X-ray  7/24/2017 3:35 PM     Clinical indication:  Dizziness and fatigue. Anemia.      Comparison: 4/9/2017.     Findings: 2 views upright AP and 2 views sitting lateral chest. Stable right IJ  vas catheter terminating in the right atrium. Lungs are well-aerated and clear. No focal airspace opacities nor edema. No effusions. Incidental thoracic  ankylosis. Stable cardiomediastinal contour.     IMPRESSION  IMPRESSION:     1. No acute abnormality. Labs: Results:       Chemistry Recent Labs      07/26/17   0545   GLU  75   NA  142   K  3.6   CL  105   CO2  22   BUN  32*   CREA  3.49*   CA  7.9*   AGAP  15      CBC w/Diff Recent Labs      07/27/17   0703  07/26/17   0545   WBC  0.7*  0.8*   RBC  3.22*  3.02*   HGB  9.3*  8.8*   HCT  27.5*  25.5*   PLT  95*  111*   GRANS   --   12*   LYMPH   --   65*   EOS   --   1      Cardiac Enzymes No results for input(s): CPK, CKND1, MERVAT in the last 72 hours. No lab exists for component: CKRMB, TROIP   Coagulation No results for input(s): PTP, INR, APTT in the last 72 hours. No lab exists for component: INREXT    Lipid Panel Lab Results   Component Value Date/Time    Cholesterol, total 80 09/29/2016 02:43 AM    HDL Cholesterol 16 09/29/2016 02:43 AM    LDL, calculated 30 09/29/2016 02:43 AM    VLDL, calculated 34 09/29/2016 02:43 AM    Triglyceride 170 09/29/2016 02:43 AM    CHOL/HDL Ratio 5.0 09/29/2016 02:43 AM      BNP No results for input(s): BNPP in the last 72 hours. Liver Enzymes No results for input(s): TP, ALB, TBIL, AP, SGOT, GPT in the last 72 hours.     No lab exists for component: DBIL   Thyroid Studies Lab Results   Component Value Date/Time    T4, Total 5.7 10/02/2016 03:00 PM    T3 Uptake 34 10/02/2016 03:00 PM    TSH 2.760 03/30/2017 09:10 AM            Discharge Exam:  Visit Vitals    /59    Pulse 63    Temp 97.9 °F (36.6 °C)    Resp 16    Ht 5' 6\" (1.676 m)    Wt 64.5 kg (142 lb 1.6 oz)    SpO2 99%    BMI 22.94 kg/m2     General appearance: alert, cooperative, no distress, appears older than stated age. Lungs: clear to auscultation bilaterally  Heart: regular rate and rhythm, S1, S2 normal, no murmur, click, rub or gallop  Abdomen: soft, non-tender. Bowel sounds normal. No masses,  no organomegaly  Skin: chronic sacral wound well healed, no redness or drainage. Extremities: no cyanosis or edema  Neurologic: Grossly normal    Disposition: [facility]  Discharge Condition: stable  Patient Instructions:   Current Discharge Medication List      START taking these medications    Details   diphenoxylate-atropine (LOMOTIL) 2.5-0.025 mg per tablet Take 1 Tab by mouth daily. Max Daily Amount: 1 Tab. Qty: 1 Tab, Refills: 0         CONTINUE these medications which have NOT CHANGED    Details   Lactobacillus Acidoph & Bulgar (FLORANEX) 1 million cell tab tablet Take 2 Tabs by mouth two (2) times a day. Qty: 120 Tab, Refills: 0      sertraline (ZOLOFT) 25 mg tablet Take 1 Tab by mouth every evening. Indications: ANXIETY WITH DEPRESSION  Qty: 30 Tab, Refills: 0      albuterol (PROVENTIL VENTOLIN) 2.5 mg /3 mL (0.083 %) nebulizer solution 3 mL by Nebulization route every six (6) hours as needed for Wheezing. Qty: 24 Each, Refills: 0    Associated Diagnoses: Perirectal abscess      Cholecalciferol, Vitamin D3, (VITAMIN D3) 2,000 unit cap capsule Take 2,000 Units by mouth once. OXYGEN-AIR DELIVERY SYSTEMS Take 2 L/min by inhalation nightly. via NC      magnesium oxide (MAG-OX) 400 mg tablet Take 400 mg by mouth daily. ferrous sulfate 325 mg (65 mg iron) tablet Take 1 Tab by mouth two (2) times daily (with meals). Qty: 60 Tab, Refills: 0      acetaminophen (TYLENOL) 325 mg tablet Take 2 Tabs by mouth two (2) times daily as needed. Use only if necessary. Patient has history of alcoholic liver disease  Qty: 30 Tab, Refills: 0      epoetin miky (EPOGEN;PROCRIT) 10,000 unit/mL injection 1 mL by SubCUTAneous route every seven (7) days.  Indications: RENAL DIALYSIS  Qty: 1 Vial, Refills: 0      pantoprazole (PROTONIX) 40 mg tablet Take 1 Tab by mouth Before breakfast and dinner.   Qty: 60 Tab, Refills: 0             Activity: as tolerated  Diet: low salt      Follow-up  · PCp in 1-2 weeks,     Time spent to discharge patient 35 minutes  Signed:  Dylon Yao DO  7/28/2017  1:30 PM

## 2017-07-28 NOTE — PROGRESS NOTES
Returns to rm, alert, R perm patent, no active bleeding,.  MARA + T/B some tissue exposed, pink in color, no bleeding, remote tele in place, lunch offered

## 2017-07-28 NOTE — PROGRESS NOTES
Pt had a wonderful night. Pt slept quietly throughout the entire evening. Pt had no requests or needs that were voiced. Call light remaines within reach. Will continue to monitor. Bedside report to be given to oncoming nurse.

## 2017-07-28 NOTE — PROGRESS NOTES
Pt is resting patiently. Respirations are even and unlabored. No SOB or pain is reported at this time. No signs or symptoms of distress are noted. Colostomy is CDI. Call light is within reach. Will continue to monitor.

## 2017-07-28 NOTE — DIALYSIS
HD treatment initiated via left upper arm AVG without difficulty. Cannulated by Yonis Doss RN using 17 gauge needles for first time stick. VS stable, will continue to monitor during tx. No Heparin. Machine tests passed and alarms intact.

## 2017-07-29 ENCOUNTER — PATIENT OUTREACH (OUTPATIENT)
Dept: CASE MANAGEMENT | Age: 67
End: 2017-07-29

## 2017-07-29 NOTE — PROGRESS NOTES
Patient discharged to a [facility] facility. Patient will be included in weekly care coordination calls. Notified [facility] RN Care Manager Karl Oconnor of ED visit. This note will not be viewable in [address]

## 2017-07-31 LAB
BACTERIA SPEC CULT: NORMAL
BACTERIA SPEC CULT: NORMAL
SERVICE CMNT-IMP: NORMAL
SERVICE CMNT-IMP: NORMAL

## 2017-08-11 ENCOUNTER — HOSPITAL ENCOUNTER (OUTPATIENT)
Dept: LAB | Age: 67
Discharge: HOME | End: 2017-08-11
Payer: MEDICARE

## 2017-08-11 PROCEDURE — 86900 BLOOD TYPING SEROLOGIC ABO: CPT | Performed by: INTERNAL MEDICINE

## 2017-08-11 PROCEDURE — 36415 COLL VENOUS BLD VENIPUNCTURE: CPT | Performed by: INTERNAL MEDICINE

## 2017-08-11 PROCEDURE — 86923 COMPATIBILITY TEST ELECTRIC: CPT | Performed by: INTERNAL MEDICINE

## 2017-08-12 ENCOUNTER — HOSPITAL ENCOUNTER (OUTPATIENT)
Dept: INFUSION THERAPY | Age: 67
Discharge: HOME | End: 2017-08-12
Payer: COMMERCIAL

## 2017-08-12 VITALS
DIASTOLIC BLOOD PRESSURE: 54 MMHG | OXYGEN SATURATION: 99 % | RESPIRATION RATE: 18 BRPM | SYSTOLIC BLOOD PRESSURE: 100 MMHG | TEMPERATURE: 98 F | HEART RATE: 65 BPM

## 2017-08-12 PROCEDURE — 77030018667 ADMN ST IV BLD FENW -A

## 2017-08-12 PROCEDURE — 36430 TRANSFUSION BLD/BLD COMPNT: CPT

## 2017-08-12 PROCEDURE — 74011250636 HC RX REV CODE- 250/636

## 2017-08-12 PROCEDURE — P9016 RBC LEUKOCYTES REDUCED: HCPCS | Performed by: INTERNAL MEDICINE

## 2017-08-12 PROCEDURE — 74011250637 HC RX REV CODE- 250/637: Performed by: INTERNAL MEDICINE

## 2017-08-12 RX ORDER — DIPHENHYDRAMINE HCL 25 MG
25 CAPSULE ORAL ONCE
Status: COMPLETED | OUTPATIENT
Start: 2017-08-12 | End: 2017-08-12

## 2017-08-12 RX ORDER — SODIUM CHLORIDE 9 MG/ML
250 INJECTION, SOLUTION INTRAVENOUS ONCE
Status: COMPLETED | OUTPATIENT
Start: 2017-08-12 | End: 2017-08-12

## 2017-08-12 RX ORDER — ACETAMINOPHEN 325 MG/1
650 TABLET ORAL ONCE
Status: COMPLETED | OUTPATIENT
Start: 2017-08-12 | End: 2017-08-12

## 2017-08-12 RX ADMIN — SODIUM CHLORIDE 250 ML: 900 INJECTION, SOLUTION INTRAVENOUS at 07:40

## 2017-08-12 RX ADMIN — DIPHENHYDRAMINE HYDROCHLORIDE 25 MG: 25 CAPSULE ORAL at 07:37

## 2017-08-12 RX ADMIN — ACETAMINOPHEN 650 MG: 325 TABLET ORAL at 07:37

## 2017-08-12 NOTE — PROGRESS NOTES
Arrived to the [facility]. 2 units prbc's transfusion completed. Patient tolerated well. Any issues or concerns during appointment: NO.  Patient has follow up with PCP. Discharged via stretcher  by [facility] transport back to care facility.

## 2017-08-22 ENCOUNTER — HOSPITAL ENCOUNTER (OUTPATIENT)
Dept: LAB | Age: 67
Discharge: HOME | End: 2017-08-22
Payer: MEDICARE

## 2017-08-22 DIAGNOSIS — D61.818 PANCYTOPENIA (HCC): ICD-10-CM

## 2017-08-22 LAB
DIFFERENTIAL METHOD BLD: ABNORMAL
EOSINOPHIL # BLD: 0 K/UL (ref 0–0.8)
EOSINOPHIL NFR BLD MANUAL: 4 % (ref 1–8)
ERYTHROCYTE [DISTWIDTH] IN BLOOD BY AUTOMATED COUNT: 16.6 % (ref 11.9–14.6)
HCT VFR BLD AUTO: 17.7 % (ref 41.1–50.3)
HGB BLD-MCNC: 6 G/DL (ref 13.6–17.2)
LYMPHOCYTES # BLD: 0.5 K/UL (ref 0.5–4.6)
LYMPHOCYTES NFR BLD MANUAL: 78 % (ref 16–44)
MCH RBC QN AUTO: 28.8 PG (ref 26.1–32.9)
MCHC RBC AUTO-ENTMCNC: 33.9 G/DL (ref 31.4–35)
MCV RBC AUTO: 85.1 FL (ref 79.6–97.8)
MONOCYTES # BLD: 0 K/UL (ref 0.1–1.3)
MONOCYTES NFR BLD MANUAL: 8 % (ref 3–9)
NEUTS BAND NFR BLD MANUAL: 2 % (ref 0–10)
NEUTS SEG # BLD: 0.1 K/UL (ref 1.7–8.2)
NEUTS SEG NFR BLD MANUAL: 8 % (ref 47–75)
NRBC # BLD: 0.01 K/UL (ref 0–0.2)
PLATELET # BLD AUTO: 107 K/UL (ref 150–450)
PLATELET COMMENTS,PCOM: SLIGHT
PMV BLD AUTO: 9.5 FL (ref 10.8–14.1)
RBC # BLD AUTO: 2.08 M/UL (ref 4.23–5.67)
RBC MORPH BLD: ABNORMAL
RBC MORPH BLD: ABNORMAL
WBC # BLD AUTO: 0.6 K/UL (ref 4.3–11.1)
WBC MORPH BLD: ABNORMAL

## 2017-08-22 PROCEDURE — 86900 BLOOD TYPING SEROLOGIC ABO: CPT | Performed by: INTERNAL MEDICINE

## 2017-08-22 PROCEDURE — 86923 COMPATIBILITY TEST ELECTRIC: CPT | Performed by: INTERNAL MEDICINE

## 2017-08-22 PROCEDURE — 85025 COMPLETE CBC W/AUTO DIFF WBC: CPT | Performed by: INTERNAL MEDICINE

## 2017-08-22 PROCEDURE — 36415 COLL VENOUS BLD VENIPUNCTURE: CPT | Performed by: INTERNAL MEDICINE

## 2017-08-24 ENCOUNTER — HOSPITAL ENCOUNTER (OUTPATIENT)
Dept: INFUSION THERAPY | Age: 67
Discharge: HOME | End: 2017-08-24
Payer: MEDICARE

## 2017-08-24 VITALS
OXYGEN SATURATION: 95 % | WEIGHT: 138 LBS | BODY MASS INDEX: 20.92 KG/M2 | DIASTOLIC BLOOD PRESSURE: 61 MMHG | HEART RATE: 79 BPM | HEIGHT: 68 IN | SYSTOLIC BLOOD PRESSURE: 102 MMHG | RESPIRATION RATE: 18 BRPM | TEMPERATURE: 98.1 F

## 2017-08-24 PROCEDURE — 36430 TRANSFUSION BLD/BLD COMPNT: CPT

## 2017-08-24 PROCEDURE — P9016 RBC LEUKOCYTES REDUCED: HCPCS | Performed by: INTERNAL MEDICINE

## 2017-08-24 PROCEDURE — 77030018667 ADMN ST IV BLD FENW -A

## 2017-08-24 PROCEDURE — 74011250636 HC RX REV CODE- 250/636: Performed by: INTERNAL MEDICINE

## 2017-08-24 RX ORDER — SODIUM CHLORIDE 9 MG/ML
250 INJECTION, SOLUTION INTRAVENOUS AS NEEDED
Status: DISCONTINUED | OUTPATIENT
Start: 2017-08-24 | End: 2017-08-28 | Stop reason: HOSPADM

## 2017-08-24 RX ADMIN — SODIUM CHLORIDE 250 ML: 900 INJECTION, SOLUTION INTRAVENOUS at 08:25

## 2017-08-24 NOTE — PROGRESS NOTES
Problem: Patient Education: Go to Patient Education Activity  Goal: Patient/Family Education  Outcome: Progressing Towards Goal  Verbalizes/demonstrates understanding of a blood transfusion.

## 2017-08-24 NOTE — PROGRESS NOTES
Pt arrived via stretcher today at 0800, to receive 2 units of blood. Pt tolerated without difficulty. Patient discharged via stretcher accompanied by EMS personnel. Instructed to notify physician of any problems, questions or concerns. Allowed opportunity for patient/family to ask questions. Verbalized understanding. Next appointment is Sept 13 at [address] with [address] Munira Granados.

## 2017-08-25 ENCOUNTER — APPOINTMENT (OUTPATIENT)
Dept: INFUSION THERAPY | Age: 67
End: 2017-08-25
Payer: MEDICARE

## 2017-08-29 ENCOUNTER — HOSPITAL ENCOUNTER (OUTPATIENT)
Dept: CT IMAGING | Age: 67
Discharge: HOME | End: 2017-08-29
Attending: NURSE PRACTITIONER
Payer: MEDICARE

## 2017-08-29 VITALS
DIASTOLIC BLOOD PRESSURE: 56 MMHG | RESPIRATION RATE: 18 BRPM | SYSTOLIC BLOOD PRESSURE: 88 MMHG | OXYGEN SATURATION: 100 % | TEMPERATURE: 98.4 F | HEART RATE: 88 BPM

## 2017-08-29 DIAGNOSIS — D61.818 PANCYTOPENIA (HCC): ICD-10-CM

## 2017-08-29 LAB
ERYTHROCYTE [DISTWIDTH] IN BLOOD BY AUTOMATED COUNT: 17.2 % (ref 11.9–14.6)
HCT VFR BLD AUTO: 19.8 % (ref 41.1–50.3)
HGB BLD-MCNC: 6.7 G/DL (ref 13.6–17.2)
MCH RBC QN AUTO: 28.3 PG (ref 26.1–32.9)
MCHC RBC AUTO-ENTMCNC: 33.8 G/DL (ref 31.4–35)
MCV RBC AUTO: 83.5 FL (ref 79.6–97.8)
PLATELET # BLD AUTO: 91 K/UL (ref 150–450)
PMV BLD AUTO: 9.4 FL (ref 10.8–14.1)
RBC # BLD AUTO: 2.37 M/UL (ref 4.23–5.67)
WBC # BLD AUTO: 0.7 K/UL (ref 4.3–11.1)

## 2017-08-29 PROCEDURE — 85027 COMPLETE CBC AUTOMATED: CPT | Performed by: NURSE PRACTITIONER

## 2017-08-29 PROCEDURE — 88342 IMHCHEM/IMCYTCHM 1ST ANTB: CPT

## 2017-08-29 PROCEDURE — 88311 DECALCIFY TISSUE: CPT | Performed by: NURSE PRACTITIONER

## 2017-08-29 PROCEDURE — 36415 COLL VENOUS BLD VENIPUNCTURE: CPT | Performed by: NURSE PRACTITIONER

## 2017-08-29 PROCEDURE — 88184 FLOWCYTOMETRY/ TC 1 MARKER: CPT | Performed by: NURSE PRACTITIONER

## 2017-08-29 PROCEDURE — 88341 IMHCHEM/IMCYTCHM EA ADD ANTB: CPT | Performed by: NURSE PRACTITIONER

## 2017-08-29 PROCEDURE — 88364 INSITU HYBRIDIZATION (FISH): CPT

## 2017-08-29 PROCEDURE — 88342 IMHCHEM/IMCYTCHM 1ST ANTB: CPT | Performed by: NURSE PRACTITIONER

## 2017-08-29 PROCEDURE — 88313 SPECIAL STAINS GROUP 2: CPT | Performed by: NURSE PRACTITIONER

## 2017-08-29 PROCEDURE — 88305 TISSUE EXAM BY PATHOLOGIST: CPT | Performed by: NURSE PRACTITIONER

## 2017-08-29 PROCEDURE — 74011000250 HC RX REV CODE- 250: Performed by: RADIOLOGY

## 2017-08-29 PROCEDURE — 88185 FLOWCYTOMETRY/TC ADD-ON: CPT | Performed by: NURSE PRACTITIONER

## 2017-08-29 PROCEDURE — 88312 SPECIAL STAINS GROUP 1: CPT | Performed by: NURSE PRACTITIONER

## 2017-08-29 PROCEDURE — 88341 IMHCHEM/IMCYTCHM EA ADD ANTB: CPT

## 2017-08-29 PROCEDURE — 38221 DX BONE MARROW BIOPSIES: CPT

## 2017-08-29 PROCEDURE — 88365 INSITU HYBRIDIZATION (FISH): CPT

## 2017-08-29 RX ORDER — LIDOCAINE HYDROCHLORIDE 20 MG/ML
20-300 INJECTION, SOLUTION INFILTRATION; PERINEURAL
Status: DISCONTINUED | OUTPATIENT
Start: 2017-08-29 | End: 2017-09-02 | Stop reason: HOSPADM

## 2017-08-29 RX ADMIN — LIDOCAINE HYDROCHLORIDE 100 MG: 20 INJECTION, SOLUTION INFILTRATION; PERINEURAL at 10:52

## 2017-08-29 NOTE — IP AVS SNAPSHOT
Louis Hampton 
 
 
 [address] 
[phone] Patient: Jez Vogel MRN: [id number] EFREM:[birth year] You are allergic to the following Allergen Reactions Heparin Analogues Other (comments) H. I.T positive Recent Documentation Smoking Status Former Smoker Emergency Contacts Name Discharge Info Relation Home Work Mobile Juany Liu  Brother [24] [phone] Godfrey Mendez  Sister [23] [phone] Yolanda Garvey  Sister [23] [phone] About your hospitalization You were admitted on:  August 29, 2017 You last received care in the:  [facility] SCAN You were discharged on:  August 29, 2017 Unit phone number:  [phone] Why you were hospitalized Your primary diagnosis was:  Not on File Providers Seen During Your Hospitalizations Provider Role Specialty Primary office phone Amairani Hallman NP Attending Provider [facility] [phone] Your Primary Care Physician (PCP) Primary Care Physician Office Phone Office Fax Hal Gandhi [phone] Follow-up Information None Your Appointments Tuesday August 29, 2017 11:00 AM EDT  
CT BX BONE MARROW NDL TROCAR with [facility] CT UNIT 2, [facility] IR RADIOLOGIST RESOURCE, [facility] NURSING, [facility] IR ANES NOT REQUIRED  
[facility] RADIOLOGY CT SCAN ([address] [place] [address]) [address]  
[phone] Interventional Radiology procedure completed with CT guidance. Referring Physicians: 1) Fax H&P/recent office notes and lab work, no older than 30 days (CBC, BMP, PT/PTT) to Interventional Radiology to facilitate prompt scheduling.  2)Patients with contrast dye allergies must be pre medicated prior to arrival. 3) Obtain clearance to hold blood thinners from prescribing Physician and give Patient instructions prior to arrival. 4)Hold oral diabetic medications the day of the procedure. If Insulin is required, take 1/2 dose the day of the procedure. 5) Pt should not eat or drink anything past midnight 6) Pt to arrive 1 hour to 1.5 hours early depending upon sedation method. 7) Responsible adult  required to drive Patient home after recovery period. Recovery period can vary depending on sedation and patient condition. 8) Interventional Radiology Scheduling can be contacted at [phone] Wednesday September 13, 2017  7:45 AM EDT  
LAB with [address]  
18[address] [facility] OUTREACH INSURANCE [facility] [address] [place] [address]  
[phone] Wednesday September 13, 2017  8:15 AM EDT Follow Up with MD Maddison Ashton [facility] Hematology and Oncology [facility] MIESHA/ Rolly Raygoza [address] [place] [id number]  
[phone] Current Discharge Medication List  
  
ASK your doctor about these medications Dose & Instructions Dispensing Information Comments Morning Noon Evening Bedtime  
 acetaminophen 325 mg tablet Commonly known as:  TYLENOL Your last dose was: Your next dose is:    
   
   
 Dose:  650 mg Take 2 Tabs by mouth two (2) times daily as needed. Use only if necessary. Patient has history of alcoholic liver disease Quantity:  30 Tab Refills:  0  
     
   
   
   
  
 albuterol 2.5 mg /3 mL (0.083 %) nebulizer solution Commonly known as:  PROVENTIL VENTOLIN Your last dose was: Your next dose is:    
   
   
 Dose:  2.5 mg  
3 mL by Nebulization route every six (6) hours as needed for Wheezing. Quantity:  24 Each Refills:  0  
     
   
   
   
  
 diphenoxylate-atropine 2.5-0.025 mg per tablet Commonly known as:  LOMOTIL Your last dose was: Your next dose is:    
   
   
 Dose:  1 Tab Take 1 Tab by mouth daily. Max Daily Amount: 1 Tab. Quantity:  1 Tab Refills:  0  
     
   
   
   
  
 epoetin miky 10,000 unit/mL injection Commonly known as:  EPOGEN;PROCRIT Your last dose was: Your next dose is:    
   
   
 Dose:  73991 Units 1 mL by SubCUTAneous route every seven (7) days. Indications: RENAL DIALYSIS Quantity:  1 Vial  
Refills:  0  
     
   
   
   
  
 ferrous sulfate 325 mg (65 mg iron) tablet Your last dose was: Your next dose is:    
   
   
 Dose:  325 mg Take 1 Tab by mouth two (2) times daily (with meals). Quantity:  60 Tab Refills:  0 Lactobacillus Acidoph & Bulgar 1 million cell Tab tablet Commonly known as:  Salas Doan Your last dose was: Your next dose is:    
   
   
 Dose:  2 Tab Take 2 Tabs by mouth two (2) times a day. Quantity:  120 Tab Refills:  0  
     
   
   
   
  
 magnesium oxide 400 mg tablet Commonly known as:  MAG-OX Your last dose was: Your next dose is:    
   
   
 Dose:  400 mg Take 400 mg by mouth daily. Refills:  0 OXYGEN-AIR DELIVERY SYSTEMS Your last dose was: Your next dose is:    
   
   
 Dose:  2 L/min Take 2 L/min by inhalation nightly. via NC Refills:  0  
     
   
   
   
  
 pantoprazole 40 mg tablet Commonly known as:  PROTONIX Your last dose was: Your next dose is:    
   
   
 Dose:  40 mg Take 1 Tab by mouth Before breakfast and dinner. Quantity:  60 Tab Refills:  0  
     
   
   
   
  
 sertraline 25 mg tablet Commonly known as:  ZOLOFT Your last dose was: Your next dose is:    
   
   
 Dose:  25 mg Take 1 Tab by mouth every evening. Indications: ANXIETY WITH DEPRESSION Quantity:  30 Tab Refills:  0  
     
   
   
   
  
 VITAMIN D3 2,000 unit Cap capsule Generic drug:  Cholecalciferol (Vitamin D3) Your last dose was: Your next dose is:    
   
   
 Dose:  2000 Units Take 2,000 Units by mouth once. Refills:  0 Discharge Instructions [address] Department of Interventional Radiology [facility] 
[phone] Office 
[phone] Fax BIOPSY DISCHARGE INSTRUCTIONS General Instructions: A biopsy is the removal of a small piece of tissue for microscopic examination or testing. Healthy tissue can be obtained for the purpose of tissue-type matching for transplants. Unhealthy tissues are more commonly biopsied to diagnose disease. Lung Biopsy: A needle lung biopsy is performed when there is a mass discovered in the lung area. The most serious risk is infection, bleeding, and pneumothorax (a collapsed lung). Signs of pneumothorax include shortness of breath, rapid heart rate, and blueness of the skin. If any of these occur, call 911. Liver Biopsy: This test helps detect cancer, infections, and the cause of an enlargement of the liver or elevated liver enzymes. It also helps to diagnose a number of liver diseases. The pain associated with the procedure may be felt in the shoulder. The risks include internal bleeding, pneumothorax, and injury to the surrounding organs. Renal Biopsy: This procedure is sometimes done to evaluate a transplanted kidney. It is also used to evaluate unexplained decrease in kidney function, blood, or protein in the urine. You may see bright red blood in the urine the first 24 hours after the test. If the bleeding lasts longer, you need to call your doctor. There is a risk of infection and bleeding into the muscle, which may cause soreness. Spinal Biopsy: This test is sometimes done in conjunction with other procedures. Your back will be sore, as we are taking a small sample of bone, which is slightly more difficult to sample than tissue. General Biopsy: A mass can grow in any area of the body, and we are taking a specimen as ordered by your doctor. The risks are the same. They include bleeding, pain, and infection. Home Care Instructions: You may resume your regular diet and medication regimen. Do not drink alcohol, drive, or make any important legal decisions in the next 24 hours. Do not lift anything heavier than a gallon of milk until the soreness goes away. You may use over the counter acetaminophen or ibuprofen for the soreness. You may apply an ice pack to the affected area for 20-30 minutes at time for the first 24 hours. After that, you may apply a heat pack. Call If: You should call your Physician and/or the Radiology Nurse if you have any questions or concerns about the biopsy site. Call if you should have increased pain, fever, redness, drainage, or bleeding more than a small spot on the bandage. Follow-Up Instructions: Please see your ordering doctor as he/she has requested. Interventional Radiology General Nurse Discharge After general anesthesia or intravenous sedation, for 24 hours or while taking prescription Narcotics: · Limit your activities · Do not drive and operate hazardous machinery · Do not make important personal or business decisions · Do  not drink alcoholic beverages · If you have not urinated within 8 hours after discharge, please contact your surgeon on call. * Please give a list of your current medications to your Primary Care Provider. * Please update this list whenever your medications are discontinued, doses are 
   changed, or new medications (including over-the-counter products) are added. * Please carry medication information at all times in case of emergency situations. These are general instructions for a healthy lifestyle: No smoking/ No tobacco products/ Avoid exposure to second hand smoke Surgeon General's Warning:  Quitting smoking now greatly reduces serious risk to your health. Obesity, smoking, and sedentary lifestyle greatly increases your risk for illness A healthy diet, regular physical exercise & weight monitoring are important for maintaining a healthy lifestyle You may be retaining fluid if you have a history of heart failure or if you experience any of the following symptoms:  Weight gain of 3 pounds or more overnight or 5 pounds in a week, increased swelling in our hands or feet or shortness of breath while lying flat in bed. Please call your doctor as soon as you notice any of these symptoms; do not wait until your next office visit. Recognize signs and symptoms of STROKE: 
F-face looks uneven A-arms unable to move or move unevenly S-speech slurred or non-existent T-time-call 911 as soon as signs and symptoms begin-DO NOT go Back to bed or wait to see if you get better-TIME IS BRAIN. To Reach Us: If you have any questions about your procedure, please call the Interventional Radiology department at [phone]. After business hours (5pm) and weekends, call the answering service at [phone] and ask for the Radiologist on call to be paged. Si tiene Preguntas acerca del procedimiento, por favor llame al departamento de Radiología Intervencional al [phone]. Después de horas de oficina (5 pm) y los fines de [place], llamar al Dav Hoar de llamadas al [phone] y pregunte por el Radiologo de [facility]. Patient Signature: 
Date: 8/29/2017 Discharging Nurse: Janessa Novak RN Discharge Orders None Introducing [facility]! Moshe Levy introduces [employer] patient portal. Now you can access parts of your medical record, email your doctor's office, and request medication refills online. 1. In your internet browser, go to [email or web address] 2. Click on the First Time User? Click Here link in the Sign In box. You will see the New Member Sign Up page. 3. Enter your e[employer] Access Code exactly as it appears below. You will not need to use this code after youve completed the sign-up process. If you do not sign up before the expiration date, you must request a new code. · [employer] Access Code: [email or web address] Expires: 9/24/2017  1:03 PM 
 
4. Enter the last four digits of your Social Security Number (xxxx) and Date of Birth (mm/dd/yyyy) as indicated and click Submit. You will be taken to the next sign-up page. 5. Create a eMinor ID. This will be your eMinor login ID and cannot be changed, so think of one that is secure and easy to remember. 6. Create a [employer] password. You can change your password at any time. 7. Enter your Password Reset Question and Answer. This can be used at a later time if you forget your password. 8. Enter your e-mail address. You will receive e-mail notification when new information is available in [address] 9. Click Sign Up. You can now view and download portions of your medical record. 10. Click the Download Summary menu link to download a portable copy of your medical information. If you have questions, please visit the Frequently Asked Questions section of the [employer] website. Remember, [employer] is NOT to be used for urgent needs. For medical emergencies, dial 911. Now available from your iPhone and Android! General Information Please provide this summary of care documentation to your next provider. Patient Signature:  ____________________________________________________________ Date:  ____________________________________________________________  
  
Larri Hazard Provider Signature:  ____________________________________________________________ Date:  ____________________________________________________________

## 2017-08-29 NOTE — PROGRESS NOTES
TRANSFER - OUT REPORT:    Verbal report given to DEVAN Castorena(name) on Mendel Abed  being transferred to recovery  (unit) for routine post - op       Report consisted of patients Situation, Background, Assessment and   Recommendations(SBAR). Information from the following report(s) SBAR and Procedure Summary was reviewed with the receiving nurse. Lines:       Opportunity for questions and clarification was provided.       Patient transported with:   Registered Nurse

## 2017-08-29 NOTE — PROCEDURES
Department of Interventional Radiology  [phone]        Interventional Radiology Brief Procedure Note    Patient: Preet Marcano MRN: [id number]  SSN: [id number]    YOB: 1950  Age: 74 y.o.   Sex: male      Date of Procedure: 8/29/2017    Pre-Procedure Diagnosis: pancytopenia    Post-Procedure Diagnosis: SAME    Procedure(s): Image Guided Biopsy    Brief Description of Procedure: CT guided left iliac bone marrow aspiration and core bx    Performed By: Jose A Gaona PA-C     Assistants: None    Anesthesia:Lidocaine    Estimated Blood Loss: Less than 10ml    Specimens: core and aspirate    Implants: None    Findings: no post bx bleeding    Complications: None    Recommendations: bedrest     Follow Up: referring MD    Signed By: Jose A Gaona PA-C     August 29, 2017

## 2017-08-29 NOTE — PROGRESS NOTES
Patient to CT room for procedure. Patient awake and alert, verbalized name, , and procedure to be performed. Patient moved to procedure table with assistance.

## 2017-08-29 NOTE — DISCHARGE INSTRUCTIONS
[address]  Department of Interventional Radiology  [facility]  [phone] Office  [phone] Fax    BIOPSY DISCHARGE INSTRUCTIONS    General Instructions:     A biopsy is the removal of a small piece of tissue for microscopic examination or testing. Healthy tissue can be obtained for the purpose of tissue-type matching for transplants. Unhealthy tissues are more commonly biopsied to diagnose disease. Lung Biopsy:     A needle lung biopsy is performed when there is a mass discovered in the lung area. The most serious risk is infection, bleeding, and pneumothorax (a collapsed lung). Signs of pneumothorax include shortness of breath, rapid heart rate, and blueness of the skin. If any of these occur, call 911. Liver Biopsy: This test helps detect cancer, infections, and the cause of an enlargement of the liver or elevated liver enzymes. It also helps to diagnose a number of liver diseases. The pain associated with the procedure may be felt in the shoulder. The risks include internal bleeding, pneumothorax, and injury to the surrounding organs. Renal Biopsy: This procedure is sometimes done to evaluate a transplanted kidney. It is also used to evaluate unexplained decrease in kidney function, blood, or protein in the urine. You may see bright red blood in the urine the first 24 hours after the test. If the bleeding lasts longer, you need to call your doctor. There is a risk of infection and bleeding into the muscle, which may cause soreness. Spinal Biopsy: This test is sometimes done in conjunction with other procedures. Your back will be sore, as we are taking a small sample of bone, which is slightly more difficult to sample than tissue. General Biopsy:     A mass can grow in any area of the body, and we are taking a specimen as ordered by your doctor. The risks are the same.  They include bleeding, pain, and infection. Home Care Instructions: You may resume your regular diet and medication regimen. Do not drink alcohol, drive, or make any important legal decisions in the next 24 hours. Do not lift anything heavier than a gallon of milk until the soreness goes away. You may use over the counter acetaminophen or ibuprofen for the soreness. You may apply an ice pack to the affected area for 20-30 minutes at time for the first 24 hours. After that, you may apply a heat pack. Call If: You should call your Physician and/or the Radiology Nurse if you have any questions or concerns about the biopsy site. Call if you should have increased pain, fever, redness, drainage, or bleeding more than a small spot on the bandage. Follow-Up Instructions: Please see your ordering doctor as he/she has requested. Interventional Radiology General Nurse Discharge    After general anesthesia or intravenous sedation, for 24 hours or while taking prescription Narcotics:  · Limit your activities  · Do not drive and operate hazardous machinery  · Do not make important personal or business decisions  · Do  not drink alcoholic beverages  · If you have not urinated within 8 hours after discharge, please contact your surgeon on call. * Please give a list of your current medications to your Primary Care Provider. * Please update this list whenever your medications are discontinued, doses are     changed, or new medications (including over-the-counter products) are added. * Please carry medication information at all times in case of emergency situations. These are general instructions for a healthy lifestyle:    No smoking/ No tobacco products/ Avoid exposure to second hand smoke  Surgeon General's Warning:  Quitting smoking now greatly reduces serious risk to your health.     Obesity, smoking, and sedentary lifestyle greatly increases your risk for illness  A healthy diet, regular physical exercise & weight monitoring are important for maintaining a healthy lifestyle    You may be retaining fluid if you have a history of heart failure or if you experience any of the following symptoms:  Weight gain of 3 pounds or more overnight or 5 pounds in a week, increased swelling in our hands or feet or shortness of breath while lying flat in bed. Please call your doctor as soon as you notice any of these symptoms; do not wait until your next office visit. Recognize signs and symptoms of STROKE:  F-face looks uneven    A-arms unable to move or move unevenly    S-speech slurred or non-existent    T-time-call 911 as soon as signs and symptoms begin-DO NOT go       Back to bed or wait to see if you get better-TIME IS BRAIN. To Reach Us: If you have any questions about your procedure, please call the Interventional Radiology department at [phone]. After business hours (5pm) and weekends, call the answering service at [phone] and ask for the Radiologist on call to be paged. Si tiene Preguntas acerca del procedimiento, por favor llame al departamento de Radiología Intervencional al [phone]. Después de horas de oficina (5 pm) y los fines de [place], llamar al Mike Diallo de llamadas al [phone] y pregunte por el Radiologo de [facility].           Patient Signature:  Date: 8/29/2017  Discharging Nurse: Maria D Tsang RN

## 2017-08-30 ENCOUNTER — APPOINTMENT (OUTPATIENT)
Dept: INFUSION THERAPY | Age: 67
End: 2017-08-30
Payer: MEDICARE

## 2017-08-31 ENCOUNTER — HOSPITAL ENCOUNTER (OUTPATIENT)
Dept: INFUSION THERAPY | Age: 67
Discharge: HOME | End: 2017-08-31
Payer: MEDICARE

## 2017-08-31 VITALS
RESPIRATION RATE: 18 BRPM | HEART RATE: 86 BPM | TEMPERATURE: 98.4 F | SYSTOLIC BLOOD PRESSURE: 106 MMHG | DIASTOLIC BLOOD PRESSURE: 58 MMHG

## 2017-08-31 PROCEDURE — 86900 BLOOD TYPING SEROLOGIC ABO: CPT | Performed by: INTERNAL MEDICINE

## 2017-08-31 PROCEDURE — 77030018667 ADMN ST IV BLD FENW -A

## 2017-08-31 PROCEDURE — 74011250636 HC RX REV CODE- 250/636: Performed by: INTERNAL MEDICINE

## 2017-08-31 PROCEDURE — P9016 RBC LEUKOCYTES REDUCED: HCPCS | Performed by: INTERNAL MEDICINE

## 2017-08-31 PROCEDURE — 36430 TRANSFUSION BLD/BLD COMPNT: CPT

## 2017-08-31 PROCEDURE — 86923 COMPATIBILITY TEST ELECTRIC: CPT | Performed by: INTERNAL MEDICINE

## 2017-08-31 RX ORDER — SODIUM CHLORIDE 9 MG/ML
250 INJECTION, SOLUTION INTRAVENOUS AS NEEDED
Status: DISCONTINUED | OUTPATIENT
Start: 2017-08-31 | End: 2017-09-04 | Stop reason: HOSPADM

## 2017-08-31 RX ORDER — SODIUM CHLORIDE 0.9 % (FLUSH) 0.9 %
10 SYRINGE (ML) INJECTION AS NEEDED
Status: ACTIVE | OUTPATIENT
Start: 2017-08-31 | End: 2017-08-31

## 2017-08-31 RX ADMIN — Medication 10 ML: at 07:20

## 2017-08-31 RX ADMIN — SODIUM CHLORIDE 250 ML: 900 INJECTION, SOLUTION INTRAVENOUS at 11:00

## 2017-08-31 NOTE — PROGRESS NOTES
Massage THERAPY: Daily Note    Referring Physician: Jeniffer Potter MD  Medical/Referring Diagnosis: Anemia in chronic kidney disease [D63.1]   Precautions/Allergies: Heparin analogues  SUBJECTIVE:  Present Symptoms: no pain, complained of severe dryness in feet. Pre-Treatment Pain: 1/10  Past Medical History:    Mr. Genny Wiggins  has a past medical history of Alcohol abuse, daily use (09/03/2016); Anemia (9/3/2016); Chronic kidney disease; HIT (heparin-induced thrombocytopenia) ([facility] [address] Hypertension; Hypokalemia (9/3/2016); and Tobacco abuse (9/3/2016). Mr. Genny Wiggins  has a past surgical history that includes debride necrotic skin/ tissue, abd wall (09/04/2016); abdomen surgery proc unlisted; and other surgical.  Current Medications:       Current Outpatient Prescriptions:     diphenoxylate-atropine (LOMOTIL) 2.5-0.025 mg per tablet, Take 1 Tab by mouth daily. Max Daily Amount: 1 Tab., Disp: 1 Tab, Rfl: 0    Lactobacillus Acidoph & Bulgar (FLORANEX) 1 million cell tab tablet, Take 2 Tabs by mouth two (2) times a day., Disp: 120 Tab, Rfl: 0    sertraline (ZOLOFT) 25 mg tablet, Take 1 Tab by mouth every evening. Indications: ANXIETY WITH DEPRESSION, Disp: 30 Tab, Rfl: 0    albuterol (PROVENTIL VENTOLIN) 2.5 mg /3 mL (0.083 %) nebulizer solution, 3 mL by Nebulization route every six (6) hours as needed for Wheezing., Disp: 24 Each, Rfl: 0    Cholecalciferol, Vitamin D3, (VITAMIN D3) 2,000 unit cap capsule, Take 2,000 Units by mouth once., Disp: , Rfl:     OXYGEN-AIR DELIVERY SYSTEMS, Take 2 L/min by inhalation nightly. via NC, Disp: , Rfl:     magnesium oxide (MAG-OX) 400 mg tablet, Take 400 mg by mouth daily. , Disp: , Rfl:     ferrous sulfate 325 mg (65 mg iron) tablet, Take 1 Tab by mouth two (2) times daily (with meals). , Disp: 60 Tab, Rfl: 0    acetaminophen (TYLENOL) 325 mg tablet, Take 2 Tabs by mouth two (2) times daily as needed. Use only if necessary.  Patient has history of alcoholic liver disease, Disp: 30 Tab, Rfl: 0    epoetin miky (EPOGEN;PROCRIT) 10,000 unit/mL injection, 1 mL by SubCUTAneous route every seven (7) days. Indications: RENAL DIALYSIS, Disp: 1 Vial, Rfl: 0    pantoprazole (PROTONIX) 40 mg tablet, Take 1 Tab by mouth Before breakfast and dinner., Disp: 60 Tab, Rfl: 0    Current Facility-Administered Medications:     0.9% sodium chloride infusion 250 mL, 250 mL, IntraVENous, PRN, Graciela Wilkins MD    central line flush (saline) syringe 10 mL, 10 mL, InterCATHeter, PRN, Graciela Wilkins MD, 10 mL at 08/31/17 0720    Facility-Administered Medications Ordered in Other Encounters:     lidocaine (XYLOCAINE) 20 mg/mL (2 %) injection  mg,  mg, SubCUTAneous, Multiple, Tg Sosa MD, 100 mg at 08/29/17 1052       OBJECTIVE/ASSESSMENT:  Objective Measure: Tool Used: Subjective Units of Distress Scale (SUDS)  Score:  Pre-Treatment: 0/100 Post-Treatment: 0/100   Interpretation of Score: Rating of patient's distress, fear, anxiety or discomfort on a scale of 0-100. Observations of Patient:  Enjoyed massage  Response To Treatment: Tracy Vasquez it [place] [facility] [employer]. \" Lotion on feet and legs very helpful   Post-Treatment Pain: 1/10  TREATMENT:    (In addition to Assessment/Re-Assessment sessions the following treatments were rendered)  Treatment Provided:  [x]  Soft tissue massage  []  Healing Touch   Location: bilateral lower legs and feet  Patient Position: supine and seated  Time: 20 minutes    PLAN OF CARE:    []  I will follow up with this patient as needed. [x]  No follow up visit necessary.     Thank you for this referral.  Chris Curry

## 2017-08-31 NOTE — PROGRESS NOTES
Pt arrived via stretcher today at 0708, to receive 2 units of blood. Pt tolerated without difficulty. Patient discharged via stretcher accompanied by EMS personnel. Instructed to notify physician of any problems, questions or concerns. Allowed opportunity for patient/family to ask questions. Verbalized understanding. Next appointment is Sept 13 at . [address] with Dr Ernie Carbone.

## 2022-12-07 NOTE — DIALYSIS
HD treatment completed without complication. Total of 0.6 kg removed. Pt hypotensive throughout treatment. Needles x 2 removed and pressure dressing applied. VS stable, NAD. Transport contacted for return to room.      Patient Vitals for the past 4 hrs:   Pulse BP   07/28/17 1100 - 103/59   07/28/17 1045 63 91/55   07/28/17 1036 66 92/57   07/28/17 1002 63 (!) 86/55   07/28/17 0928 64 92/52   07/28/17 0854 73 (!) 83/51   07/28/17 0828 63 90/53   07/28/17 0759 69 90/54   07/28/17 0719 71 95/51 No
